# Patient Record
Sex: FEMALE | Race: WHITE | Employment: FULL TIME | URBAN - METROPOLITAN AREA
[De-identification: names, ages, dates, MRNs, and addresses within clinical notes are randomized per-mention and may not be internally consistent; named-entity substitution may affect disease eponyms.]

---

## 2017-12-19 ENCOUNTER — TELEPHONE (OUTPATIENT)
Dept: PHARMACY | Facility: CLINIC | Age: 42
End: 2017-12-19

## 2017-12-19 NOTE — TELEPHONE ENCOUNTER
Called patient to inform missing office note. NO answer left nuha GALLOWAY  [address] [facility]  Direct: [phone]  Department, toll free: [phone], option 7

## 2018-01-15 ENCOUNTER — CLINICAL DOCUMENTATION (OUTPATIENT)
Dept: PHARMACY | Facility: CLINIC | Age: 43
End: 2018-01-15

## 2018-04-04 DIAGNOSIS — R52 PAIN: Primary | ICD-10-CM

## 2018-04-06 ENCOUNTER — OFFICE VISIT (OUTPATIENT)
Dept: ORTHOPEDIC SURGERY | Age: 43
End: 2018-04-06
Payer: COMMERCIAL

## 2018-04-06 ENCOUNTER — HOSPITAL ENCOUNTER (OUTPATIENT)
Dept: GENERAL RADIOLOGY | Age: 43
Discharge: HOME | End: 2018-04-08
Payer: COMMERCIAL

## 2018-04-06 VITALS
TEMPERATURE: 98 F | RESPIRATION RATE: 18 BRPM | DIASTOLIC BLOOD PRESSURE: 65 MMHG | SYSTOLIC BLOOD PRESSURE: 111 MMHG | WEIGHT: 279.2 LBS | BODY MASS INDEX: 47.66 KG/M2 | HEART RATE: 89 BPM | HEIGHT: 64 IN

## 2018-04-06 DIAGNOSIS — M17.12 PRIMARY OSTEOARTHRITIS OF LEFT KNEE: Primary | ICD-10-CM

## 2018-04-06 DIAGNOSIS — R52 PAIN: ICD-10-CM

## 2018-04-06 PROCEDURE — 99203 OFFICE O/P NEW LOW 30 MIN: CPT

## 2018-04-06 PROCEDURE — 99203 OFFICE O/P NEW LOW 30 MIN: CPT | Performed by: ORTHOPAEDIC SURGERY

## 2018-04-10 ENCOUNTER — TELEPHONE (OUTPATIENT)
Dept: ORTHOPEDIC SURGERY | Age: 43
End: 2018-04-10

## 2018-04-11 ENCOUNTER — TELEPHONE (OUTPATIENT)
Dept: PHARMACY | Facility: CLINIC | Age: 43
End: 2018-04-11

## 2018-04-25 ENCOUNTER — PATIENT MESSAGE (OUTPATIENT)
Dept: PHARMACY | Facility: CLINIC | Age: 43
End: 2018-04-25

## 2018-05-18 ENCOUNTER — EMPLOYEE WELLNESS (OUTPATIENT)
Dept: OTHER | Age: 43
End: 2018-05-18

## 2018-05-19 LAB
CHOLESTEROL, TOTAL: 188 MG/DL (ref 0–199)
GLUCOSE BLD-MCNC: 132 MG/DL (ref 74–107)
HBA1C MFR BLD: 9.7 % (ref 4.8–5.9)
HDLC SERPL-MCNC: 59 MG/DL
LDL CHOLESTEROL CALCULATED: 112 MG/DL (ref 0–99)
TRIGL SERPL-MCNC: 83 MG/DL (ref 0–149)

## 2018-05-29 VITALS — WEIGHT: 277 LBS | BODY MASS INDEX: 47.55 KG/M2

## 2018-06-06 ENCOUNTER — OFFICE VISIT (OUTPATIENT)
Dept: ORTHOPEDIC SURGERY | Age: 43
End: 2018-06-06
Payer: COMMERCIAL

## 2018-06-06 VITALS
SYSTOLIC BLOOD PRESSURE: 124 MMHG | HEIGHT: 64 IN | HEART RATE: 82 BPM | DIASTOLIC BLOOD PRESSURE: 70 MMHG | BODY MASS INDEX: 46.1 KG/M2 | RESPIRATION RATE: 18 BRPM | WEIGHT: 270 LBS

## 2018-06-06 DIAGNOSIS — M17.12 PRIMARY OSTEOARTHRITIS OF LEFT KNEE: Primary | ICD-10-CM

## 2018-06-06 PROCEDURE — 20610 DRAIN/INJ JOINT/BURSA W/O US: CPT | Performed by: NURSE PRACTITIONER

## 2018-06-06 PROCEDURE — 99213 OFFICE O/P EST LOW 20 MIN: CPT | Performed by: NURSE PRACTITIONER

## 2018-06-06 PROCEDURE — 6360000002 HC RX W HCPCS

## 2018-06-06 PROCEDURE — 99212 OFFICE O/P EST SF 10 MIN: CPT | Performed by: NURSE PRACTITIONER

## 2018-06-06 PROCEDURE — 2500000003 HC RX 250 WO HCPCS

## 2018-06-08 RX ORDER — LIDOCAINE HYDROCHLORIDE 10 MG/ML
4 INJECTION, SOLUTION INFILTRATION; PERINEURAL ONCE
Status: COMPLETED | OUTPATIENT
Start: 2018-06-08 | End: 2018-06-08

## 2018-06-08 RX ORDER — LIDOCAINE HYDROCHLORIDE 10 MG/ML
2.5 INJECTION, SOLUTION INFILTRATION; PERINEURAL ONCE
Status: COMPLETED | OUTPATIENT
Start: 2018-06-08 | End: 2018-06-08

## 2018-06-08 RX ORDER — BUPIVACAINE HYDROCHLORIDE 2.5 MG/ML
4 INJECTION, SOLUTION EPIDURAL; INFILTRATION; INTRACAUDAL ONCE
Status: COMPLETED | OUTPATIENT
Start: 2018-06-08 | End: 2018-06-08

## 2018-06-08 RX ORDER — TRIAMCINOLONE ACETONIDE 40 MG/ML
40 INJECTION, SUSPENSION INTRA-ARTICULAR; INTRAMUSCULAR ONCE
Status: COMPLETED | OUTPATIENT
Start: 2018-06-08 | End: 2018-06-08

## 2018-06-08 RX ADMIN — BUPIVACAINE HYDROCHLORIDE 10 MG: 2.5 INJECTION, SOLUTION EPIDURAL; INFILTRATION; INTRACAUDAL at 11:41

## 2018-06-08 RX ADMIN — LIDOCAINE HYDROCHLORIDE 4 ML: 10 INJECTION, SOLUTION INFILTRATION; PERINEURAL at 11:42

## 2018-06-08 RX ADMIN — LIDOCAINE HYDROCHLORIDE 2.5 ML: 10 INJECTION, SOLUTION INFILTRATION; PERINEURAL at 11:41

## 2018-06-08 RX ADMIN — TRIAMCINOLONE ACETONIDE 40 MG: 40 INJECTION, SUSPENSION INTRA-ARTICULAR; INTRAMUSCULAR at 11:43

## 2018-06-13 ENCOUNTER — HOSPITAL ENCOUNTER (OUTPATIENT)
Dept: PHYSICAL THERAPY | Age: 43
Setting detail: THERAPIES SERIES
Discharge: HOME | End: 2018-06-13
Payer: COMMERCIAL

## 2018-06-13 PROCEDURE — G8978 MOBILITY CURRENT STATUS: HCPCS

## 2018-06-13 PROCEDURE — G8979 MOBILITY GOAL STATUS: HCPCS

## 2018-06-13 PROCEDURE — 97161 PT EVAL LOW COMPLEX 20 MIN: CPT

## 2018-06-20 ENCOUNTER — HOSPITAL ENCOUNTER (OUTPATIENT)
Dept: PHYSICAL THERAPY | Age: 43
Setting detail: THERAPIES SERIES
Discharge: HOME | End: 2018-06-20
Payer: COMMERCIAL

## 2018-06-20 PROCEDURE — 97113 AQUATIC THERAPY/EXERCISES: CPT

## 2018-06-22 ENCOUNTER — HOSPITAL ENCOUNTER (OUTPATIENT)
Dept: PHYSICAL THERAPY | Age: 43
Setting detail: THERAPIES SERIES
Discharge: HOME | End: 2018-06-22
Payer: COMMERCIAL

## 2018-06-22 PROCEDURE — 97113 AQUATIC THERAPY/EXERCISES: CPT

## 2018-06-25 ENCOUNTER — HOSPITAL ENCOUNTER (OUTPATIENT)
Age: 43
Discharge: HOME | End: 2018-06-25
Payer: COMMERCIAL

## 2018-06-25 LAB
ALBUMIN SERPL-MCNC: 4.6 G/DL (ref 3.5–5.2)
ALP BLD-CCNC: 120 U/L (ref 35–104)
ALT SERPL-CCNC: 21 U/L (ref 0–32)
ANION GAP SERPL CALCULATED.3IONS-SCNC: 13 MMOL/L (ref 7–16)
AST SERPL-CCNC: 21 U/L (ref 0–31)
BILIRUB SERPL-MCNC: 0.6 MG/DL (ref 0–1.2)
BUN BLDV-MCNC: 12 MG/DL (ref 6–20)
CALCIUM SERPL-MCNC: 9.7 MG/DL (ref 8.6–10.2)
CHLORIDE BLD-SCNC: 98 MMOL/L (ref 98–107)
CHOLESTEROL, FASTING: 225 MG/DL (ref 0–199)
CO2: 27 MMOL/L (ref 22–29)
CREAT SERPL-MCNC: 0.6 MG/DL (ref 0.5–1)
GFR AFRICAN AMERICAN: >60
GFR NON-AFRICAN AMERICAN: >60 ML/MIN/1.73
GLUCOSE FASTING: 148 MG/DL (ref 74–109)
HBA1C MFR BLD: 9.7 % (ref 4–5.6)
HCT VFR BLD CALC: 43.7 % (ref 34–48)
HDLC SERPL-MCNC: 71 MG/DL
HEMOGLOBIN: 14.1 G/DL (ref 11.5–15.5)
LDL CHOLESTEROL CALCULATED: 126 MG/DL (ref 0–99)
MCH RBC QN AUTO: 29.1 PG (ref 26–35)
MCHC RBC AUTO-ENTMCNC: 32.3 % (ref 32–34.5)
MCV RBC AUTO: 90.1 FL (ref 80–99.9)
PDW BLD-RTO: 13.1 FL (ref 11.5–15)
PLATELET # BLD: 340 E9/L (ref 130–450)
PMV BLD AUTO: 11.4 FL (ref 7–12)
POTASSIUM SERPL-SCNC: 4.5 MMOL/L (ref 3.5–5)
RBC # BLD: 4.85 E12/L (ref 3.5–5.5)
SODIUM BLD-SCNC: 138 MMOL/L (ref 132–146)
TOTAL PROTEIN: 7.6 G/DL (ref 6.4–8.3)
TRIGLYCERIDE, FASTING: 141 MG/DL (ref 0–149)
TSH SERPL DL<=0.05 MIU/L-ACNC: 8.92 UIU/ML (ref 0.27–4.2)
VLDLC SERPL CALC-MCNC: 28 MG/DL
WBC # BLD: 8.3 E9/L (ref 4.5–11.5)

## 2018-06-25 PROCEDURE — 84443 ASSAY THYROID STIM HORMONE: CPT

## 2018-06-25 PROCEDURE — 83036 HEMOGLOBIN GLYCOSYLATED A1C: CPT

## 2018-06-25 PROCEDURE — 36415 COLL VENOUS BLD VENIPUNCTURE: CPT

## 2018-06-25 PROCEDURE — 85027 COMPLETE CBC AUTOMATED: CPT

## 2018-06-25 PROCEDURE — 80061 LIPID PANEL: CPT

## 2018-06-25 PROCEDURE — 80053 COMPREHEN METABOLIC PANEL: CPT

## 2018-06-27 ENCOUNTER — HOSPITAL ENCOUNTER (OUTPATIENT)
Dept: PHYSICAL THERAPY | Age: 43
Setting detail: THERAPIES SERIES
Discharge: HOME | End: 2018-06-27
Payer: COMMERCIAL

## 2018-06-27 PROCEDURE — 97113 AQUATIC THERAPY/EXERCISES: CPT

## 2018-07-02 ENCOUNTER — OFFICE VISIT (OUTPATIENT)
Dept: FAMILY MEDICINE CLINIC | Age: 43
End: 2018-07-02
Payer: COMMERCIAL

## 2018-07-02 VITALS
RESPIRATION RATE: 18 BRPM | BODY MASS INDEX: 46.95 KG/M2 | WEIGHT: 275 LBS | DIASTOLIC BLOOD PRESSURE: 64 MMHG | HEIGHT: 64 IN | HEART RATE: 85 BPM | OXYGEN SATURATION: 97 % | TEMPERATURE: 98.7 F | SYSTOLIC BLOOD PRESSURE: 127 MMHG

## 2018-07-02 DIAGNOSIS — J01.90 ACUTE SINUSITIS, RECURRENCE NOT SPECIFIED, UNSPECIFIED LOCATION: Primary | ICD-10-CM

## 2018-07-02 PROCEDURE — 99213 OFFICE O/P EST LOW 20 MIN: CPT | Performed by: PHYSICIAN ASSISTANT

## 2018-07-02 RX ORDER — BENZONATATE 100 MG/1
100 CAPSULE ORAL 3 TIMES DAILY PRN
Qty: 15 CAPSULE | Refills: 0 | Status: SHIPPED | OUTPATIENT
Start: 2018-07-02 | End: 2018-07-07

## 2018-07-02 RX ORDER — AMOXICILLIN AND CLAVULANATE POTASSIUM 875; 125 MG/1; MG/1
1 TABLET, FILM COATED ORAL 2 TIMES DAILY
Qty: 20 TABLET | Refills: 0 | Status: SHIPPED | OUTPATIENT
Start: 2018-07-02 | End: 2018-07-12

## 2018-07-11 ENCOUNTER — HOSPITAL ENCOUNTER (OUTPATIENT)
Dept: PHYSICAL THERAPY | Age: 43
Setting detail: THERAPIES SERIES
Discharge: HOME | End: 2018-07-11
Payer: COMMERCIAL

## 2018-07-11 PROCEDURE — 97113 AQUATIC THERAPY/EXERCISES: CPT

## 2018-07-11 NOTE — PROGRESS NOTES
Patient tolerated treatment well [x] Patient limited by fatique  [x] Patient limited by pain [] Patient limited by other medical complications  [x] Other: Treatment session continues to focus on pt's gait mechanics -proper weight shifting with heel to toe pattern repeated with VCs per PTA-Pt able to comply 50%. Pt lacks stance time on LLE due to same 5-6/10 pain in pool.       Prognosis: [x] Good [] Fair  [] Poor    Patient Requires Follow-up: [x] Yes  [] No    Plan:   [x] Continue per plan of care [] Alter current plan (see comments)  [] Plan of care initiated [] Hold pending MD visit [] Discharge          Electronically signed by:  Dany Gunderson PTA [id number]

## 2018-07-13 ENCOUNTER — HOSPITAL ENCOUNTER (OUTPATIENT)
Dept: PHYSICAL THERAPY | Age: 43
Setting detail: THERAPIES SERIES
Discharge: HOME | End: 2018-07-13
Payer: COMMERCIAL

## 2018-07-13 PROCEDURE — 97113 AQUATIC THERAPY/EXERCISES: CPT

## 2018-07-20 ENCOUNTER — APPOINTMENT (OUTPATIENT)
Dept: PHYSICAL THERAPY | Age: 43
End: 2018-07-20
Payer: COMMERCIAL

## 2018-07-25 ENCOUNTER — HOSPITAL ENCOUNTER (OUTPATIENT)
Dept: PHYSICAL THERAPY | Age: 43
Setting detail: THERAPIES SERIES
Discharge: HOME | End: 2018-07-25
Payer: COMMERCIAL

## 2018-07-27 ENCOUNTER — HOSPITAL ENCOUNTER (OUTPATIENT)
Dept: PHYSICAL THERAPY | Age: 43
Setting detail: THERAPIES SERIES
Discharge: HOME | End: 2018-07-27
Payer: COMMERCIAL

## 2018-07-27 PROCEDURE — 97113 AQUATIC THERAPY/EXERCISES: CPT

## 2018-07-27 NOTE — PROGRESS NOTES
[facility]  Phone: [phone] Fax: [phone]        Physical Therapy Aquatic Flow Sheet   Date:  2018    Patient Name:  Jay Gibbons    :  1975  Restrictions/Precautions:    Diagnosis:  Referral Date : 18  Treatment Diagnosis:Left Knee Pain     Insurance/Certification information:  Med [place]   Referring Physician:   Dr Heydi Green of care signed (Y/N):    Visit# / total visits: 6/10  Pain level: 4-5/10 (same)  Electronically signed by:  Aquiles Cook PTA  Time In:1600  Time Out:1640  Key  B= Belt DB= Dumbells T= Theratube   H= Hydrotone N= Noodles W= Weights   P= Paddles S= Speedo equipment K= Kickboard     Exercises/Activities   Warm-up/Amb  minutes  Exercises      Slow forward  6  HR/TR  20x    Slow sideways  6  Marches  20x    Slow backwards  6  Mini-squats  20x    Medium forward    4-way SLR  20x    Medium sideways    Hip circles/fig 8      Small shuffle    Hamstring curls      Jog    Knee extension      Braiding    Pelvic tilts      Bicycling  6  Scap squeezes      Marching  6  Shoulder flex/ext      Functional    Shoulder abd/add      Step    Shoulder H. abd/add      Lifting    Shoulder IR/ER      Hand to opp knee    Rowing      Push down squat    Bilateral pull down      UE PNF    Push/pull      LE PNF    Push downs      Wall push ups    Arm circles      SLS    Elbow flex/ext          Chin tuck      Stretching    UT shrugs/rolls      Gastroc/Soleus  3 x 30 sec  Rocking horse      Hamstring  3 x 30 sec        SKTC    Other      Piriformis          Hip flexor  3 x 30 sec        Ladder pull          Pec stretch          Post deltoid         Timed Code Treatment Minutes:  30    Total Treatment Minutes:  40    Treatment/Activity Tolerance:  [x] Patient tolerated treatment well [] Patient limited by fatique  [] Patient limited by pain [] Patient limited by other medical complications  [x] Other:Increased endurance as pt able to increase 3 walking patterns from 5 min to 6 without complication    Prognosis: [x] Good [] Fair  [] Poor    Patient Requires Follow-up: [x] Yes  [] No    Plan:   [x] Continue per plan of care [] Alter current plan (see comments)  [] Plan of care initiated [] Hold pending MD visit [] Discharge          Electronically signed by:  Keely Hinojosa PTA [id number]

## 2018-08-03 ENCOUNTER — HOSPITAL ENCOUNTER (OUTPATIENT)
Dept: PHYSICAL THERAPY | Age: 43
Setting detail: THERAPIES SERIES
Discharge: HOME | End: 2018-08-03
Payer: COMMERCIAL

## 2018-08-03 PROCEDURE — 97113 AQUATIC THERAPY/EXERCISES: CPT

## 2018-08-08 ENCOUNTER — HOSPITAL ENCOUNTER (OUTPATIENT)
Dept: PHYSICAL THERAPY | Age: 43
Setting detail: THERAPIES SERIES
Discharge: HOME | End: 2018-08-08
Payer: COMMERCIAL

## 2018-08-08 PROCEDURE — 97113 AQUATIC THERAPY/EXERCISES: CPT

## 2018-08-10 ENCOUNTER — HOSPITAL ENCOUNTER (OUTPATIENT)
Age: 43
Discharge: HOME | End: 2018-08-10
Payer: COMMERCIAL

## 2018-08-10 LAB
ALBUMIN SERPL-MCNC: 4.1 G/DL (ref 3.5–5.2)
ALP BLD-CCNC: 98 U/L (ref 35–104)
ALT SERPL-CCNC: 18 U/L (ref 0–32)
ANION GAP SERPL CALCULATED.3IONS-SCNC: 9 MMOL/L (ref 7–16)
AST SERPL-CCNC: 18 U/L (ref 0–31)
BILIRUB SERPL-MCNC: 0.5 MG/DL (ref 0–1.2)
BUN BLDV-MCNC: 13 MG/DL (ref 6–20)
CALCIUM SERPL-MCNC: 9 MG/DL (ref 8.6–10.2)
CHLORIDE BLD-SCNC: 99 MMOL/L (ref 98–107)
CHOLESTEROL, TOTAL: 185 MG/DL (ref 0–199)
CO2: 29 MMOL/L (ref 22–29)
CREAT SERPL-MCNC: 0.6 MG/DL (ref 0.5–1)
CREATININE URINE: 51 MG/DL (ref 29–226)
GFR AFRICAN AMERICAN: >60
GFR NON-AFRICAN AMERICAN: >60 ML/MIN/1.73
GLUCOSE BLD-MCNC: 190 MG/DL (ref 74–109)
HDLC SERPL-MCNC: 68 MG/DL
LDL CHOLESTEROL CALCULATED: 99 MG/DL (ref 0–99)
MICROALBUMIN UR-MCNC: <12 MG/L
MICROALBUMIN/CREAT UR-RTO: ABNORMAL (ref 0–30)
POTASSIUM SERPL-SCNC: 4.7 MMOL/L (ref 3.5–5)
SODIUM BLD-SCNC: 137 MMOL/L (ref 132–146)
T4 FREE: 1.8 NG/DL (ref 0.93–1.7)
TOTAL PROTEIN: 6.9 G/DL (ref 6.4–8.3)
TRIGL SERPL-MCNC: 88 MG/DL (ref 0–149)
TSH SERPL DL<=0.05 MIU/L-ACNC: 3.34 UIU/ML (ref 0.27–4.2)
VLDLC SERPL CALC-MCNC: 18 MG/DL

## 2018-08-10 PROCEDURE — 83036 HEMOGLOBIN GLYCOSYLATED A1C: CPT

## 2018-08-10 PROCEDURE — 84443 ASSAY THYROID STIM HORMONE: CPT

## 2018-08-10 PROCEDURE — 80061 LIPID PANEL: CPT

## 2018-08-10 PROCEDURE — 82570 ASSAY OF URINE CREATININE: CPT

## 2018-08-10 PROCEDURE — 80053 COMPREHEN METABOLIC PANEL: CPT

## 2018-08-10 PROCEDURE — 36415 COLL VENOUS BLD VENIPUNCTURE: CPT

## 2018-08-10 PROCEDURE — 82044 UR ALBUMIN SEMIQUANTITATIVE: CPT

## 2018-08-10 PROCEDURE — 84439 ASSAY OF FREE THYROXINE: CPT

## 2018-08-10 PROCEDURE — 83721 ASSAY OF BLOOD LIPOPROTEIN: CPT

## 2018-08-11 LAB — HBA1C MFR BLD: 8.8 % (ref 4–5.6)

## 2018-08-15 ENCOUNTER — HOSPITAL ENCOUNTER (OUTPATIENT)
Dept: PHYSICAL THERAPY | Age: 43
Setting detail: THERAPIES SERIES
Discharge: HOME | End: 2018-08-15
Payer: COMMERCIAL

## 2018-08-15 PROCEDURE — 97113 AQUATIC THERAPY/EXERCISES: CPT

## 2018-08-15 NOTE — PROGRESS NOTES
[facility]  Phone: [phone] Fax: [phone]        Physical Therapy Aquatic Flow Sheet   Date:  8/15/2018    Patient Name:  Aba Barr    :  1975  Visit# / total visits: 8/10  Restrictions/Precautions:    Diagnosis:  Referral Date : 18  Treatment Diagnosis:Left Knee Pain     Insurance/Certification information:  Med [place]   Referring Physician:   Dr Patel [facility] of care signed (Y/N):      Pain level: 3/10 (better)  Electronically signed by:  Devante Gibbs PTA  Time In:1600  Time Out:1650  Key  B= Belt DB= Dumbells T= Theratube   H= Hydrotone N= Noodles W= Weights   P= Paddles S= Speedo equipment K= Kickboard     Exercises/Activities   Warm-up/Amb  minutes  Exercises      Slow forward  6  HR/TR  20x    Slow sideways  6  Marches  20x    Slow backwards  6  Mini-squats  20x    Medium forward    4-way SLR  20x    Medium sideways    Hip circles/fig 8      Small shuffle    Hamstring curls      Jog    Knee extension      Braiding    Pelvic tilts      Bicycling  6  Scap squeezes      Marching  6  Shoulder flex/ext      Functional    Shoulder abd/add      Step    Shoulder H. abd/add      Lifting    Shoulder IR/ER      Hand to opp knee    Rowing      Push down squat    Bilateral pull down      UE PNF    Push/pull      LE PNF    Push downs      Wall push ups    Arm circles      SLS    Elbow flex/ext        +PF stretch  Chin tuck      Stretching  + calf stretch  UT shrugs/rolls      Gastroc/Soleus  3 x 30 sec  Rocking horse      Hamstring  3 x 30 sec        SKTC    Other      Piriformis          Hip flexor  3 x 30 sec        Ladder pull          Pec stretch          Post deltoid         Timed Code Treatment Minutes:  30    Total Treatment Minutes:  40    Treatment/Activity Tolerance:  [x] Patient tolerated treatment well [] Patient limited by fatique  [] Patient limited by pain [] Patient limited by other medical complications  [x] Other:gait mechanics appear

## 2018-08-16 LAB
Lab: NORMAL
REPORT: NORMAL
THIS TEST SENT TO: NORMAL

## 2018-08-17 ENCOUNTER — APPOINTMENT (OUTPATIENT)
Dept: PHYSICAL THERAPY | Age: 43
End: 2018-08-17
Payer: COMMERCIAL

## 2018-09-10 DIAGNOSIS — M17.12 PRIMARY OSTEOARTHRITIS OF LEFT KNEE: Primary | ICD-10-CM

## 2018-09-12 ENCOUNTER — HOSPITAL ENCOUNTER (OUTPATIENT)
Dept: GENERAL RADIOLOGY | Age: 43
Discharge: HOME | End: 2018-09-14
Payer: COMMERCIAL

## 2018-09-12 ENCOUNTER — OFFICE VISIT (OUTPATIENT)
Dept: ORTHOPEDIC SURGERY | Age: 43
End: 2018-09-12
Payer: COMMERCIAL

## 2018-09-12 VITALS
WEIGHT: 275 LBS | BODY MASS INDEX: 46.95 KG/M2 | HEIGHT: 64 IN | SYSTOLIC BLOOD PRESSURE: 126 MMHG | HEART RATE: 87 BPM | DIASTOLIC BLOOD PRESSURE: 64 MMHG

## 2018-09-12 DIAGNOSIS — M17.12 PRIMARY OSTEOARTHRITIS OF LEFT KNEE: Primary | ICD-10-CM

## 2018-09-12 DIAGNOSIS — M17.12 PRIMARY OSTEOARTHRITIS OF LEFT KNEE: ICD-10-CM

## 2018-09-12 PROCEDURE — 73560 X-RAY EXAM OF KNEE 1 OR 2: CPT

## 2018-09-12 PROCEDURE — 99213 OFFICE O/P EST LOW 20 MIN: CPT | Performed by: NURSE PRACTITIONER

## 2018-09-12 PROCEDURE — 99212 OFFICE O/P EST SF 10 MIN: CPT | Performed by: NURSE PRACTITIONER

## 2018-09-12 NOTE — LETTER
[address]  [facility] [address]  Phone: [phone]  Fax: [phone]           Go Teran CNP      September 12, 2018     Patient: Manpreet Chatterjee   YOB: 1975   Date of Visit: 9/12/2018       To Whom It May Concern: It is my medical opinion that Manpreet Chatterjee requires a disability parking placard for the following reasons:  She has limited walking ability due to an orthopedic condition. Duration of need: 6 months    If you have any questions or concerns, please don't hesitate to call.     Sincerely,        Go Teran CNP

## 2018-09-12 NOTE — PROGRESS NOTES
weight loss program- Dr Shaw Esteban  She will think about  brace-educational pamphlet given  Follow up when knee starts to bother you again     Face-to-face time 15 minutes, greater than 50% in counseling, education, and coordination of care. I have personally performed and/or participated in the history, exam, medical decision making, and procedures and agree with all pertinent clinical information. I have also reviewed and agree with the comprehensive medical history, medications and allergies unless otherwise noted.     Ramon Sheldon, CNP

## 2018-09-21 ENCOUNTER — HOSPITAL ENCOUNTER (OUTPATIENT)
Age: 43
Setting detail: OBSERVATION
Discharge: HOME | End: 2018-09-22
Attending: EMERGENCY MEDICINE | Admitting: INTERNAL MEDICINE
Payer: COMMERCIAL

## 2018-09-21 ENCOUNTER — NURSE TRIAGE (OUTPATIENT)
Dept: OTHER | Facility: CLINIC | Age: 43
End: 2018-09-21

## 2018-09-21 ENCOUNTER — APPOINTMENT (OUTPATIENT)
Dept: GENERAL RADIOLOGY | Age: 43
End: 2018-09-21
Payer: COMMERCIAL

## 2018-09-21 ENCOUNTER — TELEPHONE (OUTPATIENT)
Dept: CARDIOLOGY CLINIC | Age: 43
End: 2018-09-21

## 2018-09-21 DIAGNOSIS — R07.9 CHEST PAIN, UNSPECIFIED TYPE: Primary | ICD-10-CM

## 2018-09-21 LAB
ANION GAP SERPL CALCULATED.3IONS-SCNC: 13 MMOL/L (ref 7–16)
BASOPHILS ABSOLUTE: 0.03 E9/L (ref 0–0.2)
BASOPHILS RELATIVE PERCENT: 0.4 % (ref 0–2)
BUN BLDV-MCNC: 12 MG/DL (ref 6–20)
CALCIUM SERPL-MCNC: 9.4 MG/DL (ref 8.6–10.2)
CHLORIDE BLD-SCNC: 95 MMOL/L (ref 98–107)
CO2: 26 MMOL/L (ref 22–29)
CREAT SERPL-MCNC: 0.5 MG/DL (ref 0.5–1)
EOSINOPHILS ABSOLUTE: 0.09 E9/L (ref 0.05–0.5)
EOSINOPHILS RELATIVE PERCENT: 1.2 % (ref 0–6)
FOLATE: 11.2 NG/ML (ref 4.8–24.2)
GFR AFRICAN AMERICAN: >60
GFR NON-AFRICAN AMERICAN: >60 ML/MIN/1.73
GLUCOSE BLD-MCNC: 308 MG/DL (ref 74–109)
HCT VFR BLD CALC: 40.5 % (ref 34–48)
HEMOGLOBIN: 13.2 G/DL (ref 11.5–15.5)
IMMATURE GRANULOCYTES #: 0.04 E9/L
IMMATURE GRANULOCYTES %: 0.5 % (ref 0–5)
LYMPHOCYTES ABSOLUTE: 1.13 E9/L (ref 1.5–4)
LYMPHOCYTES RELATIVE PERCENT: 14.5 % (ref 20–42)
MAGNESIUM: 2 MG/DL (ref 1.6–2.6)
MCH RBC QN AUTO: 29.5 PG (ref 26–35)
MCHC RBC AUTO-ENTMCNC: 32.6 % (ref 32–34.5)
MCV RBC AUTO: 90.4 FL (ref 80–99.9)
MONOCYTES ABSOLUTE: 0.74 E9/L (ref 0.1–0.95)
MONOCYTES RELATIVE PERCENT: 9.5 % (ref 2–12)
NEUTROPHILS ABSOLUTE: 5.79 E9/L (ref 1.8–7.3)
NEUTROPHILS RELATIVE PERCENT: 73.9 % (ref 43–80)
PDW BLD-RTO: 12.6 FL (ref 11.5–15)
PHOSPHORUS: 4 MG/DL (ref 2.5–4.5)
PLATELET # BLD: 313 E9/L (ref 130–450)
PMV BLD AUTO: 11.5 FL (ref 7–12)
POTASSIUM SERPL-SCNC: 5.1 MMOL/L (ref 3.5–5)
RBC # BLD: 4.48 E12/L (ref 3.5–5.5)
SODIUM BLD-SCNC: 134 MMOL/L (ref 132–146)
TROPONIN: <0.01 NG/ML (ref 0–0.03)
TROPONIN: <0.01 NG/ML (ref 0–0.03)
TSH SERPL DL<=0.05 MIU/L-ACNC: 3.17 UIU/ML (ref 0.27–4.2)
VITAMIN B-12: 1605 PG/ML (ref 211–946)
WBC # BLD: 7.8 E9/L (ref 4.5–11.5)

## 2018-09-21 PROCEDURE — 87486 CHLMYD PNEUM DNA AMP PROBE: CPT

## 2018-09-21 PROCEDURE — 87798 DETECT AGENT NOS DNA AMP: CPT

## 2018-09-21 PROCEDURE — 83735 ASSAY OF MAGNESIUM: CPT

## 2018-09-21 PROCEDURE — 87040 BLOOD CULTURE FOR BACTERIA: CPT

## 2018-09-21 PROCEDURE — 84443 ASSAY THYROID STIM HORMONE: CPT

## 2018-09-21 PROCEDURE — 80048 BASIC METABOLIC PNL TOTAL CA: CPT

## 2018-09-21 PROCEDURE — 87633 RESP VIRUS 12-25 TARGETS: CPT

## 2018-09-21 PROCEDURE — 36415 COLL VENOUS BLD VENIPUNCTURE: CPT

## 2018-09-21 PROCEDURE — G0378 HOSPITAL OBSERVATION PER HR: HCPCS

## 2018-09-21 PROCEDURE — 2580000003 HC RX 258: Performed by: INTERNAL MEDICINE

## 2018-09-21 PROCEDURE — 85025 COMPLETE CBC W/AUTO DIFF WBC: CPT

## 2018-09-21 PROCEDURE — 84100 ASSAY OF PHOSPHORUS: CPT

## 2018-09-21 PROCEDURE — 82746 ASSAY OF FOLIC ACID SERUM: CPT

## 2018-09-21 PROCEDURE — 87581 M.PNEUMON DNA AMP PROBE: CPT

## 2018-09-21 PROCEDURE — 99285 EMERGENCY DEPT VISIT HI MDM: CPT

## 2018-09-21 PROCEDURE — 6370000000 HC RX 637 (ALT 250 FOR IP): Performed by: EMERGENCY MEDICINE

## 2018-09-21 PROCEDURE — 71045 X-RAY EXAM CHEST 1 VIEW: CPT

## 2018-09-21 PROCEDURE — 84484 ASSAY OF TROPONIN QUANT: CPT

## 2018-09-21 PROCEDURE — 82607 VITAMIN B-12: CPT

## 2018-09-21 PROCEDURE — 6370000000 HC RX 637 (ALT 250 FOR IP): Performed by: INTERNAL MEDICINE

## 2018-09-21 RX ORDER — ASPIRIN 81 MG/1
81 TABLET, CHEWABLE ORAL NIGHTLY
Status: DISCONTINUED | OUTPATIENT
Start: 2018-09-21 | End: 2018-09-22 | Stop reason: HOSPADM

## 2018-09-21 RX ORDER — ACETAMINOPHEN 325 MG/1
650 TABLET ORAL EVERY 4 HOURS PRN
Status: DISCONTINUED | OUTPATIENT
Start: 2018-09-21 | End: 2018-09-21 | Stop reason: SDUPTHER

## 2018-09-21 RX ORDER — ALPRAZOLAM 0.25 MG/1
0.5 TABLET ORAL NIGHTLY PRN
Status: DISCONTINUED | OUTPATIENT
Start: 2018-09-21 | End: 2018-09-22 | Stop reason: HOSPADM

## 2018-09-21 RX ORDER — RAMIPRIL 5 MG/1
10 CAPSULE ORAL DAILY
Status: DISCONTINUED | OUTPATIENT
Start: 2018-09-21 | End: 2018-09-22 | Stop reason: HOSPADM

## 2018-09-21 RX ORDER — NITROGLYCERIN 0.4 MG/1
0.4 TABLET SUBLINGUAL EVERY 5 MIN PRN
Status: DISCONTINUED | OUTPATIENT
Start: 2018-09-21 | End: 2018-09-22 | Stop reason: HOSPADM

## 2018-09-21 RX ORDER — SODIUM CHLORIDE 0.9 % (FLUSH) 0.9 %
10 SYRINGE (ML) INJECTION EVERY 12 HOURS SCHEDULED
Status: DISCONTINUED | OUTPATIENT
Start: 2018-09-21 | End: 2018-09-22 | Stop reason: HOSPADM

## 2018-09-21 RX ORDER — SODIUM CHLORIDE 9 MG/ML
INJECTION, SOLUTION INTRAVENOUS CONTINUOUS
Status: DISCONTINUED | OUTPATIENT
Start: 2018-09-21 | End: 2018-09-22 | Stop reason: HOSPADM

## 2018-09-21 RX ORDER — DOCUSATE SODIUM 100 MG/1
100 CAPSULE, LIQUID FILLED ORAL DAILY
Status: DISCONTINUED | OUTPATIENT
Start: 2018-09-21 | End: 2018-09-22 | Stop reason: HOSPADM

## 2018-09-21 RX ORDER — PANTOPRAZOLE SODIUM 40 MG/1
40 TABLET, DELAYED RELEASE ORAL
Status: DISCONTINUED | OUTPATIENT
Start: 2018-09-22 | End: 2018-09-22 | Stop reason: HOSPADM

## 2018-09-21 RX ORDER — ACETAMINOPHEN 325 MG/1
650 TABLET ORAL EVERY 4 HOURS PRN
Status: DISCONTINUED | OUTPATIENT
Start: 2018-09-21 | End: 2018-09-22 | Stop reason: HOSPADM

## 2018-09-21 RX ORDER — CELECOXIB 100 MG/1
200 CAPSULE ORAL DAILY
Status: DISCONTINUED | OUTPATIENT
Start: 2018-09-21 | End: 2018-09-22 | Stop reason: HOSPADM

## 2018-09-21 RX ORDER — DEXTROSE MONOHYDRATE 50 MG/ML
100 INJECTION, SOLUTION INTRAVENOUS PRN
Status: DISCONTINUED | OUTPATIENT
Start: 2018-09-21 | End: 2018-09-22 | Stop reason: HOSPADM

## 2018-09-21 RX ORDER — DEXTROSE MONOHYDRATE 25 G/50ML
12.5 INJECTION, SOLUTION INTRAVENOUS PRN
Status: DISCONTINUED | OUTPATIENT
Start: 2018-09-21 | End: 2018-09-22 | Stop reason: HOSPADM

## 2018-09-21 RX ORDER — SIMVASTATIN 40 MG
40 TABLET ORAL NIGHTLY
Status: DISCONTINUED | OUTPATIENT
Start: 2018-09-21 | End: 2018-09-22 | Stop reason: HOSPADM

## 2018-09-21 RX ORDER — ASPIRIN 81 MG/1
324 TABLET, CHEWABLE ORAL ONCE
Status: COMPLETED | OUTPATIENT
Start: 2018-09-21 | End: 2018-09-21

## 2018-09-21 RX ORDER — ONDANSETRON 2 MG/ML
4 INJECTION INTRAMUSCULAR; INTRAVENOUS EVERY 6 HOURS PRN
Status: DISCONTINUED | OUTPATIENT
Start: 2018-09-21 | End: 2018-09-22 | Stop reason: HOSPADM

## 2018-09-21 RX ORDER — NICOTINE POLACRILEX 4 MG
15 LOZENGE BUCCAL PRN
Status: DISCONTINUED | OUTPATIENT
Start: 2018-09-21 | End: 2018-09-22 | Stop reason: HOSPADM

## 2018-09-21 RX ORDER — BUPROPION HYDROCHLORIDE 300 MG/1
300 TABLET ORAL NIGHTLY
Status: DISCONTINUED | OUTPATIENT
Start: 2018-09-21 | End: 2018-09-22 | Stop reason: HOSPADM

## 2018-09-21 RX ORDER — LEVOTHYROXINE SODIUM 0.07 MG/1
150 TABLET ORAL DAILY
Status: DISCONTINUED | OUTPATIENT
Start: 2018-09-21 | End: 2018-09-22 | Stop reason: HOSPADM

## 2018-09-21 RX ORDER — SODIUM CHLORIDE 0.9 % (FLUSH) 0.9 %
10 SYRINGE (ML) INJECTION PRN
Status: DISCONTINUED | OUTPATIENT
Start: 2018-09-21 | End: 2018-09-22 | Stop reason: HOSPADM

## 2018-09-21 RX ORDER — UBIDECARENONE 75 MG
500 CAPSULE ORAL DAILY
Status: DISCONTINUED | OUTPATIENT
Start: 2018-09-21 | End: 2018-09-22 | Stop reason: HOSPADM

## 2018-09-21 RX ADMIN — ALPRAZOLAM 0.5 MG: 0.25 TABLET ORAL at 23:38

## 2018-09-21 RX ADMIN — SODIUM CHLORIDE: 9 INJECTION, SOLUTION INTRAVENOUS at 17:19

## 2018-09-21 RX ADMIN — SIMVASTATIN 40 MG: 40 TABLET, FILM COATED ORAL at 20:54

## 2018-09-21 RX ADMIN — Medication 10 ML: at 17:20

## 2018-09-21 RX ADMIN — ASPIRIN 81 MG 324 MG: 81 TABLET ORAL at 13:23

## 2018-09-21 RX ADMIN — ACETAMINOPHEN 650 MG: 325 TABLET ORAL at 20:54

## 2018-09-21 RX ADMIN — BUPROPION HYDROCHLORIDE 300 MG: 300 TABLET, EXTENDED RELEASE ORAL at 20:55

## 2018-09-21 RX ADMIN — ASPIRIN 81 MG 81 MG: 81 TABLET ORAL at 20:54

## 2018-09-21 ASSESSMENT — PAIN SCALES - GENERAL
PAINLEVEL_OUTOF10: 5
PAINLEVEL_OUTOF10: 3
PAINLEVEL_OUTOF10: 5
PAINLEVEL_OUTOF10: 4

## 2018-09-21 ASSESSMENT — PAIN DESCRIPTION - ORIENTATION
ORIENTATION: LEFT
ORIENTATION: MID;LEFT

## 2018-09-21 ASSESSMENT — ENCOUNTER SYMPTOMS
VOMITING: 0
CONSTIPATION: 0
NAUSEA: 0
ABDOMINAL PAIN: 0
COUGH: 0
BACK PAIN: 0
RHINORRHEA: 0
HEARTBURN: 0
COLOR CHANGE: 0
BLOOD IN STOOL: 0
SHORTNESS OF BREATH: 0
SORE THROAT: 0
DIARRHEA: 0

## 2018-09-21 ASSESSMENT — PAIN DESCRIPTION - LOCATION
LOCATION: CHEST
LOCATION: CHEST
LOCATION: HEAD
LOCATION: HEAD

## 2018-09-21 ASSESSMENT — PAIN DESCRIPTION - DESCRIPTORS
DESCRIPTORS: DULL;HEADACHE
DESCRIPTORS: ACHING;CONSTANT;DISCOMFORT
DESCRIPTORS: HEADACHE
DESCRIPTORS: DISCOMFORT

## 2018-09-21 ASSESSMENT — PAIN DESCRIPTION - ONSET
ONSET: ON-GOING
ONSET: SUDDEN

## 2018-09-21 ASSESSMENT — PAIN DESCRIPTION - PAIN TYPE
TYPE: ACUTE PAIN

## 2018-09-21 ASSESSMENT — PAIN DESCRIPTION - FREQUENCY
FREQUENCY: CONTINUOUS
FREQUENCY: INTERMITTENT

## 2018-09-21 ASSESSMENT — PAIN DESCRIPTION - PROGRESSION: CLINICAL_PROGRESSION: NOT CHANGED

## 2018-09-21 NOTE — ED PROVIDER NOTES
Present 69-year-old female presenting to the emergency department with chest discomfort and \"ffloppiness\" for the past 2 days. She says that today she felt her heart \"flip flopping\" yesterday, it was an intermittent discomfort. She said that she called her cardiologist office who recommended her come in for evaluation. She said that she last had a stress test and echocardiogram done about one year ago that was normal. She mentions that her mother had a cardiac bypass when she was in her 46s. Chest Pain   Pain location:  L chest  Pain quality comment:  \"discomfort\", floppiness  Pain radiates to:  Does not radiate  Pain severity:  Mild  Onset quality:  Sudden  Duration:  2 days  Timing:  Intermittent (lasts a few minutes at a time)  Progression:  Unchanged  Chronicity:  New  Context: at rest    Context comment:  Not related to exertion or eating  Relieved by:  None tried  Worsened by:  Nothing  Ineffective treatments:  None tried  Associated symptoms: palpitations    Associated symptoms: no abdominal pain, no back pain, no cough, no diaphoresis, no dizziness, no fever, no headache, no heartburn, no lower extremity edema, no nausea, no near-syncope, no shortness of breath, no syncope, no vomiting and no weakness    Risk factors: diabetes mellitus, high cholesterol and hypertension    Risk factors: no coronary artery disease and no smoking (former)        Review of Systems   Constitutional: Negative for chills, diaphoresis and fever. HENT: Negative for congestion, rhinorrhea and sore throat. Respiratory: Negative for cough and shortness of breath. Cardiovascular: Positive for chest pain and palpitations. Negative for syncope and near-syncope. Gastrointestinal: Negative for abdominal pain, blood in stool, constipation, diarrhea, heartburn, nausea and vomiting. Genitourinary: Negative for difficulty urinating, dysuria and hematuria. Musculoskeletal: Negative for back pain and neck pain.    Skin: she was admitted for further observation and treatment. She was given aspirin here. She remained asymptomatic without any chest pain. Vitals were stable. The HEART Risk Score for Acute Coronary Syndrome  Age <46 years, 55-63, 65+  ?  0   > 2 Risk Factors for CAD?*, 1-2, 0  2   History: slight, moderate, highly suspicious  1   EKG: Normal, nonspecific repolarization changes, ST depression   0   Troponin; low, 1-3x normal  Limit, 3x+  0   Total HEART Score: 3     *Risk factors as follows:                  - Family history of CAD    - Hypertension      - Hyperlipidemia     - Diabetes mellitus  - Current smoker  - Obesity    Predicts 6-week risk of major adverse cardiac event. Low Score (0-3 points), risk of MACE of 0.9-1.7%. Moderate Score (4-6 points), risk of MACE of 12-16.6%. High Score (7-10 points), risk of MACE of 50-65%.             ----------------------------------------------- PAST HISTORY --------------------------------------------  Past Medical History:  has a past medical history of Capsulitis; Depression; Diabetes mellitus ([facility] [address] GERD (gastroesophageal reflux disease); HTN (hypertension); Hyperlipidemia; Hypothyroid; Morbid obesity due to excess calories ([facility] [address] Nausea & vomiting; Obesity; Primary osteoarthritis of left knee; Primary osteoarthritis of left knee; Sleep apnea; and Thyroid disease. Past Surgical History:  has a past surgical history that includes shoulder surgery (Left, ?);  section; Endometrial ablation (); Foot surgery (Bilateral, [id number]); and hernia repair (). Social History:  reports that she quit smoking about 4 years ago. She smoked 0.30 packs per day. She has never used smokeless tobacco. She reports that she drinks about 1.8 - 2.4 oz of alcohol per week . She reports that she does not use drugs.     Family History: family history includes Cancer in her father and paternal grandfather; Diabetes in her father, maternal grandfather, maternal

## 2018-09-22 ENCOUNTER — APPOINTMENT (OUTPATIENT)
Dept: NUCLEAR MEDICINE | Age: 43
End: 2018-09-22
Payer: COMMERCIAL

## 2018-09-22 VITALS
TEMPERATURE: 98.4 F | HEART RATE: 86 BPM | RESPIRATION RATE: 16 BRPM | HEIGHT: 64 IN | SYSTOLIC BLOOD PRESSURE: 175 MMHG | WEIGHT: 275 LBS | OXYGEN SATURATION: 98 % | DIASTOLIC BLOOD PRESSURE: 79 MMHG | BODY MASS INDEX: 46.95 KG/M2

## 2018-09-22 PROBLEM — E66.01 OBESITY, CLASS III, BMI 40-49.9 (MORBID OBESITY) (HCC): Status: ACTIVE | Noted: 2018-09-01

## 2018-09-22 PROBLEM — Z96.41 INSULIN PUMP IN PLACE: Status: ACTIVE | Noted: 2018-01-01

## 2018-09-22 PROBLEM — E66.813 OBESITY, CLASS III, BMI 40-49.9 (MORBID OBESITY): Status: ACTIVE | Noted: 2018-09-01

## 2018-09-22 LAB
ALBUMIN SERPL-MCNC: 3.8 G/DL (ref 3.5–5.2)
ALP BLD-CCNC: 119 U/L (ref 35–104)
ALT SERPL-CCNC: 23 U/L (ref 0–32)
ANION GAP SERPL CALCULATED.3IONS-SCNC: 10 MMOL/L (ref 7–16)
AST SERPL-CCNC: 22 U/L (ref 0–31)
BASOPHILS ABSOLUTE: 0.03 E9/L (ref 0–0.2)
BASOPHILS RELATIVE PERCENT: 0.4 % (ref 0–2)
BILIRUB SERPL-MCNC: 0.5 MG/DL (ref 0–1.2)
BUN BLDV-MCNC: 11 MG/DL (ref 6–20)
CALCIUM SERPL-MCNC: 9.1 MG/DL (ref 8.6–10.2)
CHLORIDE BLD-SCNC: 102 MMOL/L (ref 98–107)
CHOLESTEROL, TOTAL: 189 MG/DL (ref 0–199)
CO2: 26 MMOL/L (ref 22–29)
CREAT SERPL-MCNC: 0.6 MG/DL (ref 0.5–1)
EOSINOPHILS ABSOLUTE: 0.15 E9/L (ref 0.05–0.5)
EOSINOPHILS RELATIVE PERCENT: 2.2 % (ref 0–6)
FILM ARRAY ADENOVIRUS: NORMAL
FILM ARRAY BORDETELLA PERTUSSIS: NORMAL
FILM ARRAY CHLAMYDOPHILIA PNEUMONIAE: NORMAL
FILM ARRAY CORONAVIRUS 229E: NORMAL
FILM ARRAY CORONAVIRUS HKU1: NORMAL
FILM ARRAY CORONAVIRUS NL63: NORMAL
FILM ARRAY CORONAVIRUS OC43: NORMAL
FILM ARRAY INFLUENZA A VIRUS 09H1: NORMAL
FILM ARRAY INFLUENZA A VIRUS H1: NORMAL
FILM ARRAY INFLUENZA A VIRUS H3: NORMAL
FILM ARRAY INFLUENZA A VIRUS: NORMAL
FILM ARRAY INFLUENZA B: NORMAL
FILM ARRAY METAPNEUMOVIRUS: NORMAL
FILM ARRAY MYCOPLASMA PNEUMONIAE: NORMAL
FILM ARRAY PARAINFLUENZA VIRUS 1: NORMAL
FILM ARRAY PARAINFLUENZA VIRUS 2: NORMAL
FILM ARRAY PARAINFLUENZA VIRUS 3: NORMAL
FILM ARRAY PARAINFLUENZA VIRUS 4: NORMAL
FILM ARRAY RESPIRATORY SYNCITIAL VIRUS: NORMAL
FILM ARRAY RHINOVIRUS/ENTEROVIRUS: NORMAL
GFR AFRICAN AMERICAN: >60
GFR NON-AFRICAN AMERICAN: >60 ML/MIN/1.73
GLUCOSE BLD-MCNC: 167 MG/DL (ref 74–109)
HCT VFR BLD CALC: 38.7 % (ref 34–48)
HDLC SERPL-MCNC: 63 MG/DL
HEMOGLOBIN: 12.6 G/DL (ref 11.5–15.5)
IMMATURE GRANULOCYTES #: 0.04 E9/L
IMMATURE GRANULOCYTES %: 0.6 % (ref 0–5)
LDL CHOLESTEROL CALCULATED: 89 MG/DL (ref 0–99)
LV EF: 60 %
LV EF: 70 %
LVEF MODALITY: NORMAL
LVEF MODALITY: NORMAL
LYMPHOCYTES ABSOLUTE: 1.39 E9/L (ref 1.5–4)
LYMPHOCYTES RELATIVE PERCENT: 20.7 % (ref 20–42)
MAGNESIUM: 2 MG/DL (ref 1.6–2.6)
MCH RBC QN AUTO: 29.6 PG (ref 26–35)
MCHC RBC AUTO-ENTMCNC: 32.6 % (ref 32–34.5)
MCV RBC AUTO: 90.8 FL (ref 80–99.9)
MONOCYTES ABSOLUTE: 0.69 E9/L (ref 0.1–0.95)
MONOCYTES RELATIVE PERCENT: 10.3 % (ref 2–12)
NEUTROPHILS ABSOLUTE: 4.43 E9/L (ref 1.8–7.3)
NEUTROPHILS RELATIVE PERCENT: 65.8 % (ref 43–80)
PDW BLD-RTO: 12.7 FL (ref 11.5–15)
PLATELET # BLD: 312 E9/L (ref 130–450)
PMV BLD AUTO: 11.7 FL (ref 7–12)
POTASSIUM SERPL-SCNC: 4.6 MMOL/L (ref 3.5–5)
RBC # BLD: 4.26 E12/L (ref 3.5–5.5)
SODIUM BLD-SCNC: 138 MMOL/L (ref 132–146)
TOTAL PROTEIN: 6.9 G/DL (ref 6.4–8.3)
TRIGL SERPL-MCNC: 183 MG/DL (ref 0–149)
VLDLC SERPL CALC-MCNC: 37 MG/DL
WBC # BLD: 6.7 E9/L (ref 4.5–11.5)

## 2018-09-22 PROCEDURE — 93018 CV STRESS TEST I&R ONLY: CPT | Performed by: INTERNAL MEDICINE

## 2018-09-22 PROCEDURE — 6370000000 HC RX 637 (ALT 250 FOR IP): Performed by: INTERNAL MEDICINE

## 2018-09-22 PROCEDURE — 80053 COMPREHEN METABOLIC PANEL: CPT

## 2018-09-22 PROCEDURE — 93016 CV STRESS TEST SUPVJ ONLY: CPT | Performed by: INTERNAL MEDICINE

## 2018-09-22 PROCEDURE — 6360000002 HC RX W HCPCS: Performed by: NURSE PRACTITIONER

## 2018-09-22 PROCEDURE — 85025 COMPLETE CBC W/AUTO DIFF WBC: CPT

## 2018-09-22 PROCEDURE — 2580000003 HC RX 258: Performed by: INTERNAL MEDICINE

## 2018-09-22 PROCEDURE — G0378 HOSPITAL OBSERVATION PER HR: HCPCS

## 2018-09-22 PROCEDURE — 99244 OFF/OP CNSLTJ NEW/EST MOD 40: CPT | Performed by: INTERNAL MEDICINE

## 2018-09-22 PROCEDURE — 93017 CV STRESS TEST TRACING ONLY: CPT

## 2018-09-22 PROCEDURE — 93306 TTE W/DOPPLER COMPLETE: CPT

## 2018-09-22 PROCEDURE — 78452 HT MUSCLE IMAGE SPECT MULT: CPT

## 2018-09-22 PROCEDURE — 94660 CPAP INITIATION&MGMT: CPT

## 2018-09-22 PROCEDURE — 83735 ASSAY OF MAGNESIUM: CPT

## 2018-09-22 PROCEDURE — 80061 LIPID PANEL: CPT

## 2018-09-22 PROCEDURE — 3430000000 HC RX DIAGNOSTIC RADIOPHARMACEUTICAL: Performed by: RADIOLOGY

## 2018-09-22 PROCEDURE — A9500 TC99M SESTAMIBI: HCPCS | Performed by: RADIOLOGY

## 2018-09-22 PROCEDURE — 36415 COLL VENOUS BLD VENIPUNCTURE: CPT

## 2018-09-22 RX ADMIN — RAMIPRIL 10 MG: 5 CAPSULE ORAL at 10:26

## 2018-09-22 RX ADMIN — DOCUSATE SODIUM 100 MG: 100 CAPSULE, LIQUID FILLED ORAL at 10:26

## 2018-09-22 RX ADMIN — ACETAMINOPHEN 650 MG: 325 TABLET ORAL at 10:30

## 2018-09-22 RX ADMIN — CELECOXIB 200 MG: 100 CAPSULE ORAL at 10:26

## 2018-09-22 RX ADMIN — SODIUM CHLORIDE: 9 INJECTION, SOLUTION INTRAVENOUS at 03:16

## 2018-09-22 RX ADMIN — Medication 10 MILLICURIE: at 11:00

## 2018-09-22 RX ADMIN — VITAMIN B12 0.1 MG ORAL TABLET 500 MCG: 0.1 TABLET ORAL at 10:25

## 2018-09-22 RX ADMIN — Medication 30 MILLICURIE: at 12:30

## 2018-09-22 RX ADMIN — REGADENOSON 0.4 MG: 0.08 INJECTION, SOLUTION INTRAVENOUS at 12:55

## 2018-09-22 ASSESSMENT — PAIN SCALES - GENERAL
PAINLEVEL_OUTOF10: 9
PAINLEVEL_OUTOF10: 6
PAINLEVEL_OUTOF10: 2
PAINLEVEL_OUTOF10: 0

## 2018-09-22 ASSESSMENT — PAIN DESCRIPTION - FREQUENCY: FREQUENCY: INTERMITTENT

## 2018-09-22 ASSESSMENT — PAIN DESCRIPTION - ORIENTATION: ORIENTATION: RIGHT;LEFT;ANTERIOR;MID

## 2018-09-22 ASSESSMENT — PAIN DESCRIPTION - PAIN TYPE: TYPE: ACUTE PAIN

## 2018-09-22 ASSESSMENT — PAIN DESCRIPTION - ONSET: ONSET: ON-GOING

## 2018-09-22 ASSESSMENT — PAIN DESCRIPTION - LOCATION: LOCATION: HEAD

## 2018-09-22 ASSESSMENT — PAIN DESCRIPTION - PROGRESSION: CLINICAL_PROGRESSION: GRADUALLY WORSENING

## 2018-09-22 ASSESSMENT — PAIN DESCRIPTION - DESCRIPTORS: DESCRIPTORS: ACHING;DISCOMFORT;HEADACHE

## 2018-09-22 NOTE — CONSULTS
\"fluttering and flopping\" without accompanying shortness of breath or  dizziness. However, as this was a new symptom, she was concerned and  presented to the emergency department for further evaluation. Upon arrival  to the emergency department, her vital signs were oral temperature 98.5,  heart rate 86 beats per minute, respiratory rate 14, blood pressure 137/68,  and oxygen saturation 97% on room air. A 12-lead EKG was obtained that  showed sinus rhythm. WBC 7.8, H and H 13.2 and 40.5. BUN/sCr 12/0.5. Troponin less than 0.01. Chest x-ray with suspected mild pulmonary  vascular congestion. She did receive aspirin 324 mg and was admitted to a  telemetry monitoring unit for further evaluation and management. Cardiology was consulted by Dr. Hussein Castro for further evaluation of chest  pain. Since that time, she has had one additional troponin less than 0.01. Currently, the patient is sitting up in bed. She denies chest pain,  palpitations, feelings of her heart racing, or dyspnea at present. Her  vital signs are stable. Telemetry monitoring shows sinus rhythm with no  arrhythmia and she is in no acute distress. PAST MEDICAL AND SURGICAL HISTORY:  1.  Echocardiogram on 05/04/2016:  EF 60%. Moderate concentric LVH. 2.  Exercise nuclear stress study on 03/28/2017:  The patient exercised  9:55 minutes, 7 METs, 81% maximum predicted heart rate, nonischemic, no  wall motion abnormality, EF 69%. 3.  Hypertension. 4.  Hyperlipidemia. 5.  Type 1 diabetes mellitus with diabetic retinopathy. 6.  Morbid obesity. 7.  Hypothyroidism, on replacement therapy. 8.  Obstructive sleep apnea with compliance with CPAP. 9.  Depression. 10.  Osteoarthritis to bilateral knees. 11.  History of right shoulder capsulitis and right frozen shoulder. 12.  Status post ventral hernia repair. 13.  Status post bilateral feet surgery (further details unknown).   14.  Status post left shoulder surgery to release impingement. 15.  Status post  section. 16.  Status post endometrial ablation. 16.  Family history of premature coronary artery disease. Mother with  history of CABG at the age of 64.  25.  Remote tobacco abuse. 19.  Echocardiogram on 2018 (Dr. Glenna England):  Left ventricular size  is grossly normal.  Moderate concentric LVH. No regional wall motion  abnormalities. EF 60%. Stage II diastolic dysfunction. FAMILY HISTORY:  Mother with history of CABG at the age of 64. Father with  history of AFib. SOCIAL HISTORY:  Quit smoking cigarettes in . Prior to that, smoked  one pack of cigarettes per day for approximately 15 years. States that she  drinks alcohol rarely on social occasion. Denies illicit drug use. Caffeine intake includes two cups of coffee daily. ALLERGIES:  No known allergies. HOME MEDICATIONS:  Xanax, aspirin 81 mg daily, Wellbutrin, Celebrex,  Colace, insulin pump, Synthroid 175 mcg daily, Prilosec, Altace 10 mg  daily, Zocor 40 mg daily, and vitamin B12. HOSPITAL MEDICATIONS:  Aspirin 81 mg daily, Wellbutrin, Celebrex, Colace,  Lovenox 40 mg daily, Humalog, Synthroid 150 mcg daily, Protonix, Altace 10  mg daily, Zocor 40 mg daily, vitamin B12, and normal saline at 100 mL per  hour. REVIEW OF SYSTEMS:  CONSTITUTIONAL:  Complains of hot flashes. Denies fevers, chills, night  sweats, or fatigue. HEENT:  Wears glasses. Complains of intermittent headaches. Denies  nosebleeds, blurred vision, or oral pain, abscess, or lesion. MUSCULOSKELETAL:  Denies falls. Complains of pain in bilateral lower  extremities with ambulation that she related to osteoarthritis to her  bilateral knees. Edema in her bilateral lower extremities that is  unchanged. NEUROLOGIC:  Denies dizziness, lightheadedness, numbness, or tingling. CARDIAC:  Chest pain, palpitations, feelings of her heart racing. See HPI. LUNGS:  Denies orthopnea or PND.   GI:  Denies heartburn, abdominal pain, nausea, vomiting, diarrhea,  constipation, or black, bloody, or tarry stools. :  Denies dysuria or hematuria. HEME:  Denies excessive bleeding or bruising. LYMPH:  Denies lumps or bumps in neck, axilla, breast, or groin. ENDOCRINE:  Denies excessive thirst.  Denies intolerance to hot or cold. PSYCH:  Complains of anxiety without depression. GYN:  Last menstrual period 2013. States that she has had no vaginal  bleeding in two years. PHYSICAL EXAMINATION:  VITAL SIGNS:  Oral temperature 98.5, heart rate 86 beats per minute,  respiratory rate 16, blood pressure 142/64, oxygen saturation 98% on room  air. Weight 275 pounds. BMI 47.3. Rest of the physical exam to follow as per Dr. Juan Jose Seymour. LABS/TESTS:  Troponin less than 0.01 x2. Sodium 138, potassium 4.6,  chloride 102, CO2 26, BUN 11, creatinine 0.6, blood glucose 167. WBC 6.7,  hemoglobin 12.6, hematocrit 38.7, platelets 932,116. Alkaline phosphatase  119. Blood cultures pending. Respiratory panel negative. Magnesium 2. Calcium 9.1. GFR greater than 60. TSH 3.170. ProBNP pending. Total  cholesterol 189, HDL 63, LDL 85, triglycerides 183. Chest x-ray:  Stable enlarged cardiomediastinal silhouette, suspected  underlying mild central pulmonary vascular congestion. IMPRESSION AND PLAN:  To follow as per Dr. Juan Jose Seymour. ABELINO Owen    D: 09/22/2018 11:07:59       T: 09/22/2018 12:29:49     [email or web address]  Job#: [id number]     Doc#: [id number]    CC:    [id number]      Expand All Collapse All    []Manual[]Template  []Copied  Full Consult      HPI:  Patient seen in consultation for chest pain. Patient notes that she has been having left chest discomfort on and off for last 24-48 hours. She notes that this lasts 5-10 minutes in duration and then resolves with rest.  She however does not have any exacerbation of this pain with exertion. She has no associated shortness of breath, diaphoresis, nausea or vomiting. She has no palpitations.           lung            ROS:   General: No unusual weight gain, no change in exercise tolerance  Skin: No rash or itching  EENT: No vision changes or nosebleeds  Cardiovascular: No orthopnea or paroxysmal nocturnal dyspnea  Respiratory: No cough or hemoptysis  Gastrointestinal: No hematemesis or recent changes in bowel habits  Genitourinary: No hematuria, urgency or frequency  Musculoskeletal: No muscular weakness or joint swelling   Neurologic / Psychiatric: No incoordination or convulsions  Allergic / Immunologic/ Lymphatic / Endocrine: No anemia or bleeding tendency     Physical Exam:   Vitals          Vitals:     09/21/18 1608 09/21/18 1645 09/21/18 2106 09/22/18 0824   BP: (!) 152/71 (!) 146/69 (!) 160/74 (!) 142/64   Pulse: 86 91 91 86   Resp: 18 16 14 16   Temp: 98.9 °F (37.2 °C) 98.4 °F (36.9 °C) 98.3 °F (36.8 °C) 98.5 °F (36.9 °C)   TempSrc:   Oral Oral Oral   SpO2: 97% 97%   98%   Weight:   275 lb (124.7 kg)       Height:   5' 4\" (1.626 m)             HEAD & FACE: Normocephalic. Symmetric. EYES: No xanthelasma. Conjunctivae not injected. EARS, NOSE, MOUTH & THROAT: Good dentition. No oral pallor or cyanosis. NECK: No JVD at 30 degrees. No thyromegaly. RESPIRATORY: Clear to auscultation and percussion in all fields. No use of accessory muscle or intercostal retractions. CARDIOVASCULAR: Regular rate and rhythm. No lifts or thrills on palpitation. Auscultation with normal S1-S2 in intensity and splitting. No carotid bruits. Abdominal aorta not enlarged. Femoral arteries without bruits. Pedal pulses 1+. No edema. ABDOMEN: Soft without hepatic or splenic enlargement. No tenderness. MUSCULOSKELETAL: No kyphosis or scoliosis of the back. Good muscle strength and tone. No muscle atrophy. EXTREMITIES: No clubbing or cyanosis. SKIN: No Xanthomas or ulcerations. NEUROLOGIC: Oriented to time, place and person. Normal mood and affect.     LYMPHATIC:  No palpable neck or supraclavicular nodes. No spleno           EKG: No acute changes     Impression:  1. Atypical chest pain   2. IDDM   3. Palpitations            Plan:  1. Cycle cardiac enzymes   2. Monitor   3. Stress testing            Isabela Quispe        The above documentation has been prepared under my direction and personally reviewed by me in its entirety. I discussed patient case in detail with JOEY and reviewed JOEY's documentation including HPI, ROS, medical/surgical history, family history, social history, allergies, and medications. I confirm that HPI, Physical Exam, Assessment and Plan are performed in their entirety by me.    Isabela Quispe MD

## 2018-09-22 NOTE — H&P
insulin lispro, Inject into the skin continuous 8661-5675 2.35 units/hr [id number] 2.40 units/hr  8080-7894 1.8 units/hr  docusate sodium (COLACE) 100 MG capsule, Take 100 mg by mouth daily  celecoxib (CELEBREX) 200 MG capsule, Take 200 mg by mouth daily  vitamin B-12 (CYANOCOBALAMIN) 500 MCG tablet, Take 500 mcg by mouth daily  levothyroxine (SYNTHROID) 175 MCG tablet, Take 175 mcg by mouth Daily   aspirin 81 MG tablet, Take 81 mg by mouth nightly. [DISCONTINUED] insulin lispro (HUMALOG) 100 UNIT/ML injection, Inject into the skin 3 times daily (before meals) Indications: insulin pump Sliding scale  ramipril (ALTACE) 5 MG tablet, Take 10 mg by mouth daily Taking 10 mg daily  omeprazole (PRILOSEC) 40 MG capsule, Take 40 mg by mouth daily. simvastatin (ZOCOR) 10 MG tablet, Take 40 mg by mouth nightly   buPROPion (WELLBUTRIN XL) 300 MG XL tablet, Take 300 mg by mouth nightly. alprazolam (XANAX) 0.5 MG tablet, Take 0.5 mg by mouth nightly as needed. Allergies:    Patient has no known allergies. Social History:    reports that she quit smoking about 4 years ago. She started smoking about 23 years ago. She has a 9.50 pack-year smoking history. She has never used smokeless tobacco. She reports that she drinks about 1.8 - 2.4 oz of alcohol per week . She reports that she does not use drugs. Family History:   family history includes Cancer in her father and paternal grandfather; Diabetes in her father, maternal grandfather, maternal grandmother, mother, and paternal grandfather; Heart Disease (age of onset: 47) in her mother; No Known Problems in her sister; Other in her father.     REVIEW OF SYSTEMS:  As above in the HPI, otherwise negative    PHYSICAL EXAM:    VS: BP (!) 142/64   Pulse 86   Temp 98.5 °F (36.9 °C) (Oral)   Resp 16   Ht 5' 4\" (1.626 m)   Wt 275 lb (124.7 kg)   SpO2 98%   BMI 47.20 kg/m²     General appearance: Alert, Awake, Oriented times 3, no distress  Skin: Warm and dry ; no rashes  Head: Normocephalic. No masses, lesions or tenderness noted  Eyes: Conjunctivae pink, sclera white. PERRL,EOM-I  Ears: External ears normal  Nose/Sinuses: Nares normal. Septum midline. Mucosa normal. No drainage  Oropharynx: Oropharynx clear with no exudate seen  Neck: Supple. No jugular venous distension, lymphadenopathy or thyromegaly Trachea midline  Lungs: Clear to auscultation bilaterally. No rhonchi, crackles or wheezes  Heart: S1 S2  Regular rate and rhythm. No rub, murmur or gallop  Abdomen: Soft, non-tender. BS normal. No masses, organomegaly; no rebound or guarding  Extremities: +1 nonpitting edema, Peripheral pulses palpable  Musculoskeletal: Muscular strength appears intact. Neuro:  No focal motor defects ; II-XII grossly intact .  HESS equally  Breast: deferred  Rectal: deferred  Genitalia:  deferred    LABS:  CBC:   Lab Results   Component Value Date    WBC 6.7 09/22/2018    RBC 4.26 09/22/2018    HGB 12.6 09/22/2018    HCT 38.7 09/22/2018    MCV 90.8 09/22/2018    MCH 29.6 09/22/2018    MCHC 32.6 09/22/2018    RDW 12.7 09/22/2018     09/22/2018    MPV 11.7 09/22/2018     CBC with Differential:    Lab Results   Component Value Date    WBC 6.7 09/22/2018    RBC 4.26 09/22/2018    HGB 12.6 09/22/2018    HCT 38.7 09/22/2018     09/22/2018    MCV 90.8 09/22/2018    MCH 29.6 09/22/2018    MCHC 32.6 09/22/2018    RDW 12.7 09/22/2018    LYMPHOPCT 20.7 09/22/2018    MONOPCT 10.3 09/22/2018    BASOPCT 0.4 09/22/2018    MONOSABS 0.69 09/22/2018    LYMPHSABS 1.39 09/22/2018    EOSABS 0.15 09/22/2018    BASOSABS 0.03 09/22/2018     Hemoglobin/Hematocrit:    Lab Results   Component Value Date    HGB 12.6 09/22/2018    HCT 38.7 09/22/2018     CMP:    Lab Results   Component Value Date     09/22/2018    K 4.6 09/22/2018     09/22/2018    CO2 26 09/22/2018    BUN 11 09/22/2018    CREATININE 0.6 09/22/2018    GFRAA >60 09/22/2018    LABGLOM >60 09/22/2018    GLUCOSE 167 09/22/2018 TRACE-INTACT 04/24/2016    GLUCOSEU Negative 04/24/2016    GLUCOSEU NEGATIVE 02/02/2012     HgBA1c:    Lab Results   Component Value Date    LABA1C 8.8 08/10/2018     FLP:    Lab Results   Component Value Date    TRIG 183 09/22/2018    HDL 63 09/22/2018    LDLCALC 89 09/22/2018    LABVLDL 37 09/22/2018     TSH:    Lab Results   Component Value Date    TSH 3.170 09/21/2018     VITAMIN B12: No components found for: B12  FOLATE:    Lab Results   Component Value Date    FOLATE 11.2 09/21/2018       RADIOLOGY:  XR CHEST PORTABLE   Final Result   1. Stable, enlarged cardiomediastinal silhouette. .   2. Suspected underlying mild central pulmonary vascular congestion      Stress/Rest NM Myocardial SPECT RX    (Results Pending)       ASSESSMENT:      Active Hospital Problems    Diagnosis    GERD (gastroesophageal reflux disease) [K21.9]    Depression [F32.9]    MILENA (obstructive sleep apnea) [G47.33]    Retinopathy due to secondary DM ([address] [E13.319]    Diabetes mellitus ([address] [E11.9]    Chest pain [R07.9]    Obesity, Class III, BMI 40-49.9 (morbid obesity) ([address] [E66.01]    Insulin pump in place [Z96.41]    Diabetic frozen shoulder associated with type 1 diabetes mellitus ([address] [E10.618, M75.00]    DM type 1 (diabetes mellitus, type 1) ([address] [E10.9]    Diabetic retinopathy ([facility] [E11.319]    HTN (hypertension) [I10]       PLAN:  Medications discussed with patient  GI prophylaxis  DVT prophylaxis  Consultants notes reviewed  Cycle cardiac enzymes  2-D echo completed this morning, pending  I spoke with cardiologist, stress test to be done this afternoon  Add low dose beta blocker to help control palpitations if stress test negative            Please note that over 50 minutes was spent in evaluating the patient, review of records and results, discussion with staff/family, etc.    Taylor Felder DO  12:34 PM  9/22/2018

## 2018-09-22 NOTE — PROGRESS NOTES
Date: 9/22/2018    Time 0100    Patient Placed On BIPAP/CPAP/ Non-Invasive Ventilation? Yes    If no must comment. Facial area red/color change? No           If YES are Blister/Lesion present? No   If yes must notify nursing staff  BIPAP/CPAP skin barrier?   Yes    Skin barrier type:Liquicel       Comments:Patient titrated to 11 cm H2O for comfort        Meredith Burns, RRT

## 2018-09-23 ENCOUNTER — CARE COORDINATION (OUTPATIENT)
Dept: CASE MANAGEMENT | Age: 43
End: 2018-09-23

## 2018-09-23 DIAGNOSIS — R07.9 CHEST PAIN, UNSPECIFIED TYPE: Primary | ICD-10-CM

## 2018-09-26 LAB — BLOOD CULTURE, ROUTINE: NORMAL

## 2018-10-01 LAB
EKG ATRIAL RATE: 90 BPM
EKG P AXIS: 49 DEGREES
EKG P-R INTERVAL: 146 MS
EKG Q-T INTERVAL: 356 MS
EKG QRS DURATION: 76 MS
EKG QTC CALCULATION (BAZETT): 435 MS
EKG R AXIS: 14 DEGREES
EKG T AXIS: 53 DEGREES
EKG VENTRICULAR RATE: 90 BPM

## 2018-10-10 ENCOUNTER — OFFICE VISIT (OUTPATIENT)
Dept: BARIATRICS/WEIGHT MGMT | Age: 43
End: 2018-10-10
Payer: COMMERCIAL

## 2018-10-10 VITALS
WEIGHT: 284.4 LBS | HEIGHT: 64 IN | TEMPERATURE: 97.8 F | DIASTOLIC BLOOD PRESSURE: 73 MMHG | BODY MASS INDEX: 48.55 KG/M2 | HEART RATE: 75 BPM | SYSTOLIC BLOOD PRESSURE: 147 MMHG

## 2018-10-10 DIAGNOSIS — M17.0 PRIMARY OSTEOARTHRITIS OF BOTH KNEES: ICD-10-CM

## 2018-10-10 PROCEDURE — 99202 OFFICE O/P NEW SF 15 MIN: CPT

## 2018-10-10 PROCEDURE — 99204 OFFICE O/P NEW MOD 45 MIN: CPT | Performed by: INTERNAL MEDICINE

## 2018-10-10 RX ORDER — TRIAMCINOLONE ACETONIDE 1 MG/G
CREAM TOPICAL PRN
COMMUNITY
Start: 2018-10-01 | End: 2019-06-26

## 2018-10-10 RX ORDER — SIMVASTATIN 40 MG
40 TABLET ORAL DAILY
COMMUNITY
Start: 2018-08-29 | End: 2018-11-28 | Stop reason: SDUPTHER

## 2018-10-10 RX ORDER — FUROSEMIDE 20 MG/1
20 TABLET ORAL EVERY OTHER DAY
COMMUNITY
End: 2020-02-05 | Stop reason: SDUPTHER

## 2018-10-10 RX ORDER — RAMIPRIL 10 MG/1
10 CAPSULE ORAL DAILY
COMMUNITY
Start: 2018-08-29 | End: 2018-11-28 | Stop reason: SDUPTHER

## 2018-10-10 NOTE — PROGRESS NOTES
Date of Encounter: 10/10/18    Chief Complaint   Patient presents with    Hypertension    Diabetes    Weight Management       HPI:     Problem -  Bilateral knee pain chronic   Duration -  Began in 2015   Severity -  high   Context -  Sees Dr. Saida Mayorga; no surgery planned; conservative/life-style management recommended;  Began due to injury   Aggravating/Alleviating Factors -  Excess weight is worsening her knee pain     Problem -  obesity   Duration -  Began 10 years ago    Severity -  high   Context -  BMI 48.8         Wt 284 lbs       HS grad wt             140 lbs  Highest wt              284 lbs  Lowest wt               140 lbs'  Pattern of wt gain    gradual  Wt change past yr   20 lbs   Aggravating/Alleviating Factors -  Ad jayla eating is worsening her weight gain      Diet:    Number of meals per day - 3    Number of snacks per day - 1    Meal volume - 9\" plate, usually seconds    Fast food/convenience store - 3x/week    Restaurants (not fast food) - 0x/week   Sweets - 2d/week   Chips or popcorn - 7d/week   Crackers/pretzels - 0d/week   Nuts - 0d/week   Dried fruit - 0d/week   Whole fruit - 5d/week   Breakfast cereal - 2d/week (plain Cherrios or Rice Krispies)   Granola/Protein/Energy bar - 0d/week   Sugar sweetened beverages - 2 cups coffee/day, 2 tablespoons of half-and-half with the first cup (20 elizabeth/Tab), 3 tablespoons of Int Delight sugar free in the second (18 elizabeth/Tbsp)   Protein - No supplements   Fiber - No supplements     Exercise:    Gym membership - [employer]    Walking - none    Running - none    Resistance - none    Aerobic class - none    PMH/PSH/FamHx/SocHx:   Each of these were reviewed in detail with the patient this visit by me. I recorded the obtained information in the chart under the respective heading. Findings pertinent to this visit are listed below.  - HTN, HLD, MILENA    Medications: The patient's current medication list was reviewed by me in detail.  I entered or updated all or container. Return to see me in six weeks    I spent 45 minutes in a face to face encounter with Rishi Mclean today. >35 minutes of this was in education and counseling regarding dietary interventions for weight reduction, protein and fiber requirements and rules of elimination.     Rupert Michael MD  Endocrinology/Obesity  10/10/18

## 2018-11-06 ENCOUNTER — OFFICE VISIT (OUTPATIENT)
Dept: CARDIOLOGY CLINIC | Age: 43
End: 2018-11-06
Payer: COMMERCIAL

## 2018-11-06 VITALS
HEIGHT: 64 IN | HEART RATE: 77 BPM | RESPIRATION RATE: 16 BRPM | BODY MASS INDEX: 46.47 KG/M2 | SYSTOLIC BLOOD PRESSURE: 128 MMHG | DIASTOLIC BLOOD PRESSURE: 78 MMHG | WEIGHT: 272.2 LBS

## 2018-11-06 DIAGNOSIS — I10 HYPERTENSION, UNSPECIFIED TYPE: Primary | ICD-10-CM

## 2018-11-06 PROCEDURE — 93000 ELECTROCARDIOGRAM COMPLETE: CPT | Performed by: INTERNAL MEDICINE

## 2018-11-06 PROCEDURE — 99213 OFFICE O/P EST LOW 20 MIN: CPT | Performed by: INTERNAL MEDICINE

## 2018-11-06 NOTE — PROGRESS NOTES
TEST VI) strip Contour Next Test Strips   Take six times daily      furosemide (LASIX) 20 MG tablet Take 20 mg by mouth every other day      ramipril (ALTACE) 10 MG capsule Take 10 mg by mouth daily      simvastatin (ZOCOR) 40 MG tablet Take 40 mg by mouth daily      triamcinolone (KENALOG) 0.1 % cream Apply topically as needed      metoprolol tartrate (LOPRESSOR) 25 MG tablet Take 0.5 tablets by mouth 2 times daily (Patient taking differently: Take 25 mg by mouth 2 times daily ) 60 tablet 3    Insulin Pump - insulin lispro Inject into the skin continuous 7445-8527 2.35 units/hr  0513-7200 2.40 units/hr   2855-5852 1.8 units/hr      docusate sodium (COLACE) 100 MG capsule Take 100 mg by mouth daily      celecoxib (CELEBREX) 200 MG capsule Take 200 mg by mouth daily      vitamin B-12 (CYANOCOBALAMIN) 500 MCG tablet Take 500 mcg by mouth daily      levothyroxine (SYNTHROID) 175 MCG tablet Take 175 mcg by mouth Daily       aspirin 81 MG tablet Take 81 mg by mouth nightly.  omeprazole (PRILOSEC) 40 MG capsule Take 40 mg by mouth daily.  buPROPion (WELLBUTRIN XL) 300 MG XL tablet Take 300 mg by mouth nightly.  alprazolam (XANAX) 0.5 MG tablet Take 0.5 mg by mouth nightly as needed. No current facility-administered medications for this visit. Physical Exam:  /78   Pulse 77   Resp 16   Ht 5' 4\" (1.626 m)   Wt 272 lb 3.2 oz (123.5 kg)   BMI 46.72 kg/m²   Wt Readings from Last 3 Encounters:   11/06/18 272 lb 3.2 oz (123.5 kg)   10/10/18 284 lb 6.4 oz (129 kg)   09/21/18 275 lb (124.7 kg)     Appearance: Awake, alert and oriented x 3, no acute respiratory distress  Skin: Intact, no rash  Head: Normocephalic, atraumatic  Eyes: EOMI, no conjunctival erythema  ENMT: No pharyngeal erythema, MMM, no rhinorrhea  Neck: Supple, no elevated JVP, no carotid bruits  Lungs: Clear to auscultation bilaterally. No wheezes, rales, or rhonchi.   Cardiac: Regular rate and rhythm, +S1S2, no test:  9/22/2018    Impression   1. No reversible perfusion defect   2. Ejection fraction is greater than 70  %.   3. No significant wall motion abnormality               The 10-year ASCVD risk score (Kate Villela, et al., 2013) is: 1.4%    Values used to calculate the score:      Age: 39 years      Sex: Female      Is Non- : No      Diabetic: Yes      Tobacco smoker: No      Systolic Blood Pressure: 478 mmHg      Is BP treated: Yes      HDL Cholesterol: 63 mg/dL      Total Cholesterol: 189 mg/dL        ASSESSMENT:  1. Atypical chest pain now resolved with a normal stress test  2. Hypertension well controlled  3. Hyperlipidemia on simvastatin  4. Obstructive sleep apnea on CPAP compliant with the CPAP  5. Type I diabetes with peripheral neuropathy  6. Hypothyroidism on HRT    Plan:   1. Continue metoprolol for rate control. 2. Continue rest of the current medications for BP and hyperlipidemia. 3. Follow up with me in one year. The patient's current medication list, allergies, problem list and results of all previously ordered testing were reviewed at today's visit.   Justina Gomez MD  [facility]) Cardiology

## 2018-11-28 ENCOUNTER — OFFICE VISIT (OUTPATIENT)
Dept: FAMILY MEDICINE CLINIC | Age: 43
End: 2018-11-28
Payer: COMMERCIAL

## 2018-11-28 VITALS
TEMPERATURE: 98.8 F | BODY MASS INDEX: 47.97 KG/M2 | RESPIRATION RATE: 16 BRPM | HEIGHT: 64 IN | WEIGHT: 281 LBS | HEART RATE: 72 BPM | DIASTOLIC BLOOD PRESSURE: 62 MMHG | OXYGEN SATURATION: 96 % | SYSTOLIC BLOOD PRESSURE: 114 MMHG

## 2018-11-28 DIAGNOSIS — E10.9 TYPE 1 DIABETES MELLITUS WITHOUT COMPLICATION (HCC): Primary | ICD-10-CM

## 2018-11-28 DIAGNOSIS — E78.2 MIXED HYPERLIPIDEMIA: ICD-10-CM

## 2018-11-28 DIAGNOSIS — G47.33 OSA (OBSTRUCTIVE SLEEP APNEA): ICD-10-CM

## 2018-11-28 DIAGNOSIS — E03.9 ACQUIRED HYPOTHYROIDISM: ICD-10-CM

## 2018-11-28 DIAGNOSIS — E66.01 OBESITY, CLASS III, BMI 40-49.9 (MORBID OBESITY) (HCC): ICD-10-CM

## 2018-11-28 DIAGNOSIS — F32.A DEPRESSION, UNSPECIFIED DEPRESSION TYPE: ICD-10-CM

## 2018-11-28 DIAGNOSIS — I10 ESSENTIAL HYPERTENSION: ICD-10-CM

## 2018-11-28 PROCEDURE — 99214 OFFICE O/P EST MOD 30 MIN: CPT | Performed by: FAMILY MEDICINE

## 2018-11-28 RX ORDER — SIMVASTATIN 40 MG
40 TABLET ORAL DAILY
Qty: 90 TABLET | Refills: 1 | Status: SHIPPED | OUTPATIENT
Start: 2018-11-28 | End: 2019-03-18 | Stop reason: SDUPTHER

## 2018-11-28 RX ORDER — RAMIPRIL 10 MG/1
10 CAPSULE ORAL DAILY
Qty: 90 CAPSULE | Refills: 1 | Status: SHIPPED | OUTPATIENT
Start: 2018-11-28 | End: 2019-03-18 | Stop reason: SDUPTHER

## 2018-11-28 NOTE — PROGRESS NOTES
effects. Hyperlipidemia: Patient presents with hyperlipidemia. Asymptomatic. Patient is  well controlled. Is currently on Simvastatin 40 mg. Compliance with treatment thus far has been adequate. Taking as prescribed. No adverse effects. The patient does use medications that may worsen dyslipidemias (corticosteroids, progestins, anabolic steroids, diuretics, beta-blockers, amiodarone, cyclosporine, olanzapine). The patient exercises rarely. The patient is not known to have coexisting coronary artery disease. Sleep Apnea: Patient presents with possible obstructive sleep apnea. Patient complains of snoring, difficulty falling asleep once awakened. Patient denies take naps during the day. Patient generally gets 5 or 6 hours of sleep per night, and states they generally have difficulty falling asleep, nightime awakenings and difficulty falling back asleep if awakened. Snoring of mild severity is present. Apneic episodes are not present. Nasal obstruction is not present. Patient does not have had tonsillectomy. Depression/Anxiety: Patient complains of evaluation of depression and  anxiety disorder. She has the following anxiety symptoms: difficulty concentrating, dizziness, feelings of losing control. Onset of symptoms was approximately several years ago, stable since that time. She denies current suicidal and homicidal ideation. Family history significant for no psychiatric illness. Possible organic causes contributing are: medications. Risk factors: none Previous treatment includes Wellbutrin and Xanax and none. She complains of the following side effects from the treatment: none. Patient's past medical, surgical, social and/or family history reviewed, updated in chart, and are non-contributory (unless otherwise stated). Medications and allergies also reviewed and updated in chart.     ROS  Review of Systems - General ROS: negative for - chills, fatigue, fever, night sweats, sleep disturbance, normal rate, regular rhythm, normal S1, S2, no murmurs, rubs, clicks or gallops. Radial pulses 2+ bilateral.  PT/DP pulse 2+ bilat. No C/C/E    Chest: clear to auscultation, no wheezes, rales or rhonchi, symmetric air entry. Abdomen: Soft, non-tender, non-distended, positive BS in all 4 quadrants    Extremities:Dorsalis pedis pulses palpated bilaterally, no clubbing, cyanosis, edema or erythema, Sensory exam of the foot is normal, tested with the monofilament. Good pulses, no lesions or ulcers, good peripheral pulses. SKIN: warm, dry, no lesions, jaundice, petechiae, pallor, cyanosis, ecchymosis    NEURO: gross motor exam normal by observation, gait normal    Mental status - alert, oriented to person, place, and time, normal mood, behavior, speech, dress, motor activity, and thought processes      Assessment/Plan  Juan Manuel Palacios was seen today for establish care, diabetes and medication refill. Diagnoses and all orders for this visit:    Type 1 diabetes mellitus without complication (HCC)  -     POCT glycosylated hemoglobin (Hb A1C)    Mixed hyperlipidemia  -     simvastatin (ZOCOR) 40 MG tablet; Take 1 tablet by mouth daily    Essential hypertension  -     ramipril (ALTACE) 10 MG capsule; Take 1 capsule by mouth daily    Acquired hypothyroidism  -  Stable, continue medications as prescribed. Anxiety  -  Stable, continue medications as prescribed. Sleep Apnea  -  Stable, continue medications as prescribed. Obesity, Class III, BMI 40-49.9 (morbid obesity) ([facility])  - Body mass index is 48.23 kg/m². BMI was elevated today, and weight loss plan recommended is : conventional weight loss and daily exercise regimen. Return in about 3 months (around 2/28/2019).       Call or go to ED immediately if symptoms worsen or persist.    Educational materials and/or home exercises printed for patient's review and were included in patient instructions on his/her After Visit Summary and given to patient at the end of visit. Counseled regarding above diagnosis, including possible risks and complications,  especially if left uncontrolled. Counseled regarding the possible side effects, risks, benefits and alternatives to treatment; patient and/or guardian verbalizes understanding, agrees, feels comfortable with and wishes to proceed with above treatment plan. Advised patient to call with any new medication issues, and read all Rx info from pharmacy to assure aware of all possible risks and side effects of medication before taking. Reviewed age and gender appropriate health screening exams and vaccinations. Advised patient regarding importance of keeping up with recommended health maintenance and to schedule as soon as possible if overdue, as this is important in assessing for undiagnosed pathology, especially cancer, as well as protecting against potentially harmful/life threatening disease. Patient and/or guardian verbalizes understanding and agrees with above counseling, assessment and plan. All questions answered.     Digna Oneill, DO

## 2019-01-07 DIAGNOSIS — R00.2 HEART PALPITATIONS: Primary | ICD-10-CM

## 2019-01-07 DIAGNOSIS — F41.9 ANXIETY: ICD-10-CM

## 2019-01-09 RX ORDER — BUPROPION HYDROCHLORIDE 300 MG/1
300 TABLET ORAL NIGHTLY
Qty: 90 TABLET | Refills: 1 | Status: SHIPPED | OUTPATIENT
Start: 2019-01-09 | End: 2019-03-18 | Stop reason: SDUPTHER

## 2019-01-10 RX ORDER — ALPRAZOLAM 0.5 MG/1
1 TABLET ORAL NIGHTLY PRN
Qty: 60 TABLET | Refills: 2 | Status: SHIPPED | OUTPATIENT
Start: 2019-01-10 | End: 2019-03-11

## 2019-01-31 ENCOUNTER — CARE COORDINATION (OUTPATIENT)
Dept: CARE COORDINATION | Age: 44
End: 2019-01-31

## 2019-03-03 ENCOUNTER — HOSPITAL ENCOUNTER (OUTPATIENT)
Age: 44
Discharge: HOME | End: 2019-03-03
Payer: COMMERCIAL

## 2019-03-03 LAB
ALBUMIN SERPL-MCNC: 4.1 G/DL (ref 3.5–5.2)
ALP BLD-CCNC: 155 U/L (ref 35–104)
ALT SERPL-CCNC: 39 U/L (ref 0–32)
ANION GAP SERPL CALCULATED.3IONS-SCNC: 11 MMOL/L (ref 7–16)
AST SERPL-CCNC: 22 U/L (ref 0–31)
BILIRUB SERPL-MCNC: 0.7 MG/DL (ref 0–1.2)
BUN BLDV-MCNC: 15 MG/DL (ref 6–20)
CALCIUM SERPL-MCNC: 9.7 MG/DL (ref 8.6–10.2)
CHLORIDE BLD-SCNC: 100 MMOL/L (ref 98–107)
CHOLESTEROL, TOTAL: 185 MG/DL (ref 0–199)
CO2: 30 MMOL/L (ref 22–29)
CREAT SERPL-MCNC: 0.7 MG/DL (ref 0.5–1)
CREATININE URINE: 77 MG/DL (ref 29–226)
GFR AFRICAN AMERICAN: >60
GFR NON-AFRICAN AMERICAN: >60 ML/MIN/1.73
GLUCOSE BLD-MCNC: 178 MG/DL (ref 74–99)
HBA1C MFR BLD: 10 % (ref 4–5.6)
HDLC SERPL-MCNC: 68 MG/DL
LDL CHOLESTEROL CALCULATED: 99 MG/DL (ref 0–99)
MICROALBUMIN UR-MCNC: <12 MG/L
MICROALBUMIN/CREAT UR-RTO: ABNORMAL (ref 0–30)
POTASSIUM SERPL-SCNC: 4.4 MMOL/L (ref 3.5–5)
SODIUM BLD-SCNC: 141 MMOL/L (ref 132–146)
T4 FREE: 1.51 NG/DL (ref 0.93–1.7)
TOTAL PROTEIN: 7.2 G/DL (ref 6.4–8.3)
TRIGL SERPL-MCNC: 92 MG/DL (ref 0–149)
TSH SERPL DL<=0.05 MIU/L-ACNC: 5.14 UIU/ML (ref 0.27–4.2)
VLDLC SERPL CALC-MCNC: 18 MG/DL

## 2019-03-03 PROCEDURE — 84443 ASSAY THYROID STIM HORMONE: CPT

## 2019-03-03 PROCEDURE — 80061 LIPID PANEL: CPT

## 2019-03-03 PROCEDURE — 82570 ASSAY OF URINE CREATININE: CPT

## 2019-03-03 PROCEDURE — 80053 COMPREHEN METABOLIC PANEL: CPT

## 2019-03-03 PROCEDURE — 83036 HEMOGLOBIN GLYCOSYLATED A1C: CPT

## 2019-03-03 PROCEDURE — 83721 ASSAY OF BLOOD LIPOPROTEIN: CPT

## 2019-03-03 PROCEDURE — 36415 COLL VENOUS BLD VENIPUNCTURE: CPT

## 2019-03-03 PROCEDURE — 84439 ASSAY OF FREE THYROXINE: CPT

## 2019-03-03 PROCEDURE — 82044 UR ALBUMIN SEMIQUANTITATIVE: CPT

## 2019-03-06 LAB
Lab: NORMAL
REPORT: NORMAL
THIS TEST SENT TO: NORMAL

## 2019-03-07 ENCOUNTER — CARE COORDINATION (OUTPATIENT)
Dept: CARE COORDINATION | Age: 44
End: 2019-03-07

## 2019-03-18 ENCOUNTER — OFFICE VISIT (OUTPATIENT)
Dept: FAMILY MEDICINE CLINIC | Age: 44
End: 2019-03-18
Payer: COMMERCIAL

## 2019-03-18 VITALS
TEMPERATURE: 98.6 F | HEART RATE: 79 BPM | HEIGHT: 63 IN | DIASTOLIC BLOOD PRESSURE: 62 MMHG | BODY MASS INDEX: 50.14 KG/M2 | RESPIRATION RATE: 16 BRPM | OXYGEN SATURATION: 98 % | SYSTOLIC BLOOD PRESSURE: 120 MMHG | WEIGHT: 283 LBS

## 2019-03-18 DIAGNOSIS — E66.01 MORBID OBESITY WITH BMI OF 50.0-59.9, ADULT (HCC): ICD-10-CM

## 2019-03-18 DIAGNOSIS — I10 ESSENTIAL HYPERTENSION: Primary | ICD-10-CM

## 2019-03-18 DIAGNOSIS — L30.9 DERMATITIS: ICD-10-CM

## 2019-03-18 DIAGNOSIS — M25.562 CHRONIC PAIN OF BOTH KNEES: ICD-10-CM

## 2019-03-18 DIAGNOSIS — E10.9 TYPE 1 DIABETES MELLITUS WITHOUT COMPLICATION (HCC): ICD-10-CM

## 2019-03-18 DIAGNOSIS — M25.561 CHRONIC PAIN OF BOTH KNEES: ICD-10-CM

## 2019-03-18 DIAGNOSIS — E78.2 MIXED HYPERLIPIDEMIA: ICD-10-CM

## 2019-03-18 DIAGNOSIS — F32.A DEPRESSION, UNSPECIFIED DEPRESSION TYPE: ICD-10-CM

## 2019-03-18 DIAGNOSIS — G89.29 CHRONIC PAIN OF BOTH KNEES: ICD-10-CM

## 2019-03-18 PROCEDURE — 99214 OFFICE O/P EST MOD 30 MIN: CPT | Performed by: FAMILY MEDICINE

## 2019-03-18 RX ORDER — BUPROPION HYDROCHLORIDE 300 MG/1
300 TABLET ORAL NIGHTLY
Qty: 90 TABLET | Refills: 3 | Status: SHIPPED | OUTPATIENT
Start: 2019-03-18 | End: 2019-07-02 | Stop reason: SDUPTHER

## 2019-03-18 RX ORDER — ICOSAPENT ETHYL 1000 MG/1
1 CAPSULE ORAL 2 TIMES DAILY
COMMUNITY
End: 2019-06-26 | Stop reason: ALTCHOICE

## 2019-03-18 RX ORDER — SIMVASTATIN 40 MG
40 TABLET ORAL DAILY
Qty: 90 TABLET | Refills: 3 | Status: SHIPPED | OUTPATIENT
Start: 2019-03-18 | End: 2019-08-21 | Stop reason: SDUPTHER

## 2019-03-18 RX ORDER — ALPRAZOLAM 0.25 MG/1
0.5 TABLET ORAL NIGHTLY
COMMUNITY
End: 2019-04-03 | Stop reason: DRUGHIGH

## 2019-03-18 RX ORDER — RAMIPRIL 10 MG/1
10 CAPSULE ORAL DAILY
Qty: 90 CAPSULE | Refills: 3 | Status: SHIPPED | OUTPATIENT
Start: 2019-03-18 | End: 2019-09-13 | Stop reason: SDUPTHER

## 2019-03-20 ENCOUNTER — CLINICAL DOCUMENTATION (OUTPATIENT)
Dept: PHARMACY | Facility: CLINIC | Age: 44
End: 2019-03-20

## 2019-04-01 ENCOUNTER — CLINICAL DOCUMENTATION (OUTPATIENT)
Dept: PHARMACY | Facility: CLINIC | Age: 44
End: 2019-04-01

## 2019-04-01 NOTE — PROGRESS NOTES
Pharmacy Pop Care Documentation:      The application for Donald Lugo for enrollment into the diabetes management program has been reviewed and accepted on 4/1/19.     Shahid Davidson

## 2019-04-03 ENCOUNTER — OFFICE VISIT (OUTPATIENT)
Dept: FAMILY MEDICINE CLINIC | Age: 44
End: 2019-04-03
Payer: COMMERCIAL

## 2019-04-03 ENCOUNTER — CARE COORDINATION (OUTPATIENT)
Dept: CARE COORDINATION | Age: 44
End: 2019-04-03

## 2019-04-03 VITALS
OXYGEN SATURATION: 96 % | HEART RATE: 74 BPM | HEIGHT: 63 IN | RESPIRATION RATE: 16 BRPM | SYSTOLIC BLOOD PRESSURE: 120 MMHG | WEIGHT: 291.4 LBS | TEMPERATURE: 98.1 F | BODY MASS INDEX: 51.63 KG/M2 | DIASTOLIC BLOOD PRESSURE: 72 MMHG

## 2019-04-03 DIAGNOSIS — L30.9 DERMATITIS: Primary | ICD-10-CM

## 2019-04-03 PROCEDURE — 99213 OFFICE O/P EST LOW 20 MIN: CPT | Performed by: PHYSICIAN ASSISTANT

## 2019-04-03 RX ORDER — CETIRIZINE HYDROCHLORIDE 10 MG/1
10 TABLET ORAL DAILY
Qty: 30 TABLET | Refills: 1 | Status: SHIPPED | OUTPATIENT
Start: 2019-04-03 | End: 2019-05-03

## 2019-04-03 RX ORDER — CEPHALEXIN 500 MG/1
500 CAPSULE ORAL 2 TIMES DAILY
Qty: 20 CAPSULE | Refills: 0 | Status: SHIPPED | OUTPATIENT
Start: 2019-04-03 | End: 2019-06-26 | Stop reason: ALTCHOICE

## 2019-04-03 RX ORDER — TRIAMCINOLONE ACETONIDE 1 MG/G
CREAM TOPICAL
Qty: 80 G | Refills: 0 | Status: SHIPPED | OUTPATIENT
Start: 2019-04-03 | End: 2020-01-20 | Stop reason: SDUPTHER

## 2019-04-03 RX ORDER — ALPRAZOLAM 0.5 MG/1
0.5 TABLET ORAL 2 TIMES DAILY
COMMUNITY
End: 2019-04-15 | Stop reason: SDUPTHER

## 2019-04-03 RX ORDER — LEVOTHYROXINE SODIUM 0.2 MG/1
200 TABLET ORAL DAILY
COMMUNITY

## 2019-04-05 NOTE — PROGRESS NOTES
Chief Complaint:   Rash (Pt has red, itchy rash w/raised bumps on both ankles x 1-2 days)    History of Present Illness   Source of history provided by:  patient. Suzy Liu is a 39 y.o. old female who has a past medical history of:   Past Medical History:   Diagnosis Date    Capsulitis     right shoulder    Depression     Diabetes mellitus ([facility]     AGE 2, TYPE 1    Diabetic frozen shoulder associated with type 1 diabetes mellitus ([facility] 2013    RIGHT    GERD (gastroesophageal reflux disease)     HTN (hypertension)     Hyperlipidemia     Hypothyroid 2/7/2012    Insulin pump in place 2018    Nausea & vomiting     resolved    Obesity 2/7/2012    Obesity, Class III, BMI 40-49.9 (morbid obesity) ([facility] 09/2018    BMI 47.3    MILENA (obstructive sleep apnea) 03/02/2012    USES CPAP    Primary osteoarthritis of knee     B/L    Retinopathy due to secondary DM ([facility] 2011    LASER [place]    Sleep apnea 3/2/2012    uses cpap, instructed to bring    Thyroid disease    presents for complaint of a rash to the bilateral ankles, which began 2 days ago. Patient states rash began suddenly. Never had anything like this before. Denies recent changes in medications, foods, lotions, detergents, shampoos, etc. She has not been outside doing yard work recently. She has dogs that have been outside. Pt states the area is itchy, red, and has not noted streaking. She reports some clear drainage from the right leg. She has chronic lower leg swelling. She occasionally takes her diuretic. She is diabetic. Denies any fever, chills, HA, recent illness, myalgias, vomiting, or lethargy.        ROS    Unless otherwise stated in this report or unable to obtain because of the patient's clinical or mental status as evidenced by the medical record, this patients's positive and negative responses for Review of Systems, constitutional, psych, eyes, ENT, cardiovascular, respiratory, gastrointestinal, neurological, genitourinary, with slight clear drainage and vesciles. Left leg without vesicles. No TTP over same area. No excoriations, papules, pustules, or bullae noted. No lymphangitic streaking. Neurological:  Orientation age-appropriate unless noted elseware. Motor functions intact. Lab / Imaging Results   (All laboratory and radiology results have been personally reviewed by myself)  Labs:    Imaging: All Radiology results interpreted by Radiologist unless otherwise noted. Assessment / Plan     Impression(s):  1. Dermatitis      Disposition:  Disposition: Discharge to home    New Prescriptions    CEPHALEXIN (KEFLEX) 500 MG CAPSULE    Take 1 capsule by mouth 2 times daily    CETIRIZINE (ZYRTEC) 10 MG TABLET    Take 1 tablet by mouth daily    TRIAMCINOLONE (KENALOG) 0.1 % CREAM    Apply topically to affected areas 2 times daily. Rash appears more like a dermatitis/poison ivy than a cellulitis; however, given open area with some drainage, will add on keflex. Recommended elevation and compression stockings. Recheck in 1 week. Discussed red flag symptoms. To call or to ER if changes or worse. Pt advised to take all medications as directed.

## 2019-04-09 DIAGNOSIS — I10 ESSENTIAL HYPERTENSION: ICD-10-CM

## 2019-04-11 ENCOUNTER — TELEPHONE (OUTPATIENT)
Dept: PHARMACY | Facility: CLINIC | Age: 44
End: 2019-04-11

## 2019-04-11 NOTE — TELEPHONE ENCOUNTER
Called patient to schedule yearly pharmacist appointment to discuss medications for Diabetes Management Program.    No answer. Left VM on home/cell TAD: Please call back at [phone] Option #7. Karuna Mandujano, [id number] [address]   Department, toll free: [phone], option 7       Letter and MyChart message also sent.

## 2019-04-11 NOTE — LETTER
Elissa Hermosillo   [address]  [place] [address]           04/11/19     Dear Paul Nunez! You are currently enrolled into the [facility]) Diabetes Management Program beginning on April 1st, 2019. The [address] [place] [address] Team has attempted to contact you to schedule your 2019 Diabetes Management telephone appointment but was unable to reach you. One of the requirements to participate in the [facility] Diabetes Management Program is to complete this appointment yearly. We would like to work with you and your doctor to:  - Review your medications, including over-the-counter and herbal medications  - Answer questions about your medications and how to get the most benefit from them  - Identify potential drug interactions or side effects and help fix them  - Identify preferred medications that are equally effective, but available at a lower cost to you  - Help you reach the necessary requirements to remain enrolled in the Diabetes Management Program offered by [facility]     Please call [phone] and select option #7 to schedule this appointment to take advantage of this service.  Telephone appointments are available Monday thru Friday from 7:30 AM till 5:30 PM.        Sincerely,      [address]  Phone: [phone], option 7

## 2019-04-15 DIAGNOSIS — F32.A DEPRESSION, UNSPECIFIED DEPRESSION TYPE: Primary | ICD-10-CM

## 2019-04-17 RX ORDER — ALPRAZOLAM 0.5 MG/1
0.5 TABLET ORAL 2 TIMES DAILY
Qty: 60 TABLET | Refills: 0 | Status: SHIPPED | OUTPATIENT
Start: 2019-04-17 | End: 2019-05-17

## 2019-04-26 NOTE — CARE COORDINATION
Spoke with patient in person , she is enrolled into care coordination for diabetes her A1C as of March 2019 is 10.0    She has had symptoms of a low Blood glucose a times, in which she has treated but could use some education on how to treat in order to avoid rebound hyperglycemia and tired symptoms after episodes     She expresses interest in weight loss and is considering dietician referral to assist   She does not exercise    Enc to drink more water       She is at risk for falls due to feet numb and having dizzy spells at times       PLAN     Education on diabetes , self management   Dietary instruction  Enc more water consumption  Increase activity   Falls prevention education(mail)  maill educational materials on DM zone tools   Increase activity   Educate on diiferent access to healthcare   Set goals   Continue to follow

## 2019-05-20 ENCOUNTER — CARE COORDINATION (OUTPATIENT)
Dept: CARE COORDINATION | Age: 44
End: 2019-05-20

## 2019-05-20 DIAGNOSIS — F41.9 ANXIETY: Primary | ICD-10-CM

## 2019-05-20 DIAGNOSIS — R00.2 HEART PALPITATIONS: ICD-10-CM

## 2019-05-20 NOTE — TELEPHONE ENCOUNTER
Needs refill of omeprazole 40 mg to [facility] and written Rx for xanax 0.5 mg BID.  Call patient when nready

## 2019-05-20 NOTE — CARE COORDINATION
Unable to reach patient by phone for DM CC outreach call, left message for patient to reach me M-F [phone] @ [phone] or cell [phone], if I do not hear from patient today,   next outreach call will be in . ..  Week(s) (refer to encounter tracker for next outreach date)

## 2019-05-21 RX ORDER — OMEPRAZOLE 40 MG/1
40 CAPSULE, DELAYED RELEASE ORAL DAILY
Qty: 90 CAPSULE | Refills: 3 | Status: SHIPPED
Start: 2019-05-21 | End: 2020-04-13 | Stop reason: SDUPTHER

## 2019-05-21 RX ORDER — ALPRAZOLAM 0.5 MG/1
0.5 TABLET ORAL 2 TIMES DAILY PRN
Qty: 60 TABLET | Refills: 2 | Status: SHIPPED | OUTPATIENT
Start: 2019-05-21 | End: 2019-09-18 | Stop reason: SDUPTHER

## 2019-05-28 ENCOUNTER — TELEPHONE (OUTPATIENT)
Dept: PHARMACY | Facility: CLINIC | Age: 44
End: 2019-05-28

## 2019-05-28 NOTE — LETTER
Maxime Castellon   [address] [id number]           05/28/19     Dear Theodore,    Thanks so much for taking the first step towards better health. According to our records, you are missing the following requirement that must be completed by July 1st, 2019:     Meet with a [facility]) clinical pharmacist by phone   Please call [phone] and select option #7 to schedule this appointment. Telephone appointments are available Monday thru Friday from 7:30 AM till 5:30 PM.      You will have to submit documentation of completion of requirements if your Physician does not use the [employer] electronic charting system or if you have your lab/urine tests done outside of [address]. Return the documentation to [email or web address] or by fax at [phone]     This is a courtesy reminder. If you have your appointment(s) or lab work scheduled prior to the July 1st dead-line please disregard the above information. Just a reminder of the requirements to be completed between July 1 2019 and Dec. 31 2019   Diabetes Visit with your physician in 2019 (Second yearly visit)   Second A1C in 2019   Flu vaccination (once yearly)   Take diabetes medication as prescribed as well as cholesterol (Statin) or high blood pressure (ACE/ARB) medications, if needed. 70% adherence is required of diabetes medications   Provide documentation if cholesterol and/or high blood pressure medications are not needed. Lipid panel (once yearly)   Urine albumin (once yearly)   Pneumonia Vaccination (once or as indicated by physician)     Requirements if A1C is greater than 8 percent:   Engage with a [facility] ([facility]) diabetes educator at one of our hospital locations or an ambulatory care coordinator by phone. If requirements(s) are not met by the date listed above you will be disqualified from the program and the credit valued at $600 towards your diabetic medications and supplies will be revoked.  You will be able to reapply the following calendar year. [address] [place] [address],[address] Team   [phone] Option #7   Email: [email or web address]   Fax Number: [phone]

## 2019-05-28 NOTE — TELEPHONE ENCOUNTER
Patient is still missing the following requirement for the DM Program that is due by July 1st, 2019:  Meet with a [facility] ([facility]) clinical pharmacist by phone      Called patient to schedule yearly pharmacist appointment to discuss medications for Diabetes Management Program.     No answer. Left VM on home TAD: Please call back at [phone] Option #7. Letter sent. Jessica Sanabria CPhT.   Pharmacy Technician/Administrative [address] [facility]  Department, toll free: [phone], option 7

## 2019-06-10 ENCOUNTER — HOSPITAL ENCOUNTER (OUTPATIENT)
Age: 44
Discharge: HOME | End: 2019-06-10
Payer: COMMERCIAL

## 2019-06-10 LAB
ALBUMIN SERPL-MCNC: 4.2 G/DL (ref 3.5–5.2)
ALP BLD-CCNC: 154 U/L (ref 35–104)
ALT SERPL-CCNC: 32 U/L (ref 0–32)
ANION GAP SERPL CALCULATED.3IONS-SCNC: 11 MMOL/L (ref 7–16)
AST SERPL-CCNC: 50 U/L (ref 0–31)
BILIRUB SERPL-MCNC: 0.4 MG/DL (ref 0–1.2)
BUN BLDV-MCNC: 21 MG/DL (ref 6–20)
CALCIUM SERPL-MCNC: 9.2 MG/DL (ref 8.6–10.2)
CHLORIDE BLD-SCNC: 103 MMOL/L (ref 98–107)
CHOLESTEROL, TOTAL: 165 MG/DL (ref 0–199)
CO2: 26 MMOL/L (ref 22–29)
CREAT SERPL-MCNC: 0.8 MG/DL (ref 0.5–1)
GFR AFRICAN AMERICAN: >60
GFR NON-AFRICAN AMERICAN: >60 ML/MIN/1.73
GLUCOSE BLD-MCNC: 77 MG/DL (ref 74–99)
HBA1C MFR BLD: 8.3 % (ref 4–5.6)
HDLC SERPL-MCNC: 49 MG/DL
LDL CHOLESTEROL CALCULATED: 84 MG/DL (ref 0–99)
POTASSIUM SERPL-SCNC: 4.9 MMOL/L (ref 3.5–5)
SODIUM BLD-SCNC: 140 MMOL/L (ref 132–146)
T4 FREE: 1.58 NG/DL (ref 0.93–1.7)
TOTAL PROTEIN: 7.1 G/DL (ref 6.4–8.3)
TRIGL SERPL-MCNC: 158 MG/DL (ref 0–149)
TSH SERPL DL<=0.05 MIU/L-ACNC: 3 UIU/ML (ref 0.27–4.2)
VLDLC SERPL CALC-MCNC: 32 MG/DL

## 2019-06-10 PROCEDURE — 83036 HEMOGLOBIN GLYCOSYLATED A1C: CPT

## 2019-06-10 PROCEDURE — 80061 LIPID PANEL: CPT

## 2019-06-10 PROCEDURE — 84443 ASSAY THYROID STIM HORMONE: CPT

## 2019-06-10 PROCEDURE — 36415 COLL VENOUS BLD VENIPUNCTURE: CPT

## 2019-06-10 PROCEDURE — 84439 ASSAY OF FREE THYROXINE: CPT

## 2019-06-10 PROCEDURE — 83721 ASSAY OF BLOOD LIPOPROTEIN: CPT

## 2019-06-10 PROCEDURE — 80053 COMPREHEN METABOLIC PANEL: CPT

## 2019-06-14 LAB
Lab: NORMAL
REPORT: NORMAL
THIS TEST SENT TO: NORMAL

## 2019-06-18 ENCOUNTER — TELEPHONE (OUTPATIENT)
Dept: PHARMACY | Facility: CLINIC | Age: 44
End: 2019-06-18

## 2019-06-18 NOTE — TELEPHONE ENCOUNTER
[facility]) Employee Diabetes Program    According to our records, you are missing one or more of the following requirements that must be completed by July 1st, 2019:     Patient is still missing the following requirement for the DM Program that is due by July 1st, 2019:    Meet with a [facility]) clinical pharmacist in person at a hospital location or by phone     Called patient regarding missing requirements for the Diabetes Management Program.   No answer. Left VM on  TAD: Please call back at [phone] Option #7 to retrieve the above message. Letter mailed     [facility] Team   [phone] Option #7   Email: [email or web address]   Fax Number: [phone]

## 2019-06-18 NOTE — LETTER
Darius Melendez   [address] [id number]           06/18/19     Dear Jimbo Baer,    Thanks so much for taking the first step towards better health. This letter is to inform you that we have received your enrollment form for the [facility] DM Program but you are missing the following requirements or documentation:     - Pharmacist appointment (M-F from 7:30am to 5:30pm) by phone or in person     To continue to qualify for this program the above requirements must be met by 07/01/2019. Results or visits obtained outside of [facility] will need to be provided by fax or email to the numbers listed below before the deadline in order to qualify for the program.    If requirements are not met by the date listed above you will be disqualified from the program and the credit valued at $600 towards your diabetic medications and supplies will be revoked. You will be able to reapply the following calendar year. [facility] Team        [phone] Option #7    Email: [email or web address]    Fax Number: [phone]

## 2019-06-21 ENCOUNTER — CARE COORDINATION (OUTPATIENT)
Dept: CARE COORDINATION | Age: 44
End: 2019-06-21

## 2019-06-21 NOTE — CARE COORDINATION
Unable to reach by phone for [facility] outreach call , left message for patient to reach me M-F [phone] @ [phone] or cell [phone], if I do not hear from patient today, next attempt to call will be wihtin 1-2 weeks (refer to next outreach. ..

## 2019-06-26 ENCOUNTER — TELEPHONE (OUTPATIENT)
Dept: PHARMACY | Facility: CLINIC | Age: 44
End: 2019-06-26

## 2019-06-26 RX ORDER — ALPRAZOLAM 0.5 MG/1
0.5 TABLET ORAL NIGHTLY PRN
COMMUNITY
End: 2019-08-16 | Stop reason: SDUPTHER

## 2019-06-26 NOTE — TELEPHONE ENCOUNTER
Patient is still missing the following requirement for the DM Program that is due by July 1st, 2019:  Meet with a [facility] Pharmacist by phone     Called patient regarding missing requirements for the Diabetes Management Program.   No answer. Left VM on home/cell TAD: Please call back at [phone] Option #7 to retrieve the above message.     Nisa Garcia, [id number]   Department, toll free: [phone], option 7

## 2019-06-26 NOTE — TELEPHONE ENCOUNTER
[facility]) Employee Diabetes Program  =================================================================  Savana Shah is a 38 y.o. female enrolled in the [address] [place] [address] Diabetes Program.    Patient return call regarding missing requirement - wanted to complete yearly LTAC, located within [facility] visit at time of call. Medications:  Medication Sig    omeprazole (PRILOSEC) 40 MG delayed release capsule Take 1 capsule by mouth daily    metoprolol tartrate (LOPRESSOR) 25 MG tablet Take 1 tablet by mouth 2 times daily    levothyroxine (SYNTHROID) 200 MCG tablet Take 200 mcg by mouth Daily    cephALEXin (KEFLEX) 500 MG capsule Take 1 capsule by mouth 2 times daily    triamcinolone (KENALOG) 0.1 % cream Apply topically to affected areas 2 times daily.  Icosapent Ethyl (VASCEPA) 1 g CAPS capsule Take 1 g by mouth 2 times daily    buPROPion (WELLBUTRIN XL) 300 MG extended release tablet Take 1 tablet by mouth nightly    ramipril (ALTACE) 10 MG capsule Take 1 capsule by mouth daily    simvastatin (ZOCOR) 40 MG tablet Take 1 tablet by mouth daily    hydrocortisone 2.5 % cream Apply topically 2 times daily.  blood glucose test strips (ASCENSIA AUTODISC VI;ONE TOUCH ULTRA TEST VI) strip Contour Next Test Strips   Take six times daily    furosemide (LASIX) 20 MG tablet Take 20 mg by mouth every other day    triamcinolone (KENALOG) 0.1 % cream Apply topically as needed    Insulin Pump - insulin lispro Inject into the skin continuous 6255-8767 2.35 units/hr  5245-9471 2.40 units/hr   5229-4757 1.8 units/hr    docusate sodium (COLACE) 100 MG capsule Take 100 mg by mouth daily    celecoxib (CELEBREX) 200 MG capsule Take 200 mg by mouth daily    vitamin B-12 (CYANOCOBALAMIN) 500 MCG tablet Take 1,000 mcg by mouth daily     aspirin 81 MG tablet Take 81 mg by mouth nightly.       Alprazolam nightly to help with sleep    Current Pharmacy: not using  Current testing supplies/frequency: Contour that syncs with pump  Pen needles/syringes: n/a    Allergies:  No Known Allergies   Vitals/Labs:  BP Readings from Last 3 Encounters:   19 120/72   19 120/62   18 114/62     Component      Latest Ref Rng & Units 3/3/2019           9:24 AM   Microalbumin, Random Urine      Not Established mg/L <12.0   Creatinine, Ur      29 - 226 mg/dL 77   Microalbumin Creatinine Ratio      0.0 - 30.0 - (AA)     Lab Results   Component Value Date    LABA1C 8.3 (H) 06/10/2019    LABA1C 10.0 (H) 2019    LABA1C 8.8 (H) 08/10/2018     Lab Results   Component Value Date    CHOL 165 06/10/2019    TRIG 158 (H) 06/10/2019    HDL 49 06/10/2019    LDLCHOLESTEROL 90 2014    LDLCALC 84 06/10/2019     ALT   Date Value Ref Range Status   06/10/2019 32 0 - 32 U/L Final     AST   Date Value Ref Range Status   06/10/2019 50 (H) 0 - 31 U/L Final     The 10-year ASCVD risk score (Maged Luis et al., 2013) is: 1.6%    Values used to calculate the score:      Age: 38 years      Sex: Female      Is Non- : No      Diabetic: Yes      Tobacco smoker: No      Systolic Blood Pressure: 322 mmHg      Is BP treated: Yes      HDL Cholesterol: 49 mg/dL      Total Cholesterol: 165 mg/dL     Lab Results   Component Value Date    CREATININE 0.8 06/10/2019     Estimated Creatinine Clearance: 118 mL/min (based on SCr of 0.8 mg/dL). Component      Latest Ref Rng & Units 6/10/2019           9:36 AM   GFR Non-African American      >=60 mL/min/1.73 >60       Immunizations:  Immunization History   Administered Date(s) Administered    Influenza Virus Vaccine 10/03/2017, 10/08/2018      Social History:  Social History     Tobacco Use    Smoking status: Former Smoker     Packs/day: 0.50     Years: 19.00     Pack years: 9.50     Start date: 2013     Last attempt to quit: 2014     Years since quittin.4    Smokeless tobacco: Never Used   Substance Use Topics    Alcohol use:  Yes     Alcohol/week: 1.2 oz     Types: 2 Glasses of wine per week     ASSESSMENT:  Initial Program Requirements (Y indicates has completed for the year, N indicates needs to be completed by 7/1/2019):  Yes - OV with provider for DM (1st)  Yes - A1c (1st)     Ongoing Program Requirements (Y indicates has completed for the year, N indicates needs to be completed by 12/31/2019): No - OV with provider for DM (2nd)  No - ACC/diabetes educator visit (if A1c over 8%) - please call [phone] to schedule   No - A1c (2nd)  Yes - Lipid panel  Yes - Urine microalbumin  No - Pneumococcal vaccination: appears due for PPSV23 - she is unsure if she has received in the past but states she will discuss with her provider and either obtain (seemed willing) or have provider send vaccination record   No - Influenza vaccination for Fall 2019  No - Medication adherence over 70% - although on insulin only regimen  Yes - On statin or contraindication(s) simvastatin  Yes - On ACEi/ARB or contraindication(s) ramipril     Formulary Medication Review:  Non-formulary or medications with cost-effective alternatives: none at this time. Current medications eligible for copay waiver, up to $600, through [facility] ([facility]) (mail order) Pharmacy:  - aspirin (with prescription), Humalog, simvastatin, ramipril  - Generic ([place] pharmacy-stocked) insulin syringes and pen needles  - Agamatrix or Prodigy meter and supplies     Diabetes Care:   - Glycemic Goal: <7.0%. Is not at blood glucose goal but is on insulin pump f/b endocrinology. Current regimen Humalog via pump. Other Considerations:  - Blood Pressure Goal: BP less than 140/90 mmHg due to history of DM: Is at blood pressure goal. Current regimen ramipril 10 mg daily, metoprolol tartrate 25 mg BID. No albuminuria, eGFR WNL. - Lipids: Patient has a 10-yr ASCVD risk of less than 20% with DM and is therefore a candidate for moderate-intensity statin therapy based on updated guidelines.  Is on moderate intensity statin therapy (simvastatin 40 mg nightly)  - Smoking status: fomer smoker    PLAN:  - Consideration(s) for provider:   · Patient to follow up with RD - will send encounter to dietician to initiate outreach per pt request   · PPSV23 as appropriate  - DM program gaps identified:   · Initial requirements: Requirements met   · Ongoing requirements: OV with provider for DM (2nd), ACC/diabetes educator visit (if A1c over 8%), A1c (2nd), Pneumococcal vaccination: appears may be due for Pneumovax 23, Influenza vaccination for 3094-5667 and Medication adherence over 70%   - Education to patient: as above   - Follow up: PCP/specialist for identified gaps or as scheduled below and Diabetes Educator or Ambulatory Care Coordinator for identified gaps or as scheduled below  - Upcoming appointments:   Future Appointments   Date Time Provider [place]   9/18/2019  7:45 AM [address] [place] [address] Misael Macdonald, PharmD, [facility] Pharmacist  Direct: [phone]  Dept: [phone] (toll free [phone], option 7)     CLINICAL PHARMACY CONSULT: MED RECONCILIATION/REVIEW ADDENDUM    For Pharmacy Admin Tracking Only    PHSO: Yes  Total # of Interventions Recommended: 2  - Updated Order #: 1 Updated Order Reason(s):  Other  Recommended intervention potential cost savings: 1  Total Interventions Accepted: 1  Time Spent (min): 30

## 2019-06-26 NOTE — LETTER
[address]  [address], [place] [address]  Phone: [phone]  Fax: [address]   [address]  [place] [address]           6/28/2019    Dear Marita Ahn,    Thank you for taking the time to speak with us for the [facility]! Here is a summary of some things we talked about:    Current medications eligible for copay waiver, up to $600, through [facility]) (mail order) Pharmacy:  - aspirin (with prescription), Humalog, simvastatin, ramipril  - Generic ([address] [facility] pharmacy-stocked) insulin syringes and pen needles available if needed; Prodigy (or Agamatrix) meter and supplies if desired  Mail order pharmacy: [phone], option 0 - please allow 2 weeks for processing and shipping prescriptions. Requirements to be completed:  - Initial requirements (due by 7/1/2019): Requirements met   - Ongoing requirements (due by 12/31/2019): Office visit with provider for diabetes (2nd), care coordinator/diabetes educator visit (if A1c over 8% - please call [phone] to schedule), A1c (2nd), Pneumococcal vaccination: appears may be due for Pneumovax 23, Influenza vaccination for 1570-7518 and Medication adherence over 70%     After your recent pharmacist visit, the below was identified as a potential opportunity to assist you with your diabetes management:  - Pneumonia vaccine: according to our records, you may be due for a pneumonia vaccine called Pneumovax 23. This is a one-time pneumonia vaccine until you turn 72 that is recommended for people with diabets. Talk to your doctor about the pneumonia vaccine. If you are due, you are able to obtain it at a provider office visit or it is available for $0 at a local in-network vaccinating pharmacy (NOT a [facility] or [facility] pharmacy). (continued on next page)    Please work with your provider to ensure that the 2019 requirements are completed by the appropriate dates. Please feel free to contact us with questions. Thank you,  [address] Team  Telephone: [phone] Option #7   Fax: [phone]  Email: [email or web address]

## 2019-06-28 ENCOUNTER — CARE COORDINATION (OUTPATIENT)
Dept: CARE COORDINATION | Age: 44
End: 2019-06-28

## 2019-06-28 NOTE — CARE COORDINATION
RD initiated outreach regarding employee beneficiary diabetes program. LM and introduced self/explained reason for call. Requested patient call back to discuss DM management. Provided call back information. If no return call within one month, RD will f/u again as appropriate.  RD signed onto care team.      Patricia Tirado [address], [address],    [phone]

## 2019-07-02 DIAGNOSIS — F32.A DEPRESSION, UNSPECIFIED DEPRESSION TYPE: ICD-10-CM

## 2019-07-02 RX ORDER — BUPROPION HYDROCHLORIDE 300 MG/1
300 TABLET ORAL NIGHTLY
Qty: 90 TABLET | Refills: 3 | Status: SHIPPED
Start: 2019-07-02 | End: 2020-06-30 | Stop reason: SDUPTHER

## 2019-07-02 NOTE — CARE COORDINATION
Lizz Goldberg  July 2, 2019    Initial Employee Beneficiary Diabetes Program Assessment     Nutrition Assessment    Biochemical Data, Medical Tests and Procedures:    Lab Results   Component Value Date    LABA1C 8.3 (H) 06/10/2019     No results found for: EAG    Lab Results   Component Value Date    CHOL 165 06/10/2019    CHOL 185 03/03/2019    CHOL 189 09/22/2018     Lab Results   Component Value Date    TRIG 158 (H) 06/10/2019    TRIG 92 03/03/2019    TRIG 183 (H) 09/22/2018     Lab Results   Component Value Date    HDL 49 06/10/2019    HDL 68 03/03/2019    HDL 63 09/22/2018     Lab Results   Component Value Date    LDLCALC 84 06/10/2019    LDLCALC 99 03/03/2019    LDLCALC 89 09/22/2018     Lab Results   Component Value Date    LABVLDL 32 06/10/2019    LABVLDL 18 03/03/2019    LABVLDL 37 09/22/2018     No results found for: [facility]    Lab Results   Component Value Date    WBC 6.7 09/22/2018    HGB 12.6 09/22/2018    HCT 38.7 09/22/2018    MCV 90.8 09/22/2018     09/22/2018       Lab Results   Component Value Date    CREATININE 0.8 06/10/2019    BUN 21 (H) 06/10/2019     06/10/2019    K 4.9 06/10/2019     06/10/2019    CO2 26 06/10/2019       Patient Care Team:  Zoe Terry DO as PCP - General (Family Medicine)  Zoe Terry DO as PCP - [facility] Provider  Kings Ferro MD as Consulting Physician (Internal Medicine)  Rosa Castro DO as Consulting Physician (Endocrinology)  Sandra Waller MD as Consulting Physician (Cardiology)  Catrachito Foster, RN as Care Coordinator  Richard Hawkins, MS, RD, LD as Care Coordinator (Dietitian)    Patient Active Problem List   Diagnosis    Obesity    Hypothyroid    HTN (hypertension)    Diabetic retinopathy ([address]    DM type 1 (diabetes mellitus, type 1) ([address]    Sleep apnea    Heart palpitations    Primary osteoarthritis of left knee    Chest pain    GERD (gastroesophageal reflux disease)    Depression    MILENA Signs/Symptoms as evidenced by food recall     #2 Problem Overweight/Obesity       Etiology related to increased energy intake and decreased energy expenditure        Signs/Symptoms as evidenced by BMI 51.61 kg/m2    Nutrition Intervention    Nutrition Prescription:    diabetic diet providing 4515-8017 kcals to promote wt loss ([place]-St. Jeor Equation - 250/500 kcals). Estimated daily CHO Needs: 190--210 g (based on 45-50% total calorie intake)  Estimated daily Protein Needs: 72 g (based on 1 g/kg adj. BW)  Estimated daily Fluid Needs: 64 oz. Intervention #1    Nutrition Counseling: Used open-ended questions to assess patients perceived susceptibility and severity of disease state. Discussed potential impact of health threat on patient's lifestyle. Used open-ended questions to assess patient's perception regarding benefits of and barriers to implementation of nutrition therapy. Goal(s): Patient will apply constructs discussed during nutrition counseling to initiate and encourage behavior change to positively affect disease state/nutrition status. Intervention #2    Nutrition Education: Clearly defined the benefits of nutrition therapy. Summarized and affirmed positive aspects of current nutrition patterns. Provided education regarding value of adherence to controlled CHO intake. Discussed ways to establish application of Plate Method meal planning efforts and discussed concepts of alternatives and choices regarding implementation of consistent CHO diet. Explored ideas for small, incremental goals to initiate behavior change. Goal(s) Patient will apply education to define small goals that will help to implement consistent CHO nutrition therapy.      Nutrition Monitoring and Evaluation    Indicator/Goal Criteria   #1 Patient will start to consume more balanced meals consistently  #1 Patient will follow MyPlate diet guidelines for reference of a balance of macronutrients   #2 Patient will increase

## 2019-07-30 ENCOUNTER — CARE COORDINATION (OUTPATIENT)
Dept: CARE COORDINATION | Age: 44
End: 2019-07-30

## 2019-08-08 ENCOUNTER — TELEPHONE (OUTPATIENT)
Dept: PHARMACY | Facility: CLINIC | Age: 44
End: 2019-08-08

## 2019-08-16 DIAGNOSIS — F41.9 ANXIETY: ICD-10-CM

## 2019-08-16 DIAGNOSIS — F41.9 ANXIETY: Primary | ICD-10-CM

## 2019-08-16 RX ORDER — ALPRAZOLAM 0.5 MG/1
0.5 TABLET ORAL NIGHTLY PRN
Qty: 30 TABLET | Refills: 0 | Status: SHIPPED | OUTPATIENT
Start: 2019-08-16 | End: 2019-08-16 | Stop reason: SDUPTHER

## 2019-08-16 RX ORDER — ALPRAZOLAM 0.5 MG/1
0.5 TABLET ORAL NIGHTLY PRN
Qty: 30 TABLET | Refills: 0 | Status: SHIPPED | OUTPATIENT
Start: 2019-08-16 | End: 2019-09-15

## 2019-08-21 DIAGNOSIS — E78.2 MIXED HYPERLIPIDEMIA: ICD-10-CM

## 2019-08-21 RX ORDER — SIMVASTATIN 40 MG
40 TABLET ORAL DAILY
Qty: 90 TABLET | Refills: 1 | Status: SHIPPED
Start: 2019-08-21 | End: 2020-02-11 | Stop reason: SDUPTHER

## 2019-08-27 ENCOUNTER — CARE COORDINATION (OUTPATIENT)
Dept: CARE COORDINATION | Age: 44
End: 2019-08-27

## 2019-09-04 ENCOUNTER — CARE COORDINATION (OUTPATIENT)
Dept: CARE COORDINATION | Age: 44
End: 2019-09-04

## 2019-09-04 NOTE — CARE COORDINATION
ACM attempted to reach patient for introduction to Associate Care Management. HIPAA compliant message left requesting a return phone call at patients convenience. [employer] Message also sent. Will continue to follow.

## 2019-09-05 ENCOUNTER — TELEPHONE (OUTPATIENT)
Dept: FAMILY MEDICINE CLINIC | Age: 44
End: 2019-09-05

## 2019-09-13 ENCOUNTER — CARE COORDINATION (OUTPATIENT)
Dept: CARE COORDINATION | Age: 44
End: 2019-09-13

## 2019-09-13 DIAGNOSIS — I10 ESSENTIAL HYPERTENSION: ICD-10-CM

## 2019-09-13 RX ORDER — RAMIPRIL 10 MG/1
10 CAPSULE ORAL DAILY
Qty: 90 CAPSULE | Refills: 0 | Status: SHIPPED | OUTPATIENT
Start: 2019-09-13 | End: 2019-12-19 | Stop reason: SDUPTHER

## 2019-09-13 NOTE — CARE COORDINATION
ACM attempted to reach patient for introduction to Associate Care Management. Patient's voicemail box has not been set up yet. ACM notes letter previously sent via [employer] has been read; patient has not outreach ACM. ACM will attempt final call next week. If no answer/reply ACM will send final letter and sign off.

## 2019-09-18 ENCOUNTER — OFFICE VISIT (OUTPATIENT)
Dept: FAMILY MEDICINE CLINIC | Age: 44
End: 2019-09-18
Payer: COMMERCIAL

## 2019-09-18 VITALS
SYSTOLIC BLOOD PRESSURE: 112 MMHG | HEART RATE: 72 BPM | BODY MASS INDEX: 49.61 KG/M2 | DIASTOLIC BLOOD PRESSURE: 80 MMHG | HEIGHT: 63 IN | TEMPERATURE: 98.1 F | OXYGEN SATURATION: 98 % | WEIGHT: 280 LBS | RESPIRATION RATE: 18 BRPM

## 2019-09-18 DIAGNOSIS — R00.2 HEART PALPITATIONS: ICD-10-CM

## 2019-09-18 DIAGNOSIS — F41.9 ANXIETY: ICD-10-CM

## 2019-09-18 DIAGNOSIS — I10 ESSENTIAL HYPERTENSION: Primary | ICD-10-CM

## 2019-09-18 DIAGNOSIS — Z12.31 ENCOUNTER FOR SCREENING MAMMOGRAM FOR MALIGNANT NEOPLASM OF BREAST: ICD-10-CM

## 2019-09-18 DIAGNOSIS — E78.2 MIXED HYPERLIPIDEMIA: ICD-10-CM

## 2019-09-18 DIAGNOSIS — Z23 NEED FOR VACCINATION: ICD-10-CM

## 2019-09-18 PROCEDURE — 99214 OFFICE O/P EST MOD 30 MIN: CPT | Performed by: FAMILY MEDICINE

## 2019-09-18 PROCEDURE — 90471 IMMUNIZATION ADMIN: CPT | Performed by: FAMILY MEDICINE

## 2019-09-18 PROCEDURE — 90732 PPSV23 VACC 2 YRS+ SUBQ/IM: CPT | Performed by: FAMILY MEDICINE

## 2019-09-18 RX ORDER — ALPRAZOLAM 0.5 MG/1
0.5 TABLET ORAL 2 TIMES DAILY PRN
Qty: 60 TABLET | Refills: 2 | Status: SHIPPED | OUTPATIENT
Start: 2019-09-18 | End: 2019-12-20 | Stop reason: SDUPTHER

## 2019-09-18 RX ORDER — CELECOXIB 200 MG/1
200 CAPSULE ORAL DAILY
Qty: 90 CAPSULE | Refills: 3 | Status: SHIPPED
Start: 2019-09-18 | End: 2020-04-13 | Stop reason: SDUPTHER

## 2019-09-18 NOTE — PROGRESS NOTES
well, no complaints. Above content reviewed today, no changes. Patient's past medical, surgical, social and/or family history reviewed, updated in chart, and are non-contributory (unless otherwise stated). Medications and allergies also reviewed and updated in chart.     ROS Unless otherwise specified  Review of Systems - General ROS: negative for - chills, fatigue, fever, night sweats, sleep disturbance, weight gain or weight loss  Psychological ROS: negative for - anxiety, behavioral disorder, depression, hallucinations, irritability, memory difficulties, mood swings, sleep disturbances or suicidal ideation  ENT ROS: negative for - epistaxis, headaches, hearing change, nasal congestion, nasal discharge, nasal polyps, sinus pain, tinnitus, vertigo or visual changes  Hematological and Lymphatic ROS: negative for - bleeding problems, blood clots, fatigue or swollen lymph nodes  Respiratory ROS: negative for - cough, orthopnea, shortness of breath, sputum changes, tachypnea or wheezing  Cardiovascular ROS: negative for - chest pain, dyspnea on exertion, irregular heartbeat, loss of consciousness, palpitations, paroxysmal nocturnal dyspnea or rapid heart rate  Gastrointestinal ROS: negative for - abdominal pain, blood in stools, change in bowel habits, constipation, diarrhea, gas/bloating, heartburn or nausea/vomiting  Musculoskeletal ROS: negative for - joint pain, joint stiffness, joint swelling or muscle, back pain, bowel or bladder incontinence  Neurological ROS: negative for - behavioral changes, confusion, dizziness, headaches, memory loss, numbness/tingling, seizures or speech problems, weakness  Dermatological ROS: negative for - dry skin, mole changes, nail changes, pruritus, rash or skin lesion changes    Physical Exam  Wt Readings from Last 3 Encounters:   09/18/19 280 lb (127 kg)   04/03/19 291 lb 6.4 oz (132.2 kg)   03/18/19 283 lb (128.4 kg)     Temp Readings from Last 3 Encounters:   09/18/19 98.1 °F (36.7 °C)   04/03/19 98.1 °F (36.7 °C)   03/18/19 98.6 °F (37 °C) (Oral)     BP Readings from Last 3 Encounters:   09/18/19 112/80   04/03/19 120/72   03/18/19 120/62     Pulse Readings from Last 3 Encounters:   09/18/19 72   04/03/19 74   03/18/19 79       General appearance: alert, well appearing, and in no distress, oriented to person, place, and time and normal appearing weight. CVS exam: normal rate, regular rhythm, normal S1, S2, no murmurs, rubs, clicks or gallops. Radial pulses 2+ bilateral.  PT/DP pulse 2+ bilat. No C/C/E    Chest: clear to auscultation, no wheezes, rales or rhonchi, symmetric air entry. Abdomen: Soft, non-tender, non-distended, positive BS in all 4 quadrants    Extremities:Dorsalis pedis pulses palpated bilaterally, no clubbing, cyanosis, edema or erythema     SKIN: warm, dry, no lesions, jaundice, petechiae, pallor, cyanosis, ecchymosis    NEURO: gross motor exam normal by observation, gait normal    Mental status - alert, oriented to person, place, and time, normal mood, behavior, speech, dress, motor activity, and thought processes      Assessment/Plan  Micheal Brennan was seen today for diabetes, hypertension and edema. Diagnoses and all orders for this visit:    Essential hypertension  Mixed hyperlipidemia  -  Stable, continue medications as prescribed. Anxiety  -     ALPRAZolam (XANAX) 0.5 MG tablet; Take 1 tablet by mouth 2 times daily as needed for Sleep (depression) for up to 30 days. Heart palpitations  -     ALPRAZolam (XANAX) 0.5 MG tablet; Take 1 tablet by mouth 2 times daily as needed for Sleep (depression) for up to 30 days. Encounter for screening mammogram for malignant neoplasm of breast  -     TOM DIGITAL SCREEN W CAD BILATERAL; Future    Other orders  -     celecoxib (CELEBREX) 200 MG capsule; Take 1 capsule by mouth daily        Return in about 6 months (around 3/18/2020).     Call or go to ED immediately if symptoms worsen or persist.    Educational

## 2019-09-26 ENCOUNTER — CARE COORDINATION (OUTPATIENT)
Dept: OTHER | Facility: CLINIC | Age: 44
End: 2019-09-26

## 2019-10-06 ENCOUNTER — HOSPITAL ENCOUNTER (OUTPATIENT)
Age: 44
Discharge: HOME | End: 2019-10-06
Payer: COMMERCIAL

## 2019-10-06 LAB
ALBUMIN SERPL-MCNC: 4.2 G/DL (ref 3.5–5.2)
ALP BLD-CCNC: 128 U/L (ref 35–104)
ALT SERPL-CCNC: 22 U/L (ref 0–32)
ANION GAP SERPL CALCULATED.3IONS-SCNC: 10 MMOL/L (ref 7–16)
AST SERPL-CCNC: 24 U/L (ref 0–31)
BILIRUB SERPL-MCNC: 0.6 MG/DL (ref 0–1.2)
BUN BLDV-MCNC: 15 MG/DL (ref 6–20)
CALCIUM SERPL-MCNC: 9.6 MG/DL (ref 8.6–10.2)
CHLORIDE BLD-SCNC: 101 MMOL/L (ref 98–107)
CHOLESTEROL, TOTAL: 178 MG/DL (ref 0–199)
CO2: 30 MMOL/L (ref 22–29)
CREAT SERPL-MCNC: 0.8 MG/DL (ref 0.5–1)
CREATININE URINE: 36 MG/DL (ref 29–226)
GFR AFRICAN AMERICAN: >60
GFR NON-AFRICAN AMERICAN: >60 ML/MIN/1.73
GLUCOSE BLD-MCNC: 73 MG/DL (ref 74–99)
HBA1C MFR BLD: 7.9 % (ref 4–5.6)
HDLC SERPL-MCNC: 56 MG/DL
LDL CHOLESTEROL CALCULATED: 104 MG/DL (ref 0–99)
MICROALBUMIN UR-MCNC: <12 MG/L
MICROALBUMIN/CREAT UR-RTO: ABNORMAL (ref 0–30)
POTASSIUM SERPL-SCNC: 4.6 MMOL/L (ref 3.5–5)
SODIUM BLD-SCNC: 141 MMOL/L (ref 132–146)
T4 FREE: 1.7 NG/DL (ref 0.93–1.7)
TOTAL PROTEIN: 7.2 G/DL (ref 6.4–8.3)
TRIGL SERPL-MCNC: 91 MG/DL (ref 0–149)
TSH SERPL DL<=0.05 MIU/L-ACNC: 3.8 UIU/ML (ref 0.27–4.2)
VLDLC SERPL CALC-MCNC: 18 MG/DL

## 2019-10-06 PROCEDURE — 84439 ASSAY OF FREE THYROXINE: CPT

## 2019-10-06 PROCEDURE — 80053 COMPREHEN METABOLIC PANEL: CPT

## 2019-10-06 PROCEDURE — 83036 HEMOGLOBIN GLYCOSYLATED A1C: CPT

## 2019-10-06 PROCEDURE — 82570 ASSAY OF URINE CREATININE: CPT

## 2019-10-06 PROCEDURE — 82044 UR ALBUMIN SEMIQUANTITATIVE: CPT

## 2019-10-06 PROCEDURE — 36415 COLL VENOUS BLD VENIPUNCTURE: CPT

## 2019-10-06 PROCEDURE — 84443 ASSAY THYROID STIM HORMONE: CPT

## 2019-10-06 PROCEDURE — 83721 ASSAY OF BLOOD LIPOPROTEIN: CPT

## 2019-10-06 PROCEDURE — 80061 LIPID PANEL: CPT

## 2019-10-09 ENCOUNTER — PATIENT MESSAGE (OUTPATIENT)
Dept: PHARMACY | Facility: CLINIC | Age: 44
End: 2019-10-09

## 2019-10-09 LAB
Lab: NORMAL
REPORT: NORMAL
THIS TEST SENT TO: NORMAL

## 2019-11-06 ENCOUNTER — HOSPITAL ENCOUNTER (OUTPATIENT)
Dept: MAMMOGRAPHY | Age: 44
Discharge: HOME | End: 2019-11-08
Payer: COMMERCIAL

## 2019-11-06 DIAGNOSIS — Z12.31 ENCOUNTER FOR SCREENING MAMMOGRAM FOR MALIGNANT NEOPLASM OF BREAST: ICD-10-CM

## 2019-11-27 ENCOUNTER — TELEPHONE (OUTPATIENT)
Dept: PHARMACY | Facility: CLINIC | Age: 44
End: 2019-11-27

## 2019-12-16 ENCOUNTER — CLINICAL DOCUMENTATION (OUTPATIENT)
Dept: PHARMACY | Facility: CLINIC | Age: 44
End: 2019-12-16

## 2019-12-19 DIAGNOSIS — I10 ESSENTIAL HYPERTENSION: ICD-10-CM

## 2019-12-19 RX ORDER — RAMIPRIL 10 MG/1
10 CAPSULE ORAL DAILY
Qty: 90 CAPSULE | Refills: 0 | Status: SHIPPED
Start: 2019-12-19 | End: 2020-03-31 | Stop reason: SDUPTHER

## 2020-01-14 ENCOUNTER — TELEPHONE (OUTPATIENT)
Dept: PHARMACY | Facility: CLINIC | Age: 45
End: 2020-01-14

## 2020-01-14 NOTE — LETTER
[facility]  [address], [address]  Phone: [phone]  Fax: [phone] [facility]   [address]  [facility] [address]         01/14/20     Dear Jaclyn Rodriguez,    Congratulations! You have completed the 2019 requirements for the [address] will automatically be re-enrolled into the [facility]) Diabetes Management Program for 2020. One of the requirements to participate in the [facility] Diabetes Management Program is to complete a Clinical Pharmacist Telephone appointment yearly. We would like to work with you and your doctor to:  - Review your medications, including over-the-counter and herbal medications  - Answer questions about your medications and how to get the most benefit from them  - Identify potential drug interactions or side effects and help fix them  - Identify preferred medications that are equally effective, but available at a lower cost to you  - Help you reach the necessary requirements to remain enrolled in the Diabetes Management Program offered by [facility]     Please call [phone] and select option #7 to schedule this appointment to take advantage of this service. Telephone appointments are available Monday thru Friday from 7:30 AM till 5:30 PM.     This is a courtesy reminder. If you have this appointment already scheduled for your 2020 enrollment in the program, please disregard this message. If you have not scheduled this appointment yet, please contact us at the above number to schedule.      Sincerely,      [address]  Phone: [phone], option 7

## 2020-01-16 RX ORDER — ALPRAZOLAM 0.5 MG/1
0.5 TABLET ORAL 2 TIMES DAILY PRN
Qty: 60 TABLET | Refills: 0 | Status: SHIPPED | OUTPATIENT
Start: 2020-01-16 | End: 2020-02-18 | Stop reason: SDUPTHER

## 2020-01-20 RX ORDER — TRIAMCINOLONE ACETONIDE 1 MG/G
CREAM TOPICAL
Qty: 80 G | Refills: 0 | Status: SHIPPED
Start: 2020-01-20 | End: 2021-10-23

## 2020-02-04 NOTE — TELEPHONE ENCOUNTER
Pharmacy called and needs clarification on Triamcinolone cream-The 80 g tube is that a 30 day or 90 day supply? Not sure of size of area being treated.   [phone] Gayle Ayala

## 2020-02-05 PROBLEM — R07.9 CHEST PAIN: Status: RESOLVED | Noted: 2018-09-21 | Resolved: 2020-02-05

## 2020-02-06 RX ORDER — FUROSEMIDE 20 MG/1
20 TABLET ORAL EVERY OTHER DAY
Qty: 45 TABLET | Refills: 1 | Status: SHIPPED
Start: 2020-02-06 | End: 2020-05-26 | Stop reason: SDUPTHER

## 2020-02-12 RX ORDER — SIMVASTATIN 40 MG
40 TABLET ORAL DAILY
Qty: 90 TABLET | Refills: 1 | Status: SHIPPED
Start: 2020-02-12 | End: 2020-05-26 | Stop reason: SDUPTHER

## 2020-02-14 ENCOUNTER — HOSPITAL ENCOUNTER (OUTPATIENT)
Age: 45
Discharge: HOME | End: 2020-02-14
Payer: COMMERCIAL

## 2020-02-14 LAB
ALBUMIN SERPL-MCNC: 4.2 G/DL (ref 3.5–5.2)
ALP BLD-CCNC: 139 U/L (ref 35–104)
ALT SERPL-CCNC: 28 U/L (ref 0–32)
ANION GAP SERPL CALCULATED.3IONS-SCNC: 11 MMOL/L (ref 7–16)
AST SERPL-CCNC: 34 U/L (ref 0–31)
BILIRUB SERPL-MCNC: 0.4 MG/DL (ref 0–1.2)
BUN BLDV-MCNC: 26 MG/DL (ref 6–20)
CALCIUM SERPL-MCNC: 9.3 MG/DL (ref 8.6–10.2)
CHLORIDE BLD-SCNC: 101 MMOL/L (ref 98–107)
CHOLESTEROL, TOTAL: 155 MG/DL (ref 0–199)
CO2: 24 MMOL/L (ref 22–29)
CREAT SERPL-MCNC: 0.8 MG/DL (ref 0.5–1)
GFR AFRICAN AMERICAN: >60
GFR NON-AFRICAN AMERICAN: >60 ML/MIN/1.73
GLUCOSE BLD-MCNC: 129 MG/DL (ref 74–99)
HBA1C MFR BLD: 8.4 % (ref 4–5.6)
HDLC SERPL-MCNC: 52 MG/DL
LDL CHOLESTEROL CALCULATED: 82 MG/DL (ref 0–99)
POTASSIUM SERPL-SCNC: 4.7 MMOL/L (ref 3.5–5)
SODIUM BLD-SCNC: 136 MMOL/L (ref 132–146)
T4 FREE: 1.7 NG/DL (ref 0.93–1.7)
TOTAL PROTEIN: 6.6 G/DL (ref 6.4–8.3)
TRIGL SERPL-MCNC: 104 MG/DL (ref 0–149)
TSH SERPL DL<=0.05 MIU/L-ACNC: 2.37 UIU/ML (ref 0.27–4.2)
VLDLC SERPL CALC-MCNC: 21 MG/DL

## 2020-02-14 PROCEDURE — 83721 ASSAY OF BLOOD LIPOPROTEIN: CPT

## 2020-02-14 PROCEDURE — 83036 HEMOGLOBIN GLYCOSYLATED A1C: CPT

## 2020-02-14 PROCEDURE — 80061 LIPID PANEL: CPT

## 2020-02-14 PROCEDURE — 36415 COLL VENOUS BLD VENIPUNCTURE: CPT

## 2020-02-14 PROCEDURE — 80053 COMPREHEN METABOLIC PANEL: CPT

## 2020-02-14 PROCEDURE — 84439 ASSAY OF FREE THYROXINE: CPT

## 2020-02-14 PROCEDURE — 84443 ASSAY THYROID STIM HORMONE: CPT

## 2020-02-17 LAB
Lab: NORMAL
REPORT: NORMAL
THIS TEST SENT TO: NORMAL

## 2020-02-18 RX ORDER — ALPRAZOLAM 0.5 MG/1
0.5 TABLET ORAL 2 TIMES DAILY PRN
Qty: 60 TABLET | Refills: 2 | Status: SHIPPED | OUTPATIENT
Start: 2020-02-18 | End: 2020-03-19

## 2020-03-17 ENCOUNTER — TELEPHONE (OUTPATIENT)
Dept: ADMINISTRATIVE | Age: 45
End: 2020-03-17

## 2020-03-17 NOTE — TELEPHONE ENCOUNTER
I will call her back, but please note that we have a refill line that patients can call. It is option 3 when you call our office.  Please refer them to this

## 2020-03-17 NOTE — TELEPHONE ENCOUNTER
Marshal Roxann called in questioning if she should come in tomorrow or not. Appt RS to May 26th, but she says that she will need med refills before then. She is not sure what she might be needing since she is not at home, but she does refills through mail order. Please call her back at [phone], ok to LM if no answer.

## 2020-03-31 RX ORDER — RAMIPRIL 10 MG/1
10 CAPSULE ORAL DAILY
Qty: 90 CAPSULE | Refills: 1 | Status: SHIPPED
Start: 2020-03-31 | End: 2020-11-02

## 2020-04-13 RX ORDER — OMEPRAZOLE 40 MG/1
40 CAPSULE, DELAYED RELEASE ORAL DAILY
Qty: 90 CAPSULE | Refills: 1 | Status: SHIPPED
Start: 2020-04-13 | End: 2020-09-28 | Stop reason: SDUPTHER

## 2020-04-13 RX ORDER — CELECOXIB 200 MG/1
200 CAPSULE ORAL DAILY
Qty: 90 CAPSULE | Refills: 1 | Status: SHIPPED
Start: 2020-04-13 | End: 2020-12-02 | Stop reason: SDUPTHER

## 2020-05-13 ENCOUNTER — TELEPHONE (OUTPATIENT)
Dept: PHARMACY | Facility: CLINIC | Age: 45
End: 2020-05-13

## 2020-05-13 RX ORDER — ALPRAZOLAM 0.5 MG/1
0.5 TABLET ORAL 2 TIMES DAILY PRN
Qty: 60 TABLET | Refills: 2 | Status: SHIPPED
Start: 2020-05-13 | End: 2020-08-13 | Stop reason: SDUPTHER

## 2020-05-13 NOTE — TELEPHONE ENCOUNTER
[address] [facility]  =============================================  Bucky Glover is a 38 y.o. female enrolled in the [address] [place] [address] Diabetes Program.      Identified care gap: yearly [facility] visit, A1c >8%    Attempting to reach patient. No answer, LM. 5/13/20, 5/18/20    [employer] message also sent.  5/13/20      Stefano Renee Pharmacist  Dept: [phone] (toll free [phone], option 7)      For Pharmacy Admin Tracking Only    PHSO: Yes  Time Spent (min): 5

## 2020-05-26 ENCOUNTER — VIRTUAL VISIT (OUTPATIENT)
Dept: FAMILY MEDICINE CLINIC | Age: 45
End: 2020-05-26
Payer: COMMERCIAL

## 2020-05-26 VITALS — HEIGHT: 64 IN | BODY MASS INDEX: 47.97 KG/M2 | WEIGHT: 281 LBS

## 2020-05-26 PROCEDURE — 3052F HG A1C>EQUAL 8.0%<EQUAL 9.0%: CPT | Performed by: FAMILY MEDICINE

## 2020-05-26 PROCEDURE — 99214 OFFICE O/P EST MOD 30 MIN: CPT | Performed by: FAMILY MEDICINE

## 2020-05-26 RX ORDER — POTASSIUM CHLORIDE 20 MEQ/1
20 TABLET, EXTENDED RELEASE ORAL 2 TIMES DAILY
Qty: 180 TABLET | Refills: 1 | Status: SHIPPED
Start: 2020-05-26 | End: 2021-10-23

## 2020-05-26 RX ORDER — FUROSEMIDE 40 MG/1
40 TABLET ORAL 2 TIMES DAILY
Qty: 180 TABLET | Refills: 1 | Status: SHIPPED
Start: 2020-05-26 | End: 2021-10-23

## 2020-05-26 RX ORDER — POTASSIUM CITRATE 10 MEQ/1
TABLET, EXTENDED RELEASE ORAL
COMMUNITY
End: 2020-05-26 | Stop reason: DRUGHIGH

## 2020-05-26 RX ORDER — POTASSIUM CHLORIDE 750 MG/1
10 CAPSULE, EXTENDED RELEASE ORAL 2 TIMES DAILY
COMMUNITY
End: 2020-06-09 | Stop reason: ALTCHOICE

## 2020-05-26 RX ORDER — SIMVASTATIN 40 MG
40 TABLET ORAL DAILY
Qty: 90 TABLET | Refills: 1 | Status: SHIPPED
Start: 2020-05-26 | End: 2020-08-07 | Stop reason: SDUPTHER

## 2020-05-26 NOTE — PROGRESS NOTES
discharge, nasal polyps, sinus pain, tinnitus, vertigo or visual changes  Hematological and Lymphatic ROS: negative for - bleeding problems, blood clots, fatigue or swollen lymph nodes  Respiratory ROS: negative for - cough, orthopnea, shortness of breath, sputum changes, tachypnea or wheezing  Cardiovascular ROS: negative for - chest pain, dyspnea on exertion, irregular heartbeat, loss of consciousness, palpitations, paroxysmal nocturnal dyspnea or rapid heart rate  Gastrointestinal ROS: negative for - abdominal pain, blood in stools, change in bowel habits, constipation, diarrhea, gas/bloating, heartburn or nausea/vomiting  Musculoskeletal ROS: negative for - joint pain, joint stiffness, joint swelling or muscle, back pain, bowel or bladder incontinence  Neurological ROS: negative for - behavioral changes, confusion, dizziness, headaches, memory loss, numbness/tingling, seizures or speech problems, weakness  Dermatological ROS: negative for - dry skin, mole changes, nail changes, pruritus, rash or skin lesion changes    Prior to Visit Medications    Medication Sig Taking? Authorizing Provider   potassium chloride (MICRO-K) 10 MEQ extended release capsule Take 10 mEq by mouth 2 times daily Yes Historical Provider, MD   potassium chloride (KLOR-CON M) 20 MEQ extended release tablet Take 1 tablet by mouth 2 times daily Yes Digna Terry, DO   simvastatin (ZOCOR) 40 MG tablet Take 1 tablet by mouth daily Yes Digna Terry DO   furosemide (LASIX) 40 MG tablet Take 1 tablet by mouth 2 times daily Yes Digna Terry DO   ALPRAZolam (XANAX) 0.5 MG tablet Take 1 tablet by mouth 2 times daily as needed for Sleep (depression) for up to 90 days.  Yes Digna Terry DO   omeprazole (PRILOSEC) 40 MG delayed release capsule Take 1 capsule by mouth daily Yes Digna Terry DO   celecoxib (CELEBREX) 200 MG capsule Take 1 capsule by mouth daily Yes Digna Terry DO   metoprolol tartrate (LOPRESSOR) 25 DTaP/Tdap/Td vaccine (1 - Tdap) 05/15/1993    Cervical cancer screen  04/12/2020    Flu vaccine (Season Ended) 09/01/2020    Diabetic microalbuminuria test  10/06/2020    A1C test (Diabetic or Prediabetic)  02/14/2021    Lipid screen  02/14/2021    TSH testing  02/14/2021    Potassium monitoring  02/14/2021    Creatinine monitoring  02/14/2021    Pneumococcal 0-64 years Vaccine  Completed    Hepatitis A vaccine  Aged Out    Hib vaccine  Aged Out    Meningococcal (ACWY) vaccine  Aged Out       PHYSICAL EXAMINATION:  [ INSTRUCTIONS:  \"[x]\" Indicates a positive item  \"[]\" Indicates a negative item  -- DELETE ALL ITEMS NOT EXAMINED]  Vital Signs: (As obtained by patient/caregiver or practitioner observation)    Blood pressure-  Heart rate-    Respiratory rate-    Temperature-  Pulse oximetry-     Constitutional: [x] Appears well-developed and well-nourished [] No apparent distress      [x] Abnormal-   Mental status  [x] Alert and awake  [x] Oriented to person/place/time [x]Able to follow commands      Eyes:  EOM    [x]  Normal  [] Abnormal-  Sclera  [x]  Normal  [] Abnormal -         Discharge [x]  None visible  [] Abnormal -    HENT:   [x] Normocephalic, atraumatic.   [] Abnormal   [x] Mouth/Throat: Mucous membranes are moist.     External Ears [x] Normal  [] Abnormal-     Neck: [x] No visualized mass     Pulmonary/Chest: [x] Respiratory effort normal.  [x] No visualized signs of difficulty breathing or respiratory distress        [] Abnormal-      Musculoskeletal:   [] Normal gait with no signs of ataxia         [x] Normal range of motion of neck        [] Abnormal-       Neurological:        [x] No Facial Asymmetry (Cranial nerve 7 motor function) (limited exam to video visit)          [] No gaze palsy        [] Abnormal-         Skin:        [x] No significant exanthematous lesions or discoloration noted on facial skin         [] Abnormal-            Psychiatric:       [x] Normal Affect [x] No

## 2020-06-09 ENCOUNTER — SCHEDULED TELEPHONE ENCOUNTER (OUTPATIENT)
Dept: PHARMACY | Facility: CLINIC | Age: 45
End: 2020-06-09

## 2020-06-09 NOTE — TELEPHONE ENCOUNTER
mg by mouth daily      vitamin B-12 (CYANOCOBALAMIN) 500 MCG tablet Take 1,000 mcg by mouth daily       aspirin 81 MG tablet Take 81 mg by mouth nightly. No current facility-administered medications for this visit. Current Pharmacy: [address] [place] [address] and [id number] Sandra Valentin (DM meds through [facility])  Current testing supplies/frequency: Freestyle Syed Patient wanting to continue with Lucyann Kennel  Pen needles/syringes: n/a    Allergies:  No Known Allergies   Vitals/Labs:  BP Readings from Last 3 Encounters:   09/18/19 112/80   04/03/19 120/72   03/18/19 120/62     Lab Results   Component Value Date    LABMICR <12.0 10/06/2019     Lab Results   Component Value Date    LABA1C 8.4 (H) 02/14/2020    LABA1C 7.9 (H) 10/06/2019    LABA1C 8.3 (H) 06/10/2019     Lab Results   Component Value Date    CHOL 155 02/14/2020    TRIG 104 02/14/2020    HDL 52 02/14/2020    LDLCHOLESTEROL 90 12/03/2014    LDLCALC 82 02/14/2020     ALT   Date Value Ref Range Status   02/14/2020 28 0 - 32 U/L Final     AST   Date Value Ref Range Status   02/14/2020 34 (H) 0 - 31 U/L Final     The 10-year ASCVD risk score (Arminda Styles, et al., 2013) is: 1.3%    Values used to calculate the score:      Age: 54 years      Sex: Female      Is Non- : No      Diabetic: Yes      Tobacco smoker: No      Systolic Blood Pressure: 988 mmHg      Is BP treated: Yes      HDL Cholesterol: 52 mg/dL      Total Cholesterol: 155 mg/dL     Lab Results   Component Value Date    CREATININE 0.8 02/14/2020     CrCl cannot be calculated (Unknown ideal weight. ).   eGFR: >60 mL/min/1.73 m^2    Immunizations:  Immunization History   Administered Date(s) Administered    Influenza Virus Vaccine 10/03/2017, 10/08/2018    Pneumococcal Polysaccharide ([id number]) 09/18/2019      Social History:  Social History     Tobacco Use    Smoking status: Former Smoker     Packs/day: 0.50     Years: 19.00     Pack years: 9.50     Start date: 1/1/2013     Last attempt to quit: 2014     Years since quittin.4    Smokeless tobacco: Never Used   Substance Use Topics    Alcohol use: Yes     Alcohol/week: 2.0 standard drinks     Types: 2 Glasses of wine per week     ASSESSMENT:  Initial Program Requirements (Y indicates has completed for the year, N indicates needs to be completed by 2020):  Yes - OV with provider for DM (1st)  Yes - A1c (1st)     Ongoing Program Requirements (Y indicates has completed for the year, N indicates needs to be completed by 2020): No - OV with provider for DM (2nd)  No - ACC/diabetes educator visit (if A1c over 8%)  No - A1c (2nd)  Yes - Lipid panel  No - Urine microalbumin  Yes - Pneumococcal vaccination: [id number]   No - Influenza vaccination for   No - Medication adherence over 70%  Yes - On statin or contraindication(s)   Yes - On ACEi/ARB or contraindication(s)       Current medications eligible for copay waiver, up to $600, through [facility]:  - Humalog, ramipril, simvastatin, aspirin (with rx)  - Generic ([facility] pharmacy-stocked) insulin syringes and pen needles  - Agamatrix or Prodigy meter and supplies  - Dexcom G6 supplies     Diabetes Care:   - Glycemic Goal: <7.0%. Is not at blood glucose goal but is on pump therapy. - Duplicate MOA: n/a  - Reduce Pill [place]: n/a  - Appropriateness of Insulin Therapy: pump therapy  - Therapy Optimization: n/a  - De-escalation of Therapy: n/a  - Adherent to ACEi/ARB and/or statin: yes  Type 1 DM under inadequate control as evidenced by A1c 8.4%. - Current symptoms/problems include none  - Home blood sugar records:  Average from [place] is 178  - Any episodes of hypoglycemia? no  - Known diabetic complications: none  - Eye exam current (within one year): yes  - Foot exam current (within one year): yes  - Daily Aspirin? Yes  - Medication compliance: compliant all of the time  - Diet compliance: patient is working to get diet controlled.  discussed diet  - Current

## 2020-06-30 RX ORDER — BUPROPION HYDROCHLORIDE 300 MG/1
300 TABLET ORAL NIGHTLY
Qty: 90 TABLET | Refills: 0 | Status: SHIPPED
Start: 2020-06-30 | End: 2020-09-28 | Stop reason: SDUPTHER

## 2020-08-10 RX ORDER — SIMVASTATIN 40 MG
40 TABLET ORAL DAILY
Qty: 90 TABLET | Refills: 1 | Status: SHIPPED
Start: 2020-08-10 | End: 2021-02-09 | Stop reason: SDUPTHER

## 2020-08-12 RX ORDER — MONTELUKAST SODIUM 10 MG/1
10 TABLET ORAL NIGHTLY
Qty: 30 TABLET | Refills: 1 | Status: SHIPPED
Start: 2020-08-12 | End: 2021-01-20 | Stop reason: ALTCHOICE

## 2020-08-13 RX ORDER — ALPRAZOLAM 0.5 MG/1
0.5 TABLET ORAL 2 TIMES DAILY PRN
Qty: 60 TABLET | Refills: 2 | Status: SHIPPED | OUTPATIENT
Start: 2020-08-13 | End: 2020-11-12 | Stop reason: SDUPTHER

## 2020-09-13 ENCOUNTER — NURSE TRIAGE (OUTPATIENT)
Dept: OTHER | Facility: CLINIC | Age: 45
End: 2020-09-13

## 2020-09-14 ENCOUNTER — OFFICE VISIT (OUTPATIENT)
Dept: PRIMARY CARE CLINIC | Age: 45
End: 2020-09-14
Payer: COMMERCIAL

## 2020-09-14 ENCOUNTER — HOSPITAL ENCOUNTER (OUTPATIENT)
Age: 45
Discharge: HOME | End: 2020-09-16
Payer: COMMERCIAL

## 2020-09-14 VITALS
WEIGHT: 275 LBS | SYSTOLIC BLOOD PRESSURE: 126 MMHG | TEMPERATURE: 98.4 F | HEIGHT: 64 IN | DIASTOLIC BLOOD PRESSURE: 78 MMHG | BODY MASS INDEX: 46.95 KG/M2 | OXYGEN SATURATION: 98 % | HEART RATE: 71 BPM

## 2020-09-14 LAB — S PYO AG THROAT QL: NORMAL

## 2020-09-14 PROCEDURE — 99213 OFFICE O/P EST LOW 20 MIN: CPT | Performed by: NURSE PRACTITIONER

## 2020-09-14 PROCEDURE — U0003 INFECTIOUS AGENT DETECTION BY NUCLEIC ACID (DNA OR RNA); SEVERE ACUTE RESPIRATORY SYNDROME CORONAVIRUS 2 (SARS-COV-2) (CORONAVIRUS DISEASE [COVID-19]), AMPLIFIED PROBE TECHNIQUE, MAKING USE OF HIGH THROUGHPUT TECHNOLOGIES AS DESCRIBED BY CMS-2020-01-R: HCPCS

## 2020-09-14 PROCEDURE — 87880 STREP A ASSAY W/OPTIC: CPT | Performed by: NURSE PRACTITIONER

## 2020-09-14 RX ORDER — ALBUTEROL SULFATE 90 UG/1
2 AEROSOL, METERED RESPIRATORY (INHALATION) 4 TIMES DAILY PRN
Qty: 3 INHALER | Refills: 1 | Status: SHIPPED
Start: 2020-09-14 | End: 2020-12-22 | Stop reason: ALTCHOICE

## 2020-09-14 RX ORDER — BENZONATATE 100 MG/1
100 CAPSULE ORAL 3 TIMES DAILY PRN
Qty: 21 CAPSULE | Refills: 0 | Status: SHIPPED | OUTPATIENT
Start: 2020-09-14 | End: 2020-09-21

## 2020-09-14 RX ORDER — AZITHROMYCIN 250 MG/1
250 TABLET, FILM COATED ORAL DAILY
Qty: 6 TABLET | Refills: 0 | Status: SHIPPED
Start: 2020-09-14 | End: 2020-12-01 | Stop reason: CLARIF

## 2020-09-14 NOTE — PROGRESS NOTES
Chief Complaint   Fatigue; Headache; and Pharyngitis      History of Present Illness   Source of history provided by:  patient. Jonathan Yost is a 54 y.o. old female who presents to the flu clinic with complaints of Headache, Pharyngitis, dry Cough, Wheezing and Fatigue x 3 days. States symptoms have stayed the same since onset. Has been taking Acetaminophen, NSAID without symptomatic relief. Denies any Fever, Shortness of breath, Nausea, Vomiting, Chest Pain, Abdominal Pain, Rash or Lethargy. Denies any hx of asthma, COPD or emphysema. Pertinent hx of T1DM, HTN and Hyperlipidemia. ROS   Pertinent positives and negatives are stated within HPI, all other systems reviewed and are negative. Past Medical History:  has a past medical history of Capsulitis, Depression, Diabetes mellitus ([facility] Diabetic frozen shoulder associated with type 1 diabetes mellitus ([facility] GERD (gastroesophageal reflux disease), HTN (hypertension), Hyperlipidemia, Hypothyroid, Insulin pump in place, Nausea & vomiting, Obesity, Obesity, Class III, BMI 40-49.9 (morbid obesity) ([facility] MILENA (obstructive sleep apnea), Primary osteoarthritis of knee, Retinopathy due to secondary DM [facility]), Sleep apnea, and Thyroid disease. Past Surgical History:  has a past surgical history that includes shoulder surgery (Left, );  section; Endometrial ablation (); Foot surgery (Bilateral, [id number]); hernia repair (); and cardiovascular stress test (2017). Social History:  reports that she quit smoking about 6 years ago. She started smoking about 7 years ago. She has a 9.50 pack-year smoking history. She has never used smokeless tobacco. She reports current alcohol use of about 2.0 standard drinks of alcohol per week. She reports that she does not use drugs.   Family History: family history includes Cancer in her father and paternal grandfather; Diabetes in her father, maternal grandfather, maternal grandmother, mother, and paternal grandfather; Heart Disease (age of onset: 47) in her mother; No Known Problems in her sister; Other in her father. Allergies: Patient has no known allergies. Physical Exam   Vital Signs:  /78   Pulse 71   Temp 98.4 °F (36.9 °C)   Ht 5' 4\" (1.626 m)   Wt 275 lb (124.7 kg)   SpO2 98%   BMI 47.20 kg/m²    Oxygen Saturation Interpretation: Normal.    Constitutional:  Alert, development consistent with age. NAD. Head:  NC/NT. Airway patent. Ears: TMs clear bilaterally. Canals without exudate or swelling bilaterally. Mouth: Posterior pharynx with mild erythema and clear postnasal drip. There is no tonsillar hypertrophy or exudate. Neck:  Normal ROM. Supple. No anterior cervical adenopathy noted. Lungs: CTAB without wheezes, rales, or rhonchi. CV:  Regular rate and rhythm, normal heart sounds, without pathological murmurs, ectopy, gallops, or rubs. Skin:  Normal turgor. Warm, dry, without visible rash. Lymphatic: No lymphangitis or adenopathy noted. Neurological:  Oriented. Motor functions intact. Lab / Imaging Results   (All laboratory and radiology results have been personally reviewed by myself)  Labs:  Results for orders placed or performed in visit on 09/14/20   POCT rapid strep A   Result Value Ref Range    Strep A Ag None Detected None Detected       Imaging: All Radiology results interpreted by Radiologist unless otherwise noted. No results found. Medical Decision Making   Pt non-toxic, in no apparent distress and stable at time of discharge. Assessment/Plan   Ondina Rutledge was seen today for fatigue, headache and pharyngitis. Diagnoses and all orders for this visit:    Upper respiratory tract infection, unspecified type  -     albuterol sulfate HFA (VENTOLIN HFA) 108 (90 Base) MCG/ACT inhaler; Inhale 2 puffs into the lungs 4 times daily as needed for Wheezing  -     azithromycin (ZITHROMAX Z-IRINEO) 250 MG tablet;  Take 1 tablet by mouth daily Take 2 tabs on day one, then

## 2020-09-16 LAB
SARS-COV-2: NOT DETECTED
SOURCE: NORMAL

## 2020-09-28 RX ORDER — BUPROPION HYDROCHLORIDE 300 MG/1
300 TABLET ORAL NIGHTLY
Qty: 90 TABLET | Refills: 0 | Status: SHIPPED
Start: 2020-09-28 | End: 2020-12-28 | Stop reason: SDUPTHER

## 2020-09-28 RX ORDER — OMEPRAZOLE 40 MG/1
40 CAPSULE, DELAYED RELEASE ORAL DAILY
Qty: 90 CAPSULE | Refills: 1 | Status: SHIPPED
Start: 2020-09-28 | End: 2021-03-09 | Stop reason: SDUPTHER

## 2020-10-02 ENCOUNTER — HOSPITAL ENCOUNTER (OUTPATIENT)
Age: 45
Discharge: HOME | End: 2020-10-02
Payer: COMMERCIAL

## 2020-10-02 LAB
ALBUMIN SERPL-MCNC: 4.1 G/DL (ref 3.5–5.2)
ALP BLD-CCNC: 201 U/L (ref 35–104)
ALT SERPL-CCNC: 47 U/L (ref 0–32)
ANION GAP SERPL CALCULATED.3IONS-SCNC: 8 MMOL/L (ref 7–16)
AST SERPL-CCNC: 45 U/L (ref 0–31)
BILIRUB SERPL-MCNC: 0.5 MG/DL (ref 0–1.2)
BUN BLDV-MCNC: 15 MG/DL (ref 6–20)
CALCIUM SERPL-MCNC: 9.3 MG/DL (ref 8.6–10.2)
CHLORIDE BLD-SCNC: 99 MMOL/L (ref 98–107)
CO2: 29 MMOL/L (ref 22–29)
CREAT SERPL-MCNC: 0.7 MG/DL (ref 0.5–1)
CREATININE URINE: 93 MG/DL (ref 29–226)
GFR AFRICAN AMERICAN: >60
GFR NON-AFRICAN AMERICAN: >60 ML/MIN/1.73
GLUCOSE BLD-MCNC: 121 MG/DL (ref 74–99)
HBA1C MFR BLD: 9.4 % (ref 4–5.6)
MICROALBUMIN UR-MCNC: 172 MG/L
MICROALBUMIN/CREAT UR-RTO: 184.9 (ref 0–30)
POTASSIUM SERPL-SCNC: 4.7 MMOL/L (ref 3.5–5)
SODIUM BLD-SCNC: 136 MMOL/L (ref 132–146)
T4 FREE: 1.65 NG/DL (ref 0.93–1.7)
TOTAL PROTEIN: 7.2 G/DL (ref 6.4–8.3)
TSH SERPL DL<=0.05 MIU/L-ACNC: 6.42 UIU/ML (ref 0.27–4.2)

## 2020-10-02 PROCEDURE — 83721 ASSAY OF BLOOD LIPOPROTEIN: CPT

## 2020-10-02 PROCEDURE — 80053 COMPREHEN METABOLIC PANEL: CPT

## 2020-10-02 PROCEDURE — 82044 UR ALBUMIN SEMIQUANTITATIVE: CPT

## 2020-10-02 PROCEDURE — 84439 ASSAY OF FREE THYROXINE: CPT

## 2020-10-02 PROCEDURE — 82570 ASSAY OF URINE CREATININE: CPT

## 2020-10-02 PROCEDURE — 84443 ASSAY THYROID STIM HORMONE: CPT

## 2020-10-02 PROCEDURE — 36415 COLL VENOUS BLD VENIPUNCTURE: CPT

## 2020-10-02 PROCEDURE — 83036 HEMOGLOBIN GLYCOSYLATED A1C: CPT

## 2020-10-08 LAB
Lab: NORMAL
REPORT: NORMAL
THIS TEST SENT TO: NORMAL

## 2020-10-12 ENCOUNTER — HOSPITAL ENCOUNTER (OUTPATIENT)
Age: 45
Discharge: HOME | End: 2020-10-12
Payer: COMMERCIAL

## 2020-10-12 LAB — ALP BLD-CCNC: 135 U/L (ref 35–104)

## 2020-10-12 PROCEDURE — 84075 ASSAY ALKALINE PHOSPHATASE: CPT

## 2020-10-12 PROCEDURE — 36415 COLL VENOUS BLD VENIPUNCTURE: CPT

## 2020-10-31 ENCOUNTER — HOSPITAL ENCOUNTER (EMERGENCY)
Age: 45
Discharge: HOME | End: 2020-10-31
Attending: EMERGENCY MEDICINE
Payer: COMMERCIAL

## 2020-10-31 VITALS
HEIGHT: 64 IN | SYSTOLIC BLOOD PRESSURE: 116 MMHG | RESPIRATION RATE: 14 BRPM | OXYGEN SATURATION: 100 % | DIASTOLIC BLOOD PRESSURE: 57 MMHG | BODY MASS INDEX: 47.46 KG/M2 | HEART RATE: 77 BPM | WEIGHT: 278 LBS | TEMPERATURE: 97.5 F

## 2020-10-31 LAB
ANION GAP SERPL CALCULATED.3IONS-SCNC: 15 MMOL/L (ref 7–16)
BASOPHILS ABSOLUTE: 0.04 E9/L (ref 0–0.2)
BASOPHILS RELATIVE PERCENT: 0.3 % (ref 0–2)
BUN BLDV-MCNC: 16 MG/DL (ref 6–20)
CALCIUM SERPL-MCNC: 9.7 MG/DL (ref 8.6–10.2)
CHLORIDE BLD-SCNC: 101 MMOL/L (ref 98–107)
CHP ED QC CHECK: YES
CO2: 24 MMOL/L (ref 22–29)
CREAT SERPL-MCNC: 0.7 MG/DL (ref 0.5–1)
EOSINOPHILS ABSOLUTE: 0.02 E9/L (ref 0.05–0.5)
EOSINOPHILS RELATIVE PERCENT: 0.2 % (ref 0–6)
GFR AFRICAN AMERICAN: >60
GFR NON-AFRICAN AMERICAN: >60 ML/MIN/1.73
GLUCOSE BLD-MCNC: 154 MG/DL
GLUCOSE BLD-MCNC: 164 MG/DL
GLUCOSE BLD-MCNC: 169 MG/DL (ref 74–99)
HCT VFR BLD CALC: 42.5 % (ref 34–48)
HEMOGLOBIN: 13.8 G/DL (ref 11.5–15.5)
IMMATURE GRANULOCYTES #: 0.06 E9/L
IMMATURE GRANULOCYTES %: 0.5 % (ref 0–5)
LYMPHOCYTES ABSOLUTE: 0.71 E9/L (ref 1.5–4)
LYMPHOCYTES RELATIVE PERCENT: 5.4 % (ref 20–42)
MCH RBC QN AUTO: 29.4 PG (ref 26–35)
MCHC RBC AUTO-ENTMCNC: 32.5 % (ref 32–34.5)
MCV RBC AUTO: 90.6 FL (ref 80–99.9)
METER GLUCOSE: 154 MG/DL (ref 74–99)
METER GLUCOSE: 163 MG/DL (ref 74–99)
MONOCYTES ABSOLUTE: 0.55 E9/L (ref 0.1–0.95)
MONOCYTES RELATIVE PERCENT: 4.2 % (ref 2–12)
NEUTROPHILS ABSOLUTE: 11.78 E9/L (ref 1.8–7.3)
NEUTROPHILS RELATIVE PERCENT: 89.4 % (ref 43–80)
PDW BLD-RTO: 12.8 FL (ref 11.5–15)
PLATELET # BLD: 320 E9/L (ref 130–450)
PMV BLD AUTO: 11 FL (ref 7–12)
POTASSIUM REFLEX MAGNESIUM: 4 MMOL/L (ref 3.5–5)
RBC # BLD: 4.69 E12/L (ref 3.5–5.5)
REASON FOR REJECTION: NORMAL
REJECTED TEST: NORMAL
SODIUM BLD-SCNC: 140 MMOL/L (ref 132–146)
WBC # BLD: 13.2 E9/L (ref 4.5–11.5)

## 2020-10-31 PROCEDURE — 99283 EMERGENCY DEPT VISIT LOW MDM: CPT

## 2020-10-31 NOTE — ED PROVIDER NOTES
Skinny Post is a 54 y.o. female presenting to the ED for hypoglycemia, beginning pta ago. The complaint has been intermittent, moderate in severity, and worsened by nothing. 55 yo f who is a type 1 diabetic on medtronic pump w basal between 1.75-2u/hr bsaed on time of day, presents with unresponsive episode in bed with BS found to be 31. EMS was called and placed IV and gave 1 amp IV d50, pt was given a sandwich and a bananam last . Pt is AAOx3 has no complaints now, she states she came due to her son wanting her to get checked out. Her endocrinologist is dr Fernando Flores. Pt denies any trauma, falls, headache, cp, sob, back pain, abd pain, n,v,d,uti sx, cough or cold sx. Review of Systems:   Pertinent positives and negatives are stated within HPI, all other systems reviewed and are negative.          --------------------------------------------- PAST HISTORY ---------------------------------------------  Past Medical History:  has a past medical history of Capsulitis, Depression, Diabetes mellitus ([address] Diabetic frozen shoulder associated with type 1 diabetes mellitus ([address] GERD (gastroesophageal reflux disease), HTN (hypertension), Hyperlipidemia, Hypothyroid, Insulin pump in place, Nausea & vomiting, Obesity, Obesity, Class III, BMI 40-49.9 (morbid obesity) ([address] MILENA (obstructive sleep apnea), Primary osteoarthritis of knee, Retinopathy due to secondary DM ([address] Sleep apnea, and Thyroid disease. Past Surgical History:  has a past surgical history that includes shoulder surgery (Left, );  section; Endometrial ablation (); Foot surgery (Bilateral, [id number]); hernia repair (); and cardiovascular stress test (2017). Social History:  reports that she quit smoking about 6 years ago. She started smoking about 7 years ago. She has a 9.50 pack-year smoking history.  She has never used smokeless tobacco. She reports current alcohol use of about 2.0 standard drinks of alcohol per week. She reports that she does not use drugs. Family History: family history includes Cancer in her father and paternal grandfather; Diabetes in her father, maternal grandfather, maternal grandmother, mother, and paternal grandfather; Heart Disease (age of onset: 47) in her mother; No Known Problems in her sister; Other in her father. The patients home medications have been reviewed. Allergies: Patient has no known allergies.     -------------------------------------------------- RESULTS -------------------------------------------------  All laboratory and radiology results have been personally reviewed by myself   LABS:  Results for orders placed or performed during the hospital encounter of 10/31/20   CBC Auto Differential   Result Value Ref Range    WBC 13.2 (H) 4.5 - 11.5 E9/L    RBC 4.69 3.50 - 5.50 E12/L    Hemoglobin 13.8 11.5 - 15.5 g/dL    Hematocrit 42.5 34.0 - 48.0 %    MCV 90.6 80.0 - 99.9 fL    MCH 29.4 26.0 - 35.0 pg    MCHC 32.5 32.0 - 34.5 %    RDW 12.8 11.5 - 15.0 fL    Platelets 176 453 - 186 E9/L    MPV 11.0 7.0 - 12.0 fL    Neutrophils % 89.4 (H) 43.0 - 80.0 %    Immature Granulocytes % 0.5 0.0 - 5.0 %    Lymphocytes % 5.4 (L) 20.0 - 42.0 %    Monocytes % 4.2 2.0 - 12.0 %    Eosinophils % 0.2 0.0 - 6.0 %    Basophils % 0.3 0.0 - 2.0 %    Neutrophils Absolute 11.78 (H) 1.80 - 7.30 E9/L    Immature Granulocytes # 0.06 E9/L    Lymphocytes Absolute 0.71 (L) 1.50 - 4.00 E9/L    Monocytes Absolute 0.55 0.10 - 0.95 E9/L    Eosinophils Absolute 0.02 (L) 0.05 - 0.50 E9/L    Basophils Absolute 0.04 0.00 - 0.20 S9/M   Basic Metabolic Panel w/ Reflex to MG   Result Value Ref Range    Sodium 140 132 - 146 mmol/L    Potassium reflex Magnesium 4.0 3.5 - 5.0 mmol/L    Chloride 101 98 - 107 mmol/L    CO2 24 22 - 29 mmol/L    Anion Gap 15 7 - 16 mmol/L    Glucose 169 (H) 74 - 99 mg/dL    BUN 16 6 - 20 mg/dL    CREATININE 0.7 0.5 - 1.0 mg/dL    GFR Non-African American >60 >=60 Chest wall: No tenderness. Abdominal:      General: Bowel sounds are normal.      Tenderness: There is no abdominal tenderness. There is no right CVA tenderness, left CVA tenderness or guarding. Comments: medtronic Pump w basal 1.75-2u/hr   Musculoskeletal:         General: No swelling or deformity. Skin:     General: Skin is warm and dry. Capillary Refill: Capillary refill takes less than 2 seconds. Neurological:      General: No focal deficit present. Mental Status: She is alert and oriented to person, place, and time. Psychiatric:         Mood and Affect: Mood normal.               ------------------------------ ED COURSE/MEDICAL DECISION MAKING----------------------  Medications - No data to display      ED COURSE:       Medical Decision Making:    Blood sugars monitored and stable, pt to call her endocrinologist Monday and review BS log and insulin doseing, pt also to f/w pcp. Pt given instructions for low BS at home and given RX for glucagon pen  Risks and benefits were discussed with patient for All medications dispensed and given in department as well prescriptions prescribed for home, . The patient elected to take the medicine. Pt instructed on warning signs and precautions for medication side effects. The patient was given warning signs for when to seek medical attention. Counseled regarding todays diagnosis, including possible risks and complications,  especially if left uncontrolled. Counseled regarding the possible side effects, risks, benefits and alternatives to treatment; patient and/or guardian verbalizes understanding, agrees, feels comfortable with and wishes to proceed with treatment plan. Advised patient to call her primary care physician with any new medication issues, and read all Rx info from pharmacy to assure aware of all possible risks and side effects of medication before taking.     I did discuss warning signs for when to return to the Emergency Room, and the patient verbalized understanding      Counseling: The emergency provider has spoken with the patient and discussed todays results, in addition to providing specific details for the plan of care and counseling regarding the diagnosis and prognosis. Questions are answered at this time and they are agreeable with the plan.      --------------------------------- IMPRESSION AND DISPOSITION ---------------------------------    IMPRESSION  1. Hypoglycemia        DISPOSITION  Disposition: Discharge to home  Patient condition is good      NOTE: This report was transcribed using voice recognition software.  Every effort was made to ensure accuracy; however, inadvertent computerized transcription errors may be present       Ariana Antony, DO  10/31/20 [facility],   10/31/20 3891

## 2020-10-31 NOTE — ED NOTES
Bed side blood sugar 154     Lavelle Pascual, VINNY  10/31/20 [facility], RN  10/31/20 4061

## 2020-11-02 ENCOUNTER — CARE COORDINATION (OUTPATIENT)
Dept: OTHER | Facility: CLINIC | Age: 45
End: 2020-11-02

## 2020-11-02 RX ORDER — RAMIPRIL 10 MG/1
CAPSULE ORAL
Qty: 90 CAPSULE | Refills: 1 | Status: SHIPPED
Start: 2020-11-02 | End: 2021-06-08 | Stop reason: SDUPTHER

## 2020-11-03 ENCOUNTER — CARE COORDINATION (OUTPATIENT)
Dept: OTHER | Facility: CLINIC | Age: 45
End: 2020-11-03

## 2020-11-03 NOTE — CARE COORDINATION
[facility] ED Follow Up Call    11/3/2020    Patient: Naomi Rodriguez Patient : 1975   MRN: [id number]  Reason for Admission: Hypoglycemia  Discharge Date: 10/31/2020    ACM attempted 2nd outreach to reach patient for introduction to Associate Care Management. HIPAA compliant message left requesting a return phone call at patients convenience. Unable to reach letter sent to patient via [address] Will continue to outreach patient. Amanuel Hanks RN BSN  Associate Care Manager  Phone: [phone]  Email: [email or web address]

## 2020-11-04 ENCOUNTER — OFFICE VISIT (OUTPATIENT)
Dept: PODIATRY | Age: 45
End: 2020-11-04
Payer: COMMERCIAL

## 2020-11-04 VITALS — HEIGHT: 64 IN | BODY MASS INDEX: 46.95 KG/M2 | TEMPERATURE: 97.3 F | WEIGHT: 275 LBS

## 2020-11-04 PROBLEM — I87.2 VENOUS INSUFFICIENCY (CHRONIC) (PERIPHERAL): Status: ACTIVE | Noted: 2020-11-04

## 2020-11-04 PROBLEM — M76.62 ACHILLES BURSITIS OF LEFT LOWER EXTREMITY: Status: ACTIVE | Noted: 2020-11-04

## 2020-11-04 PROBLEM — M79.672 LEFT FOOT PAIN: Status: ACTIVE | Noted: 2020-11-04

## 2020-11-04 PROBLEM — M79.661 PAIN IN BOTH LOWER LEGS: Status: ACTIVE | Noted: 2020-11-04

## 2020-11-04 PROBLEM — R60.0 LOCALIZED EDEMA: Status: ACTIVE | Noted: 2020-11-04

## 2020-11-04 PROBLEM — M79.662 PAIN IN BOTH LOWER LEGS: Status: ACTIVE | Noted: 2020-11-04

## 2020-11-04 PROCEDURE — 29580 STRAPPING UNNA BOOT: CPT | Performed by: PODIATRIST

## 2020-11-04 PROCEDURE — 99203 OFFICE O/P NEW LOW 30 MIN: CPT | Performed by: PODIATRIST

## 2020-11-04 NOTE — PROGRESS NOTES
Patient is here today for evaluation of left heel pain. She states the pain has been present for approximately 2 weeks. No injury.

## 2020-11-04 NOTE — PROGRESS NOTES
20     Liu Hayes    : 1975 Sex: female   Age: 54 y.o. Patient was referred by: None  Patient's PCP/Provider is:  Digna Oneill DO    Subjective:    Patient seen today for evaluation regarding Achilles insertional tendinitis issues left foot. Chief Complaint   Patient presents with    Foot Pain       HPI: Patient stated the issues have been going on for several weeks now and progressively getting worse. She denies any recent injuries or change in activities. She stated the issue does cause discomfort upon awakening in the morning and after certain ambulatory activities. She has not tried any OTC treatments to this point in time. No other additional abnormalities noted at this time. ROS:  Const: Positives and pertinent negatives as per HPI. Musculo: Denies symptoms other than stated above. Neuro: Denies symptoms other than stated above. Skin: Denies symptoms other than stated above. Current Medications:    Current Outpatient Medications:     ramipril (ALTACE) 10 MG capsule, TAKE 1 CAPSULE BY MOUTH ONE TIME A DAY, Disp: 90 capsule, Rfl: 1    omeprazole (PRILOSEC) 40 MG delayed release capsule, Take 1 capsule by mouth daily, Disp: 90 capsule, Rfl: 1    buPROPion (WELLBUTRIN XL) 300 MG extended release tablet, Take 1 tablet by mouth nightly, Disp: 90 tablet, Rfl: 0    albuterol sulfate HFA (VENTOLIN HFA) 108 (90 Base) MCG/ACT inhaler, Inhale 2 puffs into the lungs 4 times daily as needed for Wheezing, Disp: 3 Inhaler, Rfl: 1    azithromycin (ZITHROMAX Z-IRINEO) 250 MG tablet, Take 1 tablet by mouth daily Take 2 tabs on day one, then 1 tab daily for the next 4 days, Disp: 6 tablet, Rfl: 0    ALPRAZolam (XANAX) 0.5 MG tablet, Take 1 tablet by mouth 2 times daily as needed for Sleep (depression) for up to 90 days. , Disp: 60 tablet, Rfl: 2    montelukast (SINGULAIR) 10 MG tablet, Take 1 tablet by mouth nightly, Disp: 30 tablet, Rfl: 1    simvastatin (ZOCOR) 40 MG tablet, Take 1 tablet by mouth daily, Disp: 90 tablet, Rfl: 1    potassium chloride (KLOR-CON M) 20 MEQ extended release tablet, Take 1 tablet by mouth 2 times daily, Disp: 180 tablet, Rfl: 1    furosemide (LASIX) 40 MG tablet, Take 1 tablet by mouth 2 times daily, Disp: 180 tablet, Rfl: 1    celecoxib (CELEBREX) 200 MG capsule, Take 1 capsule by mouth daily, Disp: 90 capsule, Rfl: 1    metoprolol tartrate (LOPRESSOR) 25 MG tablet, Take 1 tablet by mouth 2 times daily, Disp: 180 tablet, Rfl: 3    triamcinolone (KENALOG) 0.1 % cream, Apply topically to affected areas 2 times daily. , Disp: 80 g, Rfl: 0    levothyroxine (SYNTHROID) 200 MCG tablet, Take 200 mcg by mouth Daily, Disp: , Rfl:     hydrocortisone 2.5 % cream, Apply topically 2 times daily. , Disp: 30 g, Rfl: 2    Insulin Pump - insulin lispro, Inject into the skin continuous 0636-5858 2.35 units/hr 7203-1495 2.40 units/hr  6567-4644 1.8 units/hr, Disp: , Rfl:     docusate sodium (COLACE) 100 MG capsule, Take 100 mg by mouth daily, Disp: , Rfl:     vitamin B-12 (CYANOCOBALAMIN) 500 MCG tablet, Take 1,000 mcg by mouth daily , Disp: , Rfl:     aspirin 81 MG tablet, Take 81 mg by mouth nightly., Disp: , Rfl:     Allergies:  No Known Allergies    Vitals:    11/04/20 0959   Temp: 97.3 °F (36.3 °C)   Weight: 275 lb (124.7 kg)   Height: 5' 4\" (1.626 m)        Past Medical History:   Diagnosis Date    Capsulitis     right shoulder    Depression     Diabetes mellitus ([facility]     AGE 2, TYPE 1    Diabetic frozen shoulder associated with type 1 diabetes mellitus ([facility] 2013    RIGHT    GERD (gastroesophageal reflux disease)     HTN (hypertension)     Hyperlipidemia     Hypothyroid 2/7/2012    Insulin pump in place 2018    Nausea & vomiting     resolved    Obesity 2/7/2012    Obesity, Class III, BMI 40-49.9 (morbid obesity) ([facility] 09/2018    BMI 47.3    MILENA (obstructive sleep apnea) 03/02/2012    USES CPAP    Primary osteoarthritis of knee     B/L    Retinopathy due to secondary DM ([facility]     LASER [place]    Sleep apnea 3/2/2012    uses cpap, instructed to bring    Thyroid disease      Family History   Problem Relation Age of Onset    Diabetes Mother         Age 59, 2018    Heart Disease Mother 47        ACB    Diabetes Father         Age 70, 2018    Cancer Father         Skin cancer    Other Father         AFIB    No Known Problems Sister     Diabetes Maternal Grandmother     Diabetes Maternal Grandfather     Diabetes Paternal Grandfather     Cancer Paternal Grandfather         lung     Past Surgical History:   Procedure Laterality Date    CARDIOVASCULAR STRESS TEST  2017    NEG     SECTION      ENDOMETRIAL ABLATION  2013    FOOT SURGERY Bilateral [id number],[id number]    to treat fascitis bilateral, to remove a nerve left    HERNIA REPAIR  2015    VENTRAL    SHOULDER SURGERY Left 2013    to release impingement     Social History     Tobacco Use    Smoking status: Former Smoker     Packs/day: 0.50     Years: 19.00     Pack years: 9.50     Start date: 2013     Last attempt to quit: 2014     Years since quittin.8    Smokeless tobacco: Never Used   Substance Use Topics    Alcohol use: Yes     Alcohol/week: 2.0 standard drinks     Types: 2 Glasses of wine per week    Drug use: No           Diagnostic studies:    Xr Foot Left (min 3 Views)    Result Date: 2020  EXAMINATION: THREE XRAY VIEWS OF THE LEFT FOOT 2020 9:56 am COMPARISON: 2011 HISTORY: ORDERING SYSTEM PROVIDED HISTORY: Left foot pain TECHNOLOGIST PROVIDED HISTORY: Standing unless patient unable to stand FINDINGS: Multifocal slight degenerative articular surface irregularity primarily in the midfoot and hindfoot region. It is most prominent at the talonavicular joint and little toe tarsometatarsal joint. There is no radiographic evidence of definite acute fracture or dislocation. No evidence of focal intraosseous lesion.      No definite radiographically visible acute skeletal pathology Degenerative change         Procedures: An unna boot  compressive wrap was applied to the left lower extremity. It's purpose is to  decrease  the amount of edema present, and to allow proper healing of the soft tissues. The patient was instructed to keep the unna boot clean, dry and intact until the next follow up visit. The patient was instructed to look for signs of redness, irritation, blistering and pain. If these or any other symptoms were to develop, they were advised to contact the office immediately for reevaluation. Exam:  VASCULAR: Pulses palpable left foot. Capillary fill time brisk digits 1 through 5 left foot. NEUROLOGICAL: Epicritic sensations intact left lower extremity  DERMATOLOGICAL: Localized edematous issues noted to the posterior left heel and ankle region. No ecchymotic skin changes present left foot. Venous insufficiency issues noted with both varicosities and stasis skin changes present. No skin abrasions or any signs of infection noted left lower extremity. MUSCULOSKELETAL: Tenderness noted to palpation to the distal Achilles insertional area with range of motion and muscle testing performed. No palpable defect noted along the course of the distal Achilles tendon region left. Plan Per Assessment  Sharon Zavala was seen today for foot pain. Diagnoses and all orders for this visit:    Achilles bursitis of left lower extremity    Localized edema    Left foot pain  -     XR FOOT LEFT (MIN 3 VIEWS); Future    Venous insufficiency (chronic) (peripheral)  -     US DUP LOWER EXTREMITY MAPPING BILAT VENOUS; Future    Pain in both lower legs  -     US DUP LOWER EXTREMITY MAPPING BILAT VENOUS; Future        1. New patient evaluation and management  2. We did review x-ray results with patient today. We did recommend obtaining a venous ultrasound with vein mapping bilateral lower extremities due to her chronic insufficiency issues.   3. Compression

## 2020-11-10 ENCOUNTER — CARE COORDINATION (OUTPATIENT)
Dept: OTHER | Facility: CLINIC | Age: 45
End: 2020-11-10

## 2020-11-12 ENCOUNTER — TELEPHONE (OUTPATIENT)
Dept: PHARMACY | Facility: CLINIC | Age: 45
End: 2020-11-12

## 2020-11-12 RX ORDER — ALPRAZOLAM 0.5 MG/1
0.5 TABLET ORAL 2 TIMES DAILY PRN
Qty: 60 TABLET | Refills: 2 | Status: SHIPPED
Start: 2020-11-12 | End: 2021-02-10

## 2020-11-13 NOTE — TELEPHONE ENCOUNTER
Patient returned call. Patients with an A1c of 8% or higher are required to meet in person or via phone with an [address] [place] [address] or Diabetes Educator. Advised her to email them: [email or web address] with her name and

## 2020-11-16 ENCOUNTER — CARE COORDINATION (OUTPATIENT)
Dept: OTHER | Facility: CLINIC | Age: 45
End: 2020-11-16

## 2020-11-16 NOTE — TELEPHONE ENCOUNTER
CLINICAL PHARMACY CONSULT: MED RECONCILIATION/REVIEW ADDENDUM    For Pharmacy Admin Tracking Only    PHSO: Yes  Recommended intervention potential cost savings: 1  Time Spent (min): 175 [facility], PharmD  [address]

## 2020-11-16 NOTE — CARE COORDINATION
Associate Care Manager [facility]) called patient for CC outreach for Diabetes management program. No answer; ACM left HIPAA compliant voicemail message with request for return call. ACM will continue to outreach. ACM also sent unable to reach letter via [address] Jay Vernon RN BSN  Associate Care Manager  Phone: [phone]  Email: [email or web address]

## 2020-11-20 ENCOUNTER — CARE COORDINATION (OUTPATIENT)
Dept: OTHER | Facility: CLINIC | Age: 45
End: 2020-11-20

## 2020-11-20 NOTE — CARE COORDINATION
Associate Care Manager (TREMAINE) attempted to reach patient by phone after referral received from  regarding need for diabetes counseling due to elevated A1C. No answer; ACM left HIPAA compliant voicemail message with request for return call. ACM also sent final unable to reach letter to patient via [address] ACM signing off unless return call received. Sagar Shukla RN BSN  Associate Care Manager  Phone: [phone]  Email: [email or web address]

## 2020-12-01 ENCOUNTER — OFFICE VISIT (OUTPATIENT)
Dept: FAMILY MEDICINE CLINIC | Age: 45
End: 2020-12-01
Payer: COMMERCIAL

## 2020-12-01 VITALS
WEIGHT: 285 LBS | BODY MASS INDEX: 48.65 KG/M2 | RESPIRATION RATE: 16 BRPM | HEART RATE: 73 BPM | TEMPERATURE: 97.1 F | SYSTOLIC BLOOD PRESSURE: 128 MMHG | DIASTOLIC BLOOD PRESSURE: 84 MMHG | HEIGHT: 64 IN | OXYGEN SATURATION: 96 %

## 2020-12-01 PROBLEM — M79.662 PAIN IN BOTH LOWER LEGS: Status: RESOLVED | Noted: 2020-11-04 | Resolved: 2020-12-01

## 2020-12-01 PROBLEM — M79.672 LEFT FOOT PAIN: Status: RESOLVED | Noted: 2020-11-04 | Resolved: 2020-12-01

## 2020-12-01 PROBLEM — M79.661 PAIN IN BOTH LOWER LEGS: Status: RESOLVED | Noted: 2020-11-04 | Resolved: 2020-12-01

## 2020-12-01 PROBLEM — M76.62 ACHILLES BURSITIS OF LEFT LOWER EXTREMITY: Status: RESOLVED | Noted: 2020-11-04 | Resolved: 2020-12-01

## 2020-12-01 PROBLEM — R60.0 LOCALIZED EDEMA: Status: RESOLVED | Noted: 2020-11-04 | Resolved: 2020-12-01

## 2020-12-01 PROCEDURE — 99214 OFFICE O/P EST MOD 30 MIN: CPT | Performed by: FAMILY MEDICINE

## 2020-12-01 NOTE — PROGRESS NOTES
12/2/2020    Chief Complaint   Patient presents with    Hypertension     routine check up       Diabetes Mellitus Type II, Follow-up: Patient here for follow-up of Type 2 diabetes mellitus. This is a longstanding problem. Is taking all medications as prescribed and is tolerating well. Has been monitoring blood glucose at home. Lab Results   Component Value Date    LABA1C 9.4 10/02/2020    LABA1C 8.4 02/14/2020    LABA1C 7.9 10/06/2019       ACE inhibitor or angiotensin II receptor blocker? Yes     Statin: Yes     Microalbumin: Microalbumen/Creatinine ratio:  No components found for: RUCREAT     Hypertension: Patient is here for follow up chronic hypertension. Patient is  compliant with lifestyle modifications like exercising and adherence to a low salt diet. Patient denies chest pain, diaphoresis, dyspnea, dyspnea on exertion, peripheral edema, palpitations, HA, visual issues. See List for current meds. Taking as prescribed. No adverse effects. Hyperlipidemia:  Patient presents with hyperlipidemia. Is asymptomatic. This is a chronic problem. Patient is  well controlled, as reviewed and seen on most recent labs Is currently on Simvastatin 40 mg . Compliance with treatment thus far has been adequate. No adverse effects. Patient's past medical, surgical, social and/or family history reviewed, updated in chart, and are non-contributory (unless otherwise stated). Medications and allergies also reviewed and updated in chart.     ROS Unless otherwise specified  Review of Systems - General ROS: negative for - chills, fatigue, fever, night sweats, sleep disturbance, weight gain or weight loss  Psychological ROS: negative for - anxiety, behavioral disorder, depression, hallucinations, irritability, memory difficulties, mood swings, sleep disturbances or suicidal ideation  ENT ROS: negative for - epistaxis, headaches, hearing change, nasal congestion, nasal discharge, nasal polyps, sinus pain, tinnitus, vertigo or visual changes  Hematological and Lymphatic ROS: negative for - bleeding problems, blood clots, fatigue or swollen lymph nodes  Respiratory ROS: negative for - cough, orthopnea, shortness of breath, sputum changes, tachypnea or wheezing  Cardiovascular ROS: negative for - chest pain, dyspnea on exertion, irregular heartbeat, loss of consciousness, palpitations, paroxysmal nocturnal dyspnea or rapid heart rate  Gastrointestinal ROS: negative for - abdominal pain, blood in stools, change in bowel habits, constipation, diarrhea, gas/bloating, heartburn or nausea/vomiting  Musculoskeletal ROS: negative for - joint pain, joint stiffness, joint swelling or muscle, back pain, bowel or bladder incontinence  Neurological ROS: negative for - behavioral changes, confusion, dizziness, headaches, memory loss, numbness/tingling, seizures or speech problems, weakness  Dermatological ROS: negative for - dry skin, mole changes, nail changes, pruritus, rash or skin lesion changes    Physical Exam  Wt Readings from Last 3 Encounters:   12/01/20 285 lb (129.3 kg)   11/04/20 275 lb (124.7 kg)   10/31/20 278 lb (126.1 kg)     Temp Readings from Last 3 Encounters:   12/01/20 97.1 °F (36.2 °C)   11/04/20 97.3 °F (36.3 °C)   10/31/20 97.5 °F (36.4 °C) (Oral)     BP Readings from Last 3 Encounters:   12/01/20 128/84   10/31/20 (!) 116/57   09/14/20 126/78     Pulse Readings from Last 3 Encounters:   12/01/20 73   10/31/20 77   09/14/20 71       General appearance: alert, well appearing, and in no distress, oriented to person, place, and time and normal appearing weight. CVS exam: normal rate, regular rhythm, normal S1, S2, no murmurs, rubs, clicks or gallops. Radial pulses 2+ bilateral.  PT/DP pulse 2+ bilat. No C/C/E    Chest: clear to auscultation, no wheezes, rales or rhonchi, symmetric air entry.      Abdomen: Soft, non-tender, non-distended, positive BS in all 4 quadrants    Extremities:Dorsalis pedis pulses palpated bilaterally, no clubbing, cyanosis, edema or erythema, Sensory exam of the foot is normal, tested with the monofilament. Good pulses, no lesions or ulcers, good peripheral pulses. SKIN: warm, dry, no lesions, jaundice, petechiae, pallor, cyanosis, ecchymosis    NEURO: gross motor exam normal by observation, gait normal    Mental status - alert, oriented to person, place, and time, normal mood, behavior, speech, dress, motor activity, and thought processes      Assessment/Plan  Nik Whitfield was seen today for hypertension. Diagnoses and all orders for this visit:    Type 1 diabetes mellitus without complication ([facility]  - Per Endocrine    Mixed hyperlipidemia  Essential hypertension  -  Stable, continue medications as prescribed. Obesity, Class III, BMI 40-49.9 (morbid obesity) (HCC)  - Body mass index is 48.92 kg/m². BMI was elevated today, and weight loss plan recommended is : conventional weight loss and daily exercise regimen. Other orders  -     celecoxib (CELEBREX) 200 MG capsule; Take 1 capsule by mouth daily        Return in about 6 months (around 6/1/2021). Call or go to ED immediately if symptoms worsen or persist.    Educational materials and/or home exercises printed for patient's review and were included in patient instructions on his/her After Visit Summary and given to patient at the end of visit. Counseled regarding above diagnosis, including possible risks and complications,  especially if left uncontrolled. Counseled regarding the possible side effects, risks, benefits and alternatives to treatment; patient and/or guardian verbalizes understanding, agrees, feels comfortable with and wishes to proceed with above treatment plan. Advised patient to call with any new medication issues, and read all Rx info from pharmacy to assure aware of all possible risks and side effects of medication before taking. Reviewed age and gender appropriate health screening exams and vaccinations. Advised patient regarding importance of keeping up with recommended health maintenance and to schedule as soon as possible if overdue, as this is important in assessing for undiagnosed pathology, especially cancer, as well as protecting against potentially harmful/life threatening disease. Patient and/or guardian verbalizes understanding and agrees with above counseling, assessment and plan. All questions answered.     Digna Oneill, DO

## 2020-12-02 ENCOUNTER — CARE COORDINATION (OUTPATIENT)
Dept: OTHER | Facility: CLINIC | Age: 45
End: 2020-12-02

## 2020-12-02 RX ORDER — CELECOXIB 200 MG/1
200 CAPSULE ORAL DAILY
Qty: 90 CAPSULE | Refills: 1 | Status: SHIPPED
Start: 2020-12-02 | End: 2021-05-03

## 2020-12-02 NOTE — LETTER
Dear Casimiro Keyes,    I hope this letter finds you doing well! I am sending you a few patient education hand-outs that I felt would be useful to you. I have highlighted or made note of a few things that I would like to emphasize or stress the importance of. I hope you find these helpful. Please look them over and let me know if you have any questions. You can contact me at my number or email listed below. Have a blessed day,      Kalina Rodriguez RN BSN  Associate Care Manager  [phone]  [email or web address]

## 2020-12-02 NOTE — CARE COORDINATION
Ambulatory Care Coordination Note  12/2/2020  CM Risk Score: 5  Charlson 10 Year Mortality Risk Score: 23%     ACC: Rigo Duong RN    Summary Note: Associate Care Manager (ACM) has attempted to reach this patient multiple times in the past without success. ACM received call from patient this date apologizing for not being available to ACM in the past but requested enrollment in ACM program this date for DM management. Patient states her goal is to lower A1C; most recent A1C=9.4% (10/2/2020). Patient  has been type 1 diabetic for 41 years and controls DM with medications and diet modification. States since October, she has been trying to limit carbohydrate intake due to elevated A1C. States she currently has a goal of <60 carbs/day, which she states she usually stays within that goal. States she is not able to exercise like she would want to due to \"issues with my knees\". Reports eats out ~2x/week but states she tries to choose low carbohydrate options even when eating out. Reports usually drinks Propel or Life Water throughout the day; coffee in the morning. States both the Propel & Life Water are sugar free. Patient  has insulin pump and Freestyle Syed monitor. Reports scans Syed at least 5-6 times per day. Reports recent highest blood glucose 237, lowest 74. States BG this am 188. Patient states her BG has a tendency to run high during the night but she does not want to take extra insulin at the risk of hypoglycemia during the night.  was in ED in October for hypoglycemia. States the CGM that she uses does not \"talk\" to the insulin pump and when she was entering her BG of 163, she got confused with the amount of insulin she needed and overdosed. States she took 12 units insulin for  and son found her unresponsive. Denies recent symptoms of hypo or hyperglycemia. Patient benjamin has regular follow up appts with eye dr and goes Q 6 months.  Reports next eye appt is in Feb 2021. [facility] she had initial appt with podiatry (Dr. Fidel Mejias) ~3 weeks ago. [facility] she is up to date on influenza vaccine. Ambulatory Care Coordination Assessment    Care Coordination Protocol  Program Enrollment:  Complex Care  Referral from Primary Care Provider:  No  Week 1 - Initial Assessment     Do you have all of your prescriptions and are they filled?:  Yes  Barriers to medication adherence:  None  Are you able to afford your medications?:  Yes  How often do you have trouble taking your medications the way you have been told to take them?:  I always take them as prescribed. Do you have Home O2 Therapy?:  No      Ability to seek help/take action for Emergent Urgent situations i.e. fire, crime, inclement weather or health crisis. :  Independent  Ability to ambulate to restroom:  Independent  Ability handle personal hygeine needs (bathing/dressing/grooming): Independent  Ability to manage Medications: Independent  Ability to prepare Food Preparation:  Independent  Ability to maintain home (clean home, laundry): Independent  Ability to drive and/or has transportation:  Independent  Ability to do shopping:  Independent  Ability to manage finances: Independent  Is patient able to live independently?:  Yes     Current Housing:  Private Residence  Does the person that you care for see a [facility] PCP?:  Yes                 Suggested Interventions and Community Resources  Diabetes Education: In Process (Comment: mailed DM education handouts to patient 12/2/2020)    Registered Dietician:  Declined   Zone Management Tools: In Process         Set up/Review an Education Plan, Set up/Review Goals              Prior to Admission medications    Medication Sig Start Date End Date Taking?  Authorizing Provider   celecoxib (CELEBREX) 200 MG capsule Take 1 capsule by mouth daily 12/2/20   Jaiden Terry, DO   ALPRAZolam (XANAX) 0.5 MG tablet Take 1 tablet by mouth 2 times daily as needed for Sleep (depression) for up to 90 days. 11/12/20 2/10/21  Digna Terry,    ramipril (ALTACE) 10 MG capsule TAKE 1 CAPSULE BY MOUTH ONE TIME A DAY 11/2/20   Digna Terry, DO   omeprazole (PRILOSEC) 40 MG delayed release capsule Take 1 capsule by mouth daily 9/28/20   Pee Terry, DO   buPROPion (WELLBUTRIN XL) 300 MG extended release tablet Take 1 tablet by mouth nightly 9/28/20   [facility], DO   albuterol sulfate HFA (VENTOLIN HFA) 108 (90 Base) MCG/ACT inhaler Inhale 2 puffs into the lungs 4 times daily as needed for Wheezing 9/14/20   ABELINO Carrera CNP   montelukast (SINGULAIR) 10 MG tablet Take 1 tablet by mouth nightly 8/12/20   [facility], DO   simvastatin (ZOCOR) 40 MG tablet Take 1 tablet by mouth daily 8/10/20   [facility], DO   potassium chloride (KLOR-CON M) 20 MEQ extended release tablet Take 1 tablet by mouth 2 times daily 5/26/20   [facility], DO   furosemide (LASIX) 40 MG tablet Take 1 tablet by mouth 2 times daily 5/26/20   [facility], DO   metoprolol tartrate (LOPRESSOR) 25 MG tablet Take 1 tablet by mouth 2 times daily 4/9/20   [facility], DO   triamcinolone (KENALOG) 0.1 % cream Apply topically to affected areas 2 times daily. 1/20/20   Digna Terry DO   levothyroxine (SYNTHROID) 200 MCG tablet Take 200 mcg by mouth Daily    Historical Provider, MD   hydrocortisone 2.5 % cream Apply topically 2 times daily. 3/18/19   Digna Terry DO   Insulin Pump - insulin lispro Inject into the skin continuous 7791-4478 2.35 units/hr  3093-6894 2.40 units/hr   1432-1790 1.8 units/hr    Historical Provider, MD   docusate sodium (COLACE) 100 MG capsule Take 100 mg by mouth daily    Historical Provider, MD   vitamin B-12 (CYANOCOBALAMIN) 500 MCG tablet Take 1,000 mcg by mouth daily     Historical Provider, MD   aspirin 81 MG tablet Take 81 mg by mouth nightly.     Historical Provider, MD       Future Appointments   Date Time Provider Department Center   6/3/2021  8:00 AM DO [facility]      and   Diabetes Assessment    Medic Alert ID:  Yes (Comment: patient states she does not wear medic alert ID)  Meal Planning:  Carb counting   How often do you test your blood sugar?:  Other, Meals, Bedtime (Comment: Patient states checks blood glucose 5-6 times per day \"at least\" as she has a [employer] 12/2/2020)   Do you have barriers with adherence to non-pharmacologic self-management interventions? (Nutrition/Exercise/Self-Monitoring):  Yes   Have you ever had to go to the ED for symptoms of low blood sugar?:  Yes   What is the date of your last ED visit for low blood sugar?:  10/31/20       No patient-reported symptoms   Do you have hyperglycemia symptoms?:  No   Do you have hypoglycemia symptoms?:  No   Last Blood Sugar Value:  188   Blood Sugar Trends:  Fluctuating        Goals      A1C Goal      Goal is for A1C to be < 7%.    12/2/2020: Patient to work with AC to get A1C controlled. [facility] mailed diabetes education handouts and DM zone tool to patient this date. Plan:  -[facility] mailed diabetes education handouts and DM zone tool to patient.     -Patient to continue to limit carbohydrate intake. -[facility] to follow up with patient next week    Jay Vernon RN BSN  Associate Care Manager  Phone: [phone]  Email: [email or web address]

## 2020-12-14 ENCOUNTER — CARE COORDINATION (OUTPATIENT)
Dept: OTHER | Facility: CLINIC | Age: 45
End: 2020-12-14

## 2020-12-14 NOTE — CARE COORDINATION
Associate Care Management (ACM) called patient for CC outreach. No answer; ACM left HIPAA compliant voicemail message with request for return call. Will continue to follow. Kalina Rodriguez RN BSN  Associate Care Manager  Phone: [phone]  Email: [email or web address]

## 2020-12-15 ENCOUNTER — TELEPHONE (OUTPATIENT)
Dept: FAMILY MEDICINE CLINIC | Age: 45
End: 2020-12-15

## 2020-12-15 ENCOUNTER — HOSPITAL ENCOUNTER (OUTPATIENT)
Age: 45
Discharge: HOME | End: 2020-12-15
Payer: COMMERCIAL

## 2020-12-15 LAB
BACTERIA: ABNORMAL /HPF
BILIRUBIN URINE: NEGATIVE
BLOOD, URINE: ABNORMAL
CLARITY: ABNORMAL
COLOR: YELLOW
EPITHELIAL CELLS, UA: ABNORMAL /HPF
GLUCOSE URINE: NEGATIVE MG/DL
KETONES, URINE: NEGATIVE MG/DL
LEUKOCYTE ESTERASE, URINE: ABNORMAL
NITRITE, URINE: POSITIVE
PH UA: 6.5 (ref 5–9)
PROTEIN UA: 30 MG/DL
RBC UA: >20 /HPF (ref 0–2)
SPECIFIC GRAVITY UA: 1.02 (ref 1–1.03)
UROBILINOGEN, URINE: 1 E.U./DL
WBC UA: >20 /HPF (ref 0–5)
YEAST: PRESENT /HPF

## 2020-12-15 PROCEDURE — 81001 URINALYSIS AUTO W/SCOPE: CPT

## 2020-12-15 RX ORDER — NITROFURANTOIN 25; 75 MG/1; MG/1
100 CAPSULE ORAL 2 TIMES DAILY
Qty: 10 CAPSULE | Refills: 0 | Status: SHIPPED | OUTPATIENT
Start: 2020-12-15 | End: 2020-12-20

## 2020-12-15 NOTE — TELEPHONE ENCOUNTER
She just did a ua at the hospital. She is having severe urgency and pain.  if you can send in a script if before 430  Send to hospital if after end to renuka redd and Horace

## 2020-12-21 ENCOUNTER — OFFICE VISIT (OUTPATIENT)
Dept: FAMILY MEDICINE CLINIC | Age: 45
End: 2020-12-21
Payer: COMMERCIAL

## 2020-12-21 VITALS
BODY MASS INDEX: 48.65 KG/M2 | DIASTOLIC BLOOD PRESSURE: 78 MMHG | SYSTOLIC BLOOD PRESSURE: 140 MMHG | HEART RATE: 82 BPM | TEMPERATURE: 97.3 F | WEIGHT: 285 LBS | RESPIRATION RATE: 18 BRPM | OXYGEN SATURATION: 98 % | HEIGHT: 64 IN

## 2020-12-21 PROCEDURE — 99214 OFFICE O/P EST MOD 30 MIN: CPT | Performed by: PHYSICIAN ASSISTANT

## 2020-12-21 RX ORDER — NAPROXEN 500 MG/1
500 TABLET ORAL 2 TIMES DAILY PRN
Qty: 20 TABLET | Refills: 0 | Status: SHIPPED
Start: 2020-12-21 | End: 2021-01-20 | Stop reason: ALTCHOICE

## 2020-12-21 RX ORDER — CEFDINIR 300 MG/1
300 CAPSULE ORAL 2 TIMES DAILY
Qty: 20 CAPSULE | Refills: 0 | Status: SHIPPED | OUTPATIENT
Start: 2020-12-21 | End: 2020-12-31

## 2020-12-21 NOTE — PROGRESS NOTES
 Primary osteoarthritis of knee     B/L    Retinopathy due to secondary DM ([facility] [address]     LASER TX    Sleep apnea 3/2/2012    uses cpap, instructed to bring    Thyroid disease        Past Surgical History:   Procedure Laterality Date    CARDIOVASCULAR STRESS TEST  2017    NEG     SECTION      ENDOMETRIAL ABLATION      FOOT SURGERY Bilateral ,    to treat fascitis bilateral, to remove a nerve left    HERNIA REPAIR  2015    VENTRAL    SHOULDER SURGERY Left 2013    to release impingement       Family History   Problem Relation Age of Onset    Diabetes Mother         Age 59, 2018    Heart Disease Mother 47        ACB    Diabetes Father         Age 70, 2018    Cancer Father         Skin cancer    Other Father         AFIB    No Known Problems Sister     Diabetes Maternal Grandmother     Diabetes Maternal Grandfather     Diabetes Paternal Grandfather     Cancer Paternal Grandfather         lung       Medications:     Current Outpatient Medications:     cefdinir (OMNICEF) 300 MG capsule, Take 1 capsule by mouth 2 times daily for 10 days, Disp: 20 capsule, Rfl: 0    naproxen (NAPROSYN) 500 MG tablet, Take 1 tablet by mouth 2 times daily as needed for Pain, Disp: 20 tablet, Rfl: 0    celecoxib (CELEBREX) 200 MG capsule, Take 1 capsule by mouth daily, Disp: 90 capsule, Rfl: 1    ALPRAZolam (XANAX) 0.5 MG tablet, Take 1 tablet by mouth 2 times daily as needed for Sleep (depression) for up to 90 days. , Disp: 60 tablet, Rfl: 2    ramipril (ALTACE) 10 MG capsule, TAKE 1 CAPSULE BY MOUTH ONE TIME A DAY, Disp: 90 capsule, Rfl: 1    omeprazole (PRILOSEC) 40 MG delayed release capsule, Take 1 capsule by mouth daily, Disp: 90 capsule, Rfl: 1    buPROPion (WELLBUTRIN XL) 300 MG extended release tablet, Take 1 tablet by mouth nightly, Disp: 90 tablet, Rfl: 0    montelukast (SINGULAIR) 10 MG tablet, Take 1 tablet by mouth nightly, Disp: 30 tablet, Rfl: 1   simvastatin (ZOCOR) 40 MG tablet, Take 1 tablet by mouth daily, Disp: 90 tablet, Rfl: 1    potassium chloride (KLOR-CON M) 20 MEQ extended release tablet, Take 1 tablet by mouth 2 times daily, Disp: 180 tablet, Rfl: 1    furosemide (LASIX) 40 MG tablet, Take 1 tablet by mouth 2 times daily, Disp: 180 tablet, Rfl: 1    metoprolol tartrate (LOPRESSOR) 25 MG tablet, Take 1 tablet by mouth 2 times daily, Disp: 180 tablet, Rfl: 3    triamcinolone (KENALOG) 0.1 % cream, Apply topically to affected areas 2 times daily. , Disp: 80 g, Rfl: 0    levothyroxine (SYNTHROID) 200 MCG tablet, Take 200 mcg by mouth Daily, Disp: , Rfl:     hydrocortisone 2.5 % cream, Apply topically 2 times daily. , Disp: 30 g, Rfl: 2    Insulin Pump - insulin lispro, Inject into the skin continuous 0780-0719 2.35 units/hr 5978-0350 2.40 units/hr  2820-6310 1.8 units/hr, Disp: , Rfl:     docusate sodium (COLACE) 100 MG capsule, Take 100 mg by mouth daily, Disp: , Rfl:     vitamin B-12 (CYANOCOBALAMIN) 500 MCG tablet, Take 1,000 mcg by mouth daily , Disp: , Rfl:     aspirin 81 MG tablet, Take 81 mg by mouth nightly., Disp: , Rfl:     albuterol sulfate HFA (VENTOLIN HFA) 108 (90 Base) MCG/ACT inhaler, Inhale 2 puffs into the lungs 4 times daily as needed for Wheezing, Disp: 3 Inhaler, Rfl: 1    Allergies:   No Known Allergies    Social History:     Social History     Tobacco Use    Smoking status: Former Smoker     Packs/day: 0.50     Years: 19.00     Pack years: 9.50     Start date: 2013     Quit date: 2014     Years since quittin.9    Smokeless tobacco: Never Used   Substance Use Topics    Alcohol use: Yes     Alcohol/week: 2.0 standard drinks     Types: 2 Glasses of wine per week    Drug use: No       Patient lives at home.     Physical Exam:     Vitals:    20 1407   BP: (!) 140/78   Pulse: 82   Resp: 18   Temp: 97.3 °F (36.3 °C)   SpO2: 98%   Weight: 285 lb (129.3 kg)   Height: 5' 4\" (1.626 m)       Exam: Physical Exam  Nurses note and vital signs reviewed and patient is not hypoxic. General: The patient appears well and in no apparent distress. Patient is resting comfortably on cart. Obese female  Skin: Warm, dry, no pallor noted. There is no rash noted. Head: Normocephalic, atraumatic. Eye: Normal conjunctiva  Ears, Nose, Mouth, and Throat: Oral mucosa is moist  Cardiovascular: Regular Rate and Rhythm  Respiratory: Patient is in no distress, no accessory muscle use, lungs are clear to auscultation, no wheezing, crackles or rhonchi  Musculoskeletal: There is no obvious deformity noted to the right lower extremity or to the right leg. The patient does have some redness and edema noted. There are some excoriations and abrasions noted to the medial aspect of the right ankle. There is some warmth around the area with evidence of erythema. There are some minimal calf tenderness. Patient pulses intact at DP/PT 2+. The patient had no cyanosis or mottling. Neurological: A&O x4, normal speech      Testing:     Us Dup Lower Extremity Right Piter    Result Date: 12/21/2020  EXAMINATION: DUPLEX VENOUS ULTRASOUND OF THE RIGHT LOWER EXTREMITY, 12/21/2020 2:54 pm TECHNIQUE: Duplex ultrasound and Doppler images were obtained of the right lower extremity. COMPARISON: None. HISTORY: ORDERING SYSTEM PROVIDED HISTORY: Right leg swelling TECHNOLOGIST PROVIDED HISTORY: Reason for exam:->right leg pain and swelling FINDINGS: The visualized veins of the right lower extremity are patent and free of echogenic thrombus. The veins are normally compressible and have normal phasic flow. No evidence of DVT in the right lower extremity. Medical Decision Making:     Vital signs reviewed    Past medical history reviewed. Allergies reviewed. Medications reviewed. Patient on arrival does not appear to be in any apparent distress or discomfort. The patient had an ultrasound of the right lower extremity that did not show any evidence of DVT. The patient does have some redness noted to the medial aspect of the right leg and some excoriations in the area. The patient had the stat keep and call ultrasound that was negative. This likely is more cellulitic in nature. Patient was on Macrobid previously which really would not have much coverage for any of the skin sakina. The patient will be treated with Omnicef. The patient is to rest, elevate. Patient is to use compression stockings. Monitor the area closely. If any the signs or symptoms worsen go directly to the ED for further evaluation. Patient was comfortable with the plan. The patient was neurovascular intact. Clinical Impression:   Mirtha Camacho was seen today for leg pain. Diagnoses and all orders for this visit:    Right leg swelling  -     US DUP LOWER EXTREMITY RIGHT JORDI; Future    Cellulitis of right leg    Other orders  -     cefdinir (OMNICEF) 300 MG capsule; Take 1 capsule by mouth 2 times daily for 10 days  -     naproxen (NAPROSYN) 500 MG tablet; Take 1 tablet by mouth 2 times daily as needed for Pain        The patient is to call for any concerns or return if any of the signs or symptoms worsen. The patient is to follow-up with PCP in the next 2-3 days for repeat evaluation repeat assessment or go directly to the emergency department.      SIGNATURE: Sarah Mcintosh III, PA-C

## 2020-12-28 RX ORDER — BUPROPION HYDROCHLORIDE 300 MG/1
300 TABLET ORAL NIGHTLY
Qty: 90 TABLET | Refills: 0 | Status: SHIPPED
Start: 2020-12-28 | End: 2021-03-09 | Stop reason: SDUPTHER

## 2021-01-08 ENCOUNTER — CARE COORDINATION (OUTPATIENT)
Dept: OTHER | Facility: CLINIC | Age: 46
End: 2021-01-08

## 2021-01-08 NOTE — CARE COORDINATION
Associate Care Manager (ACM) called patient for CC outreach. No answer; ACM left HIPAA compliant voicemail message with request for return call. ACM also sent lost to follow up letter to patient via [address] ACM will continue to follow. Stephon Musa RN BSN  Associate Care Manager  Phone: [phone]  Email: [email or web address]

## 2021-01-12 ENCOUNTER — HOSPITAL ENCOUNTER (OUTPATIENT)
Dept: ULTRASOUND IMAGING | Age: 46
Discharge: HOME | End: 2021-01-14
Payer: COMMERCIAL

## 2021-01-12 ENCOUNTER — TELEPHONE (OUTPATIENT)
Dept: PHARMACY | Facility: CLINIC | Age: 46
End: 2021-01-12

## 2021-01-12 DIAGNOSIS — M79.661 PAIN IN BOTH LOWER LEGS: ICD-10-CM

## 2021-01-12 DIAGNOSIS — M79.662 PAIN IN BOTH LOWER LEGS: ICD-10-CM

## 2021-01-12 DIAGNOSIS — I87.2 VENOUS INSUFFICIENCY (CHRONIC) (PERIPHERAL): ICD-10-CM

## 2021-01-12 PROCEDURE — 93970 EXTREMITY STUDY: CPT

## 2021-01-18 ENCOUNTER — OFFICE VISIT (OUTPATIENT)
Dept: PODIATRY | Age: 46
End: 2021-01-18
Payer: COMMERCIAL

## 2021-01-18 VITALS — HEIGHT: 64 IN | BODY MASS INDEX: 48.65 KG/M2 | WEIGHT: 285 LBS

## 2021-01-18 DIAGNOSIS — I87.2 VENOUS INSUFFICIENCY (CHRONIC) (PERIPHERAL): Primary | ICD-10-CM

## 2021-01-18 DIAGNOSIS — M79.662 PAIN IN BOTH LOWER LEGS: ICD-10-CM

## 2021-01-18 DIAGNOSIS — E10.69 TYPE 1 DIABETES MELLITUS WITH OTHER SPECIFIED COMPLICATION (HCC): ICD-10-CM

## 2021-01-18 DIAGNOSIS — R26.2 DIFFICULTY WALKING: ICD-10-CM

## 2021-01-18 DIAGNOSIS — M79.661 PAIN IN BOTH LOWER LEGS: ICD-10-CM

## 2021-01-18 PROCEDURE — 99213 OFFICE O/P EST LOW 20 MIN: CPT | Performed by: PODIATRIST

## 2021-01-19 ENCOUNTER — CARE COORDINATION (OUTPATIENT)
Dept: OTHER | Facility: CLINIC | Age: 46
End: 2021-01-19

## 2021-01-19 NOTE — PROGRESS NOTES
21     Johan Mcginnis    : 1975   Sex: female    Age: 54 y.o. Patient's PCP/Provider is:  Digna Oneill DO    Subjective:  Patient is seen today for follow-up regarding evaluation chronic edematous issues into both lower extremities. Patient presents to discuss her venous study results. Patient is still wearing her compression garments with minimal improvement in symptoms. No other additional issues noted. Chief Complaint   Patient presents with    Foot Pain     right heel       ROS:  Const: Positives and pertinent negatives as per HPI. Musculo: Denies symptoms other than stated above. Neuro: Denies symptoms other than stated above. Skin: Denies symptoms other than stated above. Current Medications:    Current Outpatient Medications:     buPROPion (WELLBUTRIN XL) 300 MG extended release tablet, Take 1 tablet by mouth nightly, Disp: 90 tablet, Rfl: 0    naproxen (NAPROSYN) 500 MG tablet, Take 1 tablet by mouth 2 times daily as needed for Pain, Disp: 20 tablet, Rfl: 0    celecoxib (CELEBREX) 200 MG capsule, Take 1 capsule by mouth daily, Disp: 90 capsule, Rfl: 1    ALPRAZolam (XANAX) 0.5 MG tablet, Take 1 tablet by mouth 2 times daily as needed for Sleep (depression) for up to 90 days. , Disp: 60 tablet, Rfl: 2    ramipril (ALTACE) 10 MG capsule, TAKE 1 CAPSULE BY MOUTH ONE TIME A DAY, Disp: 90 capsule, Rfl: 1    omeprazole (PRILOSEC) 40 MG delayed release capsule, Take 1 capsule by mouth daily, Disp: 90 capsule, Rfl: 1    montelukast (SINGULAIR) 10 MG tablet, Take 1 tablet by mouth nightly, Disp: 30 tablet, Rfl: 1    simvastatin (ZOCOR) 40 MG tablet, Take 1 tablet by mouth daily, Disp: 90 tablet, Rfl: 1    potassium chloride (KLOR-CON M) 20 MEQ extended release tablet, Take 1 tablet by mouth 2 times daily, Disp: 180 tablet, Rfl: 1    furosemide (LASIX) 40 MG tablet, Take 1 tablet by mouth 2 times daily, Disp: 180 tablet, Rfl: 1   metoprolol tartrate (LOPRESSOR) 25 MG tablet, Take 1 tablet by mouth 2 times daily, Disp: 180 tablet, Rfl: 3    triamcinolone (KENALOG) 0.1 % cream, Apply topically to affected areas 2 times daily. , Disp: 80 g, Rfl: 0    levothyroxine (SYNTHROID) 200 MCG tablet, Take 200 mcg by mouth Daily, Disp: , Rfl:     hydrocortisone 2.5 % cream, Apply topically 2 times daily. , Disp: 30 g, Rfl: 2    Insulin Pump - insulin lispro, Inject into the skin continuous 7384-0776 2.35 units/hr 1111-5687 2.40 units/hr  0815-1574 1.8 units/hr, Disp: , Rfl:     docusate sodium (COLACE) 100 MG capsule, Take 100 mg by mouth daily, Disp: , Rfl:     vitamin B-12 (CYANOCOBALAMIN) 500 MCG tablet, Take 1,000 mcg by mouth daily , Disp: , Rfl:     aspirin 81 MG tablet, Take 81 mg by mouth nightly., Disp: , Rfl:     Allergies:  No Known Allergies    Vitals:    01/18/21 1652   Weight: 285 lb (129.3 kg)   Height: 5' 4\" (1.626 m)       Exam:  NVS unchanged. Edema with varicosities and stasis skin changes noted to both lower extremities. No ulcerations or signs of infection noted bilaterally. Tenderness noted into both lower extremities with active ROM and muscle testing.         Diagnostic Studies:     Us Dup Lower Extremity Mapping Bilat Venous    Result Date: 1/12/2021 EXAMINATION: ULTRASOUND OF THE BILATERAL LOWER EXTREMITIES FOR VEIN MAPPING, 1/12/2021 1:27 pm TECHNIQUE: Sonographic evaluation of the bilateral greater saphenous veins was performed. Color flow Doppler imaging was also acquired. COMPARISON: None. HISTORY: ORDERING SYSTEM PROVIDED HISTORY: Venous insufficiency (chronic) (peripheral) TECHNOLOGIST PROVIDED HISTORY: Reason for exam:->Chronic venous insufficiency What reading provider will be dictating this exam?->CRC FINDINGS: Great saphenous vein venous valvular mechanism competence. Right lower extremity: There is  competent venous vascular mechanism for the right common femoral vein, saphenofemoral junction, great saphenous vein mid segment. There is incompetent venous vascular mechanism for the right great saphenous vein distal segment in the calf area and for the lesser great saphenous vein. Left lower extremity: There are competent venous vascular mechanism for the left common femoral vein, saphenofemoral junction, distal great saphenous vein, and for the lesser saphenous vein. There are incompetent venous vascular mechanism for the mid left great saphenous vein. Communicating/perforating veins evaluation of the lower extremities Right lower extremity: There are communicating/ veins at the level of the Birch's  group of vein, Cockett II and Cockett I vein. Left lower extremity: There communicating/ veins at the level of the Birch's group of vein, 24 cm perforating vein, and Cockett II vein. Ankle:  . ..mm.    Presence of incompetent vascular mechanism bilateral as above commented the in some of the veins of the great saphenous venous system of the right and left lower extremity. There communicating/ veins at multiple levels in both lower extremities as above commented.     Us Dup Lower Extremity Right Piter    Result Date: 12/21/2020

## 2021-01-20 ENCOUNTER — TELEPHONE (OUTPATIENT)
Dept: VASCULAR SURGERY | Age: 46
End: 2021-01-20

## 2021-01-20 ENCOUNTER — SCHEDULED TELEPHONE ENCOUNTER (OUTPATIENT)
Dept: PHARMACY | Facility: CLINIC | Age: 46
End: 2021-01-20

## 2021-01-20 NOTE — LETTER
HOME DELIVERY PRESCRIPTION REQUEST  BE WELL WITH DIABETES PROGRAM  Prescriber:  Dr. Elizabeth Wiggins, DO  [facility]  Phone: [phone]       Fax: [phone]     Patient:  Benny Brock 1975  [place] [address]  [phone] (home)      Rationale:   Patient is enrolled in the [facility] Be Well With Diabetes program. Copays for certain diabetes related medications and supplies will be waived if filled through [address] while participating in the program. 90 day supplies are preferred. Your patient can now get her Freestyle Syed at her preferred pharmacy for $0     Covered Medication(s) for Patient:    Medication: Freestyle Syed 14 (Sensors ONLY)     Sig: Change every 2 weeks                                   #: 6 sensors  R: 4     Prescriber Response:    Prescription(s) approved (please [place] one):  YES  NO    Other response: ________________________________________    ______________________________________   __________________  Authorized By       Date     Pharmacy: [address] [place] [address]                        Phone:  [phone]    Alma Patel, [facility]  Fax:  [phone]    [address]     The information transmitted is intended only for the person or entity to which it is addressed and may contain confidential and/or privileged material. Any review, retransmission, dissemination or other use of, or taking of any action in reliance upon, this information by persons or entities other than the intended recipient is prohibited. This document contains information covered under the Privacy Act, 5 (a), and/or the [employer] and Accountability Act ([address]) and its various implementing regulations and must be protected in accordance with those provisions. If you received this in error, please contact the sender and delete the material from any computer.

## 2021-01-20 NOTE — TELEPHONE ENCOUNTER
[facility] CLINICAL PHARMACY REVIEW - BE WELL WITH DIABETES  =================================================================  Gin Manuel is a 54 y.o. female enrolled in the [address] [place], [place] [address] Employee Diabetes Program. Patient provided Mata Childes with verbal consent to remain in the program for this year. Patient enrolled 4/1/19. Medications:  Current Outpatient Medications   Medication Sig Dispense Refill    buPROPion (WELLBUTRIN XL) 300 MG extended release tablet Take 1 tablet by mouth nightly 90 tablet 0    naproxen (NAPROSYN) 500 MG tablet Take 1 tablet by mouth 2 times daily as needed for Pain 20 tablet 0    celecoxib (CELEBREX) 200 MG capsule Take 1 capsule by mouth daily 90 capsule 1    ALPRAZolam (XANAX) 0.5 MG tablet Take 1 tablet by mouth 2 times daily as needed for Sleep (depression) for up to 90 days. 60 tablet 2    ramipril (ALTACE) 10 MG capsule TAKE 1 CAPSULE BY MOUTH ONE TIME A DAY 90 capsule 1    omeprazole (PRILOSEC) 40 MG delayed release capsule Take 1 capsule by mouth daily 90 capsule 1    montelukast (SINGULAIR) 10 MG tablet Take 1 tablet by mouth nightly 30 tablet 1    simvastatin (ZOCOR) 40 MG tablet Take 1 tablet by mouth daily 90 tablet 1    potassium chloride (KLOR-CON M) 20 MEQ extended release tablet Take 1 tablet by mouth 2 times daily 180 tablet 1    furosemide (LASIX) 40 MG tablet Take 1 tablet by mouth 2 times daily 180 tablet 1    metoprolol tartrate (LOPRESSOR) 25 MG tablet Take 1 tablet by mouth 2 times daily 180 tablet 3    triamcinolone (KENALOG) 0.1 % cream Apply topically to affected areas 2 times daily. 80 g 0    levothyroxine (SYNTHROID) 200 MCG tablet Take 200 mcg by mouth Daily      hydrocortisone 2.5 % cream Apply topically 2 times daily.  30 g 2    Insulin Pump - insulin lispro Inject into the skin continuous 9307-6985 2.35 units/hr  9464-3968 2.40 units/hr   3098-0681 1.8 units/hr      docusate sodium (COLACE) 100 MG capsule Take 100 mg by mouth daily      vitamin B-12 (CYANOCOBALAMIN) 500 MCG tablet Take 1,000 mcg by mouth daily       aspirin 81 MG tablet Take 81 mg by mouth nightly. No current facility-administered medications for this visit. Current Pharmacy: [facility]  Current testing supplies/frequency: Angel Rodriguez- Has been paying out OOP ~$60 from [facility].   Working to get PA and prescription so she can begin filling at [facility]  Pen needles/syringes: n/a    Allergies:  No Known Allergies     Vitals/Labs:  BP Readings from Last 3 Encounters:   12/21/20 (!) 140/78   12/01/20 128/84   10/31/20 (!) 116/57     Lab Results   Component Value Date    LABMICR 172.0 (H) 10/02/2020     Lab Results   Component Value Date    LABA1C 9.4 (H) 10/02/2020    LABA1C 8.4 (H) 02/14/2020    LABA1C 7.9 (H) 10/06/2019     Lab Results   Component Value Date    CHOL 155 02/14/2020    TRIG 104 02/14/2020    HDL 52 02/14/2020    LDLCHOLESTEROL 90 12/03/2014    LDLCALC 82 02/14/2020     ALT   Date Value Ref Range Status   10/02/2020 47 (H) 0 - 32 U/L Final     AST   Date Value Ref Range Status   10/02/2020 45 (H) 0 - 31 U/L Final     The 10-year ASCVD risk score (Gisele Shone., et al., 2013) is: 2%    Values used to calculate the score:      Age: 54 years      Sex: Female      Is Non- : No      Diabetic: Yes      Tobacco smoker: No      Systolic Blood Pressure: 388 mmHg      Is BP treated: Yes      HDL Cholesterol: 52 mg/dL      Total Cholesterol: 155 mg/dL     Lab Results   Component Value Date    CREATININE 0.7 10/31/2020     eGFR: >60 mL/min/1.73 m^2    Immunizations:  Immunization History   Administered Date(s) Administered    Influenza A ([id number]) Vaccine PF IM 12/11/2009    Influenza Virus Vaccine 10/04/2016, 10/03/2017, 10/08/2018, 10/14/2020    Pneumococcal Conjugate 13-valent ([id number]) 04/16/2010    Pneumococcal Polysaccharide ([id number]) 09/18/2019      Social History:  Social History Date Time Provider [place]   1/20/2021  5:00 PM SCHEDULE, S CLINICAL PHARMACY [facility] None   2/8/2021 11:00 AM Marlyn Pink MD [facility]   6/3/2021  8:00 AM DO [facility]     WJl Prather, PharmD, [address]  Direct: [phone]  Department, toll free: [phone], option 7    CLINICAL PHARMACY CONSULT: MED RECONCILIATION/REVIEW ADDENDUM    For Pharmacy Admin Tracking Only    PHSO: Yes  Total # of Interventions Recommended: 2  - New Order #: 1 New Medication Order Reason(s): Patient Preference  - Updated Order #: 1 Updated Order Reason(s):  Other  Recommended intervention potential cost savings: 1  Total Interventions Accepted: 2  Time Spent (min): [address], PharmD  [address]

## 2021-01-27 NOTE — TELEPHONE ENCOUNTER
Prescription for [facility] has not yet been received. Sent fax early this week. Left VM with Dr. Delphine Castro office on 1/27. Will continue to followup as necessary    W. Baruch Riedel, PharmD, [address]  Direct: [phone]  Department, toll free: [phone], option 7

## 2021-01-28 NOTE — TELEPHONE ENCOUNTER
Prescription for [facility] sensors sent in by endo. Sent email to benefits for PA. Will continue to follow.     Anny Crystal, PharmD, [address]  Direct: [phone]  Department, toll free: [phone], option 7

## 2021-01-29 NOTE — TELEPHONE ENCOUNTER
Lvm with patient that her sensors had been received and filled and instructed to call [phone] option 7 with any questions.     Nikkie Lay, PharmD, [address] [facility]  Direct: [phone]  Department, toll free: [phone], option 7

## 2021-02-01 ENCOUNTER — CARE COORDINATION (OUTPATIENT)
Dept: OTHER | Facility: CLINIC | Age: 46
End: 2021-02-01

## 2021-02-01 NOTE — CARE COORDINATION
Ambulatory Care Coordination Note  2/1/2021  CM Risk Score: 5  Charlson 10 Year Mortality Risk Score: 23%     ACC: Shekhar Muñoz, RN    Summary Note: Associate Care Manager (ACM) called patient after patient contacted AC with interest for ongoing enrollment in ACM program. Patient reports to AC that things have been going \"pretty good\" lately but states blood glucose not as good as it had been. States in Dec 2020, she was following low carbohydrate diet but states with the holidays, she got away from that. States trying to get back on low carb diet. Reports recent highest blood glucose 354 but states that was the morning after getting second dose of COVID vaccine. States had the following symptoms the day after second dose of COVID vaccine: \"heart racing\", pulse ox 85% at home, fever (max 103), and fatigue. States symptoms lasted 2 days then when she woke up on Friday (got vaccine on Tuesday) she was fine and no symptoms. Patient made the comment \"I've never been so sick\" than she was after the second dose of COVID vaccine. States no recent hypoglycemia symptoms. Reports has been scanning Freestyle Syed meter 6-7 times per day. Reports no blood glucose readings >300 since the week of second dose COVID vaccine. States had to reschedule office visit with eye dr to March 2021. States also had to reschedule office visit with endocrinology to May 2021. States both appts rescheduled due to COVID symptoms after second dose vaccine. States plans to get labs drawn for endocrinology soon. Patient reports bilateral lower extremities continue to be swollen. States wears compression stockings to work daily but states amount of swelling remains unchanged. States recently had follow up with podiatrist after ultrasound was done. States now being referred to vascular with initial office visit 2/8/2021 for venous insufficiency. Patient reports BLE are \"heavy\".      Patient reports received DM education from [facility] couple months ago. Thanked [facility] for the education and denies questions regarding education. Patient agreeable to follow up calls from [facility] with next outreach planned for 2 weeks. [facility] instructed patient to reach out if needs arise before next scheduled follow up. Patient verbalized understanding. Ambulatory Care Coordination Assessment    Care Coordination Protocol  Program Enrollment: Complex Care  Referral from Primary Care Provider: No  Week 1 - Initial Assessment     Do you have all of your prescriptions and are they filled?: Yes  Barriers to medication adherence: None  Are you able to afford your medications?: Yes  How often do you have trouble taking your medications the way you have been told to take them?: I always take them as prescribed. Do you have Home O2 Therapy?: No      Ability to seek help/take action for Emergent Urgent situations i.e. fire, crime, inclement weather or health crisis. : Independent  Ability to ambulate to restroom: Independent  Ability handle personal hygeine needs (bathing/dressing/grooming): Independent  Ability to manage Medications: Independent  Ability to prepare Food Preparation: Independent  Ability to maintain home (clean home, laundry): Independent  Ability to drive and/or has transportation: Independent  Ability to do shopping: Independent  Ability to manage finances: Independent  Is patient able to live independently?: Yes     Current Housing: Private Residence  Does the person that you care for see a [facility] PCP?: Yes                 Suggested Interventions and Community Resources  Diabetes Education: In Process          Set up/Review an Education Plan, Set up/Review Goals              Prior to Admission medications    Medication Sig Start Date End Date Taking?  Authorizing Provider   buPROPion (WELLBUTRIN XL) 300 MG extended release tablet Take 1 tablet by mouth nightly 12/28/20   Brittany Terry, DO   celecoxib (CELEBREX) 200 MG capsule Take 1 capsule by mouth support. Magan Escalera RN BSN  Associate Care Manager  Phone: [phone]  Email: [email or web address]

## 2021-02-05 ENCOUNTER — TELEPHONE (OUTPATIENT)
Dept: VASCULAR SURGERY | Age: 46
End: 2021-02-05

## 2021-02-08 ENCOUNTER — OFFICE VISIT (OUTPATIENT)
Dept: VASCULAR SURGERY | Age: 46
End: 2021-02-08
Payer: COMMERCIAL

## 2021-02-08 VITALS — DIASTOLIC BLOOD PRESSURE: 72 MMHG | SYSTOLIC BLOOD PRESSURE: 126 MMHG

## 2021-02-08 DIAGNOSIS — I87.2 VENOUS INSUFFICIENCY (CHRONIC) (PERIPHERAL): Primary | ICD-10-CM

## 2021-02-08 DIAGNOSIS — I83.813 VARICOSE VEINS OF BILATERAL LOWER EXTREMITIES WITH PAIN: ICD-10-CM

## 2021-02-08 PROCEDURE — 99204 OFFICE O/P NEW MOD 45 MIN: CPT | Performed by: SURGERY

## 2021-02-08 NOTE — PATIENT INSTRUCTIONS
· If you cut or scratch the skin over a vein, it may bleed a lot. Prop up your leg and apply firm pressure with a clean bandage over the site of the bleeding. Continue to apply pressure for a full 15 minutes. Do not check sooner to see if the bleeding has stopped. If the bleeding has not stopped after 15 minutes, apply pressure again for another 15 minutes. You can repeat this up to 3 times for a total of 45 minutes. If you have a blood clot in a varicose vein, you may have tenderness and swelling over the vein. The vein may feel firm. Be sure to call your doctor right away if you have these symptoms. If your doctor has told you how to care for the clot, follow his or her instructions. Care may include the following:  · Prop up your leg and apply heat with a warm, damp cloth or a heating pad set on low (put a towel or cloth between your leg and the heating pad to prevent burns). · Ask your doctor if you can take an over-the-counter pain medicine, such as acetaminophen (Tylenol), ibuprofen (Advil, Motrin), or naproxen (Aleve). Be safe with medicines. Read and follow all instructions on the label. When should you call for help? Call 911 anytime you think you may need emergency care. For example, call if:    · You have sudden chest pain and shortness of breath, or you cough up blood. Call your doctor now or seek immediate medical care if:    · You have signs of a blood clot, such as:  ? Pain in your calf, back of the knee, thigh, or groin. ? Redness and swelling in your leg or groin.     · A varicose vein begins to bleed and you cannot stop it.     · You have a tender lump in your leg.     · You get an open sore. Watch closely for changes in your health, and be sure to contact your doctor if:    · Your varicose vein symptoms do not improve with home treatment. Where can you learn more? Go to [email or web address] and sign in to your [employer] account. Enter D847 in the [employer] box to learn more about \"Varicose Veins: Care Instructions. \"     If you do not have an account, please click on the \"Sign Up Now\" link. Current as of: March 4, 2020               Content Version: 12.6  © 5257-3345 [employer], [facility]. Care instructions adapted under license by [facility] ([facility]). If you have questions about a medical condition or this instruction, always ask your healthcare professional. [address] any warranty or liability for your use of this information.

## 2021-02-08 NOTE — PROGRESS NOTES
Vascular Surgery Outpatient Consultation    Reason for Consult:  Venous insufficiency    PCP : Tasha Moseley DO  Podiatrist : Dr. Rajiv Tompkins:    The patient is a 54 y.o. female who presents in regards to venous insufficiency. She has been having problems in both legs since about 2016. The symptoms are left greater than right. It has been getting progressively worse since about 9/2020. Symptoms include pain, aching, fatigue, burning, edema and dermatitis typically worse as on feet more, at end of the day. Pain at its worst is a 8/10. The varicose veins seems to be related to venous insufficiency. She is taking ibuprofen 4 pills a day in the evening. She did have an episode of cellulitis 11/2020 which was tx with oral abx and resolved. The patient has been using over the counter compression stockings from [facility] consistently since 11/2020. They do help to get through the day and than when she takes then off after  getting home from work, she develops significant swelling and pain. They denies hx of DVT in the past.      Dad had DVT associated when he was sickly. She works as a  on [employer] at [employer] [facility] [employer] [employer].       She had venous reflux study done at hospital.    · Right distal GSV incompetence  · Right lesser saphenous vein incompetence  · Left mid gsv incompetence     ROS : All others Negative if blank [], Positive if [x]  General Urinary   [] Fevers [] Hematuria   [] Chills [] Dysuria   [] Weight Loss Vascular   Skin [] Claudication   [] Tissue Loss [] Rest Pain   Eyes Neurologic   [x] Wears Glasses/Contacts [] Stroke/TIA   [] Vision Changes [] Focal weakness   Respiratory [] Slurred Speech    [] Shortness of breath ENT   Cardiovascular [] Difficulty swallowing   [] Chest Pain Endocrine    [] Shortness of breath with exertion [] Increased Thirst   Gastrointestinal    [] Abdominal Pain    [] Melena   [] Hematochezia         Past Medical History: Diagnosis Date    Capsulitis     right shoulder    Depression     Diabetes mellitus ([facility]     AGE 2, TYPE 1    Diabetic frozen shoulder associated with type 1 diabetes mellitus ([facility]     RIGHT    GERD (gastroesophageal reflux disease)     HTN (hypertension)     Hyperlipidemia     Hypothyroid 2012    Insulin pump in place     Nausea & vomiting     resolved    Obesity 2012    Obesity, Class III, BMI 40-49.9 (morbid obesity) ([facility] 2018    BMI 47.3    MILENA (obstructive sleep apnea) 2012    USES CPAP    Primary osteoarthritis of knee     B/L    Retinopathy due to secondary DM ([facility] 2011    LASER TX    Sleep apnea 3/2/2012    uses cpap, instructed to bring    Thyroid disease      Past Surgical History:        Procedure Laterality Date    CARDIOVASCULAR STRESS TEST  2017    NEG     SECTION      ENDOMETRIAL ABLATION      FOOT SURGERY Bilateral ,    to treat fascitis bilateral, to remove a nerve left    HERNIA REPAIR  2015    VENTRAL    SHOULDER SURGERY Left 2013    to release impingement     Current Medications:   Current Outpatient Medications   Medication Sig Dispense Refill    buPROPion (WELLBUTRIN XL) 300 MG extended release tablet Take 1 tablet by mouth nightly 90 tablet 0    celecoxib (CELEBREX) 200 MG capsule Take 1 capsule by mouth daily 90 capsule 1    ALPRAZolam (XANAX) 0.5 MG tablet Take 1 tablet by mouth 2 times daily as needed for Sleep (depression) for up to 90 days.  60 tablet 2    ramipril (ALTACE) 10 MG capsule TAKE 1 CAPSULE BY MOUTH ONE TIME A DAY 90 capsule 1    omeprazole (PRILOSEC) 40 MG delayed release capsule Take 1 capsule by mouth daily 90 capsule 1    simvastatin (ZOCOR) 40 MG tablet Take 1 tablet by mouth daily 90 tablet 1    potassium chloride (KLOR-CON M) 20 MEQ extended release tablet Take 1 tablet by mouth 2 times daily 180 tablet 1  furosemide (LASIX) 40 MG tablet Take 1 tablet by mouth 2 times daily 180 tablet 1    metoprolol tartrate (LOPRESSOR) 25 MG tablet Take 1 tablet by mouth 2 times daily 180 tablet 3    triamcinolone (KENALOG) 0.1 % cream Apply topically to affected areas 2 times daily. 80 g 0    levothyroxine (SYNTHROID) 200 MCG tablet Take 200 mcg by mouth Daily      hydrocortisone 2.5 % cream Apply topically 2 times daily. 30 g 2    Insulin Pump - insulin lispro Inject into the skin continuous 5128-6564 2.35 units/hr  7560-8916 2.40 units/hr   5870-1525 1.8 units/hr      docusate sodium (COLACE) 100 MG capsule Take 100 mg by mouth daily      vitamin B-12 (CYANOCOBALAMIN) 500 MCG tablet Take 1,000 mcg by mouth daily       aspirin 81 MG tablet Take 81 mg by mouth nightly. No current facility-administered medications for this visit. Allergies:  Patient has no known allergies. Social History     Socioeconomic History    Marital status: Single     Spouse name: Not on file    Number of children: 1    Years of education: Not on file    Highest education level: Not on file   Occupational History    Occupation: [place] [employer]     Employer: [employer]   Social Needs    Financial resource strain: Not on file    Food insecurity     Worry: Not on file     Inability: Not on file   [employer] needs     Medical: Not on file     Non-medical: Not on file   Tobacco Use    Smoking status: Former Smoker     Packs/day: 0.50     Years: 19.00     Pack years: 9.50     Start date: 2013     Quit date: 2014     Years since quittin.0    Smokeless tobacco: Never Used   Substance and Sexual Activity    Alcohol use:  Yes     Alcohol/week: 2.0 standard drinks     Types: 2 Glasses of wine per week    Drug use: No    Sexual activity: Never   Lifestyle    Physical activity     Days per week: Not on file     Minutes per session: Not on file    Stress: Not on file   Relationships    Social connections Talks on phone: Not on file     Gets together: Not on file     Attends Holiness service: Not on file     Active member of club or organization: Not on file     Attends meetings of clubs or organizations: Not on file     Relationship status: Not on file    Intimate partner violence     Fear of current or ex partner: Not on file     Emotionally abused: Not on file     Physically abused: Not on file     Forced sexual activity: Not on file   Other Topics Concern    Not on file   Social History Narrative    Lives in a house in [place] with mother, step-father and son        Family History   Problem Relation Age of Onset    Diabetes Mother         Age 59, 2018    Heart Disease Mother 47        ACB    Diabetes Father         Age 70, 2018    Cancer Father         Skin cancer    Other Father         AFIB    No Known Problems Sister     Diabetes Maternal Grandmother     Diabetes Maternal Grandfather     Diabetes Paternal Grandfather     Cancer Paternal Grandfather         lung     Labs  Lab Results   Component Value Date    WBC 13.2 (H) 10/31/2020    HGB 13.8 10/31/2020    HCT 42.5 10/31/2020     10/31/2020    PROTIME 10.6 04/24/2016    INR 1.0 04/24/2016    APTT 31.5 04/24/2016    K 4.0 10/31/2020    BUN 16 10/31/2020    CREATININE 0.7 10/31/2020     PHYSICAL EXAM  There were no vitals taken for this visit.   CONSTITUTIONAL:   Awake, alert, cooperative  PSYCHIATRIC :  Oriented to time, place and person     Appropriate insight to disease process  EYES: Lids and lashes normal  ENT:  External ears and nose without lesions   Hearing deficits not noted  NECK: Supple, symmetrical, trachea midline   Thyroid goiter not appreciated   Carotid bruit notnoted  LUNGS:  No increased work of breathing                 Clear to auscultation  CARDIOVASCULAR:  regular rate and rhythm   ABDOMEN:  soft, non-distended, non-tender   Hernias notnoted   Insulin pump in place   Aorta is not palpable

## 2021-02-09 DIAGNOSIS — E78.2 MIXED HYPERLIPIDEMIA: ICD-10-CM

## 2021-02-09 DIAGNOSIS — F41.9 ANXIETY: ICD-10-CM

## 2021-02-09 DIAGNOSIS — R00.2 HEART PALPITATIONS: ICD-10-CM

## 2021-02-09 RX ORDER — SIMVASTATIN 40 MG
40 TABLET ORAL DAILY
Qty: 90 TABLET | Refills: 1 | Status: SHIPPED
Start: 2021-02-09 | End: 2021-08-13 | Stop reason: SDUPTHER

## 2021-02-10 RX ORDER — ALPRAZOLAM 0.5 MG/1
TABLET ORAL
Qty: 60 TABLET | Refills: 2 | Status: SHIPPED
Start: 2021-02-10 | End: 2021-05-10

## 2021-02-16 ENCOUNTER — CARE COORDINATION (OUTPATIENT)
Dept: OTHER | Facility: CLINIC | Age: 46
End: 2021-02-16

## 2021-02-16 ENCOUNTER — TELEPHONE (OUTPATIENT)
Dept: ADMINISTRATIVE | Age: 46
End: 2021-02-16

## 2021-02-16 DIAGNOSIS — I10 ESSENTIAL HYPERTENSION: ICD-10-CM

## 2021-02-16 NOTE — TELEPHONE ENCOUNTER
Reason:  Pt called stating she is needing a 10 day Rx of the following medication as she will not receive her actual Rx from [facility] until end of the week depending on the weather and she was told by lissa that they could over-ride the Rx since she is waiting for a full Rx at the moment. **metoprolol tartrate (LOPRESSOR) 25 MG tablet**    Due to patient care no one was available to speak with at the office. Pt can be reached back @ [phone] as she is at work. She is requesting this Rx be send to [facility]         PCP:  Alexia, DO      Thank You So Much!!

## 2021-02-16 NOTE — CARE COORDINATION
Ambulatory Care Coordination Note  2/16/2021  CM Risk Score: 5  Charlson 10 Year Mortality Risk Score: 23%     ACC: Teodora Horn, RN    Summary Note: Associate Care Manager (ACM) called patient for CC outreach. Patient states had initial office visit with Dr. Virginia Trinidad and GINA hose were ordered. States GINA hose should be in the end of this week or the beginning of next week. States she is still wearing OTC compression stockings until GINA hose arrive. Patient states has been elevating BLE. Reports BLE swelling improving. Patient states blood glucose fluctuating lately. States highest . Reports checks BG if wakes up in night; reports BG has been 150 around 0400 then when wakes ~4 hours later and BG in the 300s. States needs to schedule follow up with endo for possible insulin dose adjustment. Patient reports compliance with DM diet. Reports eating breakfast ~9318-8369 q am at work. Patient states called PCP office this date to request short term RX Metoprolol be sent to local pharmacy due to [address] delivery of med delayed due to holiday yesterday and weather. ACM confirmed for patient that PCP sent RX to [address] [facility] but notified patient to reach out to [facility] to make sure RX ready for . Care Coordination Interventions    Program Enrollment: Complex Care  Referral from Primary Care Provider: No  Suggested Interventions and Community Resources  Diabetes Education: In Process         Goals Addressed                 This Visit's Progress     HEMOGLOBIN A1C < 7        2/16/2021: Patient to reschedule follow up with endocrinology for next A1C check & possible medication adjustment. Prior to Admission medications    Medication Sig Start Date End Date Taking?  Authorizing Provider   metoprolol tartrate (LOPRESSOR) 25 MG tablet Take 1 tablet by mouth 2 times daily 2/16/21 5/17/21  Moe Terry DO   ALPRAZolam (XANAX) 0.5 MG tablet TAKE 1 TABLET BY MOUTH TWICE DAILY AS HP      and   Diabetes Assessment    Medic Alert ID: Yes (Comment: patient states she does not wear medic alert ID)  Meal Planning: Carb counting   How often do you test your blood sugar?: Other, Meals, Bedtime (Comment: Patient states checks blood glucose 5-6 times per day \"at least\" as she has a [address] 12/2/2020)   Do you have barriers with adherence to non-pharmacologic self-management interventions? (Nutrition/Exercise/Self-Monitoring): Yes   Have you ever had to go to the ED for symptoms of low blood sugar?: Yes   What is the date of your last ED visit for low blood sugar?: 10/31/20       Blood Sugar Trends: Fluctuating        Plan:  -Patient to call endocrinologist to schedule appointment due to blood glucose fluctuating     -Patient waiting arrival of GINA escotoe; wearing OTC compression stockings until GINA hose arrive    -Patient to  short term RX Metoprolol from local pharmacy to take until Metoprolol arrives from [facility] [address]  Phone: [phone]  Email: [email or web address]

## 2021-03-02 ENCOUNTER — CARE COORDINATION (OUTPATIENT)
Dept: OTHER | Facility: CLINIC | Age: 46
End: 2021-03-02

## 2021-03-02 NOTE — CARE COORDINATION
Associate Care Manager (ACM) received return call from patient this date. Patient states she received GINA hose today. States she just came from appointment with eye dr and eyes were dilated. Patient states she called endocrinologist since last ACM outreach and her insulin doses were adjusted per endo. Patient tried to read updated settings on insulin pump but unable due to eyes still dilated. ACM offered to call patient later this week to follow up when she can tell me updated insulin pump settings. Patient requested ACM call back this Friday after 4:30 pm; [facility] will do so. Antonietta Grove RN BSN  Associate Care Manager  Phone: [phone]  Email: [email or web address]

## 2021-03-05 ENCOUNTER — CARE COORDINATION (OUTPATIENT)
Dept: OTHER | Facility: CLINIC | Age: 46
End: 2021-03-05

## 2021-03-08 ENCOUNTER — OFFICE VISIT (OUTPATIENT)
Dept: PODIATRY | Age: 46
End: 2021-03-08
Payer: COMMERCIAL

## 2021-03-08 VITALS — BODY MASS INDEX: 47.12 KG/M2 | HEIGHT: 64 IN | WEIGHT: 276 LBS

## 2021-03-08 DIAGNOSIS — S92.215A CLOSED NONDISPLACED FRACTURE OF CUBOID OF LEFT FOOT, INITIAL ENCOUNTER: Primary | ICD-10-CM

## 2021-03-08 DIAGNOSIS — M79.672 LEFT FOOT PAIN: Primary | ICD-10-CM

## 2021-03-08 DIAGNOSIS — R26.2 DIFFICULTY WALKING: ICD-10-CM

## 2021-03-08 DIAGNOSIS — M67.88 LEFT PERONEAL TENDINOSIS: ICD-10-CM

## 2021-03-08 DIAGNOSIS — M25.572 PAIN IN LEFT ANKLE AND JOINTS OF LEFT FOOT: ICD-10-CM

## 2021-03-08 DIAGNOSIS — R60.0 LOCALIZED EDEMA: ICD-10-CM

## 2021-03-08 PROCEDURE — 99213 OFFICE O/P EST LOW 20 MIN: CPT | Performed by: PODIATRIST

## 2021-03-08 NOTE — PROGRESS NOTES
Patient is in today for evaluation of left heel pain. Patient says the pain has been bothering her for about a month with it increasing in pain lately. Patient says the pain is constant.  pcp is Digna Oneill, DO

## 2021-03-08 NOTE — LETTER
[address] [facility] [address]  Phone: [phone]  Fax: [place], [place]        March 9, 2021     Patient: Johan Mcginnis   YOB: 1975   Date of Visit: 3/8/2021       To Whom It May Concern: It is my medical opinion that Mirna Cox may return to full duty immediately with no restrictions with the Cam walker. If you have any questions or concerns, please don't hesitate to call.     Sincerely,        Aleida Bee DPM

## 2021-03-09 DIAGNOSIS — F32.A DEPRESSION, UNSPECIFIED DEPRESSION TYPE: ICD-10-CM

## 2021-03-09 RX ORDER — BUPROPION HYDROCHLORIDE 300 MG/1
300 TABLET ORAL NIGHTLY
Qty: 90 TABLET | Refills: 0 | Status: SHIPPED
Start: 2021-03-09 | End: 2021-07-01

## 2021-03-09 RX ORDER — OMEPRAZOLE 40 MG/1
40 CAPSULE, DELAYED RELEASE ORAL DAILY
Qty: 90 CAPSULE | Refills: 1 | Status: SHIPPED
Start: 2021-03-09 | End: 2021-08-06 | Stop reason: SDUPTHER

## 2021-03-09 NOTE — CARE COORDINATION
Ambulatory Care Coordination Note  3/5/2021  CM Risk Score: 5  Charlson 10 Year Mortality Risk Score: 23%     ACC: Yadira Aquino RN    Summary Note: Associate Care Manager [facility]) called patient for CC outreach follow up. Patient provided ACM with updated insulin pump settings: 9275-9170: 2.1 units/hour, 1636-0821: 1.8 units/hour, 3986-2664: 2.2 units/hour. States recent highest blood glucose 211 (@1130 today); recent lowest BG 70 (before lunch yesterday). States BG during this outreach 149. Patient denies symptoms of hypoglycemia with BG 70. Patient states following diabetic diet recently and states normally eats lunch at work ~1330. Patient  has been wearing GINA hose and states first day they felt good. States since then, she has had issues with the hose slipping and pinching just below the knees. Patient  has noticed swelling in the evenings.  has appt with podiatry 3/8/2021 and will discuss alternative option. States dad had compression stockings that were sleeves that he had good outcomes with. Patient states unable to elevated BLE in the evenings much due to has to work around the home. Patient c/p left heel pain that feels like plantar fasciitis .  has had plantar fasciitis in the past and has been having difficulty walking from car to the building at work due to pain.  has to sit and rest once she gets to the building prior to going to her unit. Patient reports received Metoprolol refill. Care Coordination Interventions    Program Enrollment: Complex Care  Referral from Primary Care Provider: No  Suggested Interventions and Community Resources  Diabetes Education: In Process         Goals Addressed                 This Visit's Progress     HEMOGLOBIN A1C < 7        2/16/2021: Patient to reschedule follow up with endocrinology for next A1C check & possible medication adjustment. 3/5/2021: Patient has not yet had A1C rechecked.             Prior to Admission medications    Medication Sig Start Date End Date Taking? Authorizing Provider   Insulin Pump - insulin lispro Inject into the skin continuous 2373-8773: 2.1 units/hr  2955-7103: 1.8 units/hr   9549-5950: 2.2 units/hr   Yes Historical Provider, MD   omeprazole (PRILOSEC) 40 MG delayed release capsule Take 1 capsule by mouth daily 3/9/21   Tomasz Terry, DO   buPROPion (WELLBUTRIN XL) 300 MG extended release tablet Take 1 tablet by mouth nightly 3/9/21   Tomasz Terry, DO   metoprolol tartrate (LOPRESSOR) 25 MG tablet Take 1 tablet by mouth 2 times daily 2/16/21 5/17/21  Tomasz Terry, DO   ALPRAZolam (XANAX) 0.5 MG tablet TAKE 1 TABLET BY MOUTH TWICE DAILY AS NEEDED FOR SLEEP(DEPRESSION) 2/10/21 3/12/21  Digna Terry, DO   simvastatin (ZOCOR) 40 MG tablet Take 1 tablet by mouth daily 2/9/21   Tomasz Terry, DO   Elastic Bandages & Supports (JOBST KNEE HIGH COMPRESSION SM) MISC Compression stockings 20-30 mm hg knee high bilaterally  Dx : Venous Insufficiency, Swelling 2/8/21   Joann Garcia MD   celecoxib (CELEBREX) 200 MG capsule Take 1 capsule by mouth daily 12/2/20   Tomasz Terry, DO   ramipril (ALTACE) 10 MG capsule TAKE 1 CAPSULE BY MOUTH ONE TIME A DAY 11/2/20   Digna Terry DO   potassium chloride (KLOR-CON M) 20 MEQ extended release tablet Take 1 tablet by mouth 2 times daily 5/26/20   Tomasz Terry, DO   furosemide (LASIX) 40 MG tablet Take 1 tablet by mouth 2 times daily  Patient taking differently: Take 40 mg by mouth daily  5/26/20   Tomasz Terry, DO   triamcinolone (KENALOG) 0.1 % cream Apply topically to affected areas 2 times daily.  1/20/20   Digna Terry,    levothyroxine (SYNTHROID) 200 MCG tablet Take 200 mcg by mouth Daily    Historical Provider, MD   docusate sodium (COLACE) 100 MG capsule Take 100 mg by mouth daily    Historical Provider, MD   vitamin B-12 (CYANOCOBALAMIN) 500 MCG tablet Take 1,000 mcg by mouth daily

## 2021-03-09 NOTE — PROGRESS NOTES
3/9/21     Gin Manuel    : 1975   Sex: female    Age: 54 y.o. Patient's PCP/Provider is:  Digna Oneill DO    Subjective:  Patient is seen today for evaluation regarding some pain into her lateral left midfoot region. She stated the issue has been getting progressively worse over the last few weeks. She denies any recent injury or changes in activities. Chief Complaint   Patient presents with    Foot Pain     left heel        ROS:  Const: Positives and pertinent negatives as per HPI. Musculo: Denies symptoms other than stated above. Neuro: Denies symptoms other than stated above. Skin: Denies symptoms other than stated above. Current Medications:    Current Outpatient Medications:     metoprolol tartrate (LOPRESSOR) 25 MG tablet, Take 1 tablet by mouth 2 times daily, Disp: 180 tablet, Rfl: 2    ALPRAZolam (XANAX) 0.5 MG tablet, TAKE 1 TABLET BY MOUTH TWICE DAILY AS NEEDED FOR SLEEP(DEPRESSION), Disp: 60 tablet, Rfl: 2    simvastatin (ZOCOR) 40 MG tablet, Take 1 tablet by mouth daily, Disp: 90 tablet, Rfl: 1    Elastic Bandages & Supports (JOBST KNEE HIGH COMPRESSION SM) MISC, Compression stockings 20-30 mm hg knee high bilaterally Dx : Venous Insufficiency, Swelling, Disp: 2 each, Rfl: 2    celecoxib (CELEBREX) 200 MG capsule, Take 1 capsule by mouth daily, Disp: 90 capsule, Rfl: 1    ramipril (ALTACE) 10 MG capsule, TAKE 1 CAPSULE BY MOUTH ONE TIME A DAY, Disp: 90 capsule, Rfl: 1    potassium chloride (KLOR-CON M) 20 MEQ extended release tablet, Take 1 tablet by mouth 2 times daily, Disp: 180 tablet, Rfl: 1    furosemide (LASIX) 40 MG tablet, Take 1 tablet by mouth 2 times daily (Patient taking differently: Take 40 mg by mouth daily ), Disp: 180 tablet, Rfl: 1    triamcinolone (KENALOG) 0.1 % cream, Apply topically to affected areas 2 times daily. , Disp: 80 g, Rfl: 0    levothyroxine (SYNTHROID) 200 MCG tablet, Take 200 mcg by mouth Daily, Disp: , Rfl:     Insulin Pump - insulin lispro, Inject into the skin continuous 2145-2582: 2.1 units/hr 8549-4524: 1.8 units/hr  7284-8334: 2.2 units/hr, Disp: , Rfl:     docusate sodium (COLACE) 100 MG capsule, Take 100 mg by mouth daily, Disp: , Rfl:     vitamin B-12 (CYANOCOBALAMIN) 500 MCG tablet, Take 1,000 mcg by mouth daily , Disp: , Rfl:     aspirin 81 MG tablet, Take 81 mg by mouth nightly., Disp: , Rfl:     omeprazole (PRILOSEC) 40 MG delayed release capsule, Take 1 capsule by mouth daily, Disp: 90 capsule, Rfl: 1    buPROPion (WELLBUTRIN XL) 300 MG extended release tablet, Take 1 tablet by mouth nightly, Disp: 90 tablet, Rfl: 0    Allergies:  No Known Allergies    Vitals:    03/08/21 1713   Weight: 276 lb (125.2 kg)   Height: 5' 4\" (1.626 m)       Exam:  NVS unchanged. Lateral left cuboid region tender to palpation and with active ROM left ankle and STJ. Tenderness noted with ROM along the course of the Peroneal region left ankle. Mild edema noted without ecchymosis left lateral hindfoot/ankle. Diagnostic Studies:     Xr Foot Left (min 3 Views)    Result Date: 3/9/2021  EXAMINATION: THREE XRAY VIEWS OF THE LEFT FOOT 3/8/2021 5:09 pm COMPARISON: 11/04/2020 HISTORY: ORDERING SYSTEM PROVIDED HISTORY: Left foot pain TECHNOLOGIST PROVIDED HISTORY: Reason for exam:->left foot pain FINDINGS: There are no fractures or dislocations. There is a plantar calcaneal spur. There is a moderate-sized dorsal spur along the proximal navicular. There are no fractures or dislocations. No acute process and unchanged         Procedures:    None    Plan Per Assessment  Juliette Callejas was seen today for foot pain. Diagnoses and all orders for this visit:    Closed nondisplaced fracture of cuboid of left foot, initial encounter  -     MRI FOOT LEFT WO CONTRAST; Future    Left peroneal tendinosis  -     MRI FOOT LEFT WO CONTRAST; Future    Pain in left ankle and joints of left foot  -     MRI FOOT LEFT WO CONTRAST;  Future    Localized edema -     MRI FOOT LEFT WO CONTRAST; Future    Difficulty walking  -     MRI FOOT LEFT WO CONTRAST; Future      1. Evaluation and management; Ordered and reviewed radiographs. Radiographs did reveal cortical irregularities lateral surface cuboid. 2. We did recommend obtaining an MRI for further evaluation lateral cuboid and peroneal tendon integrity. 3. Patient was placed into a camwalker for protected ambulation. The patient was dispensed a/an pneumatic walker. It is medically necessary and within the standard of care for the patient's diagnosis. Its purpose is to immobilize the lower extremity, decrease edema, and promote healing of the affected area. The patient was instructed on is proper application and use. The patient was also instructed to watch for areas of running, irritation, blister formation, or any other signs of abnormal pressure. If this occurs, the patient is to contact the office immediately. The ABN was reviewed and signed by the patient prior to leaving this appointment. 4. Patient will be followed up to discuss MRI findings and further treatment options available. She was advised to call the office with any questions in the interim. Seen By:    Susan Velasquez DPM    Electronically signed by Susan Velasquez DPM on 3/9/2021 at 3:27 PM    This note was created using voice recognition software. The note was reviewed however may contain grammatical errors.

## 2021-03-16 ENCOUNTER — CARE COORDINATION (OUTPATIENT)
Dept: OTHER | Facility: CLINIC | Age: 46
End: 2021-03-16

## 2021-03-25 ENCOUNTER — CARE COORDINATION (OUTPATIENT)
Dept: OTHER | Facility: CLINIC | Age: 46
End: 2021-03-25

## 2021-03-25 NOTE — CARE COORDINATION
Associate Care Manager (ACM) called patient for CC outreach. No answer; ACM left HIPAA compliant voicemail message with request for return call. ACM also sent lost to follow up letter to patient via [address] ACM will continue to follow. Masoud Sheldon RN BSN  Associate Care Manager  Phone: [phone]  Email: [email or web address]

## 2021-03-28 ENCOUNTER — HOSPITAL ENCOUNTER (OUTPATIENT)
Dept: MRI IMAGING | Age: 46
Discharge: HOME | End: 2021-03-30
Payer: COMMERCIAL

## 2021-03-28 DIAGNOSIS — R60.0 LOCALIZED EDEMA: ICD-10-CM

## 2021-03-28 DIAGNOSIS — M25.572 PAIN IN LEFT ANKLE AND JOINTS OF LEFT FOOT: ICD-10-CM

## 2021-03-28 DIAGNOSIS — R26.2 DIFFICULTY WALKING: ICD-10-CM

## 2021-03-28 DIAGNOSIS — S92.215A CLOSED NONDISPLACED FRACTURE OF CUBOID OF LEFT FOOT, INITIAL ENCOUNTER: ICD-10-CM

## 2021-03-28 DIAGNOSIS — M67.88 LEFT PERONEAL TENDINOSIS: ICD-10-CM

## 2021-03-28 PROCEDURE — 73718 MRI LOWER EXTREMITY W/O DYE: CPT

## 2021-04-01 ENCOUNTER — CARE COORDINATION (OUTPATIENT)
Dept: OTHER | Facility: CLINIC | Age: 46
End: 2021-04-01

## 2021-04-01 RX ORDER — IBUPROFEN 200 MG
400 TABLET ORAL EVERY 6 HOURS PRN
COMMUNITY
End: 2022-04-27

## 2021-04-01 NOTE — CARE COORDINATION
Ambulatory Care Coordination Note  4/1/2021  CM Risk Score: 5  Charlson 10 Year Mortality Risk Score: 23%     ACC: Nazario Disla, RN    Summary Note: Associate Care Manager called patient for CC outreach. Patient  had MRI left foot and has follow up with podiatrist tomorrow to discuss results. [facility] she has viewed results in [address] Reports to [facility] she is currently wearing camwalker to LLE as prescribed per podiatrist at last office visit. C/o \"constant burning\" pain to LLE. States taking Ibuprofen PRN but states \"not helping much\". States has difficulty staying still at work or at home. Patient states plans to sit and watch television tonight, while keeping LLE elevated and iced. States camwalker is heavy and comber some. [facility] does not currently have FMLA for left foot but does have it for DM. [facility] will discuss this with podiatrist tomorrow, when discussing plan of care. Patient reports still with edema to BLE (L > R). Reports wearing compression stockings to BLE. States currently wearing [employer] compression stockings and [facility] has ordered a second pair. [facility] has follow up with vascular 4/12/2021. Patient reports blood glucose high most of day today. States BG 300s this am and staying in 200s rest of the day. States lowest BG today 232, current  per CGM. States prior to today, lowest BG 77 (3/29/2021 @ 10 am prior to eating) which she reports hypoglycemia symptoms with.  normally takes mid-morning break at work and eats small amount like yogurt. States highest BG prior to today (3/29/2021 after lunch) 304. States prior to lunch that date,  and did not take insulin coverage due to low BG prior to that. Patient states due to change site to insulin pump tonight. Care Coordination Interventions    Program Enrollment: Complex Care  Referral from Primary Care Provider: No  Suggested Interventions and Community Resources  Diabetes Education: In Process  Fall Risk Prevention:  In Process  Physical Therapy: Completed (Comment: Patient current with [facility] with Diabetes program (4/1/2021) )         Goals Addressed                 This Visit's Progress       Patient Stated     Patient Stated (pt-stated)        Left foot pain will be controlled. (Baseline: patient c/o \"constant burning\" foot pain 4/1/2021.)    Barriers: stress and time constraints  Plan for overcoming my barriers: I will work with my ACM to overcome barriers. Confidence: 5/10  Anticipated Goal Completion Date: 5/15/2021 & ongoing    4/1/2021: Patient reports \"constant burning\" pain to left foot. Currently wearing camwalker as ordered per podiatrist. Follow up with podiatrist tomorrow for MRI results. Other     HEMOGLOBIN A1C < 7        2/16/2021: Patient to reschedule follow up with endocrinology for next A1C check & possible medication adjustment. 3/5/2021: Patient has not yet had A1C rechecked. 4/1/2021: Patient has not had A1C rechecked since 10/2/2020 per [facility] records            Prior to Admission medications    Medication Sig Start Date End Date Taking? Authorizing Provider   omeprazole (PRILOSEC) 40 MG delayed release capsule Take 1 capsule by mouth daily 3/9/21   Alannah Broody Mara, DO   buPROPion (WELLBUTRIN XL) 300 MG extended release tablet Take 1 tablet by mouth nightly 3/9/21   Alannah Broody Mara, DO   metoprolol tartrate (LOPRESSOR) 25 MG tablet Take 1 tablet by mouth 2 times daily 2/16/21 5/17/21  Alannah Broody Mara, DO   simvastatin (ZOCOR) 40 MG tablet Take 1 tablet by mouth daily 2/9/21   Alannah Broody Mara, DO   Elastic Bandages & Supports (JOBST KNEE HIGH COMPRESSION SM) MISC Compression stockings 20-30 mm hg knee high bilaterally  Dx :  Venous Insufficiency, Swelling 2/8/21   Dawson Swift MD   celecoxib (CELEBREX) 200 MG capsule Take 1 capsule by mouth daily 12/2/20   Alannah Broody Mara, DO   ramipril (ALTACE) 10 MG capsule TAKE 1 CAPSULE BY MOUTH ONE TIME A DAY 11/2/20   Cale Fuller Man Terry DO   potassium chloride (KLOR-CON M) 20 MEQ extended release tablet Take 1 tablet by mouth 2 times daily 5/26/20   Meredith Terry DO   furosemide (LASIX) 40 MG tablet Take 1 tablet by mouth 2 times daily  Patient taking differently: Take 40 mg by mouth daily  5/26/20   Meredith Terry DO   triamcinolone (KENALOG) 0.1 % cream Apply topically to affected areas 2 times daily. 1/20/20   Digna Terry DO   levothyroxine (SYNTHROID) 200 MCG tablet Take 200 mcg by mouth Daily    Historical Provider, MD   Insulin Pump - insulin lispro Inject into the skin continuous 8543-4836: 2.1 units/hr  7998-8487: 1.8 units/hr   1980-5864: 2.2 units/hr    Historical Provider, MD   docusate sodium (COLACE) 100 MG capsule Take 100 mg by mouth daily    Historical Provider, MD   vitamin B-12 (CYANOCOBALAMIN) 500 MCG tablet Take 1,000 mcg by mouth daily     Historical Provider, MD   aspirin 81 MG tablet Take 81 mg by mouth nightly. Historical Provider, MD       Future Appointments   Date Time Provider [place]   4/2/2021 10:30 AM SIERRA Ruano [facility]   4/12/2021 11:00 AM Marcello Leon MD [facility]   6/3/2021  8:00 AM [facility]      and   Diabetes Assessment    Medic Alert ID: Yes (Comment: patient states she does not wear medic alert ID)  Meal Planning: Carb counting   How often do you test your blood sugar?: Other, Meals, Bedtime (Comment: Patient states checks blood glucose 5-6 times per day \"at least\" as she has a [facility] 12/2/2020)   Do you have barriers with adherence to non-pharmacologic self-management interventions?  (Nutrition/Exercise/Self-Monitoring): Yes   Have you ever had to go to the ED for symptoms of low blood sugar?: Yes   What is the date of your last ED visit for low blood sugar?: 10/31/20       Symptoms of Low Blood Sugar, Increase or Decrease trend in Blood Sugars   Do you have hypoglycemia symptoms?: Yes Frequency of Episodes: 1 Per: Week   Last Blood Sugar Value: 266   Blood Sugar Trends: Fluctuating        Plan:  -Patient to see podiatrist tomorrow for left foot MRI result.    -Patient to follow up with vascular 4/12/2021.    -Patient to change insulin pump site tonight. -ACM to follow up with patient next week. Sim Lamas RN BSN  Associate Care Manager  Phone: [phone]  Email: [email or web address]

## 2021-04-02 ENCOUNTER — OFFICE VISIT (OUTPATIENT)
Dept: PODIATRY | Age: 46
End: 2021-04-02
Payer: COMMERCIAL

## 2021-04-02 VITALS — HEIGHT: 64 IN | WEIGHT: 276 LBS | BODY MASS INDEX: 47.12 KG/M2

## 2021-04-02 DIAGNOSIS — M79.672 PAIN IN LEFT FOOT: ICD-10-CM

## 2021-04-02 DIAGNOSIS — E10.9 TYPE 1 DIABETES MELLITUS WITHOUT COMPLICATION (HCC): ICD-10-CM

## 2021-04-02 DIAGNOSIS — S86.012A ACHILLES TENDON TEAR, LEFT, INITIAL ENCOUNTER: Primary | ICD-10-CM

## 2021-04-02 DIAGNOSIS — R26.2 DIFFICULTY WALKING: ICD-10-CM

## 2021-04-02 PROCEDURE — 99214 OFFICE O/P EST MOD 30 MIN: CPT | Performed by: PODIATRIST

## 2021-04-02 NOTE — PROGRESS NOTES
21     Jasmina Kay    : 1975   Sex: female    Age: 54 y.o. Patient's PCP/Provider is:  Digna Oneill DO    Subjective:  Patient is seen today for follow-up regarding continued evaluation regarding chronic pain into her posterior left heel region. Patient presents to discuss her MRI results. Patient has been continuing to wear her cam walker as instructed with minimal improvement in symptoms. When patient does take off her cam walker at home at the end of the day she is still experiencing pain and discomfort into the heel region. No other additional abnormalities noted at this time. Chief Complaint   Patient presents with    Follow-up     MRI results       ROS:  Const: Positives and pertinent negatives as per HPI. Musculo: Denies symptoms other than stated above. Neuro: Denies symptoms other than stated above. Skin: Denies symptoms other than stated above. Current Medications:    Current Outpatient Medications:     ibuprofen (ADVIL;MOTRIN) 200 MG tablet, Take 400 mg by mouth every 6 hours as needed for Pain, Disp: , Rfl:     omeprazole (PRILOSEC) 40 MG delayed release capsule, Take 1 capsule by mouth daily, Disp: 90 capsule, Rfl: 1    buPROPion (WELLBUTRIN XL) 300 MG extended release tablet, Take 1 tablet by mouth nightly, Disp: 90 tablet, Rfl: 0    metoprolol tartrate (LOPRESSOR) 25 MG tablet, Take 1 tablet by mouth 2 times daily, Disp: 180 tablet, Rfl: 2    simvastatin (ZOCOR) 40 MG tablet, Take 1 tablet by mouth daily, Disp: 90 tablet, Rfl: 1    Elastic Bandages & Supports (JOBST KNEE HIGH COMPRESSION SM) MISC, Compression stockings 20-30 mm hg knee high bilaterally Dx :  Venous Insufficiency, Swelling, Disp: 2 each, Rfl: 2    celecoxib (CELEBREX) 200 MG capsule, Take 1 capsule by mouth daily, Disp: 90 capsule, Rfl: 1    ramipril (ALTACE) 10 MG capsule, TAKE 1 CAPSULE BY MOUTH ONE TIME A DAY, Disp: 90 capsule, Rfl: 1    potassium chloride (KLOR-CON M) 20 MEQ proximal navicular. There are no fractures or dislocations. No acute process and unchanged     Mri Foot Left Wo Contrast    Result Date: 3/29/2021  EXAMINATION: MRI OF THE LEFT ANKLE WITHOUT CONTRAST 3/28/2021 11:59 am TECHNIQUE: Multiplanar multisequence MRI of the left ankle was performed without the administration of intravenous contrast. COMPARISON: Left foot plain radiographs from 03/08/2021 HISTORY: ORDERING SYSTEM PROVIDED HISTORY: Closed nondisplaced fracture of cuboid of left foot, initial encounter TECHNOLOGIST PROVIDED HISTORY: What is the area of interest?->Hindfoot 79-year-old female with possible closed nondisplaced fracture of cuboid of left foot FINDINGS: SYNDESMOTIC LIGAMENTS:  The anterior-inferior tibiofibular ligament, interosseous membrane and posterior-inferior tibiofibular ligaments are normal. LATERAL COLLATERAL LIGAMENT COMPLEX: Severe thinning of the anterior talofibular ligament, likely related to remote partial tearing. Posterior talofibular ligament and calcaneofibular ligament appear intact. DELTOID LIGAMENT COMPLEX:  The superficial and deep components of the deltoid ligament complex are normal. SINUS TARSI AND SPRING LIGAMENT:  The anterior-inferior tibiofibular ligament, interosseous membrane and posterior-inferior tibiofibular ligaments are normal. Lisfranc ligament complex appears intact. MEDIAL TENDONS: The posterior tibialis, flexor digitorum longus and flexor hallucis longus tendons are intact. LATERAL TENDONS:  The peroneus longus and brevis tendons are intact. ANTERIOR TENDONS: The tibialis anterior, extensor hallucis longus and extensor digitorum longus tendons are normal in position, morphology and signal. ACHILLES TENDON: Moderate to severe distal Achilles tendinosis with low-grade partial-thickness interstitial tearing of the distal Achilles tendon.  PLANTAR FASCIA: Moderate enlargement of the central cord of the plantar fascia with intermediate T1 signal on image 12, series 5 and series 6. TARSAL TUNNEL: There are no obstructing lesions within the tarsal tunnel. BONE MARROW: Subcortical cystic changes at the lateral talar dome. No osteochondral lesion or defect at the talar dome. Osseous alignment is normal. No acute fracture or dislocation. No evidence for cuboid fracture. No marginal erosions. JOINT SPACES: Mild degenerative changes of the midfoot and tarsal metatarsal joints with associated subcortical cystic changes. No tibiotalar, subtalar, or sizable intertarsal joint effusion. SOFT TISSUES: Moderate edema in the subcutaneous fat about the ankle. No discrete organized fluid collection. 1. Moderate to severe distal Achilles tendinosis with low-grade partial thickness interstitial tearing of the distal Achilles tendon. 2. Moderate central cord plantar fasciitis. 3. No acute osseous abnormality. No cuboid fracture. 4. Moderate edema in the subcutaneous fat about the ankle. 5. Mild diffuse degenerative changes as detailed above. 6. Severe thinning of the ATFL, likely related to remote partial tearing. Procedures:    None    Plan Per Assessment  Bozman Room was seen today for follow-up. Diagnoses and all orders for this visit:    Achilles tendon tear, left, initial encounter    Pain in left foot    Difficulty walking    Type 1 diabetes mellitus without complication ([facility] [address]      1. Evaluation and management  2. We did discuss previous x-rays and current MRI findings with patient today. Due to the distal Achilles tendinosis present with interstitial tear we did discuss further options including outpatient surgical intervention for repair. Patient stated that due to the chronic issues in her left heel and inability to wear the cam walker at all times patient does want to proceed with surgical intervention.   She has tried other conservative treatment options to this point in time as well including anti-inflammatories both oral and topical, compression garments, home therapies without improvement noted. 3. The reason for surgery is due to failed conservative treatment and/or conservative treatment is not a viable option. It was discussed with the patient that compliance postoperatively is of utmost importance. Any deviation on behalf of the patient will decrease the chances of a successful outcome. The risks of surgery were discussed in detail with the patient. They include but are not limited to: Infection, failure, prolonged pain, swelling, numbness, recurrence, limited mobility, painful scar, reflex sympathetic dystrophy, over correction, under correction, and loss of limb/life. It was also discussed in detail that no guarantees could be made in regards to a good cosmetic result. The patient understands all of the potential complications. All questions were thoroughly answered and the patient consented to proceed with the proposed surgery. Consent is located in the patient record. The patient was counseled at length about the risks of stephani Covid-19 during their perioperative period and any recovery window from their procedure. The patient was made aware that stephani Covid-19  may worsen their prognosis for recovering from their procedure  and lend to a higher morbidity and/or mortality risk. All material risks, benefits, and reasonable alternatives including postponing the procedure were discussed. The patient does wish to proceed with the procedure at this time. 4. Patient will be notified of the exact date and time the procedure is scheduled. All necessary evaluations, labs, and testing will be performed prior to scheduling the procedure. She was advised continued use of her cam walker with all ambulatory activities. Patient was advised to call the office with any questions or concerns in the interim.       Seen By:    Phyllis Nair DPM    Electronically signed by Phyllis Nair DPM on 4/2/2021 at 7:14 PM    This note was created using voice recognition software. The note was reviewed however may contain grammatical errors.

## 2021-04-08 ENCOUNTER — CARE COORDINATION (OUTPATIENT)
Dept: OTHER | Facility: CLINIC | Age: 46
End: 2021-04-08

## 2021-04-13 ENCOUNTER — OFFICE VISIT (OUTPATIENT)
Dept: FAMILY MEDICINE CLINIC | Age: 46
End: 2021-04-13
Payer: COMMERCIAL

## 2021-04-13 VITALS
SYSTOLIC BLOOD PRESSURE: 128 MMHG | WEIGHT: 288 LBS | HEART RATE: 67 BPM | DIASTOLIC BLOOD PRESSURE: 82 MMHG | RESPIRATION RATE: 16 BRPM | HEIGHT: 64 IN | TEMPERATURE: 98.2 F | OXYGEN SATURATION: 95 % | BODY MASS INDEX: 49.17 KG/M2

## 2021-04-13 DIAGNOSIS — Z01.818 PRE-OP TESTING: ICD-10-CM

## 2021-04-13 DIAGNOSIS — E78.2 MIXED HYPERLIPIDEMIA: ICD-10-CM

## 2021-04-13 DIAGNOSIS — E03.9 ACQUIRED HYPOTHYROIDISM: ICD-10-CM

## 2021-04-13 DIAGNOSIS — I10 ESSENTIAL HYPERTENSION: ICD-10-CM

## 2021-04-13 DIAGNOSIS — I10 ESSENTIAL HYPERTENSION: Primary | ICD-10-CM

## 2021-04-13 DIAGNOSIS — R30.0 DYSURIA: ICD-10-CM

## 2021-04-13 DIAGNOSIS — E10.29 TYPE 1 DIABETES MELLITUS WITH OTHER KIDNEY COMPLICATION (HCC): ICD-10-CM

## 2021-04-13 LAB
ALBUMIN SERPL-MCNC: 4.4 G/DL (ref 3.5–5.2)
ALP BLD-CCNC: 153 U/L (ref 35–104)
ALT SERPL-CCNC: 25 U/L (ref 0–32)
ANION GAP SERPL CALCULATED.3IONS-SCNC: 11 MMOL/L (ref 7–16)
AST SERPL-CCNC: 21 U/L (ref 0–31)
BILIRUB SERPL-MCNC: 0.9 MG/DL (ref 0–1.2)
BILIRUBIN, POC: NORMAL
BLOOD URINE, POC: NORMAL
BUN BLDV-MCNC: 19 MG/DL (ref 6–20)
CALCIUM SERPL-MCNC: 9.8 MG/DL (ref 8.6–10.2)
CHLORIDE BLD-SCNC: 100 MMOL/L (ref 98–107)
CHOLESTEROL, TOTAL: 177 MG/DL (ref 0–199)
CLARITY, POC: CLEAR
CO2: 25 MMOL/L (ref 22–29)
COLOR, POC: YELLOW
CREAT SERPL-MCNC: 0.7 MG/DL (ref 0.5–1)
CREATININE URINE POCT: 100
GFR AFRICAN AMERICAN: >60
GFR NON-AFRICAN AMERICAN: >60 ML/MIN/1.73
GLUCOSE BLD-MCNC: 172 MG/DL (ref 74–99)
GLUCOSE URINE, POC: 100
HBA1C MFR BLD: 9 % (ref 4–5.6)
HCT VFR BLD CALC: 41.9 % (ref 34–48)
HDLC SERPL-MCNC: 59 MG/DL
HEMOGLOBIN: 13.3 G/DL (ref 11.5–15.5)
KETONES, POC: NORMAL
LDL CHOLESTEROL CALCULATED: 96 MG/DL (ref 0–99)
LEUKOCYTE EST, POC: NORMAL
MCH RBC QN AUTO: 28.6 PG (ref 26–35)
MCHC RBC AUTO-ENTMCNC: 31.7 % (ref 32–34.5)
MCV RBC AUTO: 90.1 FL (ref 80–99.9)
MICROALBUMIN/CREAT 24H UR: 30 MG/G{CREAT}
MICROALBUMIN/CREAT UR-RTO: NORMAL
NITRITE, POC: NORMAL
PDW BLD-RTO: 13.2 FL (ref 11.5–15)
PH, POC: 6.5
PLATELET # BLD: 336 E9/L (ref 130–450)
PMV BLD AUTO: 11.9 FL (ref 7–12)
POTASSIUM SERPL-SCNC: 4.9 MMOL/L (ref 3.5–5)
PROTEIN, POC: NORMAL
RBC # BLD: 4.65 E12/L (ref 3.5–5.5)
SODIUM BLD-SCNC: 136 MMOL/L (ref 132–146)
SPECIFIC GRAVITY, POC: 1.01
TOTAL PROTEIN: 7.3 G/DL (ref 6.4–8.3)
TRIGL SERPL-MCNC: 110 MG/DL (ref 0–149)
TSH SERPL DL<=0.05 MIU/L-ACNC: 3.89 UIU/ML (ref 0.27–4.2)
UROBILINOGEN, POC: 0.2
VLDLC SERPL CALC-MCNC: 22 MG/DL
WBC # BLD: 7.7 E9/L (ref 4.5–11.5)

## 2021-04-13 PROCEDURE — 99214 OFFICE O/P EST MOD 30 MIN: CPT | Performed by: FAMILY MEDICINE

## 2021-04-13 PROCEDURE — 93000 ELECTROCARDIOGRAM COMPLETE: CPT | Performed by: FAMILY MEDICINE

## 2021-04-13 PROCEDURE — 82044 UR ALBUMIN SEMIQUANTITATIVE: CPT | Performed by: FAMILY MEDICINE

## 2021-04-13 PROCEDURE — 81002 URINALYSIS NONAUTO W/O SCOPE: CPT | Performed by: FAMILY MEDICINE

## 2021-04-13 PROCEDURE — 3052F HG A1C>EQUAL 8.0%<EQUAL 9.0%: CPT | Performed by: FAMILY MEDICINE

## 2021-04-13 NOTE — PROGRESS NOTES
Assessment/Plan  Ruthanna Ormond was seen today for pre-op exam.    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 Return if symptoms worsen or fail to improve. Digna Oneill, DO    Call or go to ED immediately if symptoms worsen or persist.    Educational materials and/or home exercises printed for patient's review and were included in patient instructions on his/her After Visit Summary and given to patient at the end of visit. Counseled regarding above diagnosis, including possible risks and complications,  especially if left uncontrolled. Counseled regarding the possible side effects, risks, benefits and alternatives to treatment; patient and/or guardian verbalizes understanding, agrees, feels comfortable with and wishes to proceed with above treatment plan. Advised patient to call with any new medication issues, and read all Rx info from pharmacy to assure aware of all possible risks and side effects of medication before taking. Reviewed age and gender appropriate health screening exams and vaccinations. Advised patient regarding importance of keeping up with recommended health maintenance and to schedule as soon as possible if overdue, as this is important in assessing for undiagnosed pathology, especially cancer, as well as protecting against potentially harmful/life threatening disease. Patient and/or guardian verbalizes understanding and agrees with above counseling, assessment and plan. All questions answered.

## 2021-04-15 ENCOUNTER — HOSPITAL ENCOUNTER (OUTPATIENT)
Age: 46
Discharge: HOME | End: 2021-04-17
Payer: COMMERCIAL

## 2021-04-15 DIAGNOSIS — M79.672 PAIN IN LEFT FOOT: ICD-10-CM

## 2021-04-15 PROCEDURE — U0005 INFEC AGEN DETEC AMPLI PROBE: HCPCS

## 2021-04-15 PROCEDURE — U0003 INFECTIOUS AGENT DETECTION BY NUCLEIC ACID (DNA OR RNA); SEVERE ACUTE RESPIRATORY SYNDROME CORONAVIRUS 2 (SARS-COV-2) (CORONAVIRUS DISEASE [COVID-19]), AMPLIFIED PROBE TECHNIQUE, MAKING USE OF HIGH THROUGHPUT TECHNOLOGIES AS DESCRIBED BY CMS-2020-01-R: HCPCS

## 2021-04-16 ENCOUNTER — ANESTHESIA EVENT (OUTPATIENT)
Dept: OPERATING ROOM | Age: 46
End: 2021-04-16
Payer: COMMERCIAL

## 2021-04-16 ENCOUNTER — CARE COORDINATION (OUTPATIENT)
Dept: OTHER | Facility: CLINIC | Age: 46
End: 2021-04-16

## 2021-04-16 LAB
SARS-COV-2: NOT DETECTED
SOURCE: NORMAL

## 2021-04-16 NOTE — CARE COORDINATION
Ambulatory Care Coordination Note  4/16/2021  CM Risk Score: 5  Charlson 10 Year Mortality Risk Score: 23%     ACC: Crista Aguillon, RN    Summary Note: Associate Care Manager (ACM) called patient for CC outreach. Patient states having surgery 4/20/2021 on left foot for repair of achilles tendon. Patient states wearing CAM walker. Has had pre-op testing and pre-op clearance from PCP. Patient states has started FMLA paperwork and working on short term disability paperwork. Patient states still with BLE edema. States wearing compression stockings all day at work but states removes compression stockings when arrives home. States elevates BLE at home. Patient states recent highest blood glucose 329, recent lowest BG 72. BG now 226. States had A1C checked recently= 9.0%. States next appt with Dr. Marleny Briones ([facility]) in May 2021. Patient denies needs for ACM. Agreeable to follow up calls from [employer] [facility] which will be after surgery. ACM notified patient to reach out if needs arise. Patient verbalized understanding. Care Coordination Interventions    Program Enrollment: Complex Care  Referral from Primary Care Provider: No  Suggested Interventions and Community Resources  Diabetes Education: In Process  Fall Risk Prevention: In Process  Physical Therapy: Completed (Comment: Patient current with Be Well with Diabetes program (4/1/2021) )         Goals Addressed                 This Visit's Progress       Patient Stated     Patient Stated (pt-stated)   No change     Left foot pain will be controlled. (Baseline: patient c/o \"constant burning\" foot pain 4/1/2021.)    Barriers: stress and time constraints  Plan for overcoming my barriers: I will work with my ACM to overcome barriers. Confidence: 5/10  Anticipated Goal Completion Date: 5/15/2021 & ongoing    4/1/2021: Patient reports \"constant burning\" pain to left foot. Currently wearing camwalker as ordered per podiatrist. Follow up with podiatrist tomorrow for MRI results. 4/16/2021: Patient having surgery for left achilles tendon repair 4/20/2021. Other     HEMOGLOBIN A1C < 7        2/16/2021: Patient to reschedule follow up with endocrinology for next A1C check & possible medication adjustment. 3/5/2021: Patient has not yet had A1C rechecked. 4/1/2021: Patient has not had A1C rechecked since 10/2/2020 per Epic records    4/16/2021: Patient had A1C checked = 9.0%. Prior to Admission medications    Medication Sig Start Date End Date Taking? Authorizing Provider   ibuprofen (ADVIL;MOTRIN) 200 MG tablet Take 400 mg by mouth every 6 hours as needed for Pain    Historical Provider, MD   omeprazole (PRILOSEC) 40 MG delayed release capsule Take 1 capsule by mouth daily 3/9/21   Gabriel Terry, DO   buPROPion (WELLBUTRIN XL) 300 MG extended release tablet Take 1 tablet by mouth nightly 3/9/21   Gabriel Terry, DO   metoprolol tartrate (LOPRESSOR) 25 MG tablet Take 1 tablet by mouth 2 times daily 2/16/21 5/17/21  Gabriel Terry, DO   simvastatin (ZOCOR) 40 MG tablet Take 1 tablet by mouth daily 2/9/21   Gabriel Terry, DO   Elastic Bandages & Supports (JOBST KNEE HIGH COMPRESSION ) MISC Compression stockings 20-30 mm hg knee high bilaterally  Dx : Venous Insufficiency, Swelling 2/8/21   Juan Carias MD   celecoxib (CELEBREX) 200 MG capsule Take 1 capsule by mouth daily 12/2/20   Gabriel Terry, DO   ramipril (ALTACE) 10 MG capsule TAKE 1 CAPSULE BY MOUTH ONE TIME A DAY 11/2/20   Digna Terry, DO   potassium chloride (KLOR-CON M) 20 MEQ extended release tablet Take 1 tablet by mouth 2 times daily 5/26/20   Gabriel Terry, DO   furosemide (LASIX) 40 MG tablet Take 1 tablet by mouth 2 times daily  Patient taking differently: Take 40 mg by mouth daily  5/26/20   Gabriel Terry, DO   triamcinolone (KENALOG) 0.1 % cream Apply topically to affected areas 2 times daily.  1/20/20   Digna Terry, DO   levothyroxine

## 2021-04-19 ENCOUNTER — PREP FOR PROCEDURE (OUTPATIENT)
Dept: PODIATRY | Age: 46
End: 2021-04-19

## 2021-04-19 RX ORDER — SODIUM CHLORIDE 9 MG/ML
25 INJECTION, SOLUTION INTRAVENOUS PRN
Status: CANCELLED | OUTPATIENT
Start: 2021-04-19

## 2021-04-19 RX ORDER — SODIUM CHLORIDE 0.9 % (FLUSH) 0.9 %
10 SYRINGE (ML) INJECTION EVERY 12 HOURS SCHEDULED
Status: CANCELLED | OUTPATIENT
Start: 2021-04-19

## 2021-04-19 RX ORDER — SODIUM CHLORIDE 0.9 % (FLUSH) 0.9 %
10 SYRINGE (ML) INJECTION PRN
Status: CANCELLED | OUTPATIENT
Start: 2021-04-19

## 2021-04-19 ASSESSMENT — LIFESTYLE VARIABLES: SMOKING_STATUS: 0

## 2021-04-20 ENCOUNTER — ANESTHESIA (OUTPATIENT)
Dept: OPERATING ROOM | Age: 46
End: 2021-04-20
Payer: COMMERCIAL

## 2021-04-20 ENCOUNTER — HOSPITAL ENCOUNTER (OUTPATIENT)
Age: 46
Setting detail: OUTPATIENT SURGERY
Discharge: HOME | End: 2021-04-20
Attending: PODIATRIST | Admitting: PODIATRIST
Payer: COMMERCIAL

## 2021-04-20 VITALS
BODY MASS INDEX: 49.17 KG/M2 | HEIGHT: 64 IN | SYSTOLIC BLOOD PRESSURE: 141 MMHG | OXYGEN SATURATION: 95 % | TEMPERATURE: 97 F | DIASTOLIC BLOOD PRESSURE: 50 MMHG | RESPIRATION RATE: 16 BRPM | WEIGHT: 288 LBS | HEART RATE: 63 BPM

## 2021-04-20 VITALS
SYSTOLIC BLOOD PRESSURE: 103 MMHG | DIASTOLIC BLOOD PRESSURE: 56 MMHG | OXYGEN SATURATION: 97 % | RESPIRATION RATE: 11 BRPM

## 2021-04-20 DIAGNOSIS — M25.572 PAIN IN LEFT ANKLE AND JOINTS OF LEFT FOOT: ICD-10-CM

## 2021-04-20 DIAGNOSIS — S86.002A ACHILLES TENDON INJURY, LEFT, INITIAL ENCOUNTER: ICD-10-CM

## 2021-04-20 DIAGNOSIS — M79.672 PAIN IN LEFT FOOT: Primary | ICD-10-CM

## 2021-04-20 DIAGNOSIS — S86.012A ACHILLES TENDON TEAR, LEFT, INITIAL ENCOUNTER: ICD-10-CM

## 2021-04-20 LAB
CHP ED QC CHECK: NORMAL
CHP ED QC CHECK: NORMAL
CONTROL: NORMAL
GLUCOSE BLD-MCNC: 153 MG/DL
GLUCOSE BLD-MCNC: 275 MG/DL
PREGNANCY TEST URINE, POC: NEGATIVE

## 2021-04-20 PROCEDURE — 6360000002 HC RX W HCPCS: Performed by: PODIATRIST

## 2021-04-20 PROCEDURE — 27654 REPAIR OF ACHILLES TENDON: CPT | Performed by: PODIATRIST

## 2021-04-20 PROCEDURE — 2500000003 HC RX 250 WO HCPCS: Performed by: ANESTHESIOLOGY

## 2021-04-20 PROCEDURE — 7100000011 HC PHASE II RECOVERY - ADDTL 15 MIN: Performed by: PODIATRIST

## 2021-04-20 PROCEDURE — 6360000002 HC RX W HCPCS

## 2021-04-20 PROCEDURE — 3700000001 HC ADD 15 MINUTES (ANESTHESIA): Performed by: PODIATRIST

## 2021-04-20 PROCEDURE — 81025 URINE PREGNANCY TEST: CPT | Performed by: PODIATRIST

## 2021-04-20 PROCEDURE — 2580000003 HC RX 258: Performed by: ANESTHESIOLOGY

## 2021-04-20 PROCEDURE — 2720000010 HC SURG SUPPLY STERILE: Performed by: PODIATRIST

## 2021-04-20 PROCEDURE — 7100000000 HC PACU RECOVERY - FIRST 15 MIN: Performed by: PODIATRIST

## 2021-04-20 PROCEDURE — 2500000003 HC RX 250 WO HCPCS: Performed by: PODIATRIST

## 2021-04-20 PROCEDURE — 6360000002 HC RX W HCPCS: Performed by: NURSE ANESTHETIST, CERTIFIED REGISTERED

## 2021-04-20 PROCEDURE — 6370000000 HC RX 637 (ALT 250 FOR IP): Performed by: ANESTHESIOLOGY

## 2021-04-20 PROCEDURE — 6360000002 HC RX W HCPCS: Performed by: ANESTHESIOLOGY

## 2021-04-20 PROCEDURE — 2500000003 HC RX 250 WO HCPCS: Performed by: NURSE ANESTHETIST, CERTIFIED REGISTERED

## 2021-04-20 PROCEDURE — 2709999900 HC NON-CHARGEABLE SUPPLY: Performed by: PODIATRIST

## 2021-04-20 PROCEDURE — 7100000001 HC PACU RECOVERY - ADDTL 15 MIN: Performed by: PODIATRIST

## 2021-04-20 PROCEDURE — 2580000003 HC RX 258: Performed by: PODIATRIST

## 2021-04-20 PROCEDURE — 3700000000 HC ANESTHESIA ATTENDED CARE: Performed by: PODIATRIST

## 2021-04-20 PROCEDURE — 3600000013 HC SURGERY LEVEL 3 ADDTL 15MIN: Performed by: PODIATRIST

## 2021-04-20 PROCEDURE — 7100000010 HC PHASE II RECOVERY - FIRST 15 MIN: Performed by: PODIATRIST

## 2021-04-20 PROCEDURE — 3600000003 HC SURGERY LEVEL 3 BASE: Performed by: PODIATRIST

## 2021-04-20 PROCEDURE — 88304 TISSUE EXAM BY PATHOLOGIST: CPT

## 2021-04-20 DEVICE — GRAFT HUM TISS W30XL40MM THK0.5MM ACELLULAR DERM RM TEMP: Type: IMPLANTABLE DEVICE | Site: HEEL | Status: FUNCTIONAL

## 2021-04-20 RX ORDER — SODIUM CHLORIDE 0.9 % (FLUSH) 0.9 %
10 SYRINGE (ML) INJECTION EVERY 12 HOURS SCHEDULED
Status: DISCONTINUED | OUTPATIENT
Start: 2021-04-20 | End: 2021-04-20 | Stop reason: HOSPADM

## 2021-04-20 RX ORDER — SODIUM CHLORIDE, SODIUM LACTATE, POTASSIUM CHLORIDE, CALCIUM CHLORIDE 600; 310; 30; 20 MG/100ML; MG/100ML; MG/100ML; MG/100ML
INJECTION, SOLUTION INTRAVENOUS CONTINUOUS
Status: DISCONTINUED | OUTPATIENT
Start: 2021-04-20 | End: 2021-04-20 | Stop reason: HOSPADM

## 2021-04-20 RX ORDER — PROCHLORPERAZINE EDISYLATE 5 MG/ML
5 INJECTION INTRAMUSCULAR; INTRAVENOUS
Status: DISCONTINUED | OUTPATIENT
Start: 2021-04-20 | End: 2021-04-20 | Stop reason: HOSPADM

## 2021-04-20 RX ORDER — MEPERIDINE HYDROCHLORIDE 25 MG/ML
12.5 INJECTION INTRAMUSCULAR; INTRAVENOUS; SUBCUTANEOUS EVERY 5 MIN PRN
Status: DISCONTINUED | OUTPATIENT
Start: 2021-04-20 | End: 2021-04-20 | Stop reason: HOSPADM

## 2021-04-20 RX ORDER — MIDAZOLAM HYDROCHLORIDE 1 MG/ML
INJECTION INTRAMUSCULAR; INTRAVENOUS PRN
Status: DISCONTINUED | OUTPATIENT
Start: 2021-04-20 | End: 2021-04-20 | Stop reason: SDUPTHER

## 2021-04-20 RX ORDER — PROPOFOL 10 MG/ML
INJECTION, EMULSION INTRAVENOUS PRN
Status: DISCONTINUED | OUTPATIENT
Start: 2021-04-20 | End: 2021-04-20 | Stop reason: SDUPTHER

## 2021-04-20 RX ORDER — LIDOCAINE HYDROCHLORIDE 20 MG/ML
INJECTION, SOLUTION EPIDURAL; INFILTRATION; INTRACAUDAL; PERINEURAL PRN
Status: DISCONTINUED | OUTPATIENT
Start: 2021-04-20 | End: 2021-04-20 | Stop reason: SDUPTHER

## 2021-04-20 RX ORDER — SODIUM CHLORIDE 0.9 % (FLUSH) 0.9 %
10 SYRINGE (ML) INJECTION PRN
Status: DISCONTINUED | OUTPATIENT
Start: 2021-04-20 | End: 2021-04-20 | Stop reason: HOSPADM

## 2021-04-20 RX ORDER — GLYCOPYRROLATE 1 MG/5 ML
SYRINGE (ML) INTRAVENOUS PRN
Status: DISCONTINUED | OUTPATIENT
Start: 2021-04-20 | End: 2021-04-20 | Stop reason: SDUPTHER

## 2021-04-20 RX ORDER — PROMETHAZINE HYDROCHLORIDE 25 MG/ML
6.25 INJECTION, SOLUTION INTRAMUSCULAR; INTRAVENOUS
Status: DISCONTINUED | OUTPATIENT
Start: 2021-04-20 | End: 2021-04-20 | Stop reason: HOSPADM

## 2021-04-20 RX ORDER — SODIUM CHLORIDE 9 MG/ML
25 INJECTION, SOLUTION INTRAVENOUS PRN
Status: DISCONTINUED | OUTPATIENT
Start: 2021-04-20 | End: 2021-04-20 | Stop reason: HOSPADM

## 2021-04-20 RX ORDER — ROCURONIUM BROMIDE 10 MG/ML
INJECTION, SOLUTION INTRAVENOUS PRN
Status: DISCONTINUED | OUTPATIENT
Start: 2021-04-20 | End: 2021-04-20 | Stop reason: SDUPTHER

## 2021-04-20 RX ORDER — SUCCINYLCHOLINE CHLORIDE 20 MG/ML
INJECTION INTRAMUSCULAR; INTRAVENOUS PRN
Status: DISCONTINUED | OUTPATIENT
Start: 2021-04-20 | End: 2021-04-20 | Stop reason: SDUPTHER

## 2021-04-20 RX ORDER — DIPHENHYDRAMINE HYDROCHLORIDE 50 MG/ML
12.5 INJECTION INTRAMUSCULAR; INTRAVENOUS
Status: DISCONTINUED | OUTPATIENT
Start: 2021-04-20 | End: 2021-04-20 | Stop reason: HOSPADM

## 2021-04-20 RX ORDER — HYDROCODONE BITARTRATE AND ACETAMINOPHEN 5; 325 MG/1; MG/1
1 TABLET ORAL EVERY 6 HOURS PRN
Qty: 28 TABLET | Refills: 0 | Status: SHIPPED | OUTPATIENT
Start: 2021-04-20 | End: 2021-04-27

## 2021-04-20 RX ORDER — HYDROCODONE BITARTRATE AND ACETAMINOPHEN 5; 325 MG/1; MG/1
2 TABLET ORAL PRN
Status: DISCONTINUED | OUTPATIENT
Start: 2021-04-20 | End: 2021-04-20 | Stop reason: HOSPADM

## 2021-04-20 RX ORDER — SCOLOPAMINE TRANSDERMAL SYSTEM 1 MG/1
1 PATCH, EXTENDED RELEASE TRANSDERMAL
Status: DISCONTINUED | OUTPATIENT
Start: 2021-04-20 | End: 2021-04-20 | Stop reason: HOSPADM

## 2021-04-20 RX ORDER — ONDANSETRON 2 MG/ML
INJECTION INTRAMUSCULAR; INTRAVENOUS PRN
Status: DISCONTINUED | OUTPATIENT
Start: 2021-04-20 | End: 2021-04-20 | Stop reason: SDUPTHER

## 2021-04-20 RX ORDER — KETOROLAC TROMETHAMINE 30 MG/ML
INJECTION, SOLUTION INTRAMUSCULAR; INTRAVENOUS PRN
Status: DISCONTINUED | OUTPATIENT
Start: 2021-04-20 | End: 2021-04-20 | Stop reason: SDUPTHER

## 2021-04-20 RX ORDER — HYDROCODONE BITARTRATE AND ACETAMINOPHEN 5; 325 MG/1; MG/1
1 TABLET ORAL PRN
Status: DISCONTINUED | OUTPATIENT
Start: 2021-04-20 | End: 2021-04-20 | Stop reason: HOSPADM

## 2021-04-20 RX ORDER — NEOSTIGMINE METHYLSULFATE 1 MG/ML
INJECTION, SOLUTION INTRAVENOUS PRN
Status: DISCONTINUED | OUTPATIENT
Start: 2021-04-20 | End: 2021-04-20 | Stop reason: SDUPTHER

## 2021-04-20 RX ORDER — BUPIVACAINE HYDROCHLORIDE 5 MG/ML
INJECTION, SOLUTION EPIDURAL; INTRACAUDAL PRN
Status: DISCONTINUED | OUTPATIENT
Start: 2021-04-20 | End: 2021-04-20 | Stop reason: ALTCHOICE

## 2021-04-20 RX ORDER — LABETALOL HYDROCHLORIDE 5 MG/ML
5 INJECTION, SOLUTION INTRAVENOUS EVERY 10 MIN PRN
Status: DISCONTINUED | OUTPATIENT
Start: 2021-04-20 | End: 2021-04-20 | Stop reason: HOSPADM

## 2021-04-20 RX ORDER — FENTANYL CITRATE 50 UG/ML
INJECTION, SOLUTION INTRAMUSCULAR; INTRAVENOUS PRN
Status: DISCONTINUED | OUTPATIENT
Start: 2021-04-20 | End: 2021-04-20 | Stop reason: SDUPTHER

## 2021-04-20 RX ADMIN — HYDROMORPHONE HYDROCHLORIDE 0.25 MG: 1 INJECTION, SOLUTION INTRAMUSCULAR; INTRAVENOUS; SUBCUTANEOUS at 08:18

## 2021-04-20 RX ADMIN — Medication 3 MG: at 07:39

## 2021-04-20 RX ADMIN — CEFAZOLIN 3000 MG: 1 INJECTION, POWDER, FOR SOLUTION INTRAMUSCULAR; INTRAVENOUS at 06:41

## 2021-04-20 RX ADMIN — SUCCINYLCHOLINE CHLORIDE 120 MG: 20 INJECTION, SOLUTION INTRAMUSCULAR; INTRAVENOUS at 06:40

## 2021-04-20 RX ADMIN — PROPOFOL 200 MG: 10 INJECTION, EMULSION INTRAVENOUS at 06:40

## 2021-04-20 RX ADMIN — HYDROMORPHONE HYDROCHLORIDE 0.25 MG: 1 INJECTION, SOLUTION INTRAMUSCULAR; INTRAVENOUS; SUBCUTANEOUS at 08:33

## 2021-04-20 RX ADMIN — SODIUM CHLORIDE, POTASSIUM CHLORIDE, SODIUM LACTATE AND CALCIUM CHLORIDE: 600; 310; 30; 20 INJECTION, SOLUTION INTRAVENOUS at 05:57

## 2021-04-20 RX ADMIN — FENTANYL CITRATE 50 MCG: 50 INJECTION, SOLUTION INTRAMUSCULAR; INTRAVENOUS at 07:26

## 2021-04-20 RX ADMIN — MIDAZOLAM 2 MG: 1 INJECTION INTRAMUSCULAR; INTRAVENOUS at 06:35

## 2021-04-20 RX ADMIN — FENTANYL CITRATE 50 MCG: 50 INJECTION, SOLUTION INTRAMUSCULAR; INTRAVENOUS at 07:33

## 2021-04-20 RX ADMIN — SODIUM CHLORIDE, POTASSIUM CHLORIDE, SODIUM LACTATE AND CALCIUM CHLORIDE: 600; 310; 30; 20 INJECTION, SOLUTION INTRAVENOUS at 06:35

## 2021-04-20 RX ADMIN — Medication 0.6 MG: at 07:39

## 2021-04-20 RX ADMIN — Medication 0.25 MG: at 08:18

## 2021-04-20 RX ADMIN — FENTANYL CITRATE 100 MCG: 50 INJECTION, SOLUTION INTRAMUSCULAR; INTRAVENOUS at 06:40

## 2021-04-20 RX ADMIN — ONDANSETRON 4 MG: 2 INJECTION INTRAMUSCULAR; INTRAVENOUS at 07:32

## 2021-04-20 RX ADMIN — KETOROLAC TROMETHAMINE 30 MG: 30 INJECTION, SOLUTION INTRAMUSCULAR; INTRAVENOUS at 07:33

## 2021-04-20 RX ADMIN — ROCURONIUM BROMIDE 30 MG: 10 SOLUTION INTRAVENOUS at 06:53

## 2021-04-20 RX ADMIN — FAMOTIDINE 20 MG: 10 INJECTION, SOLUTION INTRAVENOUS at 06:38

## 2021-04-20 RX ADMIN — LIDOCAINE HYDROCHLORIDE 50 MG: 20 INJECTION, SOLUTION EPIDURAL; INFILTRATION; INTRACAUDAL; PERINEURAL at 06:40

## 2021-04-20 RX ADMIN — HYDROMORPHONE HYDROCHLORIDE 0.25 MG: 1 INJECTION, SOLUTION INTRAMUSCULAR; INTRAVENOUS; SUBCUTANEOUS at 08:23

## 2021-04-20 RX ADMIN — HYDROMORPHONE HYDROCHLORIDE 0.25 MG: 1 INJECTION, SOLUTION INTRAMUSCULAR; INTRAVENOUS; SUBCUTANEOUS at 08:28

## 2021-04-20 ASSESSMENT — PULMONARY FUNCTION TESTS
PIF_VALUE: 35
PIF_VALUE: 33
PIF_VALUE: 35
PIF_VALUE: 33
PIF_VALUE: 2
PIF_VALUE: 27
PIF_VALUE: 32
PIF_VALUE: 26
PIF_VALUE: 1
PIF_VALUE: 4
PIF_VALUE: 24
PIF_VALUE: 2
PIF_VALUE: 4
PIF_VALUE: 2
PIF_VALUE: 33
PIF_VALUE: 34
PIF_VALUE: 36
PIF_VALUE: 36
PIF_VALUE: 33
PIF_VALUE: 33
PIF_VALUE: 2
PIF_VALUE: 34
PIF_VALUE: 34
PIF_VALUE: 2
PIF_VALUE: 33
PIF_VALUE: 35
PIF_VALUE: 3
PIF_VALUE: 3
PIF_VALUE: 33
PIF_VALUE: 35
PIF_VALUE: 36
PIF_VALUE: 3
PIF_VALUE: 1
PIF_VALUE: 33
PIF_VALUE: 33
PIF_VALUE: 35

## 2021-04-20 ASSESSMENT — PAIN - FUNCTIONAL ASSESSMENT: PAIN_FUNCTIONAL_ASSESSMENT: 0-10

## 2021-04-20 ASSESSMENT — PAIN DESCRIPTION - DESCRIPTORS: DESCRIPTORS: BURNING;SHARP

## 2021-04-20 ASSESSMENT — PAIN SCALES - GENERAL
PAINLEVEL_OUTOF10: 8

## 2021-04-20 NOTE — PROGRESS NOTES
Discussed pt hx with anesthesia, pt desats during deep sleep, pt uses cpap for sleep apnea, pt will use when she gets home

## 2021-04-20 NOTE — H&P
Update History & Physical     The patient's History and Physical this morning was reviewed with the patient and there were no significant changes. I examined the patient and there were no significant changes from the previous History and Physical.     Plan: The risk, benefits, expected outcome, and alternative to the recommended procedure have been discussed with the patient. Patient understands and wants to proceed with the procedure. The procedure discussed is 1. Achilles tendon repair left lower extremity.          Electronically signed by Jo Cook DPM on 4/20/2021 at 6:21 AM

## 2021-04-20 NOTE — OP NOTE
Operative Note      Patient: Taiwo Conley  YOB: 1975  MRN: [id number]    Date of Procedure: 4/20/2021    Pre-Op Diagnosis: 1. ACHILLES TENDON TEAR LEFT  2. PAIN IN LEFT FOOT    Post-Op Diagnosis: Same       Procedure(s):  REPAIR ACHILLES TENDON LEFT LOWER EXTREMITY    Surgeon(s):  Liliana Borges DPM    Assistant:   * No surgical staff found *    Anesthesia: General    Estimated Blood Loss (mL): Minimal    Complications: None    Specimens:   ID Type Source Tests Collected by Time Destination   A : TENDON LEFT FOOT Tissue Tissue SURGICAL PATHOLOGY Liliana Borges DPM 4/20/2021 2830        Implants:  Implant Name Type Inv. Item Serial No.  Lot No. LRB No. Used Action   GRAFT HUM TISS [id number] THK0.5MM ACELLULAR DERM RM TEMP  GRAFT HUM TISS [id number] THK0.5MM ACELLULAR DERM RM TEMP  [facility] TISSUE [facility]-  Left 1 Implanted         Drains: * No LDAs found *    Findings: Thickening/tendinosis just proximal to Achilles insertional area left heel    Detailed Description of Procedure:   After proper preoperative evaluation, patient was brought back to the operating room where initially general anesthetic was administered per anesthesia department. Patient was then properly placed in a prone position. Tourniquet was placed around the patient's well-padded left calf inflated to 325 mmHg then lowered to the surgical field once proper prepping and draping was performed. Attention was directed towards the posterior aspect left heel where at this time approximately a 4 cm linear incision was made, incision was carried down through subcutaneous tissue layers all vital structures were bovied and tied off as necessary. The all soft tissue adhesions were freed up from the posterior Achilles tendon. At this time the thickening/tendinosis region of the distal tendon was noted with intrasubstance partial tearing of the posterior fibers.   We did utilize a 15 blade for sharp excision of all the nonviable tendinous tissue. Specimen was sent to pathology for gross examination. We did not detach the distal insertional area of the Achilles tendon. Surgical site was flushed with copious amounts of saline. We did utilize 2-0 PDS to reapproximate the posterior tear in the Achilles tendon in a running stitch fashion. Once adequate reapproximation was obtained, we did apply the Arthrex the decelluarized dermis graft overlying the repair site. The graft was properly held in place with 2-0 Vicryl and properly incorporated into the underlying tendon repair. All redundant graft was sharply excised from the surgical site. At this time we did utilize 3-0 Vicryl in the intradermal layer to reapproximate the region. Authorization of some of the superficial bleeders was again performed to allow for proper hemostasis and surgical site healing. Epidermal closure was performed with 4-0 nylon in a continuous interlocking fashion. Good approximation noted along the surgical site without tension noted. We did utilize a total of 10 cc of 0.5% Marcaine plain to anesthetize the surgical site left lower extremity. Betadine soaked Adaptic dressing, followed by a dry padded dressing was applied to the left lower extremity. Compressive double Ace was applied left lower extremity. Tourniquet was released with total tourniquet time of 44 minutes noted, good vascularity was reestablished to the left foot and digital regions. The patient tolerated the procedure and anesthesia well and left the operating room in stable condition. The patient is being transported to the recovery room for post operative management. Patient and/or caregiver was given postoperative home-going instructions and prescriptions to be taken as directed. Patient and/or caregiver were advised to call the office with any questions or concerns prior to their scheduled postoperative appointment.         Electronically signed by Mukund Lisa

## 2021-04-20 NOTE — ANESTHESIA POSTPROCEDURE EVALUATION
Department of Anesthesiology  Postprocedure Note    Patient: Ben Moseley  MRN: [id number]  YOB: 1975  Date of evaluation: 4/20/2021  Time:  10:01 AM     Procedure Summary     Date: 04/20/21 Room / Location: [address] [place] [address]    Anesthesia Start: 8552 Anesthesia Stop: 1782    Procedure: REPAIR ACHILLES TENDON LEFT LOWER EXTREMITY (Left ) Diagnosis: (ACHILLES TENDON TEAR LEFT, PAIN IN LEFT FOOT)    Surgeons: Kd Vincent DPM Responsible Provider: Nicole Horne MD    Anesthesia Type: general ASA Status: 3          Anesthesia Type: general    Ekaterina Phase I: Ekaterina Score: 9    Ekaterina Phase II: Ekaterina Score: 10    Last vitals: Reviewed and per EMR flowsheets.        Anesthesia Post Evaluation

## 2021-04-20 NOTE — PROGRESS NOTES
4/12/2021 1528, chart reviewed with anesthesia for upcomming surgery on 4/20/2021. Ok to proceed at [facility] surgery center per anesthesia.   bjp

## 2021-04-21 ENCOUNTER — CARE COORDINATION (OUTPATIENT)
Dept: OTHER | Facility: CLINIC | Age: 46
End: 2021-04-21

## 2021-04-21 NOTE — CARE COORDINATION
[facility] Transitions Initial Follow Up Call    Call within 2 business days of discharge: Yes    Patient: Taiwo Conley Patient : 1975   MRN: [id number]  Reason for Admission: Left achilles tendon repair  Discharge Date: 21 RARS: No data recorded    Last Discharge [address] [address]       Complaint Diagnosis Description Type Department Provider    21  Pain in left foot . .. Admission (Discharged) [address] [facility] [address] DPM           Spoke with: N/A    Facility: [address] [place] [address]    ACM attempted to reach patient for follow up call after recent surgery. HIPAA compliant message left requesting a return phone call at patients convenience. Will continue to follow. Follow Up  Future Appointments   Date Time Provider Kyung Nj   2021  2:30 PM SIERRA Galvan [facility]   6/3/2021  8:00 AM DO SANTY Chen INA Garcia RN BSN  Associate Care Manager  Phone: [phone]  Email: [email or web address]

## 2021-04-24 ENCOUNTER — CARE COORDINATION (OUTPATIENT)
Dept: OTHER | Facility: CLINIC | Age: 46
End: 2021-04-24

## 2021-04-24 NOTE — CARE COORDINATION
[facility] Transitions Follow Up Call    2021    Patient: Tami Gonzalez  Patient : 1975   MRN: [id number]  Reason for Admission: Left achilles tendon repair  Discharge Date: 21 RARS: No data recorded       Spoke with: Balbina Prince, patient    Care Transitions Subsequent and Final Call    Subsequent and Final Calls  Do you have any ongoing symptoms?: Yes  Onset of Patient-reported symptoms: Other  Patient-reported symptoms: Pain  Have your medications changed?: No  Do you have any questions related to your medications?: No  Do you currently have any active services?: No  Do you have any needs or concerns that I can assist you with?: No  Identified Barriers: None  Care Transitions Interventions  Other Interventions:           Was this an external facility discharge? No Discharge Facility: [facility] to be reviewed by the provider   Additional needs identified to be addressed with provider No  none             Method of communication with provider : none    Discussed COVID-19 related testing which was available at this time. Test results were negative. Patient informed of results, if available? Patient already aware of result    Advance Care Planning:   Does patient have an Advance Directive:  not addressed this date. Was this a readmission? No  Patient stated reason for admission: left achilles surgery  Patients top risk factors for readmission: functional physical ability and medical condition-recent surgery and history of type 1 DM    Associate Care Manager [facility]) contacted the patient by telephone to perform post hospital discharge assessment. Verified name and  with patient as identifiers. Provided introduction to self, and explanation of the ACM role. ACM called patient for CC outreach after recent surgery on 2021 for repair of left achilles tendon. Patient reports post-op pain has worsened yesterday and today.  States pain now \"8\"/10 and reports most recent Norco dose was last night around 2230. States slept \"some\" last night. States plans to take Norco this morning as soon as she eats something. Rates current pain \"8\"/10 to op site. Describes pain as \"burning\" and \"throbbing\". Patient states post-op pain is improvement from pre-op pain. ACM discussed option of alternating Norco & Ibuprofen Q 3 hrs, as per post op AVS okay to take OTC NSAID prn. Patient verbalized understanding. Patient states has been elevating LLE on ottoman with sitting. Patient states has not been taking Colace once daily since surgery and has been taking Probiotic. Patient states now having issues with having BM. ACM discussed side effect of constipation related to Norco and advised patient to take Colace regularly. States she will do so. ACM also discussed need to increase water intake. Patient verbalized understanding. States has had RLE swelling but has not resumed Lasix post-op due to difficulty ambulating. ACM discussed importance of resuming Lasix as soon as possible and able to ambulate a little better. ACM also discussed ordered Lasix frequency of BID. Patient verbalized understanding. Patient states using knee scooter for ambulation. States surgical dressing to left foot dry and intact. ACM reinforced importance of not getting site wet or removing dressing prior to appt with Dr. Haris Sanchez on 4/26/2021. Patient verbalized understanding. Patient denies fever or chills. Denies issues with urination. States most recent BM was today. Reports blood glucose 123, 132, 168 pre-op; states  this am.     ACM reviewed discharge instructions, medical action plan and red flags with patient who verbalized understanding. Patient given an opportunity to ask questions and does not have any further questions or concerns at this time. Were discharge instructions available to patient? Yes.  Reviewed appropriate site of care based on symptoms and resources available to patient including: PCP, Specialist and Benefits related nurse triage line. The patient agrees to contact the PCP office for questions related to their healthcare. Medication reconciliation was performed with patient, who verbalizes understanding of administration of home medications. Advised obtaining a 90-day supply of all daily and as-needed medications. Covid Risk Education    Patient has following risk factors of: diabetes. Education provided regarding infection prevention, and signs and symptoms of COVID-19 and when to seek medical attention with patient who verbalized understanding. Discussed exposure protocols and quarantine From CDC: Are you at higher risk for severe illness?   and given an opportunity for questions and concerns. The patient agrees to contact the COVID-19 hotline [phone] or PCP office for questions related to COVID-19. For more information on steps you can take to protect yourself, see CDC's How to Protect Yourself     Discussed follow-up appointments. If no appointment was previously scheduled, appointment scheduling offered: N/A- already scheduled. Is follow up appointment scheduled within 7 days of discharge? Yes  [facility](s): None known    Plan for follow-up call in 3-5 days based on severity of symptoms and risk factors. Plan for next call: symptom management-post-op pain, self management-BG readings and follow up appointment-podiatrist 4/26/2021  ACM provided contact information for future needs. Follow Up  Future Appointments   Date Time Provider Kyung Nj   4/26/2021  2:30 PM SIERRA [facility]ac[facility]   6/3/2021  8:00 AM DO Michael Campbell [facility]       Goals        Patient Stated     Patient Stated (pt-stated)      Left foot pain will be controlled.  (Baseline: patient c/o \"constant burning\" foot pain 4/1/2021.)    Barriers: stress and time constraints  Plan for overcoming my barriers: I will work with my ACM

## 2021-04-26 ENCOUNTER — OFFICE VISIT (OUTPATIENT)
Dept: PODIATRY | Age: 46
End: 2021-04-26

## 2021-04-26 VITALS
HEIGHT: 64 IN | DIASTOLIC BLOOD PRESSURE: 70 MMHG | SYSTOLIC BLOOD PRESSURE: 138 MMHG | WEIGHT: 288 LBS | BODY MASS INDEX: 49.17 KG/M2

## 2021-04-26 DIAGNOSIS — S86.012A ACHILLES TENDON TEAR, LEFT, INITIAL ENCOUNTER: Primary | ICD-10-CM

## 2021-04-26 PROBLEM — M67.88 LEFT PERONEAL TENDINOSIS: Status: RESOLVED | Noted: 2021-03-08 | Resolved: 2021-04-26

## 2021-04-26 PROCEDURE — 99024 POSTOP FOLLOW-UP VISIT: CPT | Performed by: PODIATRIST

## 2021-04-26 NOTE — PROGRESS NOTES
Patient is here post op week. Patient states she is having some pain. Patient is taking Narco in morning and night and ibuprofen in between.  Digna Oneill DO 4/15/2021  Electronically signed by Sowmya Jaimes LPN on 4/38/9972 at 1:90 PM

## 2021-04-27 NOTE — PROGRESS NOTES
21     Yann Found    : 1975   Sex: female    Age: 54 y.o. Patient's PCP/Provider is:  Digna Oneill DO    Subjective:  Patient is seen today for follow-up regarding continued evaluation regarding Achilles tendon repair left lower extremity. Patient denies any additional issues at this time. Patient has been trying to remain nonweightbearing left lower extremity as instructed with use of her knee walker. Patient has been utilizing crutch assistance at home as well. Patient is in no acute distress. Patient denies any nausea, vomiting, fever, chills. Chief Complaint   Patient presents with    Post-Op Check     left foot       ROS:  Const: Positives and pertinent negatives as per HPI. Musculo: Denies symptoms other than stated above. Neuro: Denies symptoms other than stated above. Skin: Denies symptoms other than stated above. Current Medications:    Current Outpatient Medications:     Probiotic Product (PROBIOTIC DAILY PO), Take 1 capsule by mouth daily, Disp: , Rfl:     Ascorbic Acid (VITAMIN C PO), Take by mouth Patient states anesthesiologist instructed her to start Vitamin C. [facility] plans to purchase and start taking. (2021), Disp: , Rfl:     HYDROcodone-acetaminophen (NORCO) 5-325 MG per tablet, Take 1 tablet by mouth every 6 hours as needed for Pain for up to 7 days. Intended supply: 7 days.  Take lowest dose possible to manage pain, Disp: 28 tablet, Rfl: 0    ibuprofen (ADVIL;MOTRIN) 200 MG tablet, Take 400 mg by mouth every 6 hours as needed for Pain, Disp: , Rfl:     omeprazole (PRILOSEC) 40 MG delayed release capsule, Take 1 capsule by mouth daily, Disp: 90 capsule, Rfl: 1    buPROPion (WELLBUTRIN XL) 300 MG extended release tablet, Take 1 tablet by mouth nightly, Disp: 90 tablet, Rfl: 0    metoprolol tartrate (LOPRESSOR) 25 MG tablet, Take 1 tablet by mouth 2 times daily, Disp: 180 tablet, Rfl: 2    simvastatin (ZOCOR) 40 MG tablet, Take 1 tablet by mouth daily, Disp: 90 tablet, Rfl: 1    Elastic Bandages & Supports (JOBST KNEE HIGH COMPRESSION SM) MISC, Compression stockings 20-30 mm hg knee high bilaterally Dx : Venous Insufficiency, Swelling, Disp: 2 each, Rfl: 2    celecoxib (CELEBREX) 200 MG capsule, Take 1 capsule by mouth daily, Disp: 90 capsule, Rfl: 1    ramipril (ALTACE) 10 MG capsule, TAKE 1 CAPSULE BY MOUTH ONE TIME A DAY, Disp: 90 capsule, Rfl: 1    potassium chloride (KLOR-CON M) 20 MEQ extended release tablet, Take 1 tablet by mouth 2 times daily, Disp: 180 tablet, Rfl: 1    furosemide (LASIX) 40 MG tablet, Take 1 tablet by mouth 2 times daily, Disp: 180 tablet, Rfl: 1    triamcinolone (KENALOG) 0.1 % cream, Apply topically to affected areas 2 times daily. , Disp: 80 g, Rfl: 0    levothyroxine (SYNTHROID) 200 MCG tablet, Take 200 mcg by mouth Daily, Disp: , Rfl:     Insulin Pump - insulin lispro, Inject into the skin continuous 8568-0551: 2.1 units/hr 0901-2551: 1.8 units/hr  3284-8155: 2.2 units/hr, Disp: , Rfl:     docusate sodium (COLACE) 100 MG capsule, Take 100 mg by mouth daily, Disp: , Rfl:     vitamin B-12 (CYANOCOBALAMIN) 500 MCG tablet, Take 1,000 mcg by mouth daily , Disp: , Rfl:     aspirin 81 MG tablet, Take 81 mg by mouth nightly., Disp: , Rfl:     Allergies:  No Known Allergies    Vitals:    04/26/21 1447   BP: 138/70   Site: Right Upper Arm   Position: Sitting   Cuff Size: Large Adult   Weight: 288 lb (130.6 kg)   Height: 5' 4\" (1.626 m)       Exam:  VASCULAR: Pedal pulses palpable left foot  NEUROLOGICAL: Epicritic sensations intact left lower extremity  DERMATOLOGICAL: Surgical site is stable with sutures intact. No signs of infection noted left foot. Minimal edema noted left foot surgical site.   MUSCULOSKELETAL: Adequate range of motion digital regions left foot    Diagnostic Studies:     Mri Foot Left Wo Contrast    Result Date: 3/29/2021  EXAMINATION: MRI OF THE LEFT ANKLE WITHOUT CONTRAST 3/28/2021 11:59 am TECHNIQUE: Multiplanar multisequence MRI of the left ankle was performed without the administration of intravenous contrast. COMPARISON: Left foot plain radiographs from 03/08/2021 HISTORY: ORDERING SYSTEM PROVIDED HISTORY: Closed nondisplaced fracture of cuboid of left foot, initial encounter TECHNOLOGIST PROVIDED HISTORY: What is the area of interest?->[place]foot 43-year-old female with possible closed nondisplaced fracture of cuboid of left foot FINDINGS: SYNDESMOTIC LIGAMENTS:  The anterior-inferior tibiofibular ligament, interosseous membrane and posterior-inferior tibiofibular ligaments are normal. LATERAL COLLATERAL LIGAMENT COMPLEX: Severe thinning of the anterior talofibular ligament, likely related to remote partial tearing. Posterior talofibular ligament and calcaneofibular ligament appear intact. DELTOID LIGAMENT COMPLEX:  The superficial and deep components of the deltoid ligament complex are normal. SINUS TARSI AND SPRING LIGAMENT:  The anterior-inferior tibiofibular ligament, interosseous membrane and posterior-inferior tibiofibular ligaments are normal. Lisfranc ligament complex appears intact. MEDIAL TENDONS: The posterior tibialis, flexor digitorum longus and flexor hallucis longus tendons are intact. LATERAL TENDONS:  The peroneus longus and brevis tendons are intact. ANTERIOR TENDONS: The tibialis anterior, extensor hallucis longus and extensor digitorum longus tendons are normal in position, morphology and signal. ACHILLES TENDON: Moderate to severe distal Achilles tendinosis with low-grade partial-thickness interstitial tearing of the distal Achilles tendon. PLANTAR FASCIA: Moderate enlargement of the central cord of the plantar fascia with intermediate T1 signal on image 12, series 5 and series 6. TARSAL TUNNEL: There are no obstructing lesions within the tarsal tunnel. BONE MARROW: Subcortical cystic changes at the lateral talar dome. No osteochondral lesion or defect at the talar dome.  Osseous

## 2021-04-29 ENCOUNTER — CARE COORDINATION (OUTPATIENT)
Dept: OTHER | Facility: CLINIC | Age: 46
End: 2021-04-29

## 2021-04-29 NOTE — CARE COORDINATION
[facility] Transitions Follow Up Call    2021    Patient: Jean Carlos Andrews  Patient : 1975   MRN: [id number]  Reason for Admission: Left Achilles tendon repair  Discharge Date: 21 RARS: No data recorded       ACM contacted patient by telephone to follow up on progress, reinforce previous education/ provide patient education, and discuss any new issues or concerns. HIPAA compliant message left requesting a return phone call at patients convenience to discuss. Will continue to follow. Care Transitions Subsequent and Final Call    Subsequent and Final Calls  Care Transitions Interventions  Other Interventions:            Follow Up  Future Appointments   Date Time Provider Kyung Nj   2021 11:30 AM SIERRA Solomon [facility]   6/3/2021  8:00 AM DO SANTY Narayanan [facility]       Brandee Huffman RN

## 2021-04-30 ENCOUNTER — OFFICE VISIT (OUTPATIENT)
Dept: PODIATRY | Age: 46
End: 2021-04-30

## 2021-04-30 VITALS — HEIGHT: 64 IN | WEIGHT: 288 LBS | BODY MASS INDEX: 49.17 KG/M2

## 2021-04-30 DIAGNOSIS — M79.672 PAIN IN LEFT FOOT: ICD-10-CM

## 2021-04-30 DIAGNOSIS — S86.012A ACHILLES TENDON TEAR, LEFT, INITIAL ENCOUNTER: Primary | ICD-10-CM

## 2021-04-30 DIAGNOSIS — R60.0 LOCALIZED EDEMA: ICD-10-CM

## 2021-04-30 PROCEDURE — 99024 POSTOP FOLLOW-UP VISIT: CPT | Performed by: PODIATRIST

## 2021-04-30 RX ORDER — NAPROXEN 500 MG/1
500 TABLET ORAL 2 TIMES DAILY WITH MEALS
Qty: 60 TABLET | Refills: 0 | Status: SHIPPED
Start: 2021-04-30 | End: 2021-05-03

## 2021-04-30 NOTE — PROGRESS NOTES
Patient is here for landing on left foot wrong. Patient stated there was some drainage from the suture site. Patient would like to know why her legs are always swollen.  Digna Oneill DO 4/15/2021  Electronically signed by Shanna Clark LPN on 2/29/1994 at 87:23 AM

## 2021-05-01 NOTE — PROGRESS NOTES
21     Carmella De La Cruz    : 1975   Sex: female    Age: 54 y.o. Patient's PCP/Provider is:  Digna Oneill DO    Subjective:  Patient is seen today for follow-up regarding postoperative evaluation left foot. She stated she was at home yesterday landed awkwardly on her surgical site left foot. She presented today for further evaluation and care. No other additional abnormalities noted at this time. Patient denies any nausea, vomiting, fever, chills. Chief Complaint   Patient presents with    Foot Pain     left foot        ROS:  Const: Positives and pertinent negatives as per HPI. Musculo: Denies symptoms other than stated above. Neuro: Denies symptoms other than stated above. Skin: Denies symptoms other than stated above. Current Medications:    Current Outpatient Medications:     naproxen (EC-NAPROSYN) 500 MG EC tablet, Take 1 tablet by mouth 2 times daily (with meals), Disp: 60 tablet, Rfl: 0    Probiotic Product (PROBIOTIC DAILY PO), Take 1 capsule by mouth daily, Disp: , Rfl:     Ascorbic Acid (VITAMIN C PO), Take by mouth Patient states anesthesiologist instructed her to start Vitamin C. [facility] plans to purchase and start taking. (2021), Disp: , Rfl:     ibuprofen (ADVIL;MOTRIN) 200 MG tablet, Take 400 mg by mouth every 6 hours as needed for Pain, Disp: , Rfl:     omeprazole (PRILOSEC) 40 MG delayed release capsule, Take 1 capsule by mouth daily, Disp: 90 capsule, Rfl: 1    buPROPion (WELLBUTRIN XL) 300 MG extended release tablet, Take 1 tablet by mouth nightly, Disp: 90 tablet, Rfl: 0    metoprolol tartrate (LOPRESSOR) 25 MG tablet, Take 1 tablet by mouth 2 times daily, Disp: 180 tablet, Rfl: 2    simvastatin (ZOCOR) 40 MG tablet, Take 1 tablet by mouth daily, Disp: 90 tablet, Rfl: 1    Elastic Bandages & Supports (JOBST KNEE HIGH COMPRESSION SM) MISC, Compression stockings 20-30 mm hg knee high bilaterally Dx :  Venous Insufficiency, Swelling, Disp: 2 each, Rfl: 2    celecoxib (CELEBREX) 200 MG capsule, Take 1 capsule by mouth daily, Disp: 90 capsule, Rfl: 1    ramipril (ALTACE) 10 MG capsule, TAKE 1 CAPSULE BY MOUTH ONE TIME A DAY, Disp: 90 capsule, Rfl: 1    potassium chloride (KLOR-CON M) 20 MEQ extended release tablet, Take 1 tablet by mouth 2 times daily, Disp: 180 tablet, Rfl: 1    furosemide (LASIX) 40 MG tablet, Take 1 tablet by mouth 2 times daily, Disp: 180 tablet, Rfl: 1    triamcinolone (KENALOG) 0.1 % cream, Apply topically to affected areas 2 times daily. , Disp: 80 g, Rfl: 0    levothyroxine (SYNTHROID) 200 MCG tablet, Take 200 mcg by mouth Daily, Disp: , Rfl:     Insulin Pump - insulin lispro, Inject into the skin continuous 6743-2407: 2.1 units/hr 9595-7652: 1.8 units/hr  3754-7248: 2.2 units/hr, Disp: , Rfl:     docusate sodium (COLACE) 100 MG capsule, Take 100 mg by mouth daily, Disp: , Rfl:     vitamin B-12 (CYANOCOBALAMIN) 500 MCG tablet, Take 1,000 mcg by mouth daily , Disp: , Rfl:     aspirin 81 MG tablet, Take 81 mg by mouth nightly., Disp: , Rfl:     Allergies:  No Known Allergies    Vitals:    04/30/21 1105   Weight: 288 lb (130.6 kg)   Height: 5' 4\" (1.626 m)       Exam:  Vascular status grossly intact left foot. Surgical site is coapted without signs of dehiscence noted. No palpable defect noted along the course of the distal Achilles tendon left lower extremity. Mild edematous issues noted surgical site left lower extremity. Her flexion strength noted lower extremity. Diagnostic Studies:     No results found. Procedures: An unna boot  compressive wrap was applied to the left lower extremity. It's purpose is to  decrease  the amount of edema present, and to allow proper healing of the soft tissues. The patient was instructed to keep the unna boot clean, dry and intact until the next follow up visit. The patient was instructed to look for signs of redness, irritation, blistering and pain.   If these or any other symptoms were to develop, they were advised to contact the office immediately for reevaluation. Plan Per Assessment  Mikki Craig was seen today for foot pain. Diagnoses and all orders for this visit:    Achilles tendon tear, left, initial encounter  -     naproxen (EC-NAPROSYN) 500 MG EC tablet; Take 1 tablet by mouth 2 times daily (with meals)    Localized edema    Pain in left foot  -     naproxen (EC-NAPROSYN) 500 MG EC tablet; Take 1 tablet by mouth 2 times daily (with meals)      1. Postoperative evaluation and management  2. We did apply a compression dressing to the symptomatic left lower extremity to reduce edema present as described above. 3. Patient was given a prescription for oral Naprosyn to be taken as directed. Patient was advised partial weightbearing with her previously dispensed cam walker left heel. She is to utilize her walker and continued use of the knee walker for ambulatory activities. Patient was advised continued icing and elevating left lower extremity, and limiting her daily activities to prevent any further potential injury to the surgical site left foot. 4. Patient will be followed up at a later date for continued evaluation and care. Seen By:    Loraine Amanda DPM    Electronically signed by Loraine Amanda DPM on 5/1/2021 at 11:06 AM    This note was created using voice recognition software. The note was reviewed however may contain grammatical errors.

## 2021-05-03 ENCOUNTER — TELEPHONE (OUTPATIENT)
Dept: PODIATRY | Age: 46
End: 2021-05-03

## 2021-05-03 DIAGNOSIS — S86.012A ACHILLES TENDON TEAR, LEFT, INITIAL ENCOUNTER: Primary | ICD-10-CM

## 2021-05-03 RX ORDER — NAPROXEN 500 MG/1
500 TABLET ORAL 2 TIMES DAILY WITH MEALS
Qty: 60 TABLET | Refills: 3 | Status: SHIPPED
Start: 2021-05-03 | End: 2022-04-27

## 2021-05-07 ENCOUNTER — OFFICE VISIT (OUTPATIENT)
Dept: PODIATRY | Age: 46
End: 2021-05-07

## 2021-05-07 ENCOUNTER — CARE COORDINATION (OUTPATIENT)
Dept: OTHER | Facility: CLINIC | Age: 46
End: 2021-05-07

## 2021-05-07 VITALS — HEIGHT: 64 IN | BODY MASS INDEX: 49.17 KG/M2 | WEIGHT: 288 LBS

## 2021-05-07 DIAGNOSIS — R60.0 LOCALIZED EDEMA: ICD-10-CM

## 2021-05-07 DIAGNOSIS — R26.2 DIFFICULTY WALKING: ICD-10-CM

## 2021-05-07 DIAGNOSIS — S86.012A ACHILLES TENDON TEAR, LEFT, INITIAL ENCOUNTER: Primary | ICD-10-CM

## 2021-05-07 DIAGNOSIS — M79.672 PAIN IN LEFT FOOT: ICD-10-CM

## 2021-05-07 PROCEDURE — 99024 POSTOP FOLLOW-UP VISIT: CPT | Performed by: PODIATRIST

## 2021-05-07 NOTE — PROGRESS NOTES
Patient is here for post op check. Patient has no concerns.  Connor Ervin DO  Electronically signed by Sharmaine Stewart LPN on 8/8/1355 at 23:51 AM

## 2021-05-07 NOTE — CARE COORDINATION
[facility] Transitions Follow Up Call    2021    Patient: Taiwo Conley  Patient : 1975   MRN: [id number]  Reason for Admission: Left achilles tendon repair  Discharge Date: 21 RARS: No data recorded       Spoke with: N/A    ACM attempted to reach patient for follow up call. HIPAA compliant message left requesting a return phone call at patients convenience. Will continue to follow. Follow Up  Future Appointments   Date Time Provider Kyung Nj   2021  8:30 AM SIERRA Galvan [facility]   6/3/2021  8:00 AM DO SANTY Chen RN BSN  Associate Care Manager  Phone: [phone]  Email: [email or web address]

## 2021-05-08 NOTE — PROGRESS NOTES
21     Katiana Terrell    : 1975   Sex: female    Age: 54 y.o. Patient's PCP/Provider is:  Digna Oneill DO    Subjective:  Patient is seen today for follow-up regarding continued care postoperative management right Achilles repair. Overall patient is doing well at this time without any additional issues noted. She denies any nausea, vomiting, fever, chills. Chief Complaint   Patient presents with    Post-Op Check     right foot        ROS:  Const: Positives and pertinent negatives as per HPI. Musculo: Denies symptoms other than stated above. Neuro: Denies symptoms other than stated above. Skin: Denies symptoms other than stated above. Current Medications:    Current Outpatient Medications:     naproxen (NAPROSYN) 500 MG tablet, Take 1 tablet by mouth 2 times daily (with meals), Disp: 60 tablet, Rfl: 3    Probiotic Product (PROBIOTIC DAILY PO), Take 1 capsule by mouth daily, Disp: , Rfl:     Ascorbic Acid (VITAMIN C PO), Take by mouth Patient states anesthesiologist instructed her to start Vitamin C. States plans to purchase and start taking. (2021), Disp: , Rfl:     ibuprofen (ADVIL;MOTRIN) 200 MG tablet, Take 400 mg by mouth every 6 hours as needed for Pain, Disp: , Rfl:     omeprazole (PRILOSEC) 40 MG delayed release capsule, Take 1 capsule by mouth daily, Disp: 90 capsule, Rfl: 1    buPROPion (WELLBUTRIN XL) 300 MG extended release tablet, Take 1 tablet by mouth nightly, Disp: 90 tablet, Rfl: 0    metoprolol tartrate (LOPRESSOR) 25 MG tablet, Take 1 tablet by mouth 2 times daily, Disp: 180 tablet, Rfl: 2    simvastatin (ZOCOR) 40 MG tablet, Take 1 tablet by mouth daily, Disp: 90 tablet, Rfl: 1    Elastic Bandages & Supports (JOBST KNEE HIGH COMPRESSION SM) MISC, Compression stockings 20-30 mm hg knee high bilaterally Dx :  Venous Insufficiency, Swelling, Disp: 2 each, Rfl: 2    ramipril (ALTACE) 10 MG capsule, TAKE 1 CAPSULE BY MOUTH ONE TIME A DAY, Disp: 90 capsule, Rfl: 1    potassium chloride (KLOR-CON M) 20 MEQ extended release tablet, Take 1 tablet by mouth 2 times daily, Disp: 180 tablet, Rfl: 1    furosemide (LASIX) 40 MG tablet, Take 1 tablet by mouth 2 times daily, Disp: 180 tablet, Rfl: 1    triamcinolone (KENALOG) 0.1 % cream, Apply topically to affected areas 2 times daily. , Disp: 80 g, Rfl: 0    levothyroxine (SYNTHROID) 200 MCG tablet, Take 200 mcg by mouth Daily, Disp: , Rfl:     Insulin Pump - insulin lispro, Inject into the skin continuous 9760-8791: 2.1 units/hr 9999-9480: 1.8 units/hr  4944-5061: 2.2 units/hr, Disp: , Rfl:     docusate sodium (COLACE) 100 MG capsule, Take 100 mg by mouth daily, Disp: , Rfl:     vitamin B-12 (CYANOCOBALAMIN) 500 MCG tablet, Take 1,000 mcg by mouth daily , Disp: , Rfl:     aspirin 81 MG tablet, Take 81 mg by mouth nightly., Disp: , Rfl:     Allergies:  No Known Allergies    Vitals:    05/07/21 1149   Weight: 288 lb (130.6 kg)   Height: 5' 4\" (1.626 m)       Exam:  Neurovascular status unchanged. Surgical site is well coapted without signs of dehiscence noted. Edema is stable left lower extremity. Adequate range of motion left ankle noted. Diagnostic Studies:     No results found. Procedures: An unna boot  compressive wrap was applied to the left lower extremity. It's purpose is to  decrease  the amount of edema present, and to allow proper healing of the soft tissues. The patient was instructed to keep the unna boot clean, dry and intact until the next follow up visit. The patient was instructed to look for signs of redness, irritation, blistering and pain. If these or any other symptoms were to develop, they were advised to contact the office immediately for reevaluation. Plan Per Assessment  Jeremias Navas was seen today for post-op check.     Diagnoses and all orders for this visit:    Achilles tendon tear, left, initial encounter  -     [facility], [place]    Pain in left foot  -     Handicap placard  -     [facility], [place]    Difficulty walking  -     Handicap placard  -     [facility], [place]    Localized edema  -     Handicap placard  -     [facility], [place]      1. Postoperative evaluation and management  2. Compression dressing was reapplied left lower extremity to allow for continued postoperative healing. Patient was to continue with the cam walker with all ambulatory activities. Patient is to continue utilizing the knee walker as much as possible with ambulatory activities to allow for complete surgical site healing. 3. Patient will be set up for physical therapy towards the end of next week. Was also given a handicap placard today to help out with her activities over the next 3 months. 4. Patient will be followed up in 1 week's time for continued evaluation and management. Potential suture removal will be performed as well. She was advised to call the office with any questions or concerns in the interim. Seen By:    Liliana Cancino DPM    Electronically signed by Liliana Cancino DPM on 5/7/2021 at 8:08 PM    This note was created using voice recognition software. The note was reviewed however may contain grammatical errors.

## 2021-05-09 DIAGNOSIS — F41.9 ANXIETY: ICD-10-CM

## 2021-05-09 DIAGNOSIS — R00.2 HEART PALPITATIONS: ICD-10-CM

## 2021-05-10 RX ORDER — ALPRAZOLAM 0.5 MG/1
TABLET ORAL
Qty: 60 TABLET | Refills: 2 | Status: SHIPPED
Start: 2021-05-10 | End: 2021-09-08 | Stop reason: SDUPTHER

## 2021-05-11 ENCOUNTER — CARE COORDINATION (OUTPATIENT)
Dept: OTHER | Facility: CLINIC | Age: 46
End: 2021-05-11

## 2021-05-11 NOTE — CARE COORDINATION
[facility] Transitions Follow Up Call    2021    Patient: Romina Brown  Patient : 1975   MRN: [id number]  Reason for Admission: Left achilles tendon repair  Discharge Date: 21 RARS: No data recorded       Spoke with: Maddi Gonzales, patient    Care Transitions Subsequent and Final Call    Subsequent and Final Calls  Do you have any ongoing symptoms?: Yes  Onset of Patient-reported symptoms: Other  Patient-reported symptoms: Other, Pain  Have your medications changed?: No  Do you have any questions related to your medications?: No  Do you currently have any active services?: No  Do you have any needs or concerns that I can assist you with?: No  Identified Barriers: Stress  Care Transitions Interventions  Other Interventions:         Care Transitions Follow Up Call    Needs to be reviewed by the provider   Additional needs identified to be addressed with provider No  none             Method of communication with provider : artemio Flower[facility]) contacted the patient by telephone to follow up after admission on 2021. Verified name and  with patient as identifiers. ACM called patient for CC post-op follow up. Patient reports doing \"ok\" and has follow up with Dr. Lili Winkler tomorrow for sutures to be removed. States had unna boot to LLE but reports she removed unna boot earlier today. [facility] Dr. Lili Winkler said ok to remove unna boot today since follow up appt tomorrow. Patient states swelling to BLE with right worse than left. States has not been wearing compression stocking to RLE and has not been taking Furosemide. ACM advised patient to resume Furosemide asap. Patient states she will do so now that she is getting around better post-op. Patient reports post-op pain to LLE \"5\"/10 at present. States has 2 tablets Norco remaining. Reports has also been taking Naproxen PRN.  Patient states did not get Naproxen RX filled that Dr. Lili Winkler wrote at last office visit due to having some remaining in the home from previous urgent care visit. Patient states she will [facility] Cain [facility] as the plan is for her to begin PT after sutures removed. States hoping to start PT later this week or next week, depending on scheduling. Patient states blood glucose \"not too bad\" recently. States highest , which was last night after eating pretzels with peanut butter. States normally does not eat snack after supper, unless it's popcorn. States BG this am 128. Patient denies hypoglycemic symptoms or low BG recently. Patient reports to [facility] follow up on 5/13/2021 with Dr. Fremont Lesch. Patient states fell x2 since surgery; once when ambulating from shower to bedroom to get dressed after shower. States second fall was the following day when walking inside front door from outside and tripped over metal piece on threshold. States called Dr. Cain Jama after first fall and was scheduled for office visit. States fell second time when returned from office visit with Dr. Cain Jama. Denies injury. Patient states now using cam walker to LLE and states Dr. Cain Jama said ok to bear weight when using cam walker. States used cam walker when went to dog park earlier today with a friend and his dog. States using knee scooter with ambulation when not using cam walker. Patient states took shower today without difficulty. States has been using stool covered with towel in the shower. Addressed changes since last contact: none  Discussed follow-up appointments. If no appointment was previously scheduled, appointment scheduling offered: N/A   Is follow up appointment scheduled within 7 days of discharge? Yes    ACM reviewed discharge instructions, medical action plan and red flags with patient and discussed any barriers to care and/or understanding of plan of care after discharge.  Discussed appropriate site of care based on symptoms and resources available to patient including: Specialist and Benefits related nurse triage line. The patient agrees to contact the PCP office for questions related to their healthcare. Patients top risk factors for readmission: functional physical ability, falls and medical condition-recent left foot surgery, history of type 1 DM  Interventions to address risk factors: Obtained and reviewed discharge summary and/or continuity of care documents      Non-[facility] follow up appointment(s): Dr. Janelle Marino 5/13/2021    ACM provided contact information for future needs. Plan for follow-up call in 10-14 days based on severity of symptoms and risk factors. Plan for next call: follow up after patient has had office visits with Dr. Shara Marino. Also follow up regarding PT    Follow Up  Future Appointments   Date Time Provider Kyung Nj   5/12/2021  8:30 AM SIERRA [facility]   6[facility]3/2021  [facility]     Follow-up With  Details  Why  Contact Info   Nadeem Becker DO  On 5/13/2021  endo follow up  [address] [place] [address]  [phone]       Goals        Patient Stated     Patient Stated (pt-stated)      Left foot pain will be controlled. (Baseline: patient c/o \"constant burning\" foot pain 4/1/2021.)    Barriers: stress and time constraints  Plan for overcoming my barriers: I will work with my ACM to overcome barriers. Confidence: 5/10  Anticipated Goal Completion Date: 5/15/2021 & ongoing    4/1/2021: Patient reports \"constant burning\" pain to left foot. Currently wearing camwalker as ordered per podiatrist. Follow up with podiatrist tomorrow for MRI results. 4/16/2021: Patient having surgery for left achilles tendon repair 4/20/2021.     5/11/2021: Patient reports post-op left foot pain. Reports pain has improved since pre-op.  Patient Stated (pt-stated)      My left foot incision will heal without complications.      Barriers: stress  Plan for overcoming my barriers: I will work with my ACM to overcome barriers. Confidence: 8/10  Anticipated Goal Completion Date: 6/1/2021 4/24/2021: Patient's incision site with surgical dressing dry and intact. Has follow up with foot surgeon 4/26/2021.     5/11/2021: Patient states Dr. Kayla Villanueva to remove sutures at follow up appt tomorrow. Other     HEMOGLOBIN A1C < 7      2/16/2021: Patient to reschedule follow up with endocrinology for next A1C check & possible medication adjustment. 3/5/2021: Patient has not yet had A1C rechecked. 4/1/2021: Patient has not had A1C rechecked since 10/2/2020 per Epic records    4/16/2021: Patient had A1C checked = 9.0%. Plan:  -Patient to have follow up with Dr. Kayla Villanueva tomorrow for suture removal.    -Patient to start OP physical therapy once sutures out and order received. -Patient to have follow up with Dr. Vidhi Blake (endo) 5/13/2021. -ACM to follow up with patient in ~2 weeks. Will Rebolledo RN BSN  Associate Care Manager  Phone: [phone]  Email: [email or web address]

## 2021-05-12 ENCOUNTER — OFFICE VISIT (OUTPATIENT)
Dept: PODIATRY | Age: 46
End: 2021-05-12

## 2021-05-12 VITALS — BODY MASS INDEX: 49.17 KG/M2 | WEIGHT: 288 LBS | HEIGHT: 64 IN

## 2021-05-12 DIAGNOSIS — S86.012A ACHILLES TENDON TEAR, LEFT, INITIAL ENCOUNTER: Primary | ICD-10-CM

## 2021-05-12 PROCEDURE — 99024 POSTOP FOLLOW-UP VISIT: CPT | Performed by: PODIATRIST

## 2021-05-12 NOTE — PROGRESS NOTES
21     Victorina Webb    : 1975   Sex: female    Age: 54 y.o. Patient's PCP/Provider is:  Digna Oneill DO    Subjective:  Patient is seen today for follow-up regarding continued postoperative management Achilles tendon repair left lower extremity. Overall patient is stable at this time with use of the cam walker and knee walker for ambulatory assistance. Patient presents today for suture removal.  Patient denies any additional issues or any recent injuries. Patient is in no acute distress. She denies any nausea, vomiting, fever, chills. Chief Complaint   Patient presents with    Post-Op Check     right foot        ROS:  Const: Positives and pertinent negatives as per HPI. Musculo: Denies symptoms other than stated above. Neuro: Denies symptoms other than stated above. Skin: Denies symptoms other than stated above.     Current Medications:    Current Outpatient Medications:     ALPRAZolam (XANAX) 0.5 MG tablet, TAKE 1 TABLET BY MOUTH TWICE DAILY AS NEEDED FOR SLEEP OR DEPRESSION, Disp: 60 tablet, Rfl: 2    naproxen (NAPROSYN) 500 MG tablet, Take 1 tablet by mouth 2 times daily (with meals), Disp: 60 tablet, Rfl: 3    Probiotic Product (PROBIOTIC DAILY PO), Take 1 capsule by mouth daily, Disp: , Rfl:     Ascorbic Acid (VITAMIN C PO), Take by mouth Patient states anesthesiologist instructed her to start Vitamin C. States plans to purchase and start taking. (2021), Disp: , Rfl:     ibuprofen (ADVIL;MOTRIN) 200 MG tablet, Take 400 mg by mouth every 6 hours as needed for Pain, Disp: , Rfl:     omeprazole (PRILOSEC) 40 MG delayed release capsule, Take 1 capsule by mouth daily, Disp: 90 capsule, Rfl: 1    buPROPion (WELLBUTRIN XL) 300 MG extended release tablet, Take 1 tablet by mouth nightly, Disp: 90 tablet, Rfl: 0    metoprolol tartrate (LOPRESSOR) 25 MG tablet, Take 1 tablet by mouth 2 times daily, Disp: 180 tablet, Rfl: 2    simvastatin (ZOCOR) 40 MG tablet, Take 1 tablet by mouth daily, Disp: 90 tablet, Rfl: 1    Elastic Bandages & Supports (JOBST KNEE HIGH COMPRESSION SM) MISC, Compression stockings 20-30 mm hg knee high bilaterally Dx : Venous Insufficiency, Swelling, Disp: 2 each, Rfl: 2    ramipril (ALTACE) 10 MG capsule, TAKE 1 CAPSULE BY MOUTH ONE TIME A DAY, Disp: 90 capsule, Rfl: 1    potassium chloride (KLOR-CON M) 20 MEQ extended release tablet, Take 1 tablet by mouth 2 times daily, Disp: 180 tablet, Rfl: 1    furosemide (LASIX) 40 MG tablet, Take 1 tablet by mouth 2 times daily, Disp: 180 tablet, Rfl: 1    triamcinolone (KENALOG) 0.1 % cream, Apply topically to affected areas 2 times daily. , Disp: 80 g, Rfl: 0    levothyroxine (SYNTHROID) 200 MCG tablet, Take 200 mcg by mouth Daily, Disp: , Rfl:     Insulin Pump - insulin lispro, Inject into the skin continuous 9973-7120: 2.1 units/hr 4222-2422: 1.8 units/hr  0614-0280: 2.2 units/hr, Disp: , Rfl:     docusate sodium (COLACE) 100 MG capsule, Take 100 mg by mouth daily, Disp: , Rfl:     vitamin B-12 (CYANOCOBALAMIN) 500 MCG tablet, Take 1,000 mcg by mouth daily , Disp: , Rfl:     aspirin 81 MG tablet, Take 81 mg by mouth nightly., Disp: , Rfl:     Allergies:  No Known Allergies    Vitals:    05/12/21 0836   Weight: 288 lb (130.6 kg)   Height: 5' 4\" (1.626 m)       Exam:  VASCULAR: Pedal pulses palpable left foot. Capillary fill time brisk digits 1 through 5 left foot  NEUROLOGICAL: Epicritic sensations intact left lower extremity  DERMATOLOGICAL: Surgical site is well coapted with sutures intact posterior aspect left heel. No signs of infection noted left foot. Edematous changes are stable bilateral lower extremities  MUSCULOSKELETAL: Alignment maintained surgical site left foot. Adequate range of motion digital regions left foot. Diagnostic Studies:     No results found. Procedures:    None    Plan Per Assessment  [facility] was seen today for post-op check.     Diagnoses and all orders for this visit:    Achilles tendon tear, left, initial encounter      1. Postoperative evaluation and management  2. We did discuss continued postoperative care with use of the cam walker and knee scooter for ambulatory assistance. 3. Sutures were removed surgical site left foot to patient tolerance. Dry dressing applied overlying. 4. We will contact physical therapy to make sure she is set up for her initial evaluation and care next week. Patient was continued limited ambulatory activities to allow for continued postoperative healing. 5. Patient will be followed up in 2 weeks time or sooner if needed for reevaluation. They were advised to call the office with any questions or concerns in the interim. We also did discuss potentially getting patient evaluated for diabetic shoes and insoles as well. Seen By:    Kd Gifford DPM    Electronically signed by Kd Gifford DPM on 5/12/2021 at 3:18 PM    This note was created using voice recognition software. The note was reviewed however may contain grammatical errors.

## 2021-05-12 NOTE — PROGRESS NOTES
Patient is here for right foot wound. Patient states she is having pain. The cam walker boot helps while walking.    Tammy Garza DO  Electronically signed by Lam Barrios LPN on 5/00/3198 at 1:04 AM

## 2021-05-17 ENCOUNTER — EVALUATION (OUTPATIENT)
Dept: PHYSICAL THERAPY | Age: 46
End: 2021-05-17
Payer: COMMERCIAL

## 2021-05-17 DIAGNOSIS — R26.2 DIFFICULTY WALKING: ICD-10-CM

## 2021-05-17 DIAGNOSIS — M79.672 PAIN IN LEFT FOOT: ICD-10-CM

## 2021-05-17 DIAGNOSIS — S86.012A ACHILLES TENDON TEAR, LEFT, INITIAL ENCOUNTER: Primary | ICD-10-CM

## 2021-05-17 PROCEDURE — 97161 PT EVAL LOW COMPLEX 20 MIN: CPT | Performed by: PHYSICAL THERAPIST

## 2021-05-17 PROCEDURE — 97110 THERAPEUTIC EXERCISES: CPT | Performed by: PHYSICAL THERAPIST

## 2021-05-17 NOTE — PROGRESS NOTES
[facility]          Phone: [phone] Fax: [phone]    Physical Therapy Daily Treatment Note  Date:  2021    Patient Name:  Mala Velasco    :  1975  MRN: [id number]    Restrictions/Precautions:    Diagnosis:     Diagnosis Orders   1. Achilles tendon tear, left, initial encounter     2. Pain in left foot     3.  Difficulty walking       Treatment Diagnosis:    Insurance/Certification information:  Medical [place]  Referring Physician:  Sarajane Prader., DPM  Plan of care signed (Y/N):    Visit# / total visits:    Pain level: 7/10   Time In:  1000  Time Out:  1035    Subjective:  See evaluation    Exercises:  Exercise/Equipment Resistance/Repetitions Other comments     Alphabet  x1 rep      Ankle circles x10 reps each CW and CCW      Ankle pumps x10 reps      Ankle inv/ever x10 reps      Calf stretch with towel 15 sec x 5 reps      Toe scrunches with towel 3 minutes      Bike/stepper       Ankle ROM with tband     Sit to stand         Step-ups                                                                       Other Therapeutic Activities:  PT evaluation completed    Home Exercise Program:  21 - ankle circles, alphabet, ankle pumps, ankle inv/ever, calf stretch with towel, toe scrunches with towel    Manual Treatments:  N/A    Modalities:  N/A     Time-in Time-out Total Time   72428  Evaluation Low Complexity 1000 1020 20   59424  Evaluation Med Complexity      69361  Evaluation High Complexity      81303  Ther Ex 1020 1035 15   41480  Neuro Re-ed        41053  Ther Activities        11095  Manual Therapy       04272  E-stim       04280  Ultrasound            Session 1000 1035 35       Treatment/Activity Tolerance:  [x] Patient tolerated treatment well [] Patient limited by fatigue  [] Patient limited by pain  [] Patient limited by other medical complications  [] Other:     Prognosis: [x] Good [] Fair  [] Poor    Patient Requires Follow-up: [x] Yes

## 2021-05-20 ENCOUNTER — TREATMENT (OUTPATIENT)
Dept: PHYSICAL THERAPY | Age: 46
End: 2021-05-20
Payer: COMMERCIAL

## 2021-05-20 DIAGNOSIS — M79.672 PAIN IN LEFT FOOT: ICD-10-CM

## 2021-05-20 DIAGNOSIS — S86.012A ACHILLES TENDON TEAR, LEFT, INITIAL ENCOUNTER: Primary | ICD-10-CM

## 2021-05-20 DIAGNOSIS — R26.2 DIFFICULTY WALKING: ICD-10-CM

## 2021-05-20 PROCEDURE — 97110 THERAPEUTIC EXERCISES: CPT

## 2021-05-20 NOTE — PROGRESS NOTES
[facility]          Phone: [phone] Fax: [phone]    Physical Therapy Daily Treatment Note  Date:  2021    Patient Name:  Julianne Regan    :  1975  MRN: [id number]    Restrictions/Precautions:    Diagnosis:     Diagnosis Orders   1. Achilles tendon tear, left, initial encounter     2. Pain in left foot     3. Difficulty walking       Treatment Diagnosis:    Insurance/Certification information:  Medical [place]  Referring Physician:  Leticia Hanson DPM  Plan of care signed (Y/N):    Visit# / total visits:    Pain level: 7-8/10   Time In:  900  Time Out:  935    Subjective:  Pt reported she has a follow up w/ Dr. Yakelin Sanders 21. Pt asking if she can put anything on her incision that is dry and scabbed. Therapist educated pt this was not a good idea at this point. Softening the scabs may hinder healing or open up allowing possible infection. Pt was instructed to discuss this w/ physician at her appointment. Pt is also to remain in walking boot for WB activities, at this time    Exercises:  Exercise/Equipment Resistance/Repetitions Other comments     Alphabet  x1 rep      Ankle circles x10 reps each CW and CCW      Ankle pumps x10 reps      Ankle inv/ever x10 reps      Calf stretch with towel 15 sec x 5 reps      Toe scrunches with towel 3 minutes      Bike/stepper 7 mins       Ankle ROM with tband OTB x 10 reps each    Sit to stand TBA        Step-ups TBA                                                                      Other : Tolerated exercises , and progressions today. Gait remains slow and antalgic.  Pt reported compliant w/ HEP    Home Exercise Program:  21 - ankle circles, alphabet, ankle pumps, ankle inv/ever, calf stretch with towel, toe scrunches with towel    Manual Treatments:  N/A    Modalities:  N/A     Time-in Time-out Total Time   47877  Evaluation Low Complexity      56132  Evaluation Med Complexity      32457 Evaluation High Complexity      44711  Ther Ex 900 645 35   [id number]  Neuro Re-ed        98310  Ther Activities        86359  Manual Therapy       49364  E-stim       74452  Ultrasound      51707  Soft tissue Massage            Session 253 935 35       Treatment/Activity Tolerance:  [x] Patient tolerated treatment well [] Patient limited by fatigue  [] Patient limited by pain  [] Patient limited by other medical complications  [] Other:     Prognosis: [x] Good [] Fair  [] Poor    Patient Requires Follow-up: [x] Yes  [] No    Plan:   [x] Continue per plan of care [] Alter current plan (see comments)  [] Plan of care initiated [] Hold pending MD visit [] Discharge    Plan for Next Session:  Plantar facia cross friction massage L       Electronically signed by:  Amanda Matthew, PTA [id number]

## 2021-05-25 ENCOUNTER — TREATMENT (OUTPATIENT)
Dept: PHYSICAL THERAPY | Age: 46
End: 2021-05-25
Payer: COMMERCIAL

## 2021-05-25 ENCOUNTER — CARE COORDINATION (OUTPATIENT)
Dept: OTHER | Facility: CLINIC | Age: 46
End: 2021-05-25

## 2021-05-25 DIAGNOSIS — M17.12 PRIMARY OSTEOARTHRITIS OF LEFT KNEE: ICD-10-CM

## 2021-05-25 DIAGNOSIS — S92.215D CLOSED NONDISPLACED FRACTURE OF CUBOID OF LEFT FOOT WITH ROUTINE HEALING, SUBSEQUENT ENCOUNTER: ICD-10-CM

## 2021-05-25 DIAGNOSIS — S86.012A ACHILLES TENDON TEAR, LEFT, INITIAL ENCOUNTER: Primary | ICD-10-CM

## 2021-05-25 PROCEDURE — 97124 MASSAGE THERAPY: CPT

## 2021-05-25 PROCEDURE — 97110 THERAPEUTIC EXERCISES: CPT

## 2021-05-25 RX ORDER — SEMAGLUTIDE 1.34 MG/ML
1 INJECTION, SOLUTION SUBCUTANEOUS WEEKLY
COMMUNITY

## 2021-05-25 NOTE — PROGRESS NOTES
[facility]          Phone: [phone] Fax: [phone]    Physical Therapy Daily Treatment Note  Date:  2021    Patient Name:  Dash Samuel    :  1975  MRN: [id number]    Restrictions/Precautions:    Diagnosis:     Diagnosis Orders   1. Achilles tendon tear, left, initial encounter     2. Primary osteoarthritis of left knee     3. Closed nondisplaced fracture of cuboid of left foot with routine healing, subsequent encounter       Treatment Diagnosis:    Insurance/Certification information:  Medical [place]  Referring Physician:  Mary Reynoso DPM  Plan of care signed (Y/N):    Visit# / total visits:  3/12  Pain level: 6-7/10   Time In:  3537  Time Out:  0612    Subjective:  Pt reported she has a follow up w/ Dr. Jena Kanner 21. Pt is also to remain in walking boot for WB activities, at this time    Exercises:  Exercise/Equipment Resistance/Repetitions Other comments     Alphabet  x1 rep      Ankle circles x10 reps each CW and CCW      Ankle pumps x10 reps      Ankle inv/ever x10 reps      Calf stretch with towel 15 sec x 5 reps      Toe scrunches with towel 3 minutes      Bike/stepper 5.5 mins       Ankle ROM with tband OTB x 10 reps each    Sit to stand TBA        Step-ups TBA                                                                      Other : Tolerated exercises , and progressions today. Gait remains slow and antalgic.  Pt reported compliant w/ HEP    Home Exercise Program:  21 - ankle circles, alphabet, ankle pumps, ankle inv/ever, calf stretch with towel, toe scrunches with towel    Manual Treatments:  Soft tissue massage to L plantar fascia x 10 mins      Modalities:  N/A     Time-in Time-out Total Time   08788  Evaluation Low Complexity      55032  Evaluation Med Complexity      38707  Evaluation High Complexity      30863  Ther Ex 1405 1428 23   81749  Neuro Re-ed        16327  Ther Activities        04410  Manual Therapy 79317  E-stim       26781  Ultrasound      93406  Soft tissue Massage 0031 4695 10         Session 9532 2314 33       Treatment/Activity Tolerance:  [x] Patient tolerated treatment well [] Patient limited by fatigue  [] Patient limited by pain  [] Patient limited by other medical complications  [] Other:     Prognosis: [x] Good [] Fair  [] Poor    Patient Requires Follow-up: [x] Yes  [] No    Plan:   [x] Continue per plan of care [] Alter current plan (see comments)  [] Plan of care initiated [] Hold pending MD visit [] Discharge    Plan for Next Session:        Electronically signed by:  Osbaldo Carl, PTA [id number]

## 2021-05-25 NOTE — CARE COORDINATION
Ambulatory Care Coordination Note  5/25/2021  CM Risk Score: 5  Charlson 10 Year Mortality Risk Score: 23%     ACC: Elder De La Torre, RN    Summary Note: Associate Care Manager (ACM) called patient for CC outreach after recent surgery. Patient returned call to [employer] after voicemail message left. Patient states she has been more sore lately since starting physical therapy. States had office visit with Dr. Aileen Esteves 5/12/2021; next appointment is tomorrow. Patient states sutures removed from op site at last office visit. States op site now with dried scab and questioned if she should be applying anything to site. ACM instructed patient to discuss this with Dr. Aileen Esteves at office visit tomorrow. Patient verbalized understanding and states she will do so. Patient states BLE swelling \"not as bad\" as it had been. States has not resumed compression stockings but has resumed Lasix. States does not take Lasix daily (as prescribed) but takes med for a couple days, forgets a couple days, and will resume for a couple days. States she notices the most improvement to BLE swelling when keeping BLE elevated when sitting. Patient reports blood glucose elevated the past couple days. Reports BG this am 207; now 229. States  @ 0445; 211 @ 0100; 218 @ 2350 last night. States when she notices elevated BG in am, it is usually time to change insulin pump site. States will plan to change site today. Patient denies recent diet changes. States had appt with Dr. Sade Vides and was started on Ozempic for insulin resistance. States Ozempic dose is 0.25 mg once weekly x4 weeks then increased to 0.5 mg once weekly. Patient states Dr. Sade Vides said patient can go up to 1 mg once weekly but that she would most likely be calling Dr. Sade Vides after being on 0.5 mg dose once weekly as she should see BG start to decrease. Patient states no issue getting Ozempic covered by insurance. States taking Ozempic on Mondays.      Patient reports still using cam walker with ambulation. States PT called Dr. Brock Rivers office last week to see about discontinuing cam walker and was told she still needs to use it at this point. Patient states her biggest issue with ambulating currently is getting inside her home through the front door over the step. States has been using quad cane to lean weight on when getting into the front door, which she states seems to help. States prior to using quad cane, the pain with getting into front door wast \"brining tears to my eyes it hurts so bad\". Patient denies falls since the falls reported with last ACM outreach. Patient states current estimated return to work date is 6/15/2021, pending how PT goes and the progress she makes with that. States this will be discussed at office visit tomorrow. Patient denies needs for ACM at present time. ACM to follow up with patient in ~2 weeks. Care Coordination Interventions    Program Enrollment: Complex Care  Referral from Primary Care Provider: No  Suggested Interventions and Community Resources  Diabetes Education: In Process  Fall Risk Prevention: In Process  Physical Therapy: Completed (Comment: Patient current with [facility] with Diabetes program (4/1/2021) )         Goals Addressed                    This Visit's Progress       Patient Stated      Patient Stated (pt-stated)   Worsening      Left foot pain will be controlled. (Baseline: patient c/o \"constant burning\" foot pain 4/1/2021.)    Barriers: stress and time constraints  Plan for overcoming my barriers: I will work with my ACM to overcome barriers. Confidence: 5/10  Anticipated Goal Completion Date: 5/15/2021 & ongoing    4/1/2021: Patient reports \"constant burning\" pain to left foot. Currently wearing camwalker as ordered per podiatrist. Follow up with podiatrist tomorrow for MRI results. 4/16/2021: Patient having surgery for left achilles tendon repair 4/20/2021.     5/11/2021: Patient reports post-op left foot pain.  Reports [facility] anesthesiologist instructed her to start Vitamin C. [facility] plans to purchase and start taking. (4/24/2021)    Historical Provider, MD   ibuprofen (ADVIL;MOTRIN) 200 MG tablet Take 400 mg by mouth every 6 hours as needed for Pain    Historical Provider, MD   omeprazole (PRILOSEC) 40 MG delayed release capsule Take 1 capsule by mouth daily 3/9/21   Kiara Terry, DO   buPROPion (WELLBUTRIN XL) 300 MG extended release tablet Take 1 tablet by mouth nightly 3/9/21   Kiara Terry, DO   metoprolol tartrate (LOPRESSOR) 25 MG tablet Take 1 tablet by mouth 2 times daily 2/16/21 5/17/21  Kiara Terry, DO   simvastatin (ZOCOR) 40 MG tablet Take 1 tablet by mouth daily 2/9/21   Kiara Terry, DO   Elastic Bandages & Supports (JOBST KNEE HIGH COMPRESSION SM) MISC Compression stockings 20-30 mm hg knee high bilaterally  Dx : Venous Insufficiency, Swelling 2/8/21   Gonzalo Hair MD   ramipril (ALTACE) 10 MG capsule TAKE 1 CAPSULE BY MOUTH ONE TIME A DAY 11/2/20   Digna Terry, DO   potassium chloride (KLOR-CON M) 20 MEQ extended release tablet Take 1 tablet by mouth 2 times daily 5/26/20   Kiara Terry, DO   furosemide (LASIX) 40 MG tablet Take 1 tablet by mouth 2 times daily 5/26/20   Kiara Terry, DO   triamcinolone (KENALOG) 0.1 % cream Apply topically to affected areas 2 times daily. 1/20/20   Digna Terry, DO   levothyroxine (SYNTHROID) 200 MCG tablet Take 200 mcg by mouth Daily    Historical Provider, MD   Insulin Pump - insulin lispro Inject into the skin continuous 0395-7694: 2.1 units/hr  2133-8139: 1.8 units/hr   6068-6596: 2.2 units/hr    Historical Provider, MD   docusate sodium (COLACE) 100 MG capsule Take 100 mg by mouth daily    Historical Provider, MD   vitamin B-12 (CYANOCOBALAMIN) 500 MCG tablet Take 1,000 mcg by mouth daily     Historical Provider, MD   aspirin 81 MG tablet Take 81 mg by mouth nightly.     Historical Provider, MD       Future Appointments   Date Time Provider [place]   5/25/2021  2:00 PM Ulysses Friday ATOWN PT [facility]   5/26/2021 10:30 AM Sarajane Prader., DPM [place] [facility]   5/27/2021  9:00 AM Ulysses Friday ATOWN PT [facility]   5/27/2021  2:00 PM Ulysses Friday ATOWN PT [facility]   6/3/2021  8:00 AM DO Michael Mares  [facility]      and   Diabetes Assessment    Medic Alert ID: Yes (Comment: patient states she does not wear medic alert ID)  Meal Planning: Carb counting   How often do you test your blood sugar?: Other, Meals, Bedtime (Comment: Patient states checks blood glucose 5-6 times per day \"at least\" as she has a [facility] 12/2/2020)   Do you have barriers with adherence to non-pharmacologic self-management interventions? (Nutrition/Exercise/Self-Monitoring): Yes   Have you ever had to go to the ED for symptoms of low blood sugar?: Yes   What is the date of your last ED visit for low blood sugar?: 10/31/20       Increase or Decrease trend in Blood Sugars   Last Blood Sugar Value: 229   Blood Sugar Trends: Steady Increase        Plan:  -Patient to have follow up with Dr. Julia Hermosillo tomorrow. Patient to ask Dr. Julia Hermosillo re: current op site care. -Patient to continue with outpatient physical therapy.    -Patient to continue Ozempic dose 0.25 mg once weekly x4 weeks then increased to 0.5 mg once weekly. Patient to call Dr. Alexia Miller if BG drops too much with Ozempic.    -Patient to continue to wear cam walker until instructed differently per Dr. Julia Hermosillo.    -ACM to follow up with patient in ~2 weeks. Eber Rutledge RN BSN  Associate Care Manager  Phone: [phone]  Email: [email or web address]

## 2021-05-26 ENCOUNTER — OFFICE VISIT (OUTPATIENT)
Dept: PODIATRY | Age: 46
End: 2021-05-26

## 2021-05-26 VITALS — WEIGHT: 288 LBS | HEIGHT: 64 IN | BODY MASS INDEX: 49.17 KG/M2

## 2021-05-26 DIAGNOSIS — I87.2 VENOUS INSUFFICIENCY (CHRONIC) (PERIPHERAL): ICD-10-CM

## 2021-05-26 DIAGNOSIS — S86.012A ACHILLES TENDON TEAR, LEFT, INITIAL ENCOUNTER: Primary | ICD-10-CM

## 2021-05-26 DIAGNOSIS — R60.0 LOCALIZED EDEMA: ICD-10-CM

## 2021-05-26 DIAGNOSIS — E10.9 TYPE 1 DIABETES MELLITUS WITHOUT COMPLICATION (HCC): ICD-10-CM

## 2021-05-26 PROCEDURE — 99024 POSTOP FOLLOW-UP VISIT: CPT | Performed by: PODIATRIST

## 2021-05-26 NOTE — PROGRESS NOTES
Patient presents for a 2 week post op appointment for her right foot. She is experiencing a lot of swelling and pain. She is not wearing her emelina hose with her boot. They would like to know when she will be able to walk again without the boot.     pcp is DO RANDI Nicholas 04/2021  Electronically signed by Karl Mendoza LPN on 5/71/7424 at 50:16 AM

## 2021-05-27 ENCOUNTER — TREATMENT (OUTPATIENT)
Dept: PHYSICAL THERAPY | Age: 46
End: 2021-05-27
Payer: COMMERCIAL

## 2021-05-27 DIAGNOSIS — R26.2 DIFFICULTY WALKING: ICD-10-CM

## 2021-05-27 DIAGNOSIS — S86.012A ACHILLES TENDON TEAR, LEFT, INITIAL ENCOUNTER: Primary | ICD-10-CM

## 2021-05-27 DIAGNOSIS — S92.215D CLOSED NONDISPLACED FRACTURE OF CUBOID OF LEFT FOOT WITH ROUTINE HEALING, SUBSEQUENT ENCOUNTER: ICD-10-CM

## 2021-05-27 DIAGNOSIS — M17.12 PRIMARY OSTEOARTHRITIS OF LEFT KNEE: ICD-10-CM

## 2021-05-27 DIAGNOSIS — M79.672 PAIN IN LEFT FOOT: ICD-10-CM

## 2021-05-27 PROCEDURE — 97110 THERAPEUTIC EXERCISES: CPT

## 2021-05-27 PROCEDURE — 97124 MASSAGE THERAPY: CPT

## 2021-05-27 NOTE — PROGRESS NOTES
[facility]          Phone: [phone] Fax: [phone]    Physical Therapy Daily Treatment Note  Date:  2021    Patient Name:  Vince Gamble    :  1975  MRN: [id number]    Restrictions/Precautions:    Diagnosis:     Diagnosis Orders   1. Achilles tendon tear, left, initial encounter     2. Primary osteoarthritis of left knee     3. Closed nondisplaced fracture of cuboid of left foot with routine healing, subsequent encounter     4. Pain in left foot     5. Difficulty walking       Treatment Diagnosis:    Insurance/Certification information:  Medical [place]  Referring Physician:  Javy Small DPM  Plan of care signed (Y/N):    Visit# / total visits:    Pain level: 6/10   Time In:  1400  Time Out:  1439    Subjective:  Pt had her follow up w/ Dr. Julia Luciano today, and noted in chart pt is to begin weaning out of cam boot , however pt presented to therapy without cam boot or compression stockings. L LE is edematous. Exercises:  Exercise/Equipment Resistance/Repetitions Other comments     Alphabet  x1 rep      Ankle circles x10 reps each CW and CCW      Ankle pumps x10 reps      Ankle inv/ever x10 reps      Calf stretch with towel 15 sec x 5 reps      Toe scrunches with towel 3 minutes      Bike/stepper 5.5 mins       Ankle ROM with tband OTB x 10 reps each    Sit to stand X 5 reps from mat  tolerated       Step-ups TBA                                                                      Other : Tolerated exercises , and progressions today. Gait remains slow and antalgic.  Pt reported compliant w/ HEP    Home Exercise Program:  21 - ankle circles, alphabet, ankle pumps, ankle inv/ever, calf stretch with towel, toe scrunches with towel    Manual Treatments:  Soft tissue massage to L plantar fascia x 10 mins      Modalities:  N/A     Time-in Time-out Total Time   56664  Evaluation Low Complexity      79086  Evaluation Med Complexity      73552 Evaluation High Complexity      51997  Ther Ex 1400 1429 29   52577  Neuro Re-ed        31346  Ther Activities        51287  Manual Therapy       45057  E-stim       03854  Ultrasound      54272  Soft tissue Massage 1429 1439 10         Session 1400 1439 39       Treatment/Activity Tolerance:  [x] Patient tolerated treatment well [] Patient limited by fatigue  [] Patient limited by pain  [] Patient limited by other medical complications  [] Other:     Prognosis: [x] Good [] Fair  [] Poor    Patient Requires Follow-up: [x] Yes  [] No    Plan:   [x] Continue per plan of care [] Alter current plan (see comments)  [] Plan of care initiated [] Hold pending MD visit [] Discharge    Plan for Next Session:        Electronically signed by:  Albert Tapia, PTA [id number]

## 2021-05-27 NOTE — PROGRESS NOTES
21     Victorina Webb    : 1975   Sex: female    Age: 51 y.o. Patient's PCP/Provider is:  Digna Oneill DO    Subjective:  Patient is seen today for follow-up regarding continued care regarding Achilles tendon repair left lower extremity. Patient has not been wearing her compression garment as instructed. We did recommend continued use of her cam walker with ambulatory activities. We do want to start transitioning her into the supportive shoe gear with compression dressing and scheduled therapy to increase her rehabilitation potential and healing. Patient denies any additional issues at this time. Chief Complaint   Patient presents with   Saúl Parents     her foot hurts constantly she is not emelina hose and she is experiencing swelling all the time; when can she stand without the boot she would like to know       ROS:  Const: Positives and pertinent negatives as per HPI. Musculo: Denies symptoms other than stated above. Neuro: Denies symptoms other than stated above. Skin: Denies symptoms other than stated above. Current Medications:    Current Outpatient Medications:     Semaglutide,0.25 or 0.5MG/DOS, (OZEMPIC, 0.25 OR 0.5 MG/DOSE,) 2 MG/1.5ML SOPN, Inject 0.25 mg into the skin once a week Patient takes on .  Patient states instructed to take 0.25 mg x4 weeks; then increase to 0.5 mg, Disp: , Rfl:     ALPRAZolam (XANAX) 0.5 MG tablet, TAKE 1 TABLET BY MOUTH TWICE DAILY AS NEEDED FOR SLEEP OR DEPRESSION, Disp: 60 tablet, Rfl: 2    naproxen (NAPROSYN) 500 MG tablet, Take 1 tablet by mouth 2 times daily (with meals), Disp: 60 tablet, Rfl: 3    Probiotic Product (PROBIOTIC DAILY PO), Take 1 capsule by mouth daily, Disp: , Rfl:     Ascorbic Acid (VITAMIN C PO), Take by mouth Patient states anesthesiologist instructed her to start Vitamin C. States plans to purchase and start taking. (2021), Disp: , Rfl:     ibuprofen (ADVIL;MOTRIN) 200 MG tablet, Take 400 mg by mouth every 6 hours as needed for Pain, Disp: , Rfl:     omeprazole (PRILOSEC) 40 MG delayed release capsule, Take 1 capsule by mouth daily, Disp: 90 capsule, Rfl: 1    buPROPion (WELLBUTRIN XL) 300 MG extended release tablet, Take 1 tablet by mouth nightly, Disp: 90 tablet, Rfl: 0    simvastatin (ZOCOR) 40 MG tablet, Take 1 tablet by mouth daily, Disp: 90 tablet, Rfl: 1    Elastic Bandages & Supports (JOBST KNEE HIGH COMPRESSION SM) MISC, Compression stockings 20-30 mm hg knee high bilaterally Dx : Venous Insufficiency, Swelling, Disp: 2 each, Rfl: 2    ramipril (ALTACE) 10 MG capsule, TAKE 1 CAPSULE BY MOUTH ONE TIME A DAY, Disp: 90 capsule, Rfl: 1    potassium chloride (KLOR-CON M) 20 MEQ extended release tablet, Take 1 tablet by mouth 2 times daily, Disp: 180 tablet, Rfl: 1    furosemide (LASIX) 40 MG tablet, Take 1 tablet by mouth 2 times daily, Disp: 180 tablet, Rfl: 1    triamcinolone (KENALOG) 0.1 % cream, Apply topically to affected areas 2 times daily. , Disp: 80 g, Rfl: 0    levothyroxine (SYNTHROID) 200 MCG tablet, Take 200 mcg by mouth Daily, Disp: , Rfl:     Insulin Pump - insulin lispro, Inject into the skin continuous 6889-3707: 2.1 units/hr 2351-9449: 1.8 units/hr  0275-3145: 2.2 units/hr, Disp: , Rfl:     docusate sodium (COLACE) 100 MG capsule, Take 100 mg by mouth daily, Disp: , Rfl:     vitamin B-12 (CYANOCOBALAMIN) 500 MCG tablet, Take 1,000 mcg by mouth daily , Disp: , Rfl:     aspirin 81 MG tablet, Take 81 mg by mouth nightly., Disp: , Rfl:     metoprolol tartrate (LOPRESSOR) 25 MG tablet, Take 1 tablet by mouth 2 times daily, Disp: 180 tablet, Rfl: 2    Allergies:  No Known Allergies    Vitals:    05/26/21 1046   Weight: 288 lb (130.6 kg)   Height: 5' 4\" (1.626 m)       Exam:  Neurovascular status unchanged. Surgical incisional area stable with mild hyperkeratosis noted. No signs of infection noted left lower extremity. Increased range of motion noted left ankle joint. Edematous issues noted to the left lower extremity. Diagnostic Studies:     No results found. Procedures:    None    Plan Per Assessment  Carmella Liang was seen today for post-op check. Diagnoses and all orders for this visit:    Achilles tendon tear, left, initial encounter    Venous insufficiency (chronic) (peripheral)  -     Amb External Referral For Orthotics    Localized edema  -     Amb External Referral For Orthotics    Type 1 diabetes mellitus without complication ([facility] [address]  -     Amb External Referral For Orthotics      1. Postoperative evaluation and management  2. We did recommend continued use of her compression garments with all ambulatory activities bilateral lower extremities. Patient is to continue with her scheduled rehabilitation and PT. 3. Patient was given a prescription to get evaluated for diabetic shoes and insoles to aid in her rehabilitation. 4. Patient will be followed up in 2 weeks time for continued evaluation and care. She was advised to call the office with any questions or concerns in the interim. Seen By:    Dipak Quintero DPM    Electronically signed by Dipak Quintero DPM on 5/27/2021 at 11:03 AM    This note was created using voice recognition software. The note was reviewed however may contain grammatical errors.

## 2021-06-01 ENCOUNTER — TREATMENT (OUTPATIENT)
Dept: PHYSICAL THERAPY | Age: 46
End: 2021-06-01
Payer: COMMERCIAL

## 2021-06-01 DIAGNOSIS — M17.12 PRIMARY OSTEOARTHRITIS OF LEFT KNEE: ICD-10-CM

## 2021-06-01 DIAGNOSIS — M79.672 PAIN IN LEFT FOOT: ICD-10-CM

## 2021-06-01 DIAGNOSIS — R26.2 DIFFICULTY WALKING: ICD-10-CM

## 2021-06-01 DIAGNOSIS — I10 ESSENTIAL HYPERTENSION: ICD-10-CM

## 2021-06-01 DIAGNOSIS — S92.215D CLOSED NONDISPLACED FRACTURE OF CUBOID OF LEFT FOOT WITH ROUTINE HEALING, SUBSEQUENT ENCOUNTER: ICD-10-CM

## 2021-06-01 DIAGNOSIS — S86.012A ACHILLES TENDON TEAR, LEFT, INITIAL ENCOUNTER: Primary | ICD-10-CM

## 2021-06-01 PROCEDURE — 97110 THERAPEUTIC EXERCISES: CPT

## 2021-06-01 PROCEDURE — 97124 MASSAGE THERAPY: CPT

## 2021-06-01 NOTE — PROGRESS NOTES
[facility]          Phone: [phone] Fax: [phone]    Physical Therapy Daily Treatment Note  Date:  2021    Patient Name:  Charles Magallon    :  1975  MRN: [id number]    Restrictions/Precautions:    Diagnosis:     Diagnosis Orders   1. Achilles tendon tear, left, initial encounter     2. Primary osteoarthritis of left knee     3. Closed nondisplaced fracture of cuboid of left foot with routine healing, subsequent encounter     4. Pain in left foot     5. Difficulty walking       Treatment Diagnosis:    Insurance/Certification information:  Medical [place]  Referring Physician:  Jaycee Triana DPM  Plan of care signed (Y/N):    Visit# / total visits:    Pain level: 5/10   Time In:  1408  Time Out:  1450    Subjective:   Pt presented to therapy without cam boot or compression stockings. L LE remains edematous. Pt reported she uses her compression stockings at home but they are painful. She has a fitting for diabetic shoes Thursday 6/3/21    Exercises:  Exercise/Equipment Resistance/Repetitions Other comments     Alphabet  x1 rep      Ankle circles x10 reps each CW and CCW      Ankle pumps x10 reps      Ankle inv/ever x10 reps      Calf stretch with towel 15 sec x 5 reps      Toe scrunches with towel 3 minutes      Bike/stepper 5.5 mins       Ankle ROM with tband OTB x 10 reps each    Sit to stand X 5 reps from mat  tolerated       Step-ups Attempted see below                                                                      Other : Tolerated exercises , and progressions today. Gait remains slow and antalgic. Pt reported compliant w/ HEP. Pt reported she has pain in her L ankle and achilles tendon when ascending curbs. Attempted a 6 inch step in gym without rails to simulate curb, when pt PF to push off L foot to ascend step, she reports increased pain in L ankle and achilles tendon.   When she uses B rails and unloads L foot to ascend, she has no pain.  Pt reports she can do curb, or steps at home w/ quad cane, but she leaves it in the car frequently. Instructed pt to use QC when negotiating curbs and steps, and to bring it to next therapy session.   Pt reported understanding     Home Exercise Program:  5/17/21 - ankle circles, alphabet, ankle pumps, ankle inv/ever, calf stretch with towel, toe scrunches with towel    Manual Treatments:  Soft tissue massage to L plantar fascia x 10 mins      Modalities:  N/A     Time-in Time-out Total Time   74957  Evaluation Low Complexity      69060  Evaluation Med Complexity      31178  Evaluation High Complexity      72558  Ther Ex 1408 1440 32   75638  Neuro Re-ed        17865  Ther Activities        81560  Manual Therapy       29978  E-stim       18227  Ultrasound      67105  Soft tissue Massage 1440 1450 10         Session 1408 1450 42       Treatment/Activity Tolerance:  [x] Patient tolerated treatment well [] Patient limited by fatigue  [] Patient limited by pain  [] Patient limited by other medical complications  [] Other:     Prognosis: [x] Good [] Fair  [] Poor    Patient Requires Follow-up: [x] Yes  [] No    Plan:   [x] Continue per plan of care [] Alter current plan (see comments)  [] Plan of care initiated [] Hold pending MD visit [] Discharge    Plan for Next Session:        Electronically signed by:  Goldy Schumacher, PTA [id number]

## 2021-06-03 ENCOUNTER — TREATMENT (OUTPATIENT)
Dept: PHYSICAL THERAPY | Age: 46
End: 2021-06-03
Payer: COMMERCIAL

## 2021-06-03 DIAGNOSIS — M17.12 PRIMARY OSTEOARTHRITIS OF LEFT KNEE: ICD-10-CM

## 2021-06-03 DIAGNOSIS — S86.012A ACHILLES TENDON TEAR, LEFT, INITIAL ENCOUNTER: Primary | ICD-10-CM

## 2021-06-03 DIAGNOSIS — R26.2 DIFFICULTY WALKING: ICD-10-CM

## 2021-06-03 DIAGNOSIS — S92.215D CLOSED NONDISPLACED FRACTURE OF CUBOID OF LEFT FOOT WITH ROUTINE HEALING, SUBSEQUENT ENCOUNTER: ICD-10-CM

## 2021-06-03 DIAGNOSIS — M79.672 PAIN IN LEFT FOOT: ICD-10-CM

## 2021-06-03 PROCEDURE — 97110 THERAPEUTIC EXERCISES: CPT

## 2021-06-03 PROCEDURE — 97124 MASSAGE THERAPY: CPT

## 2021-06-03 NOTE — PROGRESS NOTES
[facility]          Phone: [phone] Fax: [phone]    Physical Therapy Daily Treatment Note  Date:  6/3/2021    Patient Name:  Jamey Pittman    :  1975  MRN: [id number]    Restrictions/Precautions:    Diagnosis:     Diagnosis Orders   1. Achilles tendon tear, left, initial encounter     2. Primary osteoarthritis of left knee     3. Closed nondisplaced fracture of cuboid of left foot with routine healing, subsequent encounter     4. Pain in left foot     5. Difficulty walking       Treatment Diagnosis:    Insurance/Certification information:  Medical [place]  Referring Physician:  Tremayne Lanier DPM  Plan of care signed (Y/N):    Visit# / total visits:    Pain level: 4/10   Time In:  1400  Time Out:  1440    Subjective:   Pt presented to therapy without cam boot or compression stockings. L LE remains edematous. Pt  Was fitted for her diabetic shoes and awaiting delivery   Exercises:  Exercise/Equipment Resistance/Repetitions Other comments     Alphabet  x1 rep      Ankle circles x10 reps each CW and CCW      Ankle pumps x10 reps      Ankle inv/ever x10 reps      Calf stretch with towel 15 sec x 5 reps      Toe scrunches with towel 3 minutes      Bike/stepper 5.5 mins       Ankle ROM with tband OTB x 10 reps each    Sit to stand X 5 reps from mat  tolerated       Step-ups 6\" ascend/descend 2 laps w/ 1 rail and SBQC Supervision Cued for sequencing and safety                                                                     Other : Tolerated exercises , and progressions today. Gait remains slow and antalgic. Pt reported compliant w/ HEP. Pt was instructed to use [email or web address] when negotiating curb, and steps w/ supervision  . Pt reported understanding.        Home Exercise Program:  21 - ankle circles, alphabet, ankle pumps, ankle inv/ever, calf stretch with towel, toe scrunches with towel    Manual Treatments:  Soft tissue massage to L plantar fascia x 10 mins      Modalities:  N/A     Time-in Time-out Total Time   77282  Evaluation Low Complexity      03752  Evaluation Med Complexity      57282  Evaluation High Complexity      40353  Ther Ex 1400 1430 30   08468  Neuro Re-ed        73948  Ther Activities        40124  Manual Therapy       12755  E-stim       13835  Ultrasound      87360  Soft tissue Massage 1430 1440 10         Session 1400 1440 40       Treatment/Activity Tolerance:  [x] Patient tolerated treatment well [] Patient limited by fatigue  [] Patient limited by pain  [] Patient limited by other medical complications  [] Other:     Prognosis: [x] Good [] Fair  [] Poor    Patient Requires Follow-up: [x] Yes  [] No    Plan:   [x] Continue per plan of care [] Alter current plan (see comments)  [] Plan of care initiated [] Hold pending MD visit [] Discharge    Plan for Next Session:        Electronically signed by:  Viviana Rosario, PTA [id number]

## 2021-06-08 ENCOUNTER — TREATMENT (OUTPATIENT)
Dept: PHYSICAL THERAPY | Age: 46
End: 2021-06-08
Payer: COMMERCIAL

## 2021-06-08 DIAGNOSIS — S86.012A ACHILLES TENDON TEAR, LEFT, INITIAL ENCOUNTER: Primary | ICD-10-CM

## 2021-06-08 DIAGNOSIS — M79.672 PAIN IN LEFT FOOT: ICD-10-CM

## 2021-06-08 DIAGNOSIS — R26.2 DIFFICULTY WALKING: ICD-10-CM

## 2021-06-08 DIAGNOSIS — S92.215D CLOSED NONDISPLACED FRACTURE OF CUBOID OF LEFT FOOT WITH ROUTINE HEALING, SUBSEQUENT ENCOUNTER: ICD-10-CM

## 2021-06-08 DIAGNOSIS — I10 ESSENTIAL HYPERTENSION: ICD-10-CM

## 2021-06-08 DIAGNOSIS — M17.12 PRIMARY OSTEOARTHRITIS OF LEFT KNEE: ICD-10-CM

## 2021-06-08 PROCEDURE — 97124 MASSAGE THERAPY: CPT

## 2021-06-08 PROCEDURE — 97110 THERAPEUTIC EXERCISES: CPT

## 2021-06-08 RX ORDER — RAMIPRIL 10 MG/1
CAPSULE ORAL
Qty: 90 CAPSULE | Refills: 1 | Status: SHIPPED | OUTPATIENT
Start: 2021-06-08 | End: 2022-02-09 | Stop reason: SDUPTHER

## 2021-06-08 NOTE — PROGRESS NOTES
[facility]          Phone: [phone] Fax: [phone]    Physical Therapy Daily Treatment Note  Date:  2021    Patient Name:  Yann Lucero    :  1975  MRN: [id number]    Restrictions/Precautions:    Diagnosis:     Diagnosis Orders   1. Achilles tendon tear, left, initial encounter     2. Primary osteoarthritis of left knee     3. Closed nondisplaced fracture of cuboid of left foot with routine healing, subsequent encounter     4. Pain in left foot     5. Difficulty walking       Treatment Diagnosis:    Insurance/Certification information:  Medical [place]  Referring Physician:  Sourav Lemus DPM  Plan of care signed (Y/N):    Visit# / total visits:    Pain level: 5-/10   Time In:  1402  Time Out:  1437    Subjective:   Pt presented to therapy without cam boot or compression stockings. L LE remains edematous. Gait remains slow and antalgic. Pt has not received her diabetic shoes yet. Pt also reported that next week she is tentatively to return to work, and has a follow up w/ her surgeon tomorrow to determine if she will return to work. Exercises:  Exercise/Equipment Resistance/Repetitions Other comments     Alphabet  x1 rep      Ankle circles x10 reps each CW and CCW      Ankle pumps x10 reps      Ankle inv/ever x10 reps      Calf stretch with towel 15 sec x 5 reps      Toe scrunches with towel 3 minutes      Bike/stepper 5.5 mins       Ankle ROM with tband OTB x 10 reps each 6/8  HEP   Sit to stand X 5 reps from mat  6/8 HEP       Step-ups 6\" ascend/descend 2 laps w/ 1 rail and SBQC Supervision Cued for sequencing and safety                                                                     Other : Tolerated exercises , and progressions today. Gait remains slow and antalgic. Pt reported compliant w/ HEP. Pt was instructed to use [employer] when negotiating curb, and steps w/ supervision  . Pt reported understanding.        Home Exercise Program: 5/17/21 - ankle circles, alphabet, ankle pumps, ankle inv/ever, calf stretch with towel, toe scrunches with towel. 6/8/21 Added to HEP as noted above. Pt demonstrated good technique and understanding of ex to therapist.  OTB provided to pt.      Manual Treatments:  Soft tissue massage to L plantar fascia x 10 mins      Modalities:  N/A     Time-in Time-out Total Time   10011  Evaluation Low Complexity      50275  Evaluation Med Complexity      91254  Evaluation High Complexity      49632  Ther Ex 1402 1428 26 ( rested x 5 mins )    14554  Neuro Re-ed        69134  Ther Activities        19378  Manual Therapy       57811  E-stim       02403  Ultrasound      57271  Soft tissue Massage 0157 3194 10         Session 1402 1438 36       Treatment/Activity Tolerance:  [x] Patient tolerated treatment well [] Patient limited by fatigue  [] Patient limited by pain  [] Patient limited by other medical complications  [] Other:     Prognosis: [x] Good [] Fair  [] Poor    Patient Requires Follow-up: [x] Yes  [] No    Plan:   [x] Continue per plan of care [] Alter current plan (see comments)  [] Plan of care initiated [] Hold pending MD visit [] Discharge    Plan for Next Session:        Electronically signed by:  Veronica Call, PTA [id number]

## 2021-06-09 ENCOUNTER — OFFICE VISIT (OUTPATIENT)
Dept: PODIATRY | Age: 46
End: 2021-06-09

## 2021-06-09 VITALS — RESPIRATION RATE: 22 BRPM | WEIGHT: 288 LBS | HEIGHT: 64 IN | BODY MASS INDEX: 49.17 KG/M2

## 2021-06-09 DIAGNOSIS — S86.012A ACHILLES TENDON TEAR, LEFT, INITIAL ENCOUNTER: Primary | ICD-10-CM

## 2021-06-09 PROCEDURE — 99024 POSTOP FOLLOW-UP VISIT: CPT | Performed by: PODIATRIST

## 2021-06-10 NOTE — PROGRESS NOTES
Patient presents for 3 week post op left foot. Left leg appears swollen, doesn't wear compression socks as ordered.     pcp is DO RANDI Nicholas 07/2021    Electronically signed by Esperanza English LPN on 9/6/2947 at 92:46 AM
None    Plan Per Assessment  Mukesh Galvin was seen today for follow-up. Diagnoses and all orders for this visit:    Achilles tendon tear, left, initial encounter      1. Postoperative evaluation and management  2. We did advised the patient to try and wear her compression garments on a more regular basis to reduce current edematous issues left lower leg. 3. Patient is to continue with her scheduled physical therapy regimen to improve range of motion and strength. 4. Patient will be followed up in 2 weeks time or sooner if needed for continued evaluation and management. Seen By:    Sasha Reyes DPM    Electronically signed by Sasha Reyes DPM on 6/9/2021 at 8:49 PM    This note was created using voice recognition software. The note was reviewed however may contain grammatical errors.

## 2021-06-11 ENCOUNTER — TELEPHONE (OUTPATIENT)
Dept: FAMILY MEDICINE CLINIC | Age: 46
End: 2021-06-11

## 2021-06-11 ENCOUNTER — CARE COORDINATION (OUTPATIENT)
Dept: OTHER | Facility: CLINIC | Age: 46
End: 2021-06-11

## 2021-06-11 NOTE — CARE COORDINATION
Ambulatory Care Coordination Note  6/11/2021  CM Risk Score: 5  [place] 10 Year Mortality Risk Score: 23%     ACC: Lian Guillen RN    Summary Note: Associate Care Manager (ACM) called patient for CC outreach. Patient reports left foot op site not hurting as bad as it had been. States still \"wobbely\" with ambulation at times. States no longer using cam walker but still using knee scooter at times. States has quad cane to use PRN when going up steps. Patient states Dr. Lorelee Mohs removed scabs from op site at office visit this week. Patient reports some bleeding in office after scab removal; denies drainage since then. States site is now healed. Patient states has been wearing crocs due to swelling unable to wear house slippers. Patient states increased edema which has caused her to wear son's flip flops. States she recently was fitted for diabetic shoes which will be ready for  in ~1 month. Patient states not sleeping well due to difficulty falling asleep and \"tossing and turning all night\". States she wears cpap with sleeping at night and if she takes nap during the day. [facility] called home care to see if cpap settings needed adjusting due to insomnia. States she spoke to respiratory therapist (RT) who notified her that her current score is 5, which she states RT said is considered good. [facility] home care sent request to provider to request home nocturnal sleep study order. Patient states she spoke to Dion Grissom in home care who said if she does not hear back by Monday at noon, she will follow up with provider by phone. Patient states has checked pulse ox while awake and at rest recently; reports reading 85% \"and did not budge\". States she put her cpap on and checked pulse ox at that time. Reports pulse ox went up to 88-89% then back down to ~85%. Patient states she checked pulse ox next am and was 92-93% room air.     Patient states has been wearing compression stocking to RLE when home or not going to PT/podiatry office visit. States she is reluctant to wear compression stocking to LLE due to op site. States swelling LLE worse than RLE. States she has been elevating BLE with sitting while at home. Patient states pain to LLE yesterday \"pretty bad\" and while at podiatrist on Weds this week. States took Naproxen and used ice application which helped somewhat. Reports felt better by this morning. Patient states she is getting \"tired of the pain\". States she has also been cranky due to insomnia. Patient states blood glucose fluctuating. Reports current . Also reports the following readings: 143, 193, 258, 307, 322 (recent highest; which was last night ~6 pm before eating dinner; states  @ 8 pm when she ate dinner). Patient states still on 0.25 mg Ozempic until Mon, June 14 when she will start 0.5 mg dose. Care Coordination Interventions    Program Enrollment: Complex Care  Referral from Primary Care Provider: No  Suggested Interventions and Community Resources  Diabetes Education: In Process  Fall Risk Prevention: In Process  Physical Therapy: Completed (Comment: Patient current with [facility] with Diabetes program (4/1/2021) )         Goals Addressed                    This Visit's Progress       Patient Stated      COMPLETED: Patient Stated (pt-stated)         Left foot pain will be controlled. (Baseline: patient c/o \"constant burning\" foot pain 4/1/2021.)    Barriers: stress and time constraints  Plan for overcoming my barriers: I will work with my ACM to overcome barriers. Confidence: 5/10  Anticipated Goal Completion Date: 5/15/2021 & ongoing    4/1/2021: Patient reports \"constant burning\" pain to left foot. Currently wearing camwalker as ordered per podiatrist. Follow up with podiatrist tomorrow for MRI results. 4/16/2021: Patient having surgery for left achilles tendon repair 4/20/2021.     5/11/2021: Patient reports post-op left foot pain.  Reports pain has improved since (OZEMPIC, 0.25 OR 0.5 MG/DOSE,) 2 MG/1.5ML SOPN Inject 0.25 mg into the skin once a week Patient takes on Mondays. Patient states instructed to take 0.25 mg x4 weeks; then increase to 0.5 mg    Historical Provider, MD   naproxen (NAPROSYN) 500 MG tablet Take 1 tablet by mouth 2 times daily (with meals) 5/3/21   Moses Ku DPM   Probiotic Product (PROBIOTIC DAILY PO) Take 1 capsule by mouth daily    Historical Provider, MD   Ascorbic Acid (VITAMIN C PO) Take by mouth Patient states anesthesiologist instructed her to start Vitamin C. States plans to purchase and start taking. (4/24/2021)    Historical Provider, MD   ibuprofen (ADVIL;MOTRIN) 200 MG tablet Take 400 mg by mouth every 6 hours as needed for Pain    Historical Provider, MD   omeprazole (PRILOSEC) 40 MG delayed release capsule Take 1 capsule by mouth daily 3/9/21   Demetria Terry DO   buPROPion (WELLBUTRIN XL) 300 MG extended release tablet Take 1 tablet by mouth nightly 3/9/21   Demetria Terry DO   simvastatin (ZOCOR) 40 MG tablet Take 1 tablet by mouth daily 2/9/21   Demetria Terry DO   Elastic Bandages & Supports (JOBST KNEE HIGH COMPRESSION ) MISC Compression stockings 20-30 mm hg knee high bilaterally  Dx : Venous Insufficiency, Swelling 2/8/21   Jeff Camarillo MD   potassium chloride (KLOR-CON M) 20 MEQ extended release tablet Take 1 tablet by mouth 2 times daily 5/26/20   Demetria Terry DO   furosemide (LASIX) 40 MG tablet Take 1 tablet by mouth 2 times daily 5/26/20   Demetria Terry DO   triamcinolone (KENALOG) 0.1 % cream Apply topically to affected areas 2 times daily.  1/20/20   Digna Terry DO   levothyroxine (SYNTHROID) 200 MCG tablet Take 200 mcg by mouth Daily    Historical Provider, MD   Insulin Pump - insulin lispro Inject into the skin continuous 7260-4672: 2.1 units/hr  5208-9376: 1.8 units/hr   2567-1843: 2.2 units/hr    Historical Provider, MD   docusate sodium (COLACE) 100 MG capsule Take 100 mg by mouth daily    Historical Provider, MD   vitamin B-12 (CYANOCOBALAMIN) 500 MCG tablet Take 1,000 mcg by mouth daily     Historical Provider, MD   aspirin 81 MG tablet Take 81 mg by mouth nightly. Historical Provider, MD       Future Appointments   Date Time Provider [place]   6/16/2021  1:00 PM JASPAL Jacobo PT [facility]   6/23/2021 10:00 AM SIERRA Goddard [facility]      and   Diabetes Assessment    Medic Alert ID: Yes (Comment: patient states she does not wear medic alert ID)  Meal Planning: Carb counting   How often do you test your blood sugar?: Other, Meals, Bedtime (Comment: Patient states checks blood glucose 5-6 times per day \"at least\" [facility]   Do you have barriers with adherence to non-pharmacologic self-management interventions? (Nutrition/Exercise/Self-Monitoring): Yes   Have you ever had to go to the ED for symptoms of low blood sugar?: Yes   What is the date of your last ED visit for low blood sugar?: 10/31/20       Increase or Decrease trend in Blood Sugars   Last Blood Sugar Value: 145   Blood Sugar Trends: Fluctuating        Plan:  -Patient waiting order from provider for home nocturnal pulse ox study.    -Patient to increase Ozempic dose to 0.5 mg starting 6/14/2021.    -Patient waiting arrival of diabetic shoes.    -Patient to continue to elevate BLE with sitting and wear compression stockings. -ACM to follow up with patient in ~1 week. Ayaka Santamaria RN BSN  Associate Care Manager  Phone: [phone]  Email: [email or web address]

## 2021-06-16 ENCOUNTER — TREATMENT (OUTPATIENT)
Dept: PHYSICAL THERAPY | Age: 46
End: 2021-06-16
Payer: COMMERCIAL

## 2021-06-16 DIAGNOSIS — S92.215D CLOSED NONDISPLACED FRACTURE OF CUBOID OF LEFT FOOT WITH ROUTINE HEALING, SUBSEQUENT ENCOUNTER: ICD-10-CM

## 2021-06-16 DIAGNOSIS — M17.12 PRIMARY OSTEOARTHRITIS OF LEFT KNEE: ICD-10-CM

## 2021-06-16 DIAGNOSIS — R26.2 DIFFICULTY WALKING: ICD-10-CM

## 2021-06-16 DIAGNOSIS — S86.012A ACHILLES TENDON TEAR, LEFT, INITIAL ENCOUNTER: Primary | ICD-10-CM

## 2021-06-16 DIAGNOSIS — M79.672 PAIN IN LEFT FOOT: ICD-10-CM

## 2021-06-16 PROCEDURE — 97124 MASSAGE THERAPY: CPT | Performed by: PHYSICAL THERAPIST

## 2021-06-16 PROCEDURE — 97110 THERAPEUTIC EXERCISES: CPT | Performed by: PHYSICAL THERAPIST

## 2021-06-16 NOTE — PROGRESS NOTES
[facility]          Phone: [phone] Fax: [phone]    Physical Therapy Daily Treatment Note  Date:  2021    Patient Name:  Jose Miguel Yepez    :  1975  MRN: [id number]    Restrictions/Precautions:    Diagnosis:     Diagnosis Orders   1. Achilles tendon tear, left, initial encounter     2. Primary osteoarthritis of left knee     3. Closed nondisplaced fracture of cuboid of left foot with routine healing, subsequent encounter     4. Pain in left foot     5. Difficulty walking       Treatment Diagnosis:    Insurance/Certification information:  Medical [place]  Referring Physician:  Jo Prater DPM  Plan of care signed (Y/N):    Visit# / total visits:    Pain level: 5-10   Time In:  1300  Time Out:  1340    Subjective:   Pt reports that she is getting frustrated because of continued pain and difficulty walking. Exercises:  Exercise/Equipment Resistance/Repetitions Other comments     Alphabet  x1 rep      Ankle circles x10 reps each CW and CCW      Ankle pumps x10 reps      Ankle inv/ever x10 reps      Calf stretch with towel 15 sec x 5 reps      Toe scrunches with towel 3 minutes      Bike/stepper 10 mins       Ankle ROM with tband OTB x 10 reps each   HEP   Sit to stand X 5 reps from mat   HEP       Step-ups 6\" ascend/descend 2 laps w/ 1 rail and SBQC Supervision Cued for sequencing and safety                                                                     Other :   N/A    Home Exercise Program:  21 - ankle circles, alphabet, ankle pumps, ankle inv/ever, calf stretch with towel, toe scrunches with towel. 21 Added to HEP as noted above. Pt demonstrated good technique and understanding of ex to therapist.  OTB provided to pt.      Manual Treatments:  Soft tissue massage to L plantar fascia x 10 mins      Modalities:  N/A     Time-in Time-out Total Time   13383  Evaluation Low Complexity      85208  Evaluation Med Complexity

## 2021-06-18 ENCOUNTER — TELEPHONE (OUTPATIENT)
Dept: ADMINISTRATIVE | Age: 46
End: 2021-06-18

## 2021-06-18 ENCOUNTER — CARE COORDINATION (OUTPATIENT)
Dept: OTHER | Facility: CLINIC | Age: 46
End: 2021-06-18

## 2021-06-18 NOTE — CARE COORDINATION
Ambulatory Care Coordination Note  6/18/2021  CM Risk Score: 5  [place] 10 Year Mortality Risk Score: 23%     ACC: Clinton Hillman, RN    Summary Note: Associate Care Manager (ACM) called patient for CC outreach. Patient states had nocturnal pulse ox study which showed desats as low as 73%. States she received call from [facility] who states they are working with PCP to get RX for home oxygen nighttime use. Patient states both suze and Shannon have called PCP office today to request RX be faxed to [facility]. Patient states has not heard back anything yet. ACM to call PCP office on Monday to follow up. Patient states \"I don't sleep at night. I just toss and turn\". States feeling sluggish all day. Patient states posterior left foot with scabbed area that rubs if she has regular shoes on. States still wearing crocs until diabetic shoes get delivered and spoke to rep who said it would take 3-4 weeks for shoes to arrive. Patient reports went to store today and shopped without any cart to lean on. States noticed BLE \"crampy\" but states overall went well. Reports still having \"rough\" time getting up 1 step from porch into her residence. States this was an issue prior to recent surgery. States still working with PT and had exercises increased this week. Follow up with Dr. Edelmira Uriostegui 6/23/2021. Patient states BLE swelling at present L>R despite wearing compression stockings. States has noticed LLE quite a bit more swollen than RLE. ACM reinforced importance of elevating BLE while sitting and regular use of compression stockings. Patient states now on 0.5 mg Ozempic once weekly. States has noticed blood glucose more often in 100s since dose increase. Also states has noticed decreased appetite since increased dose. States now eating ~1/2 amount of serving. Reports BG today 187 @ 0715, 197 @ 0730, 165 @ 1300, 203 during this phone call.        Care Coordination Interventions    Program Enrollment: Complex Care  Referral from Primary Care Provider: No  Suggested Interventions and Community Resources  Diabetes Education: In Process  Fall Risk Prevention: In Process  Physical Therapy: Completed (Comment: Patient current with [facility] with Diabetes program (4/1/2021) )         Goals Addressed                    This Visit's Progress       Patient Stated      COMPLETED: Patient Stated (pt-stated)         I will have home nocturnal pulse ox study done. Barriers: none  Plan for overcoming my barriers: N/A  Confidence: 9/10  Anticipated Goal Completion Date: 7/11/2021 6/11/2021: Patient states she called home care and spoke to resp therapist re: CPAP and not sleeping well. [facility] she was told her score is 5 and that is considered good. [facility] home care is sending request for home nocturnal pulse ox study for patient to evaluate due to patient states not sleeping. [facility] she checked pulse ox recently and was 85% at rest. Patient states anxious to have nocturnal pulse ox study done. 6/18/2021: Patient states had nocturnal pulse ox study done earlier this week and received information that it was abnormal. [facility] now working towards getting order from PCP faxed to DME for home oxygen to be used with CPAP. Goal: MET        Patient Stated (pt-stated)         I will get home oxygen delivered to use at night with CPAP. Barriers: none  Plan for overcoming my barriers: N/A  Confidence: 9/10  Anticipated Goal Completion Date: 6/25/2021 6/18/2021: Patient states she and Shannon @ [facility] has been in contact with PCP office for needed RX for home oxygen to use at night with CPAP. ACM to follow up on this on Monday. Prior to Admission medications    Medication Sig Start Date End Date Taking?  Authorizing Provider   ramipril (ALTACE) 10 MG capsule TAKE 1 CAPSULE BY MOUTH ONE TIME A DAY 6/8/21   Digna Terry, DO   metoprolol tartrate (LOPRESSOR) 25 MG tablet TAKE 1 TABLET BY MOUTH 2 TIMES A DAY 6/1/21   Digna Terry, DO Semaglutide,0.25 or 0.5MG/DOS, (OZEMPIC, 0.25 OR 0.5 MG/DOSE,) 2 MG/1.5ML SOPN Inject 0.5 mg into the skin once a week Patient takes on Mondays. Patient states instructed to take 0.25 mg x4 weeks; then increase to 0.5 mg     Historical Provider, MD   naproxen (NAPROSYN) 500 MG tablet Take 1 tablet by mouth 2 times daily (with meals) 5/3/21   Annabel Cat DPM   Probiotic Product (PROBIOTIC DAILY PO) Take 1 capsule by mouth daily    Historical Provider, MD   Ascorbic Acid (VITAMIN C PO) Take by mouth Patient states anesthesiologist instructed her to start Vitamin C. States plans to purchase and start taking. (4/24/2021)    Historical Provider, MD   ibuprofen (ADVIL;MOTRIN) 200 MG tablet Take 400 mg by mouth every 6 hours as needed for Pain    Historical Provider, MD   omeprazole (PRILOSEC) 40 MG delayed release capsule Take 1 capsule by mouth daily 3/9/21   Raji Terry DO   buPROPion (WELLBUTRIN XL) 300 MG extended release tablet Take 1 tablet by mouth nightly 3/9/21   Raji Terry DO   simvastatin (ZOCOR) 40 MG tablet Take 1 tablet by mouth daily 2/9/21   Raji Terry DO   Elastic Bandages & Supports (JOBST KNEE HIGH COMPRESSION ) MISC Compression stockings 20-30 mm hg knee high bilaterally  Dx : Venous Insufficiency, Swelling 2/8/21   Nancy Gustafson MD   potassium chloride (KLOR-CON M) 20 MEQ extended release tablet Take 1 tablet by mouth 2 times daily 5/26/20   Raji Terry DO   furosemide (LASIX) 40 MG tablet Take 1 tablet by mouth 2 times daily 5/26/20   Raji Terry DO   triamcinolone (KENALOG) 0.1 % cream Apply topically to affected areas 2 times daily.  1/20/20   Digna Terry DO   levothyroxine (SYNTHROID) 200 MCG tablet Take 200 mcg by mouth Daily    Historical Provider, MD   Insulin Pump - insulin lispro Inject into the skin continuous 7780-3264: 2.1 units/hr  5676-2500: 1.8 units/hr   2080-1489: 2.2 units/hr    Historical Provider, MD docusate sodium (COLACE) 100 MG capsule Take 100 mg by mouth daily    Historical Provider, MD   vitamin B-12 (CYANOCOBALAMIN) 500 MCG tablet Take 1,000 mcg by mouth daily     Historical Provider, MD   aspirin 81 MG tablet Take 81 mg by mouth nightly. Historical Provider, MD       Future Appointments   Date Time Provider [place]   6/23/2021 10:00 AM SIERRA Brown POD [facility]   6/23/2021  3:30 PM Veronica Call [facility] PT [facility]   6/25/2021  3:30 PM [facility]      and   Diabetes Assessment    Medic Alert ID: Yes (Comment: patient states she does not wear medic alert ID)  Meal Planning: Carb counting   How often do you test your blood sugar?: Other, Meals, Bedtime (Comment: Patient states checks blood glucose 5-6 times per day \"at least\" as she has a [facility] 12/2/2020)   Do you have barriers with adherence to non-pharmacologic self-management interventions? (Nutrition/Exercise/Self-Monitoring): Yes   Have you ever had to go to the ED for symptoms of low blood sugar?: Yes   What is the date of your last ED visit for low blood sugar?: 10/31/20       Increase or Decrease trend in Blood Sugars   Last Blood Sugar Value: 203   Blood Sugar Trends: Steady Decrease        Plan:  -ACM to call PCP office on Monday, June 21 to follow up re: RX needed for DME for home oxygen to use at night with CPAP. -Patient to follow up with Dr. Monster Ghotra 6/23/2021.    -Patient still waiting delivery of diabetic shoes.     -Patient to continue use of compression stockings and elevate BLE with sitting. Margarita Baig RN BSN  Associate Care Manager  Phone: [phone]  Email: [email or web address]

## 2021-06-23 ENCOUNTER — TREATMENT (OUTPATIENT)
Dept: PHYSICAL THERAPY | Age: 46
End: 2021-06-23
Payer: COMMERCIAL

## 2021-06-23 ENCOUNTER — OFFICE VISIT (OUTPATIENT)
Dept: PODIATRY | Age: 46
End: 2021-06-23

## 2021-06-23 VITALS — BODY MASS INDEX: 49.17 KG/M2 | HEIGHT: 64 IN | WEIGHT: 288 LBS

## 2021-06-23 DIAGNOSIS — R26.2 DIFFICULTY WALKING: ICD-10-CM

## 2021-06-23 DIAGNOSIS — S92.215D CLOSED NONDISPLACED FRACTURE OF CUBOID OF LEFT FOOT WITH ROUTINE HEALING, SUBSEQUENT ENCOUNTER: ICD-10-CM

## 2021-06-23 DIAGNOSIS — S86.012A ACHILLES TENDON TEAR, LEFT, INITIAL ENCOUNTER: Primary | ICD-10-CM

## 2021-06-23 DIAGNOSIS — M17.12 PRIMARY OSTEOARTHRITIS OF LEFT KNEE: ICD-10-CM

## 2021-06-23 DIAGNOSIS — M79.672 PAIN IN LEFT FOOT: ICD-10-CM

## 2021-06-23 PROCEDURE — 97110 THERAPEUTIC EXERCISES: CPT

## 2021-06-23 PROCEDURE — 97124 MASSAGE THERAPY: CPT

## 2021-06-23 PROCEDURE — 99024 POSTOP FOLLOW-UP VISIT: CPT | Performed by: PODIATRIST

## 2021-06-23 NOTE — PROGRESS NOTES
Patient is here for post op left foot tendon tear. Patient states she has not tried to put on a closed show. Patient states swelling with and without emelina hose.  Елена Villegas DO  Electronically signed by Mercedez Lynn LPN on 7/12/1325 at 59:64 AM

## 2021-06-23 NOTE — PROGRESS NOTES
21     Victorina Webb    : 1975   Sex: female    Age: 51 y.o. Patient's PCP/Provider is:  Digna Oneill DO    Subjective:  Patient is seen today for follow-up regarding continued evaluation regarding postoperative care Achilles tendon repair left lower extremity. She has been attending physical therapy sessions as prescribed with noted gradual improvement. Patient denies any additional issues at this time. Patient still has not worn the compression garment on the left lower extremity as recommended to reduce the residual edema present. Patient is back into her regular shoe gear which she is tolerating with her ambulatory activities. No other additional abnormalities noted. Chief Complaint   Patient presents with    Post-Op Check     left foot        ROS:  Const: Positives and pertinent negatives as per HPI. Musculo: Denies symptoms other than stated above. Neuro: Denies symptoms other than stated above. Skin: Denies symptoms other than stated above. Current Medications:    Current Outpatient Medications:     mineral oil-hydrophilic petrolatum (AQUAPHOR) ointment, Apply topically as needed for Dry Skin Apply topically as needed. , Disp: , Rfl:     ramipril (ALTACE) 10 MG capsule, TAKE 1 CAPSULE BY MOUTH ONE TIME A DAY, Disp: 90 capsule, Rfl: 1    metoprolol tartrate (LOPRESSOR) 25 MG tablet, TAKE 1 TABLET BY MOUTH 2 TIMES A DAY, Disp: 180 tablet, Rfl: 3    Semaglutide,0.25 or 0.5MG/DOS, (OZEMPIC, 0.25 OR 0.5 MG/DOSE,) 2 MG/1.5ML SOPN, Inject 0.5 mg into the skin once a week Patient takes on .  Patient states instructed to take 0.25 mg x4 weeks; then increase to 0.5 mg , Disp: , Rfl:     naproxen (NAPROSYN) 500 MG tablet, Take 1 tablet by mouth 2 times daily (with meals), Disp: 60 tablet, Rfl: 3    Probiotic Product (PROBIOTIC DAILY PO), Take 1 capsule by mouth daily, Disp: , Rfl:     Ascorbic Acid (VITAMIN C PO), Take by mouth Patient states anesthesiologist instructed her to start Vitamin C. States plans to purchase and start taking. (4/24/2021), Disp: , Rfl:     ibuprofen (ADVIL;MOTRIN) 200 MG tablet, Take 400 mg by mouth every 6 hours as needed for Pain, Disp: , Rfl:     omeprazole (PRILOSEC) 40 MG delayed release capsule, Take 1 capsule by mouth daily, Disp: 90 capsule, Rfl: 1    buPROPion (WELLBUTRIN XL) 300 MG extended release tablet, Take 1 tablet by mouth nightly, Disp: 90 tablet, Rfl: 0    simvastatin (ZOCOR) 40 MG tablet, Take 1 tablet by mouth daily, Disp: 90 tablet, Rfl: 1    Elastic Bandages & Supports (JOBST KNEE HIGH COMPRESSION SM) MISC, Compression stockings 20-30 mm hg knee high bilaterally Dx : Venous Insufficiency, Swelling, Disp: 2 each, Rfl: 2    potassium chloride (KLOR-CON M) 20 MEQ extended release tablet, Take 1 tablet by mouth 2 times daily, Disp: 180 tablet, Rfl: 1    furosemide (LASIX) 40 MG tablet, Take 1 tablet by mouth 2 times daily, Disp: 180 tablet, Rfl: 1    triamcinolone (KENALOG) 0.1 % cream, Apply topically to affected areas 2 times daily. , Disp: 80 g, Rfl: 0    levothyroxine (SYNTHROID) 200 MCG tablet, Take 200 mcg by mouth Daily, Disp: , Rfl:     Insulin Pump - insulin lispro, Inject into the skin continuous 6739-9146: 2.1 units/hr 3159-0185: 1.8 units/hr  3186-3710: 2.2 units/hr, Disp: , Rfl:     docusate sodium (COLACE) 100 MG capsule, Take 100 mg by mouth daily, Disp: , Rfl:     vitamin B-12 (CYANOCOBALAMIN) 500 MCG tablet, Take 1,000 mcg by mouth daily , Disp: , Rfl:     aspirin 81 MG tablet, Take 81 mg by mouth nightly., Disp: , Rfl:     Allergies:  No Known Allergies    Vitals:    06/23/21 1031   Weight: 288 lb (130.6 kg)   Height: 5' 4\" (1.626 m)       Exam:  Neurovascular status unchanged. Surgical site well coapted without signs of dehiscence noted. No signs of infection noted left heel. Increased range of motion noted left ankle and subtalar joint.   Residual, dependent edematous issues noted left lower extremity without ulceration noted. Diagnostic Studies:     No results found. Procedures:    None    Plan Per Assessment  [place] [facility] was seen today for post-op check. Diagnoses and all orders for this visit:    Achilles tendon tear, left, initial encounter      1. Postoperative evaluation and management  2. We did discuss continued physical therapy sessions and home stretching and strengthening exercises to improve her gait and flexibility. 3. Patient was advised to utilize her compression garments as much as possible daily to both lower extremities to improve her postoperative rehabilitation. 4. Patient will be followed up in 2 weeks time or sooner if needed for reevaluation. She was advised to call the office with any questions or concerns in the interim. Seen By:    Jo Cook DPM    Electronically signed by Jo Cook DPM on 6/23/2021 at 7:48 PM    This note was created using voice recognition software. The note was reviewed however may contain grammatical errors.

## 2021-06-23 NOTE — PROGRESS NOTES
Based on today's exam, diagnostic studies, and/or review of records, the determination was made for treatment today. AVASTIN 1.25mg recommended TODAY after discussion of benefits, risks and alternatives. The injection was given without complication and tolerated well by the patient. Discussed risks, benefits, and alternatives of Intravitreal Avastin. Patient understands and accepts that the Avastin is being used off label. Patient understand and accepts that the Avastin comes from a formulating pharmacy, which may increase the risk of sever vision threatening intraocular infections. Post-injection instructions were reviewed and understood by the patient. Procedure was performed without complications. Instructed to call immediately if any new distortion, blurring, decreased vision or eye pain. [facility]          Phone: [phone] Fax: [phone]    Physical Therapy Daily Treatment Note  Date:  2021    Patient Name:  Jose Miguel Yepez    :  1975  MRN: [id number]    Restrictions/Precautions:    Diagnosis:     Diagnosis Orders   1. Achilles tendon tear, left, initial encounter     2. Primary osteoarthritis of left knee     3. Closed nondisplaced fracture of cuboid of left foot with routine healing, subsequent encounter     4. Pain in left foot     5. Difficulty walking       Treatment Diagnosis:    Insurance/Certification information:  [employer] [place]  Referring Physician:  Jo Prater DPM  Plan of care signed (Y/N):    Visit# / total visits:    Pain level: 5/10   Time In:  2339  Time Out:  1608    Subjective:   Pt reports that the podiatrist , debrided dead skin around her incision of L achilles tendon and cleared her for massage to area. Exercises:  Exercise/Equipment Resistance/Repetitions Other comments     Alphabet  x1 rep      Ankle circles x10 reps each CW and CCW      Ankle pumps x10 reps      Ankle inv/ever x10 reps      Calf stretch with towel 15 sec x 5 reps      Toe scrunches with towel 3 minutes      Bike/stepper 10 mins       Ankle ROM with tband OTB x 10 reps each   HEP   Sit to stand X 5 reps from mat   HEP       Step-ups 6\" ascend/descend 2 laps w/ 1 rail and SBQC Supervision Cued for sequencing and safety                                                                     Other :   N/A    Home Exercise Program:  21 - ankle circles, alphabet, ankle pumps, ankle inv/ever, calf stretch with towel, toe scrunches with towel. 21 Added to HEP as noted above. Pt demonstrated good technique and understanding of ex to therapist.  OTB provided to pt.      Manual Treatments:  Soft tissue massage to L achilles tendon lateral to the incision x 10 mins , avoiding direct contact with the scabbed area to prevent disruption to the healing incision    Modalities:  N/A     Time-in Time-out Total Time   00439  Evaluation Low Complexity      49883  Evaluation Med Complexity      48183  Evaluation High Complexity      37161  Ther Ex 1532 1558 26   53274  Neuro Re-ed        23841  Ther Activities        65680  Manual Therapy       35145  E-stim       43416  Ultrasound      03923  Soft tissue Massage 1687 1052 10         Session [id number]        Treatment/Activity Tolerance:  [x] Patient tolerated treatment well [] Patient limited by fatigue  [] Patient limited by pain  [] Patient limited by other medical complications  [] Other:     Prognosis: [x] Good [] Fair  [] Poor    Patient Requires Follow-up: [x] Yes  [] No    Plan:   [x] Continue per plan of care [] Alter current plan (see comments)  [] Plan of care initiated [] Hold pending MD visit [] Discharge    Plan for Next Session:        Electronically signed by:  Antionette Colón, PTA [id number]

## 2021-06-29 ENCOUNTER — TREATMENT (OUTPATIENT)
Dept: PHYSICAL THERAPY | Age: 46
End: 2021-06-29
Payer: COMMERCIAL

## 2021-06-29 DIAGNOSIS — S86.012A ACHILLES TENDON TEAR, LEFT, INITIAL ENCOUNTER: Primary | ICD-10-CM

## 2021-06-29 DIAGNOSIS — M79.672 PAIN IN LEFT FOOT: ICD-10-CM

## 2021-06-29 DIAGNOSIS — R26.2 DIFFICULTY WALKING: ICD-10-CM

## 2021-06-29 DIAGNOSIS — S92.215D CLOSED NONDISPLACED FRACTURE OF CUBOID OF LEFT FOOT WITH ROUTINE HEALING, SUBSEQUENT ENCOUNTER: ICD-10-CM

## 2021-06-29 DIAGNOSIS — M17.12 PRIMARY OSTEOARTHRITIS OF LEFT KNEE: ICD-10-CM

## 2021-06-29 PROCEDURE — 97110 THERAPEUTIC EXERCISES: CPT

## 2021-06-29 PROCEDURE — 97124 MASSAGE THERAPY: CPT

## 2021-06-29 NOTE — PROGRESS NOTES
[facility]          Phone: [phone] Fax: [phone]    Physical Therapy Daily Treatment Note  Date:  2021    Patient Name:  Breanna Espino    :  1975  MRN: [id number]    Restrictions/Precautions:    Diagnosis:     Diagnosis Orders   1. Achilles tendon tear, left, initial encounter     2. Primary osteoarthritis of left knee     3. Closed nondisplaced fracture of cuboid of left foot with routine healing, subsequent encounter     4. Pain in left foot     5. Difficulty walking       Treatment Diagnosis:    Insurance/Certification information:  [employer] [place]  Referring Physician:  Krunal Middleton DPM  Plan of care signed (Y/N):    Visit# / total visits:  10/12  Pain level: 4/10   Time In:  902  Time Out:  940    Subjective:   Pt reports she has not received diabetic shoes yet. She is in contact w/ the provider. Pt is not compliant w/ compression stocking wear. L LE moderately edematous, compared to R LE. Exercises:  Exercise/Equipment Resistance/Repetitions Other comments     Alphabet  x1 rep      Ankle circles x10 reps each CW and CCW      Ankle pumps x10 reps      Ankle inv/ever x10 reps      Calf stretch with towel 15 sec x 5 reps      Toe scrunches with towel 3 minutes      Bike/stepper 10 mins       Ankle ROM with tband OTB x 10 reps each   HEP   Sit to stand X 5 reps from mat   HEP       Step-ups 6 \" x 10 reps                                                                       Other :   N/A    Home Exercise Program:  21 - ankle circles, alphabet, ankle pumps, ankle inv/ever, calf stretch with towel, toe scrunches with towel. 21 Added to HEP as noted above. Pt demonstrated good technique and understanding of ex to therapist.  OTB provided to pt.      Manual Treatments:  Soft tissue massage to L achilles tendon lateral to the incision x 10 mins , avoiding direct contact with the scabbed area to prevent disruption to the healing incision    Modalities:  N/A     Time-in Time-out Total Time   96437  Evaluation Low Complexity      85022  Evaluation Med Complexity      45608  Evaluation High Complexity      17855  Ther Ex 902 930 28   99434  Neuro Re-ed        65874  Ther Activities        87936  Manual Therapy       52013  E-stim       00044  Ultrasound      40910  Soft tissue Massage 930 940 10         Session 902 940 38        Treatment/Activity Tolerance:  [x] Patient tolerated treatment well [] Patient limited by fatigue  [] Patient limited by pain  [] Patient limited by other medical complications  [] Other:     Prognosis: [x] Good [] Fair  [] Poor    Patient Requires Follow-up: [x] Yes  [] No    Plan:   [x] Continue per plan of care [] Alter current plan (see comments)  [] Plan of care initiated [] Hold pending MD visit [] Discharge    Plan for Next Session:        Electronically signed by:  Royer Casanova, PTA [id number]

## 2021-06-30 ENCOUNTER — VIRTUAL VISIT (OUTPATIENT)
Dept: FAMILY MEDICINE CLINIC | Age: 46
End: 2021-06-30
Payer: COMMERCIAL

## 2021-06-30 DIAGNOSIS — F32.A DEPRESSION, UNSPECIFIED DEPRESSION TYPE: ICD-10-CM

## 2021-06-30 DIAGNOSIS — G47.33 OBSTRUCTIVE SLEEP APNEA SYNDROME: Primary | ICD-10-CM

## 2021-06-30 PROCEDURE — 99213 OFFICE O/P EST LOW 20 MIN: CPT | Performed by: FAMILY MEDICINE

## 2021-06-30 SDOH — ECONOMIC STABILITY: FOOD INSECURITY: WITHIN THE PAST 12 MONTHS, YOU WORRIED THAT YOUR FOOD WOULD RUN OUT BEFORE YOU GOT MONEY TO BUY MORE.: NEVER TRUE

## 2021-06-30 SDOH — ECONOMIC STABILITY: FOOD INSECURITY: WITHIN THE PAST 12 MONTHS, THE FOOD YOU BOUGHT JUST DIDN'T LAST AND YOU DIDN'T HAVE MONEY TO GET MORE.: NEVER TRUE

## 2021-06-30 ASSESSMENT — SOCIAL DETERMINANTS OF HEALTH (SDOH): HOW HARD IS IT FOR YOU TO PAY FOR THE VERY BASICS LIKE FOOD, HOUSING, MEDICAL CARE, AND HEATING?: NOT VERY HARD

## 2021-06-30 NOTE — PROGRESS NOTES
TeleMedicine Patient Consent    This visit was performed as a virtual video visit using a synchronous, two-way, audio-video telehealth technology platform. Patient identification was verified at the start of the visit, including the patient's telephone number and physical location. I discussed with the patient the nature of our telehealth visits, that:     1. Due to the nature of an audio- video modality, the only components of a physical exam that could be done are the elements supported by direct observation. 2. I would evaluate the patient and recommend diagnostics and treatments based on my assessment. 3. If it was felt that the patient should be evaluated in clinic or an emergency room setting, then they would be directed there. 4. Our sessions are not being recorded and that personal health information is protected. 5. Our team would provide follow up care in person if/when the patient needs it. Patient does agree to proceed with telemedicine consultation. Patient's location: home address in [place]. Is there anyone else present for this visit: No  This visit was completed virtually using [email or web address]    Physician Location:   [address] [facility], [address] [facility]  Angeline Rivas [place] [address]    Time spent: Greater than Not billed by time    2021    TELEHEALTH EVALUATION -- Audio or Visual (During [facility] public health emergency)    HPI:    Yasmani Luu (:  1975) has requested an audio/video evaluation for the following concern(s):    Last sleep study was 9-10 years ago. Does qualify for O2 due to overnight pulse oximetry 73-87%.   Does use qhs.     ROS Unless otherwise specified  Review of Systems - General ROS: negative for - chills, fatigue, fever, night sweats, sleep disturbance, weight gain or weight loss  Psychological ROS: negative for - anxiety, behavioral disorder, depression, hallucinations, irritability, memory difficulties, mood swings, sleep disturbances or suicidal ideation  ENT ROS: negative for - epistaxis, headaches, hearing change, nasal congestion, nasal discharge, nasal polyps, sinus pain, tinnitus, vertigo or visual changes  Hematological and Lymphatic ROS: negative for - bleeding problems, blood clots, fatigue or swollen lymph nodes  Respiratory ROS: negative for - cough, orthopnea, shortness of breath, sputum changes, tachypnea or wheezing  Cardiovascular ROS: negative for - chest pain, dyspnea on exertion, irregular heartbeat, loss of consciousness, palpitations, paroxysmal nocturnal dyspnea or rapid heart rate  Gastrointestinal ROS: negative for - abdominal pain, blood in stools, change in bowel habits, constipation, diarrhea, gas/bloating, heartburn or nausea/vomiting  Musculoskeletal ROS: negative for - joint pain, joint stiffness, joint swelling or muscle, back pain, bowel or bladder incontinence  Neurological ROS: negative for - behavioral changes, confusion, dizziness, headaches, memory loss, numbness/tingling, seizures or speech problems, weakness  Dermatological ROS: negative for - dry skin, mole changes, nail changes, pruritus, rash or skin lesion changes    Prior to Visit Medications    Medication Sig Taking? Authorizing Provider   mineral oil-hydrophilic petrolatum (AQUAPHOR) ointment Apply topically as needed for Dry Skin Apply topically as needed. Yes Historical Provider, MD   ramipril (ALTACE) 10 MG capsule TAKE 1 CAPSULE BY MOUTH ONE TIME A DAY Yes Digna Terry, DO   metoprolol tartrate (LOPRESSOR) 25 MG tablet TAKE 1 TABLET BY MOUTH 2 TIMES A DAY Yes Digna Terry, DO   Semaglutide,0.25 or 0.5MG/DOS, (OZEMPIC, 0.25 OR 0.5 MG/DOSE,) 2 MG/1.5ML SOPN Inject 0.5 mg into the skin once a week Patient takes on Mondays.  Patient states instructed to take 0.25 mg x4 weeks; then increase to 0.5 mg  Yes Historical Provider, MD   naproxen (NAPROSYN) 500 MG tablet Take 1 tablet by mouth 2 times daily (with meals) Yes Devaughn Cunha, SIERRA Probiotic Product (PROBIOTIC DAILY PO) Take 1 capsule by mouth daily Yes Historical Provider, MD   Ascorbic Acid (VITAMIN C PO) Take by mouth Patient states anesthesiologist instructed her to start Vitamin C. [facility] [place] plans to purchase and start taking. (4/24/2021) Yes Historical Provider, MD   ibuprofen (ADVIL;MOTRIN) 200 MG tablet Take 400 mg by mouth every 6 hours as needed for Pain Yes Historical Provider, MD   omeprazole (PRILOSEC) 40 MG delayed release capsule Take 1 capsule by mouth daily Yes Digna Terry DO   buPROPion (WELLBUTRIN XL) 300 MG extended release tablet Take 1 tablet by mouth nightly Yes Digna Terry DO   simvastatin (ZOCOR) 40 MG tablet Take 1 tablet by mouth daily Yes Digna Terry DO   Elastic Bandages & Supports (JOBST KNEE HIGH COMPRESSION SM) MISC Compression stockings 20-30 mm hg knee high bilaterally  Dx : Venous Insufficiency, Swelling Yes Josie Davis MD   potassium chloride (KLOR-CON M) 20 MEQ extended release tablet Take 1 tablet by mouth 2 times daily Yes Digna Terry DO   furosemide (LASIX) 40 MG tablet Take 1 tablet by mouth 2 times daily Yes Digna Terry DO   triamcinolone (KENALOG) 0.1 % cream Apply topically to affected areas 2 times daily. Yes Digna Terry DO   levothyroxine (SYNTHROID) 200 MCG tablet Take 200 mcg by mouth Daily Yes Historical Provider, MD   Insulin Pump - insulin lispro Inject into the skin continuous 7706-5449: 2.1 units/hr  4020-6849: 1.8 units/hr   6477-8697: 2.2 units/hr Yes Historical Provider, MD   docusate sodium (COLACE) 100 MG capsule Take 100 mg by mouth daily Yes Historical Provider, MD   vitamin B-12 (CYANOCOBALAMIN) 500 MCG tablet Take 1,000 mcg by mouth daily  Yes Historical Provider, MD   aspirin 81 MG tablet Take 81 mg by mouth nightly.  Yes Historical Provider, MD       Social History     Tobacco Use    Smoking status: Former Smoker     Packs/day: 0.50     Years: 19.00     Pack years: 9.50 Types: Cigarettes     Start date: 2013     Quit date: 2014     Years since quittin.4    Smokeless tobacco: Never Used   Vaping Use    Vaping Use: Never used   Substance Use Topics    Alcohol use:  Yes     Alcohol/week: 2.0 standard drinks     Types: 2 Glasses of wine per week     Comment: socially    Drug use: No        No Known Allergies,   Past Medical History:   Diagnosis Date    Capsulitis     right shoulder    Depression     Diabetes mellitus ([facility]     AGE 2, TYPE 1  HAS INSULIN PUMP    Diabetic frozen shoulder associated with type 1 diabetes mellitus ([facility]     RIGHT    GERD (gastroesophageal reflux disease)     HTN (hypertension)     Hyperlipidemia     Hypothyroid 2012    Insulin pump in place     Nausea & vomiting     resolved    Obesity 2012    Obesity, Class III, BMI 40-49.9 (morbid obesity) ([facility] 2018    BMI 47.3    MILENA (obstructive sleep apnea) 2012    USES CPAP    PONV (postoperative nausea and vomiting)     Primary osteoarthritis of knee     B/L    Retinopathy due to secondary DM ([facility]     LASER TX    Sleep apnea 3/2/2012    uses cpap, instructed to bring    Thyroid disease    ,   Past Surgical History:   Procedure Laterality Date    ACHILLES TENDON SURGERY Left 2021    ACHILLES TENDON SURGERY Left 2021    REPAIR ACHILLES TENDON LEFT LOWER EXTREMITY performed by Mary Reynoso DPM [facility] [address] [facility]  2017    NEG     SECTION      ENDOMETRIAL ABLATION  2013    FOOT SURGERY Bilateral ,    to treat fascitis bilateral, to remove a nerve left    HERNIA REPAIR  2015    VENTRAL    SHOULDER SURGERY Left     to release impingement   ,   Social History     Tobacco Use    Smoking status: Former Smoker     Packs/day: 0.50     Years: 19.00     Pack years: 9.50     Types: Cigarettes     Start date: 2013     Quit date: 2014     Years since quittin.4    Smokeless tobacco: Never Used   Vaping Use    Vaping Use: Never used   Substance Use Topics    Alcohol use:  Yes     Alcohol/week: 2.0 standard drinks     Types: 2 Glasses of wine per week     Comment: socially    Drug use: No   ,   Family History   Problem Relation Age of Onset    Diabetes Mother         Age 59, 200    Heart Disease Mother 47        ACB   [facility] Diabetes Father         Age 70, 2018    Cancer Father         Skin cancer    Other Father         AFIB    No Known Problems Sister     Diabetes Maternal Grandmother     Diabetes Maternal Grandfather     Diabetes Paternal Grandfather     Cancer Paternal Grandfather         lung   ,   Immunization History   Administered Date(s) Administered    COVID-19, Moderna, PF, 100mcg/0.5mL 12/29/2020, 01/26/2021    Influenza A ([id number]) Vaccine PF IM 12/11/2009    Influenza Virus Vaccine 10/04/2016, 10/03/2017, 10/08/2018, 10/14/2020    Pneumococcal Conjugate 13-valent ([id number]) 04/16/2010    Pneumococcal Polysaccharide (Tvmovnyrf70) 09/18/2019   ,   Health Maintenance   Topic Date Due    Hepatitis C screen  Never done    Diabetic foot exam  Never done    Diabetic retinal exam  Never done    HIV screen  Never done    Hepatitis B vaccine (1 of 3 - Risk 3-dose series) Never done    DTaP/Tdap/Td vaccine (1 - Tdap) Never done    Cervical cancer screen  04/12/2020    A1C test (Diabetic or Prediabetic)  07/13/2021    Diabetic microalbuminuria test  04/13/2022    Lipid screen  04/13/2022    TSH testing  04/13/2022    Potassium monitoring  04/13/2022    Creatinine monitoring  04/13/2022    Pneumococcal 0-64 years Vaccine (2 of 2 - PPSV23) 05/15/2039    Flu vaccine  Completed    COVID-19 Vaccine  Completed    Hepatitis A vaccine  Aged Out    Hib vaccine  Aged Out    Meningococcal (ACWY) vaccine  Aged Out       PHYSICAL EXAMINATION:  [ INSTRUCTIONS:  \"[x]\" Indicates a positive item  \"[]\" Indicates a negative item  -- DELETE ALL ITEMS NOT EXAMINED]  Vital Signs: (As obtained by patient/caregiver or practitioner observation)    Blood pressure-  Heart rate-    Respiratory rate-    Temperature-  Pulse oximetry-     Constitutional: [x] Appears well-developed and well-nourished [] No apparent distress      [x] Abnormal-   Mental status  [x] Alert and awake  [x] Oriented to person/place/time [x]Able to follow commands      Eyes:  EOM    [x]  Normal  [] Abnormal-  Sclera  [x]  Normal  [] Abnormal -         Discharge [x]  None visible  [] Abnormal -    HENT:   [x] Normocephalic, atraumatic. [] Abnormal   [x] Mouth/Throat: Mucous membranes are moist.     External Ears [x] Normal  [] Abnormal-     Neck: [x] No visualized mass     Pulmonary/Chest: [x] Respiratory effort normal.  [x] No visualized signs of difficulty breathing or respiratory distress        [] Abnormal-      Musculoskeletal:   [] Normal gait with no signs of ataxia         [x] Normal range of motion of neck        [] Abnormal-       Neurological:        [x] No Facial Asymmetry (Cranial nerve 7 motor function) (limited exam to video visit)          [] No gaze palsy        [] Abnormal-         Skin:        [x] No significant exanthematous lesions or discoloration noted on facial skin         [] Abnormal-            Psychiatric:       [x] Normal Affect [x] No Hallucinations        [] Abnormal-       Other pertinent observable physical exam findings-     Due to this being a TeleHealth encounter, evaluation of the following organ systems is limited: Vitals/Constitutional/EENT/Resp/CV/GI//MS/Neuro/Skin/Heme-Lymph-Imm. ASSESSMENT/PLAN:  Princess Varela was seen today for fatigue. Diagnoses and all orders for this visit:    Obstructive sleep apnea syndrome  -     Baseline Diagnostic Sleep Study; Future         No follow-ups on file. An  electronic signature was used to authenticate this note.     --Allan Melton DO on 6/30/2021 at 4:27 }    Pursuant to the emergency declaration under the [address] and

## 2021-06-30 NOTE — PROGRESS NOTES
Jose Miguel Yepez was read the following message We want to confirm that, for purposes of billing, this is a virtual visit with your provider for which we will submit a claim for reimbursement with your insurance company. You will be responsible for any copays, coinsurance amounts or other amounts not covered by your insurance company. If you do not accept this, unfortunately we will not be able to schedule a virtual visit with the provider. Do you accept?  Salazar Foster responded YES

## 2021-07-01 DIAGNOSIS — F32.A DEPRESSION, UNSPECIFIED DEPRESSION TYPE: ICD-10-CM

## 2021-07-01 RX ORDER — BUPROPION HYDROCHLORIDE 300 MG/1
300 TABLET ORAL EVERY MORNING
Qty: 7 TABLET | Refills: 0 | Status: SHIPPED
Start: 2021-07-01 | End: 2021-09-24 | Stop reason: SDUPTHER

## 2021-07-01 RX ORDER — BUPROPION HYDROCHLORIDE 300 MG/1
TABLET ORAL
Qty: 90 TABLET | Refills: 0 | Status: SHIPPED
Start: 2021-07-01 | End: 2021-07-01 | Stop reason: SDUPTHER

## 2021-07-01 NOTE — TELEPHONE ENCOUNTER
Pt called requesting 7 day supply of her wellbutrin. Wellbutrin sent in yesterday to [facility] will take 72 hours to get it mailed out to her and currently in process.        Lucille redd -Horace

## 2021-07-06 ENCOUNTER — TREATMENT (OUTPATIENT)
Dept: PHYSICAL THERAPY | Age: 46
End: 2021-07-06
Payer: COMMERCIAL

## 2021-07-06 DIAGNOSIS — S86.012A ACHILLES TENDON TEAR, LEFT, INITIAL ENCOUNTER: Primary | ICD-10-CM

## 2021-07-06 DIAGNOSIS — M79.672 PAIN IN LEFT FOOT: ICD-10-CM

## 2021-07-06 DIAGNOSIS — M17.12 PRIMARY OSTEOARTHRITIS OF LEFT KNEE: ICD-10-CM

## 2021-07-06 DIAGNOSIS — S92.215D CLOSED NONDISPLACED FRACTURE OF CUBOID OF LEFT FOOT WITH ROUTINE HEALING, SUBSEQUENT ENCOUNTER: ICD-10-CM

## 2021-07-06 DIAGNOSIS — R26.2 DIFFICULTY WALKING: ICD-10-CM

## 2021-07-06 PROCEDURE — 97110 THERAPEUTIC EXERCISES: CPT

## 2021-07-06 PROCEDURE — 97124 MASSAGE THERAPY: CPT

## 2021-07-06 NOTE — PROGRESS NOTES
[facility]          Phone: [phone] Fax: [phone]    Physical Therapy Daily Treatment Note  Date:  2021    Patient Name:  Cecille Ahmadi    :  1975  MRN: [id number]    Restrictions/Precautions:    Diagnosis:     Diagnosis Orders   1. Achilles tendon tear, left, initial encounter     2. Primary osteoarthritis of left knee     3. Closed nondisplaced fracture of cuboid of left foot with routine healing, subsequent encounter     4. Pain in left foot     5. Difficulty walking       Treatment Diagnosis:    Insurance/Certification information:  [employer]  Referring Physician:  Melissa Coleman DPM  Plan of care signed (Y/N):    Visit# / total visits:    Pain level: 3/10   Time In:  903  Time Out:  943    Subjective:   Pt reports she has not received diabetic shoes yet. She is in contact w/ the provider. Pt is not compliant w/ compression stocking wear. L LE moderately edematous, compared to R LE. Pt's gait is reciprocating without AD, mildly antalgic, but w/ less lateral sway noted. Exercises:  Exercise/Equipment Resistance/Repetitions Other comments     Alphabet  x1 rep      Ankle circles x10 reps each CW and CCW      Ankle pumps x10 reps      Ankle inv/ever x10 reps      Calf stretch with towel 15 sec x 5 reps      Toe scrunches with towel 3 minutes      Bike/stepper 10 mins       Ankle ROM with tband GTB x 10 reps each   HEP   Sit to stand X 10 reps from mat   HEP       Step-ups 6 \" x 10 reps               BAPs  #2 ball X 10 reps each CW,CCW, fwd/bwd, side<> side                                                       Other :   N/A    Home Exercise Program:  21 - ankle circles, alphabet, ankle pumps, ankle inv/ever, calf stretch with towel, toe scrunches with towel. 21 Added to HEP as noted above. Pt demonstrated good technique and understanding of ex to therapist.  OTB provided to pt.      Manual Treatments:  Soft tissue massage to L achilles tendon lateral to the incision x 10 mins , avoiding direct contact with the scabbed area to prevent disruption to the healing incision    Modalities:  N/A     Time-in Time-out Total Time   96656  Evaluation Low Complexity      13132  Evaluation Med Complexity      33327  Evaluation High Complexity      25258  Ther Ex 903 933 30   68080  Neuro Re-ed        20612  Ther Activities        01941  Manual Therapy       26407  E-stim       60544  Ultrasound      40907  Soft tissue Massage 933 943 10         Session 903 943 40       Treatment/Activity Tolerance:  [x] Patient tolerated treatment well [] Patient limited by fatigue  [] Patient limited by pain  [] Patient limited by other medical complications  [] Other:     Prognosis: [x] Good [] Fair  [] Poor    Patient Requires Follow-up: [x] Yes  [] No    Plan:   [x] Continue per plan of care [] Alter current plan (see comments)  [] Plan of care initiated [] Hold pending MD visit [] Discharge    Plan for Next Session:        Electronically signed by:  Zhen France, PTA [id number]

## 2021-07-08 ENCOUNTER — HOSPITAL ENCOUNTER (OUTPATIENT)
Dept: SLEEP CENTER | Age: 46
Discharge: HOME | End: 2021-07-08
Payer: COMMERCIAL

## 2021-07-08 ENCOUNTER — TREATMENT (OUTPATIENT)
Dept: PHYSICAL THERAPY | Age: 46
End: 2021-07-08
Payer: COMMERCIAL

## 2021-07-08 DIAGNOSIS — G47.33 OBSTRUCTIVE SLEEP APNEA SYNDROME: ICD-10-CM

## 2021-07-08 DIAGNOSIS — M79.672 PAIN IN LEFT FOOT: ICD-10-CM

## 2021-07-08 DIAGNOSIS — S86.012A ACHILLES TENDON TEAR, LEFT, INITIAL ENCOUNTER: Primary | ICD-10-CM

## 2021-07-08 DIAGNOSIS — R26.2 DIFFICULTY WALKING: ICD-10-CM

## 2021-07-08 DIAGNOSIS — S92.215D CLOSED NONDISPLACED FRACTURE OF CUBOID OF LEFT FOOT WITH ROUTINE HEALING, SUBSEQUENT ENCOUNTER: ICD-10-CM

## 2021-07-08 DIAGNOSIS — M17.12 PRIMARY OSTEOARTHRITIS OF LEFT KNEE: ICD-10-CM

## 2021-07-08 PROCEDURE — 97110 THERAPEUTIC EXERCISES: CPT

## 2021-07-08 PROCEDURE — 95810 POLYSOM 6/> YRS 4/> PARAM: CPT

## 2021-07-08 NOTE — PROGRESS NOTES
[facility]                Phone: [phone] Fax: [phone]    Physical Therapy  Outpatient Discharge Summary     Date:  2021    Patient Name:  Ashanti Dang    :  1975  MRN: [id number]    DIAGNOSIS:     Diagnosis Orders   1. Achilles tendon tear, left, initial encounter     2. Primary osteoarthritis of left knee     3. Closed nondisplaced fracture of cuboid of left foot with routine healing, subsequent encounter     4. Pain in left foot     5. Difficulty walking       REFERRING PHYSICIAN:  Susie Olivarez DPM    ATTENDANCE:  Pt has attended 12 of 12 scheduled treatments from 21 to 21. TREATMENTS RECEIVED:  Soft tissue massage, stretching, strength training, education in a HEP    INITIAL STATUS:  · Pain left heel 7/10  · AROM left ankle: DF -15°, PF 50°, inv 20°, ever 10°  · Strength left ankle 4/5 throughout except eversion 4-/5  · Gait: antalgic, slow pace in CAM boot with a moderate limp on the L LE  · LEFS: 34/80    FINAL STATUS:  · Pain left heel 0-4/10  · AROM left AROM: DF -15°, PF 50°, inv 20°, ever 10°  · Strength left ankle 5/5 throughout  · Gait: reciprocating with mild antalgic pattern in sandals/flip flops  · LEFS: 63/80  · Pt is independent with HEP    GOALS:  5 out of 6 Long Term Goals were obtained. LONG TERM GOALS NOT OBTAINED/REASON:  All goals met except pt continues to demonstrate a mild antalgic gait pattern ans is not able to tolerate shoes due to heel discomfort. PATIENT GOALS:  Return to prior activites    REASON FOR DISCHARGE:  Pt has met most goals and received maximum benefit from physical therapy. PATIENT EDUCATION/INSTRUCTIONS:  Pt educated in stretching, strength training, and functional activities for HEP. RECOMMENDATIONS:  Discharge to HEP. Thank you for the opportunity to work with your patient.  If you have questions or comments, please feel free to contact me by phone or

## 2021-07-08 NOTE — PROGRESS NOTES
[facility]          Phone: [phone] Fax: [phone]    Physical Therapy Daily Treatment Note  Date:  2021    Patient Name:  Nereida Chou    :  1975  MRN: [id number]    Restrictions/Precautions:    Diagnosis:     Diagnosis Orders   1. Achilles tendon tear, left, initial encounter     2. Primary osteoarthritis of left knee     3. Closed nondisplaced fracture of cuboid of left foot with routine healing, subsequent encounter     4. Pain in left foot     5. Difficulty walking       Treatment Diagnosis:    Insurance/Certification information:  [employer]  Referring Physician:  Breanna Restrepo DPM  Plan of care signed (Y/N):    Visit# / total visits:    Pain level: 0-4/10   Time In:  900  Time Out:  935    Subjective:   Pt reports she has not received diabetic shoes yet. Pt is not compliant w/ compression stocking wear. L LE remains moderately edematous, compared to R LE. Pt's gait is reciprocating without AD, mildly antalgic, but w/ less lateral sway noted. Exercises:  Exercise/Equipment Resistance/Repetitions Other comments     Alphabet  x1 rep      Ankle circles x10 reps each CW and CCW      Ankle pumps x10 reps      Ankle inv/ever x10 reps      Calf stretch with towel 15 sec x 5 reps      Toe scrunches with towel 3 minutes      Bike/stepper 10 mins       Ankle ROM with tband GTB x 10 reps each   HEP   Sit to stand X 10 reps from mat   HEP       Step-ups 6 \" x 10 reps               BAPs  #2 ball X 10 reps each CW,CCW, fwd/bwd, side<> side           LEFS  63/80            L ankle MMT   5/5 throughout              L ankle DF  5°                   PF  55°                   INV  20 °                   evr  10°       Other :   Pt reports compliance/ Indep w/ HEP. Home Exercise Program:  21 - ankle circles, alphabet, ankle pumps, ankle inv/ever, calf stretch with towel, toe scrunches with towel. 21 Added to HEP as noted above. Pt demonstrated good technique and understanding of ex to therapist.  OTB provided to pt.      Manual Treatments:      Modalities:  N/A     Time-in Time-out Total Time   61538  Evaluation Low Complexity      58560  Evaluation Med Complexity      24313  Evaluation High Complexity      48044  Ther Ex 900 935 35   75393  Neuro Re-ed        24225  Ther Activities        31893  Manual Therapy       49500  E-stim       26836  Ultrasound      24918  Soft tissue Massage            Session 320 640 35       Treatment/Activity Tolerance:  [x] Patient tolerated treatment well [] Patient limited by fatigue  [] Patient limited by pain  [] Patient limited by other medical complications  [] Other:     Prognosis: [x] Good [] Fair  [] Poor    Patient Requires Follow-up: [x] Yes  [] No    Plan:   [] Continue per plan of care [] Alter current plan (see comments)  [] Plan of care initiated [] Hold pending MD visit [x] Discharge    Plan for Next Session:        Electronically signed by:  Frankie Terry, PTA [id number]

## 2021-07-09 ENCOUNTER — OFFICE VISIT (OUTPATIENT)
Dept: PODIATRY | Age: 46
End: 2021-07-09

## 2021-07-09 VITALS — HEIGHT: 64 IN | BODY MASS INDEX: 47.46 KG/M2 | WEIGHT: 278 LBS

## 2021-07-09 VITALS
OXYGEN SATURATION: 95 % | HEART RATE: 85 BPM | SYSTOLIC BLOOD PRESSURE: 131 MMHG | WEIGHT: 278 LBS | HEIGHT: 64 IN | DIASTOLIC BLOOD PRESSURE: 59 MMHG | BODY MASS INDEX: 47.46 KG/M2

## 2021-07-09 DIAGNOSIS — S86.012A ACHILLES TENDON TEAR, LEFT, INITIAL ENCOUNTER: Primary | ICD-10-CM

## 2021-07-09 PROCEDURE — 99024 POSTOP FOLLOW-UP VISIT: CPT | Performed by: PODIATRIST

## 2021-07-09 ASSESSMENT — SLEEP AND FATIGUE QUESTIONNAIRES
HOW LIKELY ARE YOU TO NOD OFF OR FALL ASLEEP WHILE SITTING QUIETLY AFTER LUNCH WITHOUT ALCOHOL: 0
HOW LIKELY ARE YOU TO NOD OFF OR FALL ASLEEP IN A CAR, WHILE STOPPED FOR A FEW MINUTES IN TRAFFIC: 0
HOW LIKELY ARE YOU TO NOD OFF OR FALL ASLEEP WHILE SITTING AND TALKING TO SOMEONE: 0
HOW LIKELY ARE YOU TO NOD OFF OR FALL ASLEEP WHILE SITTING AND READING: 1
HOW LIKELY ARE YOU TO NOD OFF OR FALL ASLEEP WHEN YOU ARE A PASSENGER IN A CAR FOR AN HOUR WITHOUT A BREAK: 2
HOW LIKELY ARE YOU TO NOD OFF OR FALL ASLEEP WHILE LYING DOWN TO REST IN THE AFTERNOON WHEN CIRCUMSTANCES PERMIT: 3
HOW LIKELY ARE YOU TO NOD OFF OR FALL ASLEEP WHILE SITTING INACTIVE IN A PUBLIC PLACE: 0
ESS TOTAL SCORE: 7
HOW LIKELY ARE YOU TO NOD OFF OR FALL ASLEEP WHILE WATCHING TV: 1

## 2021-07-09 NOTE — PROGRESS NOTES
Patient is here for post op tendon tear let foot. Patient states she still gets spasms. Patient has fallen 2 times and almost fall walking into the house.    Digna Oneill DO 6/30/2021  Electronically signed by Reena Abreu LPN on 6/1/9075 at 59:22 AM

## 2021-07-12 NOTE — PROGRESS NOTES
21     Erwin Olszewski    : 1975   Sex: female    Age: 51 y.o. Patient's PCP/Provider is:  Digna Oneill DO    Subjective:  Patient is seen today for follow-up regarding continued care Achilles tendon repair left lower extremity. Patient finished out her PT sessions with improvement noted. Patient is still having some swelling issues. No other additional issues noted. Chief Complaint   Patient presents with    Post-Op Check     left foot        ROS:  Const: Positives and pertinent negatives as per HPI. Musculo: Denies symptoms other than stated above. Neuro: Denies symptoms other than stated above. Skin: Denies symptoms other than stated above. Current Medications:    Current Outpatient Medications:     mineral oil-hydrophilic petrolatum (AQUAPHOR) ointment, Apply topically as needed for Dry Skin Apply topically as needed. , Disp: , Rfl:     ramipril (ALTACE) 10 MG capsule, TAKE 1 CAPSULE BY MOUTH ONE TIME A DAY, Disp: 90 capsule, Rfl: 1    metoprolol tartrate (LOPRESSOR) 25 MG tablet, TAKE 1 TABLET BY MOUTH 2 TIMES A DAY, Disp: 180 tablet, Rfl: 3    Semaglutide,0.25 or 0.5MG/DOS, (OZEMPIC, 0.25 OR 0.5 MG/DOSE,) 2 MG/1.5ML SOPN, Inject 0.5 mg into the skin once a week Patient takes on .  Patient states instructed to take 0.25 mg x4 weeks; then increase to 0.5 mg , Disp: , Rfl:     naproxen (NAPROSYN) 500 MG tablet, Take 1 tablet by mouth 2 times daily (with meals), Disp: 60 tablet, Rfl: 3    Probiotic Product (PROBIOTIC DAILY PO), Take 1 capsule by mouth daily, Disp: , Rfl:     Ascorbic Acid (VITAMIN C PO), Take by mouth Patient states anesthesiologist instructed her to start Vitamin C. States plans to purchase and start taking. (2021), Disp: , Rfl:     ibuprofen (ADVIL;MOTRIN) 200 MG tablet, Take 400 mg by mouth every 6 hours as needed for Pain, Disp: , Rfl:     omeprazole (PRILOSEC) 40 MG delayed release capsule, Take 1 capsule by mouth daily, Disp: 90 capsule, Rfl: 1    simvastatin (ZOCOR) 40 MG tablet, Take 1 tablet by mouth daily, Disp: 90 tablet, Rfl: 1    Elastic Bandages & Supports (JOBST KNEE HIGH COMPRESSION SM) MISC, Compression stockings 20-30 mm hg knee high bilaterally Dx : Venous Insufficiency, Swelling, Disp: 2 each, Rfl: 2    potassium chloride (KLOR-CON M) 20 MEQ extended release tablet, Take 1 tablet by mouth 2 times daily, Disp: 180 tablet, Rfl: 1    furosemide (LASIX) 40 MG tablet, Take 1 tablet by mouth 2 times daily, Disp: 180 tablet, Rfl: 1    triamcinolone (KENALOG) 0.1 % cream, Apply topically to affected areas 2 times daily. , Disp: 80 g, Rfl: 0    levothyroxine (SYNTHROID) 200 MCG tablet, Take 200 mcg by mouth Daily, Disp: , Rfl:     Insulin Pump - insulin lispro, Inject into the skin continuous 1526-0334: 2.1 units/hr 0153-6401: 1.8 units/hr  2006-9456: 2.2 units/hr, Disp: , Rfl:     docusate sodium (COLACE) 100 MG capsule, Take 100 mg by mouth daily, Disp: , Rfl:     vitamin B-12 (CYANOCOBALAMIN) 500 MCG tablet, Take 1,000 mcg by mouth daily , Disp: , Rfl:     aspirin 81 MG tablet, Take 81 mg by mouth nightly., Disp: , Rfl:     buPROPion (WELLBUTRIN XL) 300 MG extended release tablet, Take 1 tablet by mouth every morning for 7 days, Disp: 7 tablet, Rfl: 0    Allergies:  No Known Allergies    Vitals:    07/09/21 1108   Weight: 278 lb (126.1 kg)   Height: 5' 4\" (1.626 m)       Exam:  NVS intact. Mild edema noted left lower extremity, no surgical site issues noted. No signs of infection noted left lower extremity. Diagnostic Studies:     No results found. Procedures:    None    Plan Per Assessment  Mirtha Camacho was seen today for post-op check. Diagnoses and all orders for this visit:    Achilles tendon tear, left, initial encounter      1. Evaluation and management  2. We did apply a compression dressing to the symptomatic left lower extremity to reduce residual edema present.   3. Patient is to get an additional prescription for her diabetic shoes and insoles which would help alleviate a lot of her lower extremity issues. 4. She will be followed up at a later date for continued evaluation and management. Seen By:    Jordi Elliott DPM    Electronically signed by Jordi Elliott DPM on 7/11/2021 at 9:31 PM    This note was created using voice recognition software. The note was reviewed however may contain grammatical errors.

## 2021-07-12 NOTE — PROGRESS NOTES
[address] [address]                               SLEEP STUDY REPORT    PATIENT NAME: Fiona Mallory                   :        1975  MED REC NO:   [id number]                            ROOM:  ACCOUNT NO:   [de-identified]                           ADMIT DATE: 2021  PROVIDER:     Mark Nava MD    DATE OF STUDY:  2021    NAME OF STUDY:  Polysomnography. Patient of Dr. Librado Alvarez. INDICATION FOR POLYSOMNOGRAPHY:  Restless sleep, morning headaches,  frequent waking to urinate, daytime sleepiness. CURRENT MEDICATIONS:  Omeprazole, Altace, metoprolol, bupropion,  simvastatin, levothyroxine, Xanax. INTERPRETATION:    SLEEP ARCHITECTURE:  During the diagnostic portion of the study, this  patient had a total time in bed of 180 minutes. Total sleep time was  123 minutes. Sleep efficiency was 68% and sleep latency was 26 minutes. REM latency was not seen during this portion of the study. SLEEP STAGING:  The patient was awake 32% of the time in bed. Stage N1  was 9% and N2 was 74% of the total sleep time. Slow wave sleep was 17%  of the sleep time and stage REM sleep was absent. RESPIRATION SUMMARY:    APNEA:  There was 1 apneic event, which was obstructive, occurred during  non-REM stage sleep and lasted 12 seconds. HYPOPNEA:  There were 99 hypopneic events, all occurred during non-REM  stage sleep and maximum duration was 49 seconds. APNEA/HYPOPNEA INDEX:  The apnea/hypopnea index was 49. TITRATION OF CPAP:  At the mid point of the study, it was noted that the  apnea/hypopnea index was greater than 40 and therefore, CPAP was  initiated at 6 cm water pressure and gradually increased to 14. At that  level, the patient slept for 2 minutes in REM stage sleep and 43 minutes  in non-REM stage sleep. The apnea/hypopnea index was less than 3.     AROUSAL ANALYSIS:  There were 28 arousals/awakenings, the sleep  disruption index is 14 (normal less than 10). LIMB MOVEMENT SUMMARY:  There were 35 limb movements, the limb movement  index is 17 (normal less than 15). OXYGEN SATURATION:  Average oxygen saturation while awake was 92%. Lowest saturation was 77%. The patient spent 16% of the time with  saturation at or greater than 90%. 83% of the time, saturation ranged  from 80 to 89% and less than 1% of the time, saturation was less than  80%. HEART RATE SUMMARY:  Average heart rate while awake was 85 beats per  minute. Maximum heart rate during sleep was 92 beats per minute and  minimum heart rate during sleep was 78 beats per minute. MISCELLANEOUS:  [place] Sleepiness Scale score is 7/24. Snoring was  extremely loud. This was graded as 10 on a 1 to 10 scale. There was no  apparent bruxism. IMPRESSION:  1. Severe obstructive sleep apnea. 2.  Good to optimal titration with CPAP. 3.  Extremely loud snoring, significantly improved with CPAP. 4.  Nocturnal hypoxia, eliminated with CPAP. DISCUSSION:  Prior to the titration of CPAP, this patient had an  apnea/hypopnea index of 49. Normal is less than 5 and mild is 5 to 15. Greater than 30 is considered severe, leaving this patient in the severe  category. Along with this, there was extremely loud snoring and  nocturnal hypoxia. With titration of positive airway pressure, which  the patient tolerated well, good to optimal results were achieved. As  the video portion of the test was malfunctioning, it is not possible to  determine if the patient was in the supine position, which would give us  optimal results. In any event, the patient did very well. PLAN:  1. CPAP at 14 cm water pressure. 2.  The patient to be seen in 6 to 10 weeks. 3.  The patient should have a medium Pretty nasal mask with heated  humidification and chin strap.         Jeremy Cardenas MD  Diplomat of Sleep Medicine    D: 07/12/2021 14:13:25 T: 07/12/2021 14:16:48     [email or web address]  Job#: [id number]     Doc#: [id number]    CC:

## 2021-07-14 ENCOUNTER — CARE COORDINATION (OUTPATIENT)
Dept: OTHER | Facility: CLINIC | Age: 46
End: 2021-07-14

## 2021-07-14 ENCOUNTER — TELEPHONE (OUTPATIENT)
Dept: FAMILY MEDICINE CLINIC | Age: 46
End: 2021-07-14

## 2021-07-14 NOTE — TELEPHONE ENCOUNTER
Patient called stating that she had a sleep study done last Thursday. Patient saw results in [facility] and wants to know if her cpap settings should be changed due to the results.

## 2021-07-15 NOTE — TELEPHONE ENCOUNTER
Yes, to be changes per Pulmonology. DId she hear from them? PLAN:  1. CPAP at 14 cm water pressure. 2.  The patient to be seen in 6 to 10 weeks. 3.  The patient should have a medium Pretty nasal mask with heated  humidification and chin strap.

## 2021-07-21 ENCOUNTER — TELEPHONE (OUTPATIENT)
Dept: PODIATRY | Age: 46
End: 2021-07-21

## 2021-07-21 ENCOUNTER — CARE COORDINATION (OUTPATIENT)
Dept: OTHER | Facility: CLINIC | Age: 46
End: 2021-07-21

## 2021-07-21 NOTE — CARE COORDINATION
sleep. States slept good last night. Patient states noticed stinging to back of left foot when spraying bug repellent spray over the weekend. States unsure if she has a small open area at site or if it was stinging due to dry skin as she states it is in an area that she is unable to see. ACM suggested patient get hand held mirror and assess site. ACM notified patient to reach out to Dr. Charito Matthews if open area. Patient states currently waiting next payment from [employer] for short term disability. States she was told per [employer] that they needed office visit notes. States she called Dr. Shivani Leone office on Tuesday this week to request office visit notes be faxed. Care Coordination Interventions    Program Enrollment: Complex Care  Referral from Primary Care Provider: No  Suggested Interventions and Community Resources  Diabetes Education: Completed  Fall Risk Prevention: Completed  Physical Therapy: Completed (Comment: Patient current with [facility] Diabetes program (4/1/2021) )         Goals Addressed                    This Visit's Progress       Patient Stated      I will get diabetic shoes and insoles. (pt-stated)         I will get diabetic shoes and insoles. Barriers: none  Plan for overcoming my barriers: N/A  Confidence: 8/10  Anticipated Goal Completion Date: 8/30/2021 7/21/2021: Patient waiting delivery of diabetic shoes and insoles from [employer].   COMPLETED: Patient Stated (pt-stated)         I will get home oxygen delivered to use at night with CPAP. Barriers: none  Plan for overcoming my barriers: N/A  Confidence: 9/10  Anticipated Goal Completion Date: 6/25/2021 6/18/2021: Patient states she and Shannon @ [address] has been in contact with PCP office for needed RX for home oxygen to use at night with CPAP. ACM to follow up on this on Monday. 7/21/2021: Patient reports now has oxygen to use with CPAP at night.  Goal: MET            Prior to Admission medications Medication Sig Start Date End Date Taking? Authorizing Provider   CPAP Machine MISC by Does not apply route   Yes Historical Provider, MD   OXYGEN Inhale into the lungs nightly Use with CPAP at night   Yes Historical Provider, MD   buPROPion (WELLBUTRIN XL) 300 MG extended release tablet Take 1 tablet by mouth every morning for 7 days 7/1/21 7/8/21  Abhijeet Terry DO   mineral oil-hydrophilic petrolatum (AQUAPHOR) ointment Apply topically as needed for Dry Skin Apply topically as needed. Historical Provider, MD   ramipril (ALTACE) 10 MG capsule TAKE 1 CAPSULE BY MOUTH ONE TIME A DAY 6/8/21   Digna Terry DO   metoprolol tartrate (LOPRESSOR) 25 MG tablet TAKE 1 TABLET BY MOUTH 2 TIMES A DAY 6/1/21   Digna Terry,    Semaglutide,0.25 or 0.5MG/DOS, (OZEMPIC, 0.25 OR 0.5 MG/DOSE,) 2 MG/1.5ML SOPN Inject 0.5 mg into the skin once a week Patient takes on Mondays. Patient states instructed to take 0.25 mg x4 weeks; then increase to 0.5 mg     Historical Provider, MD   naproxen (NAPROSYN) 500 MG tablet Take 1 tablet by mouth 2 times daily (with meals) 5/3/21   Anabelnda Cassette., DPM   Probiotic Product (PROBIOTIC DAILY PO) Take 1 capsule by mouth daily    Historical Provider, MD   Ascorbic Acid (VITAMIN C PO) Take by mouth Patient states anesthesiologist instructed her to start Vitamin C. States plans to purchase and start taking. (4/24/2021)    Historical Provider, MD   ibuprofen (ADVIL;MOTRIN) 200 MG tablet Take 400 mg by mouth every 6 hours as needed for Pain    Historical Provider, MD   omeprazole (PRILOSEC) 40 MG delayed release capsule Take 1 capsule by mouth daily 3/9/21   Abhijeet Terry DO   simvastatin (ZOCOR) 40 MG tablet Take 1 tablet by mouth daily 2/9/21   Abhijeet Terry DO   Elastic Bandages & Supports (JOBST KNEE HIGH COMPRESSION SM) MISC Compression stockings 20-30 mm hg knee high bilaterally  Dx :  Venous Insufficiency, Swelling 2/8/21   Warren Burnett MD potassium chloride (KLOR-CON M) 20 MEQ extended release tablet Take 1 tablet by mouth 2 times daily 5/26/20   Yasmeen Terry, DO   furosemide (LASIX) 40 MG tablet Take 1 tablet by mouth 2 times daily 5/26/20   Yasmeen Terry, DO   triamcinolone (KENALOG) 0.1 % cream Apply topically to affected areas 2 times daily. 1/20/20   Digna Terry, DO   levothyroxine (SYNTHROID) 200 MCG tablet Take 200 mcg by mouth Daily    Historical Provider, MD   Insulin Pump - insulin lispro Inject into the skin continuous 6832-2688: 2.1 units/hr  1892-3890: 1.8 units/hr   0270-6656: 2.2 units/hr    Historical Provider, MD   docusate sodium (COLACE) 100 MG capsule Take 100 mg by mouth daily    Historical Provider, MD   vitamin B-12 (CYANOCOBALAMIN) 500 MCG tablet Take 1,000 mcg by mouth daily     Historical Provider, MD   aspirin 81 MG tablet Take 81 mg by mouth nightly. Historical Provider, MD       Future Appointments   Date Time Provider Kyung Nj   7/30/2021 11:00  [facility] DPM [facility]      and   Diabetes Assessment    Medic Alert ID: Yes (Comment: patient states she does not wear medic alert ID)  Meal Planning: Carb counting   How often do you test your blood sugar?: Other, Meals, Bedtime (Comment: Patient states checks blood glucose 5-6 times per day \"at least\" as she has a [facility] 12/2/2020)   Do you have barriers with adherence to non-pharmacologic self-management interventions?  (Nutrition/Exercise/Self-Monitoring): Yes   Have you ever had to go to the ED for symptoms of low blood sugar?: Yes   What is the date of your last ED visit for low blood sugar?: 10/31/20       Increase or Decrease trend in Blood Sugars   Last Blood Sugar Value: 182   Blood Sugar Trends: Fluctuating          Plan:  -Patient to increase Ozempic to 1 mg once weekly starting tomorrow.    -Patient waiting arrival of diabetic shoes and insoles from [facility].    -Patient to use hand held mirror to assess left foot for open area. If open area noted, patient to reach out to Dr. Hansa Carias.     -[facility] to follow up with patient in ~2 weeks. Amanuel Hanks RN BSN  Associate Care Manager  Phone: [phone]  Email: [email or web address]

## 2021-07-30 ENCOUNTER — OFFICE VISIT (OUTPATIENT)
Dept: PODIATRY | Age: 46
End: 2021-07-30

## 2021-07-30 VITALS — HEIGHT: 64 IN | WEIGHT: 278 LBS | BODY MASS INDEX: 47.46 KG/M2

## 2021-07-30 DIAGNOSIS — S86.012A ACHILLES TENDON TEAR, LEFT, INITIAL ENCOUNTER: Primary | ICD-10-CM

## 2021-07-30 DIAGNOSIS — E66.01 OBESITY, CLASS III, BMI 40-49.9 (MORBID OBESITY) (HCC): ICD-10-CM

## 2021-07-30 DIAGNOSIS — R26.2 DIFFICULTY WALKING: ICD-10-CM

## 2021-07-30 DIAGNOSIS — E10.29 TYPE 1 DIABETES MELLITUS WITH OTHER KIDNEY COMPLICATION (HCC): ICD-10-CM

## 2021-07-30 DIAGNOSIS — I87.2 VENOUS INSUFFICIENCY (CHRONIC) (PERIPHERAL): ICD-10-CM

## 2021-07-30 PROCEDURE — 99024 POSTOP FOLLOW-UP VISIT: CPT | Performed by: PODIATRIST

## 2021-07-30 NOTE — PROGRESS NOTES
21     Edi Hernandez    : 1975   Sex: female    Age: 51 y.o. Patient's PCP/Provider is:  Digna Oneill DO    Subjective:  Patient is seen today for follow-up regarding continued evaluation regarding repair Achilles tendon tear left lower extremity. Patient is still awaiting her diabetic shoes and insoles which she got fitted for over weeks ago. He is to contact the shoe and orthotic facility to check on availability. Patient still refuses to wear her graduated compression garments on the left lower extremity. Patient has been trying to ambulate with her regular shoe gear on a daily basis as recommended. She denies any additional issues at this time. Chief Complaint   Patient presents with    Post-Op Check     left foot        ROS:  Const: Positives and pertinent negatives as per HPI. Musculo: Denies symptoms other than stated above. Neuro: Denies symptoms other than stated above. Skin: Denies symptoms other than stated above. Current Medications:    Current Outpatient Medications:     CPAP Machine MISC, by Does not apply route, Disp: , Rfl:     OXYGEN, Inhale into the lungs nightly Use with CPAP at night, Disp: , Rfl:     mineral oil-hydrophilic petrolatum (AQUAPHOR) ointment, Apply topically as needed for Dry Skin Apply topically as needed. , Disp: , Rfl:     ramipril (ALTACE) 10 MG capsule, TAKE 1 CAPSULE BY MOUTH ONE TIME A DAY, Disp: 90 capsule, Rfl: 1    metoprolol tartrate (LOPRESSOR) 25 MG tablet, TAKE 1 TABLET BY MOUTH 2 TIMES A DAY, Disp: 180 tablet, Rfl: 3    Semaglutide,0.25 or 0.5MG/DOS, (OZEMPIC, 0.25 OR 0.5 MG/DOSE,) 2 MG/1.5ML SOPN, Inject 0.5 mg into the skin once a week Patient takes on .  Patient states instructed to take 0.25 mg x4 weeks; then increase to 0.5 mg , Disp: , Rfl:     naproxen (NAPROSYN) 500 MG tablet, Take 1 tablet by mouth 2 times daily (with meals), Disp: 60 tablet, Rfl: 3    Probiotic Product (PROBIOTIC DAILY PO), Take 1 capsule by mouth daily, Disp: , Rfl:     Ascorbic Acid (VITAMIN C PO), Take by mouth Patient states anesthesiologist instructed her to start Vitamin C. \[facility] [place] [facility]\"" plans to purchase and start taking. (4/24/2021), Disp: , Rfl:     ibuprofen (ADVIL;MOTRIN) 200 MG tablet, Take 400 mg by mouth every 6 hours as needed for Pain, Disp: , Rfl:     omeprazole (PRILOSEC) 40 MG delayed release capsule, Take 1 capsule by mouth daily, Disp: 90 capsule, Rfl: 1    simvastatin (ZOCOR) 40 MG tablet, Take 1 tablet by mouth daily, Disp: 90 tablet, Rfl: 1    Elastic Bandages & Supports (JOBST KNEE HIGH COMPRESSION SM) MISC, Compression stockings 20-30 mm hg knee high bilaterally Dx : Venous Insufficiency, Swelling, Disp: 2 each, Rfl: 2    potassium chloride (KLOR-CON M) 20 MEQ extended release tablet, Take 1 tablet by mouth 2 times daily, Disp: 180 tablet, Rfl: 1    furosemide (LASIX) 40 MG tablet, Take 1 tablet by mouth 2 times daily, Disp: 180 tablet, Rfl: 1    triamcinolone (KENALOG) 0.1 % cream, Apply topically to affected areas 2 times daily. , Disp: 80 g, Rfl: 0    levothyroxine (SYNTHROID) 200 MCG tablet, Take 200 mcg by mouth Daily, Disp: , Rfl:     Insulin Pump - insulin lispro, Inject into the skin continuous 7267-7003: 2.1 units/hr 8646-0709: 1.8 units/hr  5615-7790: 2.2 units/hr, Disp: , Rfl:     docusate sodium (COLACE) 100 MG capsule, Take 100 mg by mouth daily, Disp: , Rfl:     vitamin B-12 (CYANOCOBALAMIN) 500 MCG tablet, Take 1,000 mcg by mouth daily , Disp: , Rfl:     aspirin 81 MG tablet, Take 81 mg by mouth nightly., Disp: , Rfl:     buPROPion (WELLBUTRIN XL) 300 MG extended release tablet, Take 1 tablet by mouth every morning for 7 days, Disp: 7 tablet, Rfl: 0    Allergies:  No Known Allergies    Vitals:    07/30/21 1126   Weight: 278 lb (126.1 kg)   Height: 5' 4\" (1.626 m)       Exam:  Vascular status unchanged. Trickle site is well coapted without signs of infection or issues posterior left heel.  Edematous issues

## 2021-07-30 NOTE — PROGRESS NOTES
Patient is here for post op check left foot. Patient has swelling, pain and has a hard time walking far distances.  Digna Oneill DO 6/30/2021  Electronically signed by Regulo Barros LPN on 2/72/1549 at 73:19 AM

## 2021-08-05 ENCOUNTER — CARE COORDINATION (OUTPATIENT)
Dept: OTHER | Facility: CLINIC | Age: 46
End: 2021-08-05

## 2021-08-05 NOTE — CARE COORDINATION
Associate Care Manager (ACM) called patient for CC outreach. No answer; ACM left HIPAA compliant voicemail message with request for return call. ACM will continue to follow. Davie Mallory RN BSN  Associate Care Manager  Phone: [phone]  Email: [email or web address]

## 2021-08-06 RX ORDER — OMEPRAZOLE 40 MG/1
40 CAPSULE, DELAYED RELEASE ORAL DAILY
Qty: 90 CAPSULE | Refills: 1 | Status: SHIPPED
Start: 2021-08-06 | End: 2022-01-03

## 2021-08-06 RX ORDER — OMEPRAZOLE 40 MG/1
CAPSULE, DELAYED RELEASE ORAL
Qty: 90 CAPSULE | Refills: 1 | OUTPATIENT
Start: 2021-08-06

## 2021-08-13 ENCOUNTER — OFFICE VISIT (OUTPATIENT)
Dept: PODIATRY | Age: 46
End: 2021-08-13
Payer: COMMERCIAL

## 2021-08-13 VITALS — BODY MASS INDEX: 47.46 KG/M2 | WEIGHT: 278 LBS | HEIGHT: 64 IN

## 2021-08-13 DIAGNOSIS — I87.2 VENOUS INSUFFICIENCY (CHRONIC) (PERIPHERAL): ICD-10-CM

## 2021-08-13 DIAGNOSIS — E78.2 MIXED HYPERLIPIDEMIA: ICD-10-CM

## 2021-08-13 DIAGNOSIS — S86.012A ACHILLES TENDON TEAR, LEFT, INITIAL ENCOUNTER: Primary | ICD-10-CM

## 2021-08-13 DIAGNOSIS — E10.9 TYPE 1 DIABETES MELLITUS WITHOUT COMPLICATION (HCC): ICD-10-CM

## 2021-08-13 PROCEDURE — 3052F HG A1C>EQUAL 8.0%<EQUAL 9.0%: CPT | Performed by: PODIATRIST

## 2021-08-13 PROCEDURE — 99213 OFFICE O/P EST LOW 20 MIN: CPT | Performed by: PODIATRIST

## 2021-08-13 RX ORDER — SIMVASTATIN 40 MG
40 TABLET ORAL DAILY
Qty: 90 TABLET | Refills: 1 | Status: SHIPPED
Start: 2021-08-13 | End: 2022-02-08

## 2021-08-13 NOTE — PROGRESS NOTES
Take 1 tablet by mouth 2 times daily (with meals), Disp: 60 tablet, Rfl: 3    Probiotic Product (PROBIOTIC DAILY PO), Take 1 capsule by mouth daily, Disp: , Rfl:     Ascorbic Acid (VITAMIN C PO), Take by mouth Patient states anesthesiologist instructed her to start Vitamin C. States plans to purchase and start taking. (4/24/2021), Disp: , Rfl:     ibuprofen (ADVIL;MOTRIN) 200 MG tablet, Take 400 mg by mouth every 6 hours as needed for Pain, Disp: , Rfl:     Elastic Bandages & Supports (JOBST KNEE HIGH COMPRESSION SM) MISC, Compression stockings 20-30 mm hg knee high bilaterally Dx : Venous Insufficiency, Swelling, Disp: 2 each, Rfl: 2    potassium chloride (KLOR-CON M) 20 MEQ extended release tablet, Take 1 tablet by mouth 2 times daily, Disp: 180 tablet, Rfl: 1    furosemide (LASIX) 40 MG tablet, Take 1 tablet by mouth 2 times daily, Disp: 180 tablet, Rfl: 1    triamcinolone (KENALOG) 0.1 % cream, Apply topically to affected areas 2 times daily. , Disp: 80 g, Rfl: 0    levothyroxine (SYNTHROID) 200 MCG tablet, Take 200 mcg by mouth Daily, Disp: , Rfl:     Insulin Pump - insulin lispro, Inject into the skin continuous 1369-0790: 2.1 units/hr 7219-4046: 1.8 units/hr  3709-0286: 2.2 units/hr, Disp: , Rfl:     docusate sodium (COLACE) 100 MG capsule, Take 100 mg by mouth daily, Disp: , Rfl:     vitamin B-12 (CYANOCOBALAMIN) 500 MCG tablet, Take 1,000 mcg by mouth daily , Disp: , Rfl:     aspirin 81 MG tablet, Take 81 mg by mouth nightly., Disp: , Rfl:     buPROPion (WELLBUTRIN XL) 300 MG extended release tablet, Take 1 tablet by mouth every morning for 7 days, Disp: 7 tablet, Rfl: 0    Allergies:  No Known Allergies    Vitals:    08/13/21 1128   Weight: 278 lb (126.1 kg)   Height: 5' 4\" (1.626 m)       Exam:  Neurovascular status unchanged. Increased range of motion noted left ankle and subtalar joint region. Edematous changes are improved to the left lower extremity.   No incisional site issues noted posterior left heel. Improved gait noted with current shoe gear and insoles. Diagnostic Studies:     No results found. Procedures:    None    Plan Per Assessment  Snow Carpio was seen today for follow-up. Diagnoses and all orders for this visit:    Achilles tendon tear, left, initial encounter  -     Gradual Compression Stockings (GINA)    Type 1 diabetes mellitus without complication (HCC)  -     Gradual Compression Stockings (GINA)    Venous insufficiency (chronic) (peripheral)  -     Gradual Compression Stockings (GINA)      1. Evaluation and management  2. Patient was given a prescription to get evaluated for new compression garments knee-high 30 to 40 mm of strength. 3. Patient is to continue with her current diabetic shoes and insoles with all ambulatory activities. Patient is to continue with her daily stretching and strengthening exercises to improve her gait and strength to be able to return to work within the next few weeks. 4. Patient will be followed up in 2 weeks time or sooner if needed for reevaluation. She was advised to call the office with any questions or concerns in the interim. Seen By:    Sylvester Montgomery DPM    Electronically signed by Sylvester Montgomery DPM on 8/13/2021 at 2:11 PM    This note was created using voice recognition software. The note was reviewed however may contain grammatical errors.

## 2021-08-13 NOTE — PROGRESS NOTES
Patient is here for follow up left foot. Patient received her orthotics about 1 week ago. Patient presents with compression garments. Patient states the back of the foot rubs while driving. Patient states she still gets spasms, not as frequent as they were.  Digna Oneill DO 6/30/2021  Electronically signed by Brian Ruby LPN on 3/97/9162 at 70:74 AM

## 2021-08-19 ENCOUNTER — CARE COORDINATION (OUTPATIENT)
Dept: OTHER | Facility: CLINIC | Age: 46
End: 2021-08-19

## 2021-08-19 NOTE — CARE COORDINATION
Ambulatory Care Coordination Note  8/19/2021  CM Risk Score: 5  Charlson 10 Year Mortality Risk Score: 23%     ACC: Kirt Cope RN    Summary Note: Associate Care Manager (ACM) called patient for CC outreach. Patient reports recovery is \"coming along\" and states return to work date is 9/6/2021. States still with \"issues\" walking length, from point a to point b. States she took son to school yesterday and went in with him to  his school schedule. States she attempted to go up stairs inside the school and was not able to. States she notices she has to start pushing self to ambulate long distances in prep to return to work. Patient reports increased pain/soreness to bilateral feet (L > R) last night which she attributes to walking at son's school yesterday. Patient states has not been taking any medication for pain at this point. Reports has been wearing compression stockings to BLE and notices swelling controlled while stockings are on. States swelling returns in the evening after removing stockings for the day. Patient states Dr. Marco Musa gave RX for new compression stockings but reports since doctor was not adimate that she start using new stockings, she is holding off on filling RX for now. Patient reports surgical site to left foot now healed. States site is \"still rough\" and hurts with any pressure applied. States she received DM shoes/insoles and has been using. Reports pain to left foot when putting shoes on intially but reports DM shoes more comfortable than previous shoes she was wearing regularly. States able to stand and walk without pain now that she is using DM shoes/insoles. Patient reports blood glucose still increasing in the morning just prior to getting out of bed. States when she gets up during the night to use bathroom, blood glucose in 120s; but will increase to 220s by the time she gets up for the day-around 0700. Denies eating in the middle of the night.  States she is thinking about calling Dr. Kori Ramires office to see about having insulin dose increased for early morning. States she was supposed to have appt with Dr. Lenora Valencia yesterday but due to not having labs done prior to appt, she cancelled and will reschedule. Patient reports has been taking 1 mg Ozempic dose, as ordered per Dr. Lenora Valencia. States now taking Ozempic Q Thursday. Reports noticing slight decrease in appetite with increased Ozempic dose. Care Coordination Interventions    Program Enrollment: Complex Care  Referral from Primary Care Provider: No  Suggested Interventions and Community Resources  Diabetes Education: Completed  Fall Risk Prevention: Completed  Physical Therapy: Completed (Comment: Patient current with Be Well with Diabetes program (4/1/2021) )         Goals Addressed                    This Visit's Progress       Patient Stated      COMPLETED: I will get diabetic shoes and insoles. (pt-stated)         I will get diabetic shoes and insoles. Barriers: none  Plan for overcoming my barriers: N/A  Confidence: 8/10  Anticipated Goal Completion Date: 8/30/2021 7/21/2021: Patient waiting delivery of diabetic shoes and insoles from [employer].     8/19/2021: Patient received diabetic shoes and insoles and is wearing. Goal: MET         Other      HEMOGLOBIN A1C < 7         2/16/2021: Patient to reschedule follow up with endocrinology for next A1C check & possible medication adjustment. 3/5/2021: Patient has not yet had A1C rechecked. 4/1/2021: Patient has not had A1C rechecked since 10/2/2020 per Epic records    4/16/2021: Patient had A1C checked = 9.0%.     5/25/2021: Most recent A1C 9.0% (on 4/13/2021). 8/19/2021: Patient to have repeat A1C repeated prior to next appt with Dr. Lenora Valencia. Prior to Admission medications    Medication Sig Start Date End Date Taking?  Authorizing Provider   Semaglutide,0.25 or 0.5MG/DOS, (OZEMPIC, 0.25 OR 0.5 MG/DOSE,) 2 MG/1.5ML SOPN Inject 1 mg into the skin once a week Patient takes on Thursdays   Yes Historical Provider, MD   simvastatin (ZOCOR) 40 MG tablet Take 1 tablet by mouth daily 8/13/21   Kole Terry DO   omeprazole (PRILOSEC) 40 MG delayed release capsule Take 1 capsule by mouth daily 8/6/21   Kole Terry, DO   CPAP Machine MISC by Does not apply route    Historical Provider, MD   OXYGEN Inhale into the lungs nightly Use with CPAP at night    Historical Provider, MD   buPROPion (WELLBUTRIN XL) 300 MG extended release tablet Take 1 tablet by mouth every morning for 7 days 7/1/21 7/8/21  Kole Terry DO   mineral oil-hydrophilic petrolatum (AQUAPHOR) ointment Apply topically as needed for Dry Skin Apply topically as needed. Historical Provider, MD   ramipril (ALTACE) 10 MG capsule TAKE 1 CAPSULE BY MOUTH ONE TIME A DAY 6/8/21   Digna Terry,    metoprolol tartrate (LOPRESSOR) 25 MG tablet TAKE 1 TABLET BY MOUTH 2 TIMES A DAY 6/1/21   Digna Terry DO   naproxen (NAPROSYN) 500 MG tablet Take 1 tablet by mouth 2 times daily (with meals) 5/3/21   Tasia Acosta DPM   Probiotic Product (PROBIOTIC DAILY PO) Take 1 capsule by mouth daily    Historical Provider, MD   Ascorbic Acid (VITAMIN C PO) Take by mouth Patient states anesthesiologist instructed her to start Vitamin C. States plans to purchase and start taking. (4/24/2021)    Historical Provider, MD   ibuprofen (ADVIL;MOTRIN) 200 MG tablet Take 400 mg by mouth every 6 hours as needed for Pain    Historical Provider, MD   Elastic Bandages & Supports (JOBST KNEE HIGH COMPRESSION SM) MISC Compression stockings 20-30 mm hg knee high bilaterally  Dx :  Venous Insufficiency, Swelling 2/8/21   Heydi Foster MD   potassium chloride (KLOR-CON M) 20 MEQ extended release tablet Take 1 tablet by mouth 2 times daily 5/26/20   Kole Terry DO   furosemide (LASIX) 40 MG tablet Take 1 tablet by mouth 2 times daily 5/26/20   Kole Terry DO triamcinolone (KENALOG) 0.1 % cream Apply topically to affected areas 2 times daily. 1/20/20   Digna Terry,    levothyroxine (SYNTHROID) 200 MCG tablet Take 200 mcg by mouth Daily    Historical Provider, MD   Insulin Pump - insulin lispro Inject into the skin continuous 3612-0642: 2.1 units/hr  0264-3988: 1.8 units/hr   8919-6958: 2.2 units/hr    Historical Provider, MD   docusate sodium (COLACE) 100 MG capsule Take 100 mg by mouth daily    Historical Provider, MD   vitamin B-12 (CYANOCOBALAMIN) 500 MCG tablet Take 1,000 mcg by mouth daily     Historical Provider, MD   aspirin 81 MG tablet Take 81 mg by mouth nightly. Historical Provider, MD       Future Appointments   Date Time Provider Kyung Nj   8/27/2021  9:00  [facility] DPM [facility]       Plan:  -Patient to call Dr. Joyce Dumont office and reschedule office visit.    -Patient to have labs drawn prior to Dr. Machelle Sena appointment.    -Patient to fill RX for new compression stockings. -ACM to follow up with patient week of Sept 4 to follow up after patient returns to work. Freddie Huber RN BSN  Associate Care Manager  Phone: [phone]  Email: [email or web address]

## 2021-08-30 ENCOUNTER — OFFICE VISIT (OUTPATIENT)
Dept: PODIATRY | Age: 46
End: 2021-08-30

## 2021-08-30 VITALS — HEIGHT: 64 IN | BODY MASS INDEX: 47.46 KG/M2 | WEIGHT: 278 LBS

## 2021-08-30 DIAGNOSIS — S86.012A ACHILLES TENDON TEAR, LEFT, INITIAL ENCOUNTER: Primary | ICD-10-CM

## 2021-08-30 PROCEDURE — 99024 POSTOP FOLLOW-UP VISIT: CPT | Performed by: PODIATRIST

## 2021-08-30 NOTE — PROGRESS NOTES
Patient is here for follow up left foot. Patient states improvement. She is still having swelling and pain.   Digna Oneill DO  6/30/2021  Electronically signed by Kanchan Smith LPN on 3/16/0518 at 47:47 AM

## 2021-08-30 NOTE — PROGRESS NOTES
21     Fidel Gregorio    : 1975   Sex: female    Age: 51 y.o. Patient's PCP/Provider is:  Digna Oneill DO    Subjective:  Patient is seen today for follow-up regarding continued evaluation regarding Achilles tendon repair left lower extremity. Overall patient is feeling much better at this time with use of the compression stockings and current diabetic shoes and insoles. Patient does believe that she is doing better at this time, and does feel healthy enough to return to work activities. No other additional abnormalities noted. Chief Complaint   Patient presents with    Follow-up     left        ROS:  Const: Positives and pertinent negatives as per HPI. Musculo: Denies symptoms other than stated above. Neuro: Denies symptoms other than stated above. Skin: Denies symptoms other than stated above. Current Medications:    Current Outpatient Medications:     simvastatin (ZOCOR) 40 MG tablet, Take 1 tablet by mouth daily, Disp: 90 tablet, Rfl: 1    omeprazole (PRILOSEC) 40 MG delayed release capsule, Take 1 capsule by mouth daily, Disp: 90 capsule, Rfl: 1    CPAP Machine MISC, by Does not apply route, Disp: , Rfl:     OXYGEN, Inhale into the lungs nightly Use with CPAP at night, Disp: , Rfl:     mineral oil-hydrophilic petrolatum (AQUAPHOR) ointment, Apply topically as needed for Dry Skin Apply topically as needed. , Disp: , Rfl:     ramipril (ALTACE) 10 MG capsule, TAKE 1 CAPSULE BY MOUTH ONE TIME A DAY, Disp: 90 capsule, Rfl: 1    metoprolol tartrate (LOPRESSOR) 25 MG tablet, TAKE 1 TABLET BY MOUTH 2 TIMES A DAY, Disp: 180 tablet, Rfl: 3    Semaglutide,0.25 or 0.5MG/DOS, (OZEMPIC, 0.25 OR 0.5 MG/DOSE,) 2 MG/1.5ML SOPN, Inject 1 mg into the skin once a week Patient takes on , Disp: , Rfl:     naproxen (NAPROSYN) 500 MG tablet, Take 1 tablet by mouth 2 times daily (with meals), Disp: 60 tablet, Rfl: 3    Probiotic Product (PROBIOTIC DAILY PO), Take 1 capsule by mouth daily, Disp: , Rfl:     Ascorbic Acid (VITAMIN C PO), Take by mouth Patient states anesthesiologist instructed her to start Vitamin C. [facility] plans to purchase and start taking. (4/24/2021), Disp: , Rfl:     ibuprofen (ADVIL;MOTRIN) 200 MG tablet, Take 400 mg by mouth every 6 hours as needed for Pain, Disp: , Rfl:     Elastic Bandages & Supports (JOBST KNEE HIGH COMPRESSION SM) MISC, Compression stockings 20-30 mm hg knee high bilaterally Dx : Venous Insufficiency, Swelling, Disp: 2 each, Rfl: 2    potassium chloride (KLOR-CON M) 20 MEQ extended release tablet, Take 1 tablet by mouth 2 times daily, Disp: 180 tablet, Rfl: 1    furosemide (LASIX) 40 MG tablet, Take 1 tablet by mouth 2 times daily, Disp: 180 tablet, Rfl: 1    triamcinolone (KENALOG) 0.1 % cream, Apply topically to affected areas 2 times daily. , Disp: 80 g, Rfl: 0    levothyroxine (SYNTHROID) 200 MCG tablet, Take 200 mcg by mouth Daily, Disp: , Rfl:     Insulin Pump - insulin lispro, Inject into the skin continuous 8129-1422: 2.1 units/hr 3043-2066: 1.8 units/hr  2031-3430: 2.2 units/hr, Disp: , Rfl:     docusate sodium (COLACE) 100 MG capsule, Take 100 mg by mouth daily, Disp: , Rfl:     vitamin B-12 (CYANOCOBALAMIN) 500 MCG tablet, Take 1,000 mcg by mouth daily , Disp: , Rfl:     aspirin 81 MG tablet, Take 81 mg by mouth nightly., Disp: , Rfl:     buPROPion (WELLBUTRIN XL) 300 MG extended release tablet, Take 1 tablet by mouth every morning for 7 days, Disp: 7 tablet, Rfl: 0    Allergies:  No Known Allergies    Vitals:    08/30/21 1008   Weight: 278 lb (126.1 kg)   Height: 5' 4\" (1.626 m)       Exam:  Neurovascular status unchanged. Surgical site well coapted left lower extremity. Edematous changes improved with current compression garments noted bilaterally. Improved gait noted upon evaluation. No other additional abnormalities noted. Diagnostic Studies:     No results found.       Procedures:    None    Plan Per Assessment  Ron Espinal was seen today for follow-up. Diagnoses and all orders for this visit:    Achilles tendon tear, left, initial encounter      1. Postoperative evaluation and management  2. We did advised the patient on appropriate and continued daily stretching and strengthening exercises. 3. Patient is to continue with her current diabetic shoes, insoles, and compression garments daily as well. 4. Patient will be followed up at a later date for continued evaluation and management. Seen By:    Selina Lazaro DPM    Electronically signed by Selina Lazaro DPM on 8/30/2021 at 12:27 PM    This note was created using voice recognition software. The note was reviewed however may contain grammatical errors.

## 2021-09-08 DIAGNOSIS — F41.9 ANXIETY: ICD-10-CM

## 2021-09-08 DIAGNOSIS — R00.2 HEART PALPITATIONS: ICD-10-CM

## 2021-09-08 RX ORDER — ALPRAZOLAM 0.5 MG/1
TABLET ORAL
Qty: 60 TABLET | Refills: 2 | Status: SHIPPED
Start: 2021-09-08 | End: 2021-09-08 | Stop reason: SDUPTHER

## 2021-09-09 ENCOUNTER — CARE COORDINATION (OUTPATIENT)
Dept: OTHER | Facility: CLINIC | Age: 46
End: 2021-09-09

## 2021-09-09 RX ORDER — ALPRAZOLAM 0.5 MG/1
TABLET ORAL
Qty: 60 TABLET | Refills: 2 | Status: SHIPPED
Start: 2021-09-09 | End: 2021-12-08 | Stop reason: SDUPTHER

## 2021-09-10 ENCOUNTER — TELEPHONE (OUTPATIENT)
Dept: PHARMACY | Facility: CLINIC | Age: 46
End: 2021-09-10

## 2021-09-10 NOTE — TELEPHONE ENCOUNTER
As of 9/3/21 patient has used $500 of the maximum of $600 a year in waived co pays for specific medications and pharmacy-related supplies through the [address] [place] for the DM Program.    Patient currently enrolled in the DM Program and is nearing the maximum of $600 a year in waived co pays for specific medications and pharmacy-related supplies through the [address] [place]. Courtesy call placed to patient at mobile number to advise them of the above information. Patient unavailable at the time of call. Message left on mobile TAD: Maximum waived co pays for the DM Program has almost been met. Once maximum has been reached, they will begin to be charged their co-payment once again. I left our number: [phone], option #3 if patient has any questions/concerns.       Reyes [address]   Phone: [phone], option #3

## 2021-09-18 ENCOUNTER — CARE COORDINATION (OUTPATIENT)
Dept: OTHER | Facility: CLINIC | Age: 46
End: 2021-09-18

## 2021-09-18 NOTE — CARE COORDINATION
Ambulatory Care Coordination Note  9/18/2021  CM Risk Score: 5  Charlson 10 Year Mortality Risk Score: 23%     ACC: Nikko Mason RN    Summary Note: Associate Care Manager (ACM) called patient for CC outreach. Patient reports return to work has gone [employer] and reports BLE swelling more controlled now that she is consistently wearing compression stockings. [facility] she does notice left DM shoe to feel tighter than right when she puts shoes on in the morning. Denies shoes rubbing against feet. [facility] has been wearing foam stickers to heels of DM shoes. [facility] she forgot to wear the foam to work this week and she noticed heel pain. Patient reports blood glucose overall \"running high\". Reports blood glucose readings between  over past week. [facility] had blood glucose down to 53 on 9/13/2021 @ 1700. [facility] she ate candy and 2 glucose tabs and BG up to 88 in ~15 minutes. States  later that evening. Patient states she had to cancel appt with Dr. Olegario Castillo (which was scheduled in September 2021) due to had not had labs done yet. [facility] now has VV with Dr. Olegario Castillo 10/13/2021. [facility] she plans to stop into Dr. Gi Jalloh office this week to have CGM downloaded. Also [facility] she is going to discuss possibly being changed to a different medication to replace Ozempic due to not noticing Ozempic keeping BG controlled like it was when she initially started the medication. [facility] she makes sure not to snack at night after dinner. Reports incision to foot now healed. Has follow up with Dr. Celine Novak 9/20/2021. Patient graduated from [address] [place] [address] this date due to goals met and no further needs identified.      Care Coordination Interventions    Program Enrollment: Complex Care  Referral from Primary Care Provider: No  Suggested Interventions and Community Resources  Diabetes Education: Completed  Fall Risk Prevention: Completed  Physical Therapy: Completed (Comment: Patient current with Be Well with Diabetes program (4/1/2021) )       Prior to Admission medications    Medication Sig Start Date End Date Taking? Authorizing Provider   ALPRAZolam Kathy Reed) 0.5 MG tablet TAKE 1 TABLET BY MOUTH TWICE DAILY AS NEEDED FOR SLEEP OR DEPRESSION 9/9/21 10/8/21  Oswaldo [place] Mara, DO   simvastatin (ZOCOR) 40 MG tablet Take 1 tablet by mouth daily 8/13/21   Oswaldo Wilmer Mara, DO   omeprazole (PRILOSEC) 40 MG delayed release capsule Take 1 capsule by mouth daily 8/6/21   Oswaldo Rose Hilljackeline Munguiaes, DO   CPAP Machine MISC by Does not apply route    Historical Provider, MD   OXYGEN Inhale into the lungs nightly Use with CPAP at night    Historical Provider, MD   buPROPion (WELLBUTRIN XL) 300 MG extended release tablet Take 1 tablet by mouth every morning for 7 days 7/1/21 7/8/21  Oswaldo Wilmerjackeline Terry, DO   mineral oil-hydrophilic petrolatum (AQUAPHOR) ointment Apply topically as needed for Dry Skin Apply topically as needed.     Historical Provider, MD   ramipril (ALTACE) 10 MG capsule TAKE 1 CAPSULE BY MOUTH ONE TIME A DAY 6/8/21   Digna Terry, DO   metoprolol tartrate (LOPRESSOR) 25 MG tablet TAKE 1 TABLET BY MOUTH 2 TIMES A DAY 6/1/21   Digna Terry, DO   Semaglutide,0.25 or 0.5MG/DOS, (OZEMPIC, 0.25 OR 0.5 MG/DOSE,) 2 MG/1.5ML SOPN Inject 1 mg into the skin once a week Patient takes on Thursdays    Historical Provider, MD   naproxen (NAPROSYN) 500 MG tablet Take 1 tablet by mouth 2 times daily (with meals) 5/3/21   Selina Plasencia DPM   Probiotic Product (PROBIOTIC DAILY PO) Take 1 capsule by mouth daily    Historical Provider, MD   Ascorbic Acid (VITAMIN C PO) Take by mouth Patient states anesthesiologist instructed her to start Vitamin C. States plans to purchase and start taking. (4/24/2021)    Historical Provider, MD   ibuprofen (ADVIL;MOTRIN) 200 MG tablet Take 400 mg by mouth every 6 hours as needed for Pain    Historical Provider, MD   Elastic Bandages & Supports (JOBST KNEE HIGH COMPRESSION SM) MISC Compression stockings 20-30 mm hg knee high bilaterally  Dx : Venous Insufficiency, Swelling 2/8/21   Samara Childers MD   potassium chloride (KLOR-CON M) 20 MEQ extended release tablet Take 1 tablet by mouth 2 times daily 5/26/20   Pepe Terry,    furosemide (LASIX) 40 MG tablet Take 1 tablet by mouth 2 times daily 5/26/20   Pepe Terry, DO   triamcinolone (KENALOG) 0.1 % cream Apply topically to affected areas 2 times daily. 1/20/20   Digna Terry,    levothyroxine (SYNTHROID) 200 MCG tablet Take 200 mcg by mouth Daily    Historical Provider, MD   Insulin Pump - insulin lispro Inject into the skin continuous 4039-8160: 2.1 units/hr  2156-0400: 1.8 units/hr   9549-5764: 2.2 units/hr    Historical Provider, MD   docusate sodium (COLACE) 100 MG capsule Take 100 mg by mouth daily    Historical Provider, MD   vitamin B-12 (CYANOCOBALAMIN) 500 MCG tablet Take 1,000 mcg by mouth daily     Historical Provider, MD   aspirin 81 MG tablet Take 81 mg by mouth nightly. Historical Provider, MD       Future Appointments   Date Time Provider Kyung Nj   9/20/2021  4:00  [facility] DPM [facility]      and   Diabetes Assessment    Medic Alert ID: Yes (Comment: patient states she does not wear medic alert ID)  Meal Planning: Carb counting   How often do you test your blood sugar?: Other, Meals, Bedtime (Comment: Patient states checks blood glucose 5-6 times per day \"at least\" as she has a [facility] 12/2/2020)   Do you have barriers with adherence to non-pharmacologic self-management interventions?  (Nutrition/Exercise/Self-Monitoring): Yes   Have you ever had to go to the ED for symptoms of low blood sugar?: Yes   What is the date of your last ED visit for low blood sugar?: 10/31/20       Blood Sugars less than 70, Increase or Decrease trend in Blood Sugars, Symptoms of Low Blood Sugar   Do you have hypoglycemia symptoms?: Yes   Frequency of Episodes: 1 Per: Week   Last Blood Sugar Value: 265   Blood Sugar Trends: Fluctuating        Plan:  -Patient graduated from [address] [place] [address] this date with goals met and no further needs identified. Miriam Hensley RN BSN  Associate Care Manager  Phone: [phone]  Email: [email or web address]

## 2021-09-24 DIAGNOSIS — F32.A DEPRESSION, UNSPECIFIED DEPRESSION TYPE: ICD-10-CM

## 2021-09-24 RX ORDER — BUPROPION HYDROCHLORIDE 300 MG/1
300 TABLET ORAL EVERY MORNING
Qty: 7 TABLET | Refills: 0 | Status: SHIPPED
Start: 2021-09-24 | End: 2021-09-28 | Stop reason: SDUPTHER

## 2021-09-28 ENCOUNTER — TELEPHONE (OUTPATIENT)
Dept: FAMILY MEDICINE CLINIC | Age: 46
End: 2021-09-28

## 2021-09-28 DIAGNOSIS — F32.A DEPRESSION, UNSPECIFIED DEPRESSION TYPE: ICD-10-CM

## 2021-09-28 RX ORDER — BUPROPION HYDROCHLORIDE 300 MG/1
300 TABLET ORAL EVERY MORNING
Qty: 90 TABLET | Refills: 1 | Status: SHIPPED
Start: 2021-09-28 | End: 2022-04-04 | Stop reason: SDUPTHER

## 2021-09-28 NOTE — TELEPHONE ENCOUNTER
Patient's Wellbutrin was filled with only 7 pills, could the rest of the script be sent to [facility]

## 2021-10-07 ENCOUNTER — TELEPHONE (OUTPATIENT)
Dept: PHARMACY | Facility: CLINIC | Age: 46
End: 2021-10-07

## 2021-10-07 NOTE — TELEPHONE ENCOUNTER
As of 10/7/21 patient has used $630 of the maximum of $600 a year in waived co pays for specific medications and pharmacy-related supplies through the [address] for the DM Program.    Patient currently enrolled in the DM Program and has used all of the maximum of $600 a year in waived co pays for specific medications and pharmacy-related supplies through the [address]. Courtesy call placed to patient at mobile number to advise them of the above information. Patient unavailable at the time of call. Message left on mobile TAD: Maximum waived co pays for the DM Program has been met and they will begin to be charged their co-payments once again. I left our number: [phone], option #3 if patient has any questions/concerns.       [employer] , [address]   Phone: [phone], option #3

## 2021-10-11 ENCOUNTER — HOSPITAL ENCOUNTER (OUTPATIENT)
Age: 46
Discharge: HOME | End: 2021-10-11
Payer: COMMERCIAL

## 2021-10-11 LAB
ALBUMIN SERPL-MCNC: 4.4 G/DL (ref 3.5–5.2)
ALP BLD-CCNC: 149 U/L (ref 35–104)
ALT SERPL-CCNC: 25 U/L (ref 0–32)
ANION GAP SERPL CALCULATED.3IONS-SCNC: 11 MMOL/L (ref 7–16)
AST SERPL-CCNC: 26 U/L (ref 0–31)
BILIRUB SERPL-MCNC: 0.6 MG/DL (ref 0–1.2)
BUN BLDV-MCNC: 11 MG/DL (ref 6–20)
CALCIUM SERPL-MCNC: 9.6 MG/DL (ref 8.6–10.2)
CHLORIDE BLD-SCNC: 100 MMOL/L (ref 98–107)
CHOLESTEROL, FASTING: 191 MG/DL (ref 0–199)
CO2: 27 MMOL/L (ref 22–29)
CREAT SERPL-MCNC: 0.7 MG/DL (ref 0.5–1)
GFR AFRICAN AMERICAN: >60
GFR NON-AFRICAN AMERICAN: >60 ML/MIN/1.73
GLUCOSE BLD-MCNC: 82 MG/DL (ref 74–99)
HBA1C MFR BLD: 7.9 % (ref 4–5.6)
HDLC SERPL-MCNC: 58 MG/DL
LDL CHOLESTEROL CALCULATED: 99 MG/DL (ref 0–99)
POTASSIUM SERPL-SCNC: 4.4 MMOL/L (ref 3.5–5)
SODIUM BLD-SCNC: 138 MMOL/L (ref 132–146)
T4 FREE: 1.73 NG/DL (ref 0.93–1.7)
TOTAL PROTEIN: 7.4 G/DL (ref 6.4–8.3)
TRIGLYCERIDE, FASTING: 172 MG/DL (ref 0–149)
TSH SERPL DL<=0.05 MIU/L-ACNC: 2.3 UIU/ML (ref 0.27–4.2)
VLDLC SERPL CALC-MCNC: 34 MG/DL

## 2021-10-11 PROCEDURE — 80053 COMPREHEN METABOLIC PANEL: CPT

## 2021-10-11 PROCEDURE — 36415 COLL VENOUS BLD VENIPUNCTURE: CPT

## 2021-10-11 PROCEDURE — 80061 LIPID PANEL: CPT

## 2021-10-11 PROCEDURE — 84439 ASSAY OF FREE THYROXINE: CPT

## 2021-10-11 PROCEDURE — 83721 ASSAY OF BLOOD LIPOPROTEIN: CPT

## 2021-10-11 PROCEDURE — 83036 HEMOGLOBIN GLYCOSYLATED A1C: CPT

## 2021-10-11 PROCEDURE — 84443 ASSAY THYROID STIM HORMONE: CPT

## 2021-10-14 LAB
Lab: NORMAL
REPORT: NORMAL
THIS TEST SENT TO: NORMAL

## 2021-10-23 ENCOUNTER — HOSPITAL ENCOUNTER (EMERGENCY)
Age: 46
Discharge: HOME | End: 2021-10-23
Attending: STUDENT IN AN ORGANIZED HEALTH CARE EDUCATION/TRAINING PROGRAM
Payer: COMMERCIAL

## 2021-10-23 ENCOUNTER — APPOINTMENT (OUTPATIENT)
Dept: CT IMAGING | Age: 46
End: 2021-10-23
Payer: COMMERCIAL

## 2021-10-23 ENCOUNTER — APPOINTMENT (OUTPATIENT)
Dept: GENERAL RADIOLOGY | Age: 46
End: 2021-10-23
Payer: COMMERCIAL

## 2021-10-23 VITALS
HEIGHT: 64 IN | OXYGEN SATURATION: 98 % | BODY MASS INDEX: 45.58 KG/M2 | TEMPERATURE: 98.4 F | DIASTOLIC BLOOD PRESSURE: 66 MMHG | RESPIRATION RATE: 16 BRPM | WEIGHT: 267 LBS | HEART RATE: 78 BPM | SYSTOLIC BLOOD PRESSURE: 138 MMHG

## 2021-10-23 DIAGNOSIS — R11.0 NAUSEA: ICD-10-CM

## 2021-10-23 DIAGNOSIS — R10.13 EPIGASTRIC PAIN: Primary | ICD-10-CM

## 2021-10-23 LAB
ALBUMIN SERPL-MCNC: 4.5 G/DL (ref 3.5–5.2)
ALP BLD-CCNC: 127 U/L (ref 35–104)
ALT SERPL-CCNC: 19 U/L (ref 0–32)
ANION GAP SERPL CALCULATED.3IONS-SCNC: 13 MMOL/L (ref 7–16)
AST SERPL-CCNC: 19 U/L (ref 0–31)
BASOPHILS ABSOLUTE: 0.03 E9/L (ref 0–0.2)
BASOPHILS RELATIVE PERCENT: 0.5 % (ref 0–2)
BILIRUB SERPL-MCNC: 0.9 MG/DL (ref 0–1.2)
BILIRUBIN URINE: NEGATIVE
BLOOD, URINE: NEGATIVE
BUN BLDV-MCNC: 8 MG/DL (ref 6–20)
CALCIUM SERPL-MCNC: 9.7 MG/DL (ref 8.6–10.2)
CHLORIDE BLD-SCNC: 99 MMOL/L (ref 98–107)
CLARITY: CLEAR
CO2: 27 MMOL/L (ref 22–29)
COLOR: YELLOW
CREAT SERPL-MCNC: 0.7 MG/DL (ref 0.5–1)
EKG ATRIAL RATE: 76 BPM
EKG P AXIS: 28 DEGREES
EKG P-R INTERVAL: 152 MS
EKG Q-T INTERVAL: 400 MS
EKG QRS DURATION: 78 MS
EKG QTC CALCULATION (BAZETT): 450 MS
EKG R AXIS: 20 DEGREES
EKG T AXIS: 21 DEGREES
EKG VENTRICULAR RATE: 76 BPM
EOSINOPHILS ABSOLUTE: 0.07 E9/L (ref 0.05–0.5)
EOSINOPHILS RELATIVE PERCENT: 1.1 % (ref 0–6)
GFR AFRICAN AMERICAN: >60
GFR NON-AFRICAN AMERICAN: >60 ML/MIN/1.73
GLUCOSE BLD-MCNC: 191 MG/DL (ref 74–99)
GLUCOSE URINE: NEGATIVE MG/DL
HCT VFR BLD CALC: 42.5 % (ref 34–48)
HEMOGLOBIN: 13.9 G/DL (ref 11.5–15.5)
IMMATURE GRANULOCYTES #: 0.02 E9/L
IMMATURE GRANULOCYTES %: 0.3 % (ref 0–5)
KETONES, URINE: NEGATIVE MG/DL
LEUKOCYTE ESTERASE, URINE: NEGATIVE
LIPASE: 31 U/L (ref 13–60)
LYMPHOCYTES ABSOLUTE: 1.01 E9/L (ref 1.5–4)
LYMPHOCYTES RELATIVE PERCENT: 16.4 % (ref 20–42)
MCH RBC QN AUTO: 30.4 PG (ref 26–35)
MCHC RBC AUTO-ENTMCNC: 32.7 % (ref 32–34.5)
MCV RBC AUTO: 93 FL (ref 80–99.9)
MONOCYTES ABSOLUTE: 0.55 E9/L (ref 0.1–0.95)
MONOCYTES RELATIVE PERCENT: 8.9 % (ref 2–12)
NEUTROPHILS ABSOLUTE: 4.49 E9/L (ref 1.8–7.3)
NEUTROPHILS RELATIVE PERCENT: 72.8 % (ref 43–80)
NITRITE, URINE: NEGATIVE
PDW BLD-RTO: 12.1 FL (ref 11.5–15)
PH UA: 6 (ref 5–9)
PLATELET # BLD: 300 E9/L (ref 130–450)
PMV BLD AUTO: 11 FL (ref 7–12)
POTASSIUM REFLEX MAGNESIUM: 4.3 MMOL/L (ref 3.5–5)
PROTEIN UA: NEGATIVE MG/DL
RBC # BLD: 4.57 E12/L (ref 3.5–5.5)
SODIUM BLD-SCNC: 139 MMOL/L (ref 132–146)
SPECIFIC GRAVITY UA: 1.01 (ref 1–1.03)
TOTAL PROTEIN: 7.3 G/DL (ref 6.4–8.3)
TROPONIN, HIGH SENSITIVITY: 7 NG/L (ref 0–9)
UROBILINOGEN, URINE: 0.2 E.U./DL
WBC # BLD: 6.2 E9/L (ref 4.5–11.5)

## 2021-10-23 PROCEDURE — 2500000003 HC RX 250 WO HCPCS: Performed by: STUDENT IN AN ORGANIZED HEALTH CARE EDUCATION/TRAINING PROGRAM

## 2021-10-23 PROCEDURE — 74177 CT ABD & PELVIS W/CONTRAST: CPT

## 2021-10-23 PROCEDURE — 36415 COLL VENOUS BLD VENIPUNCTURE: CPT

## 2021-10-23 PROCEDURE — 96374 THER/PROPH/DIAG INJ IV PUSH: CPT

## 2021-10-23 PROCEDURE — 84484 ASSAY OF TROPONIN QUANT: CPT

## 2021-10-23 PROCEDURE — 85025 COMPLETE CBC W/AUTO DIFF WBC: CPT

## 2021-10-23 PROCEDURE — 80053 COMPREHEN METABOLIC PANEL: CPT

## 2021-10-23 PROCEDURE — 96375 TX/PRO/DX INJ NEW DRUG ADDON: CPT

## 2021-10-23 PROCEDURE — 83690 ASSAY OF LIPASE: CPT

## 2021-10-23 PROCEDURE — 93010 ELECTROCARDIOGRAM REPORT: CPT | Performed by: INTERNAL MEDICINE

## 2021-10-23 PROCEDURE — 81003 URINALYSIS AUTO W/O SCOPE: CPT

## 2021-10-23 PROCEDURE — 6360000002 HC RX W HCPCS: Performed by: STUDENT IN AN ORGANIZED HEALTH CARE EDUCATION/TRAINING PROGRAM

## 2021-10-23 PROCEDURE — 99283 EMERGENCY DEPT VISIT LOW MDM: CPT

## 2021-10-23 PROCEDURE — 6360000004 HC RX CONTRAST MEDICATION: Performed by: RADIOLOGY

## 2021-10-23 PROCEDURE — 2580000003 HC RX 258: Performed by: STUDENT IN AN ORGANIZED HEALTH CARE EDUCATION/TRAINING PROGRAM

## 2021-10-23 PROCEDURE — 71045 X-RAY EXAM CHEST 1 VIEW: CPT

## 2021-10-23 PROCEDURE — 93005 ELECTROCARDIOGRAM TRACING: CPT | Performed by: STUDENT IN AN ORGANIZED HEALTH CARE EDUCATION/TRAINING PROGRAM

## 2021-10-23 RX ORDER — FAMOTIDINE 20 MG/1
20 TABLET, FILM COATED ORAL 2 TIMES DAILY
Qty: 60 TABLET | Refills: 0 | Status: SHIPPED | OUTPATIENT
Start: 2021-10-23 | End: 2022-11-02 | Stop reason: SDUPTHER

## 2021-10-23 RX ORDER — KETOROLAC TROMETHAMINE 30 MG/ML
15 INJECTION, SOLUTION INTRAMUSCULAR; INTRAVENOUS ONCE
Status: COMPLETED | OUTPATIENT
Start: 2021-10-23 | End: 2021-10-23

## 2021-10-23 RX ORDER — 0.9 % SODIUM CHLORIDE 0.9 %
500 INTRAVENOUS SOLUTION INTRAVENOUS ONCE
Status: COMPLETED | OUTPATIENT
Start: 2021-10-23 | End: 2021-10-23

## 2021-10-23 RX ORDER — ONDANSETRON 4 MG/1
4 TABLET, ORALLY DISINTEGRATING ORAL EVERY 8 HOURS PRN
Qty: 10 TABLET | Refills: 0 | Status: SHIPPED | OUTPATIENT
Start: 2021-10-23 | End: 2022-04-27 | Stop reason: ALTCHOICE

## 2021-10-23 RX ADMIN — FAMOTIDINE 20 MG: 10 INJECTION INTRAVENOUS at 15:13

## 2021-10-23 RX ADMIN — SODIUM CHLORIDE 500 ML: 9 INJECTION, SOLUTION INTRAVENOUS at 15:12

## 2021-10-23 RX ADMIN — KETOROLAC TROMETHAMINE 15 MG: 30 INJECTION, SOLUTION INTRAMUSCULAR at 15:12

## 2021-10-23 RX ADMIN — IOPAMIDOL 75 ML: 755 INJECTION, SOLUTION INTRAVENOUS at 16:26

## 2021-10-23 ASSESSMENT — PAIN DESCRIPTION - LOCATION: LOCATION: ABDOMEN;BACK

## 2021-10-23 ASSESSMENT — PAIN DESCRIPTION - ORIENTATION: ORIENTATION: RIGHT;LEFT;MID

## 2021-10-23 ASSESSMENT — PAIN DESCRIPTION - PROGRESSION: CLINICAL_PROGRESSION: NOT CHANGED

## 2021-10-23 ASSESSMENT — PAIN SCALES - GENERAL
PAINLEVEL_OUTOF10: 5
PAINLEVEL_OUTOF10: 5

## 2021-10-23 ASSESSMENT — PAIN DESCRIPTION - FREQUENCY: FREQUENCY: CONTINUOUS

## 2021-10-23 ASSESSMENT — PAIN DESCRIPTION - ONSET: ONSET: SUDDEN

## 2021-10-23 ASSESSMENT — PAIN DESCRIPTION - PAIN TYPE: TYPE: ACUTE PAIN

## 2021-10-23 ASSESSMENT — PAIN DESCRIPTION - DESCRIPTORS: DESCRIPTORS: PRESSURE

## 2021-10-23 NOTE — ED PROVIDER NOTES
Department of Emergency Medicine   ED  Provider Note  Admit Date/RoomTime: 10/23/2021  2:30 PM  ED Room: 02/02          History of Present Illness:  10/23/21, Time: 2:58 PM EDT  Chief Complaint   Patient presents with    Abdominal Pain     pain in abdomen that is in her back also. Both sides. +nausea. Pain worsens when she eats or drinks. Alina Burgos is a 51 y.o. female presenting to the ED for abdominal pain. The patient states that she has been having abdominal pain for the past 3 days. Is mainly in the epigastrium but radiates to the left and right upper quadrants as well as to her back. She describes it as a pressure sensation. Eating and drinking worsens the pain. It is constant but intermittently worsened by the food. Gas-X seems to help. She also has been feeling bloated. Initially she thought she was constipated so took laxatives and magnesium citrate. She has had a bowel movement without blood. Denies any chest pain, shortness of breath, fevers or cough. No dysuria or hematuria. No history of nephrolithiasis. She drinks alcohol approximately 3 times a week. Denies any trauma. Only abdominal surgery is hernia repair. No nausea or vomiting.     Review of Systems:   Constitutional symptoms: Denies fever  Eyes: Denies eye pain  Ears, Nose, Mouth, Throat: Denies ear pain  Cardiovascular: Denies chest pain  Respiratory: Denies shortness of breath  Gastrointestinal: Positive for abdominal pain and bloating  Genitourinary: Denies hematuria  Skin: Denies rashes  Neurological: Denies headache  Musculoskeletal: Denies extremity pain    --------------------------------------------- PAST HISTORY ---------------------------------------------  Past Medical History:  has a past medical history of Capsulitis, Depression, Diabetes mellitus ([facility] Diabetic frozen shoulder associated with type 1 diabetes mellitus ([facility] GERD (gastroesophageal reflux disease), HTN (hypertension), Hyperlipidemia, Hypothyroid, Insulin pump in place, Nausea & vomiting, Obesity, Obesity, Class III, BMI 40-49.9 (morbid obesity) ([facility] MILENA (obstructive sleep apnea), PONV (postoperative nausea and vomiting), Primary osteoarthritis of knee, Retinopathy due to secondary DM [facility]), Sleep apnea, and Thyroid disease. Past Surgical History:  has a past surgical history that includes shoulder surgery (Left, );  section; Endometrial ablation (); Foot surgery (Bilateral, [id number]); hernia repair (); cardiovascular stress test (2017); Achilles tendon surgery (Left, 2021); and Achilles tendon surgery (Left, 2021). Social History:  reports that she quit smoking about 7 years ago. Her smoking use included cigarettes. She started smoking about 8 years ago. She has a 9.50 pack-year smoking history. She has never used smokeless tobacco. She reports current alcohol use of about 2.0 standard drinks of alcohol per week. She reports that she does not use drugs. Family History: family history includes Cancer in her father and paternal grandfather; Diabetes in her father, maternal grandfather, maternal grandmother, mother, and paternal grandfather; Heart Disease (age of onset: 47) in her mother; No Known Problems in her sister; Other in her father. . Unless otherwise noted, family history is non contributory    The patients home medications have been reviewed. Allergies: Patient has no known allergies. I have reviewed the past medical history, past surgical history, social history, and family history    ---------------------------------------------------PHYSICAL EXAM--------------------------------------    General: The patient is conversational and lying comfortably in bed. Head: Normocephalic and atraumatic. Eyes: Sclera non-icteric and no conjunctival injection  ENT: Nasal and oral membranes appear mildly dry  Neck: Trachea midline. No JVD  Respiratory: Lungs clear to auscultation bilaterally. Patient speaking in full sentences. Cardiovascular: Regular rate. No pedal edema. Gastrointestinal:  Abdomen is soft and nondistended. Bowel sounds present. Tenderness of the epigastrium and right upper quadrant. Negative Merritt sign. There is no guarding or rebound. Genitourinary: No CVA tenderness  Musculoskeletal: No deformity  Skin: Skin warm and dry. No rashes. The patient has her insulin pump in place with needles along the abdomen without surrounding erythema, fluctuance or drainage  Neurologic: No gross motor deficits. No aphasia. Psychiatric: Normal affect.    -------------------------------------------------- RESULTS -------------------------------------------------  I have personally reviewed all laboratory and imaging results for this patient. Results are listed below.      LABS: (Lab results interpreted by me)  Results for orders placed or performed during the hospital encounter of 10/23/21   Urinalysis   Result Value Ref Range    Color, UA Yellow Straw/Yellow    Clarity, UA Clear Clear    Glucose, Ur Negative Negative mg/dL    Bilirubin Urine Negative Negative    Ketones, Urine Negative Negative mg/dL    Specific Gravity, UA 1.015 1.005 - 1.030    Blood, Urine Negative Negative    pH, UA 6.0 5.0 - 9.0    Protein, UA Negative Negative mg/dL    Urobilinogen, Urine 0.2 <2.0 E.U./dL    Nitrite, Urine Negative Negative    Leukocyte Esterase, Urine Negative Negative   Comprehensive Metabolic Panel w/ Reflex to MG   Result Value Ref Range    Sodium 139 132 - 146 mmol/L    Potassium reflex Magnesium 4.3 3.5 - 5.0 mmol/L    Chloride 99 98 - 107 mmol/L    CO2 27 22 - 29 mmol/L    Anion Gap 13 7 - 16 mmol/L    Glucose 191 (H) 74 - 99 mg/dL    BUN 8 6 - 20 mg/dL    CREATININE 0.7 0.5 - 1.0 mg/dL    GFR Non-African American >60 >=60 mL/min/1.73    GFR African American >60     Calcium 9.7 8.6 - 10.2 mg/dL    Total Protein 7.3 6.4 - 8.3 g/dL    Albumin 4.5 3.5 - 5.2 g/dL    Total Bilirubin 0.9 0.0 - 1.2 mg/dL    Alkaline Phosphatase 127 (H) 35 - 104 U/L    ALT 19 0 - 32 U/L    AST 19 0 - 31 U/L   CBC Auto Differential   Result Value Ref Range    WBC 6.2 4.5 - 11.5 E9/L    RBC 4.57 3.50 - 5.50 E12/L    Hemoglobin 13.9 11.5 - 15.5 g/dL    Hematocrit 42.5 34.0 - 48.0 %    MCV 93.0 80.0 - 99.9 fL    MCH 30.4 26.0 - 35.0 pg    MCHC 32.7 32.0 - 34.5 %    RDW 12.1 11.5 - 15.0 fL    Platelets 843 770 - 810 E9/L    MPV 11.0 7.0 - 12.0 fL    Neutrophils % 72.8 43.0 - 80.0 %    Immature Granulocytes % 0.3 0.0 - 5.0 %    Lymphocytes % 16.4 (L) 20.0 - 42.0 %    Monocytes % 8.9 2.0 - 12.0 %    Eosinophils % 1.1 0.0 - 6.0 %    Basophils % 0.5 0.0 - 2.0 %    Neutrophils Absolute 4.49 1.80 - 7.30 E9/L    Immature Granulocytes # 0.02 E9/L    Lymphocytes Absolute 1.01 (L) 1.50 - 4.00 E9/L    Monocytes Absolute 0.55 0.10 - 0.95 E9/L    Eosinophils Absolute 0.07 0.05 - 0.50 E9/L    Basophils Absolute 0.03 0.00 - 0.20 E9/L   Lipase   Result Value Ref Range    Lipase 31 13 - 60 U/L   Troponin   Result Value Ref Range    Troponin, High Sensitivity 7 0 - 9 ng/L   EKG 12 Lead   Result Value Ref Range    Ventricular Rate 76 BPM    Atrial Rate 76 BPM    P-R Interval 152 ms    QRS Duration 78 ms    Q-T Interval 400 ms    QTc Calculation (Bazett) 450 ms    P Axis 28 degrees    R Axis 20 degrees    T Axis 21 degrees   ,     RADIOLOGY:  Interpreted by Radiologist unless otherwise specified  XR CHEST PORTABLE   Final Result   No acute cardiopulmonary process. CT ABDOMEN PELVIS W IV CONTRAST Additional Contrast? None   Final Result   1. Liquid stool in the colon indicates a diarrheal illness. No bowel wall   thickening or obstruction noted. 2. Fibroid uterus.              ------------------------- NURSING NOTES AND VITALS REVIEWED ---------------------------   The nursing notes within the ED encounter and vital signs as below have been reviewed by myself  /66   Pulse 78   Temp 98.4 °F (36.9 °C) (Oral)   Resp 16   Ht 5' 4\" (1.626 m)   Wt 267 lb (121.1 kg)   SpO2 98%   BMI 45.83 kg/m²     Oxygen Saturation Interpretation: Normal    The patients available past medical records and past encounters were reviewed. ------------------------------ ED COURSE/MEDICAL DECISION MAKING----------------------  Medications   0.9 % sodium chloride bolus (0 mLs IntraVENous Stopped 10/23/21 1620)   ketorolac (TORADOL) injection 15 mg (15 mg IntraVENous Given 10/23/21 1512)   famotidine (PEPCID) injection 20 mg (20 mg IntraVENous Given 10/23/21 1513)   iopamidol (ISOVUE-370) 76 % injection 75 mL (75 mLs IntraVENous Given 10/23/21 1626)       Medical Decision Making: This is a 51 y.o. female presenting to the ED for abdominal pain. On initial presentation, the patient's vital signs are interpreted as hypertensive, afebrile and saturating well on room air. Based on history and physical exam, we have a broad differential.  We considered pancreatitis, cholecystitis, biliary colic, gastritis, as well as atypical ACS. The patient does have tenderness but a soft nonsurgical abdomen. At this time will obtain chest x-ray, EKG, CT abdomen pelvis and laboratory studies. The patient will be hydrated and given famotidine as well as Toradol for symptomatic relief. A 12-lead EKG was performed and interpreted by myself. The rate is 76 with normal sinus rhythm and normal axis. There is no ectopy. The AL interval is 152, QRS interval is 78, and QTC interval is 450. The there is a T wave inversion in V1. No acute ST elevation or depression. Good R wave progression. This is normal sinus rhythm with nonspecific abnormality. Laboratory studies show electrolytes within normal limits. Troponin is 7. Alkaline phosphatase mildly elevated LFTs otherwise within normal limits. No leukocytosis or anemia. Urinalysis within normal limits. CT abdomen pelvis shows liquid stool in the colon which indicates diarrheal illness.   No bowel wall thickening or Sharifa Blanca am the primary provider of record    NOTE: This report was transcribed using voice recognition software.  Every effort was made to ensure accuracy; however, inadvertent computerized transcription errors may be present        Carmen Hare MD  10/24/21 9731

## 2021-10-23 NOTE — ED NOTES
Iv established and labs drawn/sent, ekg done     Brittany Herrera RN  10/23/21 9501 Procedure To Be Performed At Next Visit: Cryotherapy Detail Level: Detailed Introduction Text (Please End With A Colon): The following procedure was deferred:

## 2021-10-25 ENCOUNTER — TELEPHONE (OUTPATIENT)
Dept: FAMILY MEDICINE CLINIC | Age: 46
End: 2021-10-25

## 2021-10-25 ENCOUNTER — CARE COORDINATION (OUTPATIENT)
Dept: OTHER | Facility: CLINIC | Age: 46
End: 2021-10-25

## 2021-10-25 RX ORDER — POLYETHYLENE GLYCOL 3350 17 G/17G
17 POWDER, FOR SOLUTION ORAL DAILY PRN
COMMUNITY
End: 2022-04-27

## 2021-10-25 NOTE — CARE COORDINATION
Ambulatory Care Coordination Note  10/25/2021  CM Risk Score: 5  Charlson 10 Year Mortality Risk Score: 23%     ACC: Izquierdo Other, RN    Summary Note: Associate Care Manager [facility]) called patient for CC ED follow up. Patient reports she has had epigastric discomfort that started mid last week. Patient describes discomfort as \"weighted\" feeling. States she has been trying Gas-X, Mag Citrate, Tums PRN with some relief. Patient denies taking Zofran as she states discomfort is not nausea. States she is taking Famotidine BID, as ordered per ED provider. Patient reports frequent belching and states now with metallic taste in mouth. Reports decreased PO intake/appetite since mid-last week when symptoms started. Patient states she has lost ~9 lbs since symptoms started. Reports eating small amount meat and mashed potatoes last night. Patient states she is wondering if she is developing lactose intolerance due to able to see stomach bloating after intake of dairy foods like yogurt recently. Patient states she would like referral to GI. ACM located in network GI providers in patient's area. Patient requested referral to Dr. Emelia Santamaria. AC offered to contact patient's PCP to request referral. Patient accepted offer. Chart routed to PCP. Patient reports constipation recently. States purchased OTC Mag Citrate mid-last week and had small BM on Thursday. Reports scant BM amount on Friday. Denies BM since Friday. Denies abdominal discomfort related to constipation. Patient states endo recently changed insulin pump settings. Reports has been having episodes of hypoglycemia and has taken insulin pump off at times when going low. States blood sugar recently dropped after eating mashed potatoes. Reports was able to get BS back up with couple pieces of candy. Patient agreeable to follow up calls from [employer].      Ambulatory Care Coordination Assessment    Care Coordination Protocol  Program Enrollment: Complex Care  Referral from Primary Care Provider: No  Week 1 - Initial Assessment     Barriers to medication adherence: None  Are you able to afford your medications?: Yes  How often do you have trouble taking your medications the way you have been told to take them?: I always take them as prescribed. Do you have Home O2 Therapy?: No      Ability to seek help/take action for Emergent Urgent situations i.e. fire, crime, inclement weather or health crisis. : Independent  Ability to ambulate to restroom: Independent  Ability handle personal hygeine needs (bathing/dressing/grooming): Independent  Ability to manage Medications: Independent  Ability to prepare Food Preparation: Independent  Ability to maintain home (clean home, laundry): Independent  Ability to drive and/or has transportation: Independent  Ability to do shopping: Independent  Ability to manage finances: Independent  Is patient able to live independently?: Yes     Current Housing: Private Residence  Does the person that you care for see a [facility] PCP?: Yes                 Suggested Interventions and Community Resources   Specialty Service Referral: In Process         Set up/Review an Education Plan, Set up/Review Goals              Prior to Admission medications    Medication Sig Start Date End Date Taking?  Authorizing Provider   Calcium Carbonate Antacid (TUMS PO) Take by mouth as needed (acid reflux, epigastric discomfort)   Yes Historical Provider, MD   polyethylene glycol (MIRALAX) 17 GM/SCOOP powder Take 17 g by mouth daily   Yes Historical Provider, MD   famotidine (PEPCID) 20 MG tablet Take 1 tablet by mouth 2 times daily 10/23/21  Yes Maxim Simms MD   ondansetron (ZOFRAN ODT) 4 MG disintegrating tablet Take 1 tablet by mouth every 8 hours as needed for Nausea or Vomiting 10/23/21  Yes Maxim Simms MD   buPROPion (WELLBUTRIN XL) 300 MG extended release tablet Take 1 tablet by mouth every morning 9/28/21 12/27/21 Yes Digna Terry DO   simvastatin (ZOCOR) 40 MG Not Applicable tablet Take 1 tablet by mouth daily 8/13/21  Yes David Terry, DO   omeprazole (PRILOSEC) 40 MG delayed release capsule Take 1 capsule by mouth daily 8/6/21  Yes David Terry DO   CPAP Machine MISC by Does not apply route   Yes Historical Provider, MD   ramipril (ALTACE) 10 MG capsule TAKE 1 CAPSULE BY MOUTH ONE TIME A DAY 6/8/21  Yes Digna Terry DO   metoprolol tartrate (LOPRESSOR) 25 MG tablet TAKE 1 TABLET BY MOUTH 2 TIMES A DAY 6/1/21  Yes Digna Terry, DO   Semaglutide,0.25 or 0.5MG/DOS, (OZEMPIC, 0.25 OR 0.5 MG/DOSE,) 2 MG/1.5ML SOPN Inject 1 mg into the skin once a week Patient takes on Thursdays   Yes Historical Provider, MD   naproxen (NAPROSYN) 500 MG tablet Take 1 tablet by mouth 2 times daily (with meals) 5/3/21  Yes Boni Copper., DPM   Probiotic Product (PROBIOTIC DAILY PO) Take 1 capsule by mouth daily   Yes Historical Provider, MD   Ascorbic Acid (VITAMIN C PO) Take by mouth Patient states anesthesiologist instructed her to start Vitamin C. States plans to purchase and start taking. (4/24/2021)   Yes Historical Provider, MD   ibuprofen (ADVIL;MOTRIN) 200 MG tablet Take 400 mg by mouth every 6 hours as needed for Pain   Yes Historical Provider, MD   Elastic Bandages & Supports (JOBST KNEE HIGH COMPRESSION SM) MISC Compression stockings 20-30 mm hg knee high bilaterally  Dx : Venous Insufficiency, Swelling 2/8/21  Yes Melchor Lozada MD   levothyroxine (SYNTHROID) 200 MCG tablet Take 200 mcg by mouth Daily   Yes Historical Provider, MD   Insulin Pump - insulin lispro Inject into the skin continuous 0669-1559: 1.9 units/hr  2883-8849: 2.1 units/hr   9481-5419: 2.2 units/hr   Yes Historical Provider, MD   docusate sodium (COLACE) 100 MG capsule Take 100 mg by mouth daily   Yes Historical Provider, MD   vitamin B-12 (CYANOCOBALAMIN) 500 MCG tablet Take 1,000 mcg by mouth daily    Yes Historical Provider, MD   aspirin 81 MG tablet Take 81 mg by mouth nightly. Yes Historical Provider, MD       No future appointments. Plan:  -ACM to route message to PCP to request referral to Dr. Amari Price (done- chart routed). -ACM to follow up with patient later this week regarding GI referral.    Hali De La Cruz RN BSN  Associate Care Manager  Phone: [phone]     Email: [email or web address]

## 2021-10-27 ENCOUNTER — OFFICE VISIT (OUTPATIENT)
Dept: FAMILY MEDICINE CLINIC | Age: 46
End: 2021-10-27
Payer: COMMERCIAL

## 2021-10-27 VITALS
SYSTOLIC BLOOD PRESSURE: 136 MMHG | TEMPERATURE: 97.6 F | HEART RATE: 85 BPM | DIASTOLIC BLOOD PRESSURE: 80 MMHG | WEIGHT: 270 LBS | BODY MASS INDEX: 46.35 KG/M2 | OXYGEN SATURATION: 94 % | RESPIRATION RATE: 18 BRPM

## 2021-10-27 DIAGNOSIS — K58.1 IRRITABLE BOWEL SYNDROME WITH CONSTIPATION: Primary | ICD-10-CM

## 2021-10-27 DIAGNOSIS — K21.9 GASTROESOPHAGEAL REFLUX DISEASE, UNSPECIFIED WHETHER ESOPHAGITIS PRESENT: ICD-10-CM

## 2021-10-27 PROCEDURE — 99214 OFFICE O/P EST MOD 30 MIN: CPT | Performed by: FAMILY MEDICINE

## 2021-10-27 RX ORDER — DICYCLOMINE HYDROCHLORIDE 10 MG/1
10 CAPSULE ORAL 4 TIMES DAILY
Qty: 120 CAPSULE | Refills: 2 | Status: SHIPPED | OUTPATIENT
Start: 2021-10-27

## 2021-10-27 NOTE — PATIENT INSTRUCTIONS
Characteristics and sources of common FODMAPs    Word that corresponds to letter in acronym Compounds in this category Foods that contain these compounds   F Fermentable   O Oligosaccharides Fructans, galacto-oligosaccharides Wheat, barley, rye, onion, talia, white part of spring onion, garlic, shallots, artichokes, beetroot, fennel, peas, chicory, pistachio, cashews, legumes, lentils, and chickpeas   D Disaccharides Lactose Milk, custard, ice cream, and yogurt   M Monosaccharides \"Free fructose\" (fructose in excess of glucose) Apples, pears, mangoes, cherries, watermelon, asparagus, sugar snap peas, honey, high-fructose corn syrup   A And   P Polyols Sorbitol, mannitol, maltitol, and xylitol Apples, pears, apricots, cherries, nectarines, peaches, plums, watermelon, mushrooms, cauliflower, artificially sweetened chewing gum and confectionery   FODMAPs: fermentable oligosaccharides, disaccharides, monosaccharides, and polyols. Adapted by permission from [employer]: [employer] [facility] of Gastroenterology. [facility], [facility]. Short-chain carbohydrates and functional gastrointestinal disorders. Am J Gastroenterol 2013; 108:707. Copyright © 2013. [employer]/savannahg.  Graphic 12183 Version 2.0  © 2021 UpToDate, Inc. and/or its affiliates. All Rights Reserved.

## 2021-10-27 NOTE — PROGRESS NOTES
10/31/2021    Chief Complaint   Patient presents with    Bloated     stomach is getting bloated and distended with anything she eats        HPI    Suki Hart is a 51 y.o. patient that presents today for:    Irritable Bowel Syndrome: Patient complains of abdominal pain. Onset of symptoms was several weeks ago. She describes symptoms as constipation alternating with loose stools. Patient denies weight loss. Symptoms have stabilized and progressed to a point and plateaued. Previous visits for this problem: none. Evaluation to date has been none. Treatment to date has been no changes since last visit. .      Patient's past medical, surgical, social and/or family history reviewed, updated in chart, and are non-contributory (unless otherwise stated). Medications and allergies also reviewed and updated in chart. ROS negative unless otherwise specified    Physical Exam  Temp Readings from Last 3 Encounters:   10/27/21 97.6 °F (36.4 °C)   10/23/21 98.4 °F (36.9 °C) (Oral)   04/20/21 97 °F (36.1 °C) (Skin)     Wt Readings from Last 3 Encounters:   10/27/21 270 lb (122.5 kg)   10/23/21 267 lb (121.1 kg)   08/30/21 278 lb (126.1 kg)     BP Readings from Last 3 Encounters:   10/27/21 136/80   10/23/21 138/66   07/09/21 (!) 131/59     Pulse Readings from Last 3 Encounters:   10/27/21 85   10/23/21 78   07/09/21 85       General appearance: alert, well appearing, and in no distress, oriented to person, place, and time and normal appearing weight. CVS exam: normal rate, regular rhythm, normal S1, S2, no murmurs, rubs, clicks or gallops. Radial pulses 2+ bilateral.  PT/DP pulse 2+ bilat. No C/C/E    Chest: clear to auscultation, no wheezes, rales or rhonchi, symmetric air entry.      Abdomen: Soft, non-tender, non-distended, positive BS in all 4 quadrants    Extremities:Dorsalis pedis pulses palpated bilaterally, no clubbing, cyanosis, edema or erythema,     SKIN: no lesions, jaundice, petechiae, pallor, cyanosis, ecchymosis    NEURO: gross motor exam normal by observation, gait normal    Mental status - alert, oriented to person, place, and time, normal mood, behavior, speech, dress, motor activity, and thought processes      Assessment/Plan  Jose Rivera was seen today for bloated. Diagnoses and all orders for this visit:    Irritable bowel syndrome with constipation  -     Amb External Referral To Gastroenterology    Gastroesophageal reflux disease, unspecified whether esophagitis present    Other orders  -     dicyclomine (BENTYL) 10 MG capsule; Take 1 capsule by mouth 4 times daily          No follow-ups on file. Digna Oneill, DO    Call or go to ED immediately if symptoms worsen or persist.    Educational materials and/or home exercises printed for patient's review and were included in patient instructions on his/her After Visit Summary and given to patient at the end of visit. Counseled regarding above diagnosis, including possible risks and complications,  especially if left uncontrolled. Counseled regarding the possible side effects, risks, benefits and alternatives to treatment; patient and/or guardian verbalizes understanding, agrees, feels comfortable with and wishes to proceed with above treatment plan. Advised patient to call with any new medication issues, and read all Rx info from pharmacy to assure aware of all possible risks and side effects of medication before taking. Reviewed age and gender appropriate health screening exams and vaccinations. Advised patient regarding importance of keeping up with recommended health maintenance and to schedule as soon as possible if overdue, as this is important in assessing for undiagnosed pathology, especially cancer, as well as protecting against potentially harmful/life threatening disease. Patient and/or guardian verbalizes understanding and agrees with above counseling, assessment and plan. All questions answered.

## 2021-10-28 ENCOUNTER — CARE COORDINATION (OUTPATIENT)
Dept: OTHER | Facility: CLINIC | Age: 46
End: 2021-10-28

## 2021-10-28 NOTE — CARE COORDINATION
Ambulatory Care Coordination Note  10/28/2021  CM Risk Score: 5  Charlson 10 Year Mortality Risk Score: 23%     ACC: Samuel Mora RN    Summary Note: Associate Care Manager (ACM) called patient for CC outreach. Patient reports had appt with PCP yesterday and was started on Bentyl and referral was sent to GI. Patient  has started Bentyl and it seems to be helping.  has not yet heard from GI office but reports missed a couple calls when she was at work today. Patient reports still gassy, denies abdominal distention. States still belching. Reports had small BM today; denies discomfort. States today was able to eat breakfast and lunch and now having small amount (mashed potatoes) for dinner. Denies nausea or vomiting. Denoes metallic taste. Patient states had insulin pump turned off today for a while due to hypoglycemia. Reports blood sugar down to 80 & dropping so she ate candy at work which brought BS up. States she has adjusted insulin pump settings to: 7772-5846=1.9 units, 4751-4073=1.9 units, 2686-4161=2.0 units to try to decrease hypoglycemia episodes. Patient to continue to monitor BS. Care Coordination Interventions    Program Enrollment: Complex Care  Referral from Primary Care Provider: No  Suggested Interventions and Community Resources  Specialty Services Referral: In Process (Comment: ACM routed chart to PCP to request GI referral (10/25/2021))         Goals Addressed                    This Visit's Progress       Patient Stated      I will have office visit with GI provider. (pt-stated)   On track      I will have office visit with GI provider. Barriers: stress  Plan for overcoming my barriers: I will work with my ACM to overcome barriers. Confidence: 9/10  Anticipated Goal Completion Date: 11/15/2021    10/25/2021: AC routed message to PCP to request GI referral to Dr. Ankit Guthrie.      10/28/2021: Patient had appointment with PCP yesterday and referral was sent to  [facility]. Patient waiting call to schedule initial office visit. Prior to Admission medications    Medication Sig Start Date End Date Taking?  Authorizing Provider   dicyclomine (BENTYL) 10 MG capsule Take 1 capsule by mouth 4 times daily 10/27/21  Yes Digna Terry DO   Insulin Pump - insulin lispro Inject into the skin continuous 9100-7036: 1.9 units/hr  8006-9805: 2.1 units/hr   0568-2564: 1.9 units/hr   Yes Historical Provider, MD   Calcium Carbonate Antacid (TUMS PO) Take by mouth as needed (acid reflux, epigastric discomfort)    Historical Provider, MD   polyethylene glycol (MIRALAX) 17 GM/SCOOP powder Take 17 g by mouth daily    Historical Provider, MD   famotidine (PEPCID) 20 MG tablet Take 1 tablet by mouth 2 times daily 10/23/21   Lalo Kelly MD   ondansetron (ZOFRAN ODT) 4 MG disintegrating tablet Take 1 tablet by mouth every 8 hours as needed for Nausea or Vomiting 10/23/21   Lalo Kelly MD   buPROPion (WELLBUTRIN XL) 300 MG extended release tablet Take 1 tablet by mouth every morning 9/28/21 12/27/21  Tyree Terry,    simvastatin (ZOCOR) 40 MG tablet Take 1 tablet by mouth daily 8/13/21   Tyree Terry DO   omeprazole (PRILOSEC) 40 MG delayed release capsule Take 1 capsule by mouth daily 8/6/21   Tyree Terry, DO   CPAP Machine MISC by Does not apply route    Historical Provider, MD   ramipril (ALTACE) 10 MG capsule TAKE 1 CAPSULE BY MOUTH ONE TIME A DAY 6/8/21   Digna Terry DO   metoprolol tartrate (LOPRESSOR) 25 MG tablet TAKE 1 TABLET BY MOUTH 2 TIMES A DAY 6/1/21   Digna Terry, DO   Semaglutide,0.25 or 0.5MG/DOS, (OZEMPIC, 0.25 OR 0.5 MG/DOSE,) 2 MG/1.5ML SOPN Inject 1 mg into the skin once a week Patient takes on Thursdays    Historical Provider, MD   naproxen (NAPROSYN) 500 MG tablet Take 1 tablet by mouth 2 times daily (with meals) 5/3/21   Anil Ou., DPM   Probiotic Product (PROBIOTIC DAILY PO) Take 1 capsule by mouth daily Historical Provider, MD   Ascorbic Acid (VITAMIN C PO) Take by mouth Patient states anesthesiologist instructed her to start Vitamin C. States plans to purchase and start taking. (4/24/2021)    Historical Provider, MD   ibuprofen (ADVIL;MOTRIN) 200 MG tablet Take 400 mg by mouth every 6 hours as needed for Pain    Historical Provider, MD   Elastic Bandages & Supports (JOBST KNEE HIGH COMPRESSION SM) MISC Compression stockings 20-30 mm hg knee high bilaterally  Dx : Venous Insufficiency, Swelling 2/8/21   Heriberto Parsons MD   levothyroxine (SYNTHROID) 200 MCG tablet Take 200 mcg by mouth Daily    Historical Provider, MD   docusate sodium (COLACE) 100 MG capsule Take 100 mg by mouth daily    Historical Provider, MD   vitamin B-12 (CYANOCOBALAMIN) 500 MCG tablet Take 1,000 mcg by mouth daily     Historical Provider, MD   aspirin 81 MG tablet Take 81 mg by mouth nightly. Historical Provider, MD       No future appointments. and   Diabetes Assessment    Medic Alert ID: Yes (Comment: patient states she does not wear medic alert ID)  Meal Planning: Carb counting   How often do you test your blood sugar?: Other, Meals, Bedtime (Comment: Patient states checks blood glucose 5-6 times per day \"at least\" as she has a [facility] 12/2/2020)   Do you have barriers with adherence to non-pharmacologic self-management interventions? (Nutrition/Exercise/Self-Monitoring): Yes   Have you ever had to go to the ED for symptoms of low blood sugar?: Yes   What is the date of your last ED visit for low blood sugar?: 10/31/20            Plan:  -Patient waiting call to schedule initial office visit with GI.    -Patient to continue to monitor blood sugar. -ACM to follow up with patient next week. Shannan Esquivel RN BSN  Associate Care Manager  Phone: [phone]  Email: [email or web address]

## 2021-11-06 ENCOUNTER — CARE COORDINATION (OUTPATIENT)
Dept: OTHER | Facility: CLINIC | Age: 46
End: 2021-11-06

## 2021-11-06 NOTE — CARE COORDINATION
Ambulatory Care Coordination Note  11/6/2021  CM Risk Score: 5  Charlson 10 Year Mortality Risk Score: 23%     ACC: Adelita Staples, RN    Summary Note: Associate Care Manager (ACM) called patient for CC outreach. Patient reports had GI office visit with RAMONITA with Dr. Ousmane Segura last week. States she now has EGD scheduled for 12/15/2021 & colonoscopy 12/20/2021. Patient states she was started on Linzess (received samples from office) and told to stop Omeprazole and start Protonix. States she just received new refill of Omeprazole so she is planning to finish that before starting Protonix. States still taking Bentyl PRN and Pepcid BID (afternoon and night). States she had x3 BMs after first dose Linzess. States ate [facility] tenders and mashed potatoes last night for supper and took Bentyl after dinner, Linzess before bed, Pepcid, & Gas-X at bed due to not feeling well after supper. Patient states ate eggs and toast this morning for breakfast and felt \"ok\" after. States had nausea at work on Thursday but did not have Zofran with her at that time. Patient states blood sugar 72 now and dropping but reports has been cleaning inside her home today. Reports BS 94, 295, 214, 250, 94, 121, 209, 202 today; 164, 146, 115, 107, 156, 135, 137, 128, 92, 62 yesterday. States BS in 200s in the morning but she does not eat breakfast so she does not usually take coverage. States BS goes down without intervention when she gets to work as she is more physically active at work. States she still periodically removes insulin pump if/when she has hypoglycemia symptoms. States most recent appt with endo was with CNP (phone visit) ~2 weeks ago but she needs to call office and schedule office follow up with Dr. Gen Mcleod. Patient questioning if Dr. Gen Mcleod is in network for new [place] plan (starting Jan 1, 2022). ACM provided patient with phone number to call to verify network coverage ([phone] available 8a - 8pm M-F).      Care Coordination Interventions    Program Enrollment: Complex Care  Referral from Primary Care Provider: No  Suggested Interventions and Community Resources  Specialty Services Referral: In Process (Comment: ACM routed chart to PCP to request GI referral (10/25/2021))         Goals Addressed                    This Visit's Progress       Patient Stated      COMPLETED: I will have office visit with GI provider. (pt-stated)         I will have office visit with GI provider. Barriers: stress  Plan for overcoming my barriers: I will work with my ACM to overcome barriers. Confidence: 9/10  Anticipated Goal Completion Date: 11/15/2021    10/25/2021: ACM routed message to PCP to request GI referral to Dr. New Miguel. 10/28/2021: Patient had appointment with PCP yesterday and referral was sent to Dr. Robbin Robertson. Patient waiting call to schedule initial office visit. 11/6/2021: Patient had GI office visit last week. Goal :MET            Prior to Admission medications    Medication Sig Start Date End Date Taking?  Authorizing Provider   linaclotide (LINZESS) 145 MCG capsule Take 145 mcg by mouth every morning (before breakfast)   Yes Historical Provider, MD   Simethicone (GAS RELIEF PO) Take by mouth as needed (gas pain)   Yes Historical Provider, MD   dicyclomine (BENTYL) 10 MG capsule Take 1 capsule by mouth 4 times daily 10/27/21  Yes Digna Terry, DO   famotidine (PEPCID) 20 MG tablet Take 1 tablet by mouth 2 times daily 10/23/21  Yes Felipe Bliss MD   omeprazole (PRILOSEC) 40 MG delayed release capsule Take 1 capsule by mouth daily  Patient taking differently: Take 40 mg by mouth daily GI changed to Protonix; patient wants to finish Omeprazole before starting Protonix (11/6/21) 8/6/21  Yes Digna Terry, DO   Calcium Carbonate Antacid (TUMS PO) Take by mouth as needed (acid reflux, epigastric discomfort)    Historical Provider, MD   polyethylene glycol (MIRALAX) 17 GM/SCOOP powder Take 17 g by mouth daily as needed (constipation)     Historical Provider, MD   ondansetron (ZOFRAN ODT) 4 MG disintegrating tablet Take 1 tablet by mouth every 8 hours as needed for Nausea or Vomiting 10/23/21   Kaelyn Bergman MD   buPROPion (WELLBUTRIN XL) 300 MG extended release tablet Take 1 tablet by mouth every morning 9/28/21 12/27/21  Lupe Terry DO   simvastatin (ZOCOR) 40 MG tablet Take 1 tablet by mouth daily 8/13/21   Lupe Terry DO   CPAP Machine MISC by Does not apply route    Historical Provider, MD   ramipril (ALTACE) 10 MG capsule TAKE 1 CAPSULE BY MOUTH ONE TIME A DAY 6/8/21   Digna Terry DO   metoprolol tartrate (LOPRESSOR) 25 MG tablet TAKE 1 TABLET BY MOUTH 2 TIMES A DAY 6/1/21   Digna Terry DO   Semaglutide,0.25 or 0.5MG/DOS, (OZEMPIC, 0.25 OR 0.5 MG/DOSE,) 2 MG/1.5ML SOPN Inject 1 mg into the skin once a week Patient takes on Thursdays    Historical Provider, MD   naproxen (NAPROSYN) 500 MG tablet Take 1 tablet by mouth 2 times daily (with meals) 5/3/21   Lucy Olson DPM   Probiotic Product (PROBIOTIC DAILY PO) Take 1 capsule by mouth daily    Historical Provider, MD   Ascorbic Acid (VITAMIN C PO) Take by mouth Patient states anesthesiologist instructed her to start Vitamin C. States plans to purchase and start taking. (4/24/2021)    Historical Provider, MD   ibuprofen (ADVIL;MOTRIN) 200 MG tablet Take 400 mg by mouth every 6 hours as needed for Pain    Historical Provider, MD   Elastic Bandages & Supports (JOBST KNEE HIGH COMPRESSION SM) MISC Compression stockings 20-30 mm hg knee high bilaterally  Dx :  Venous Insufficiency, Swelling 2/8/21   Basia Anthony MD   levothyroxine (SYNTHROID) 200 MCG tablet Take 200 mcg by mouth Daily    Historical Provider, MD   Insulin Pump - insulin lispro Inject into the skin continuous 7957-3152: 1.9 units/hr  4091-6868: 2.1 units/hr   9742-2919: 1.9 units/hr    Historical Provider, MD   docusate sodium (COLACE) 100 MG capsule Take 100 mg by mouth daily    Historical Provider, MD   vitamin B-12 (CYANOCOBALAMIN) 500 MCG tablet Take 1,000 mcg by mouth daily     Historical Provider, MD   aspirin 81 MG tablet Take 81 mg by mouth nightly. Historical Provider, MD       No future appointments. and   Diabetes Assessment    Medic Alert ID: Yes (Comment: patient states she does not wear medic alert ID)  Meal Planning: Carb counting   How often do you test your blood sugar?: Other, Meals, Bedtime (Comment: Patient states checks blood glucose 5-6 times per day \"at least\" as she has a [facility] 12/2/2020)   Do you have barriers with adherence to non-pharmacologic self-management interventions? (Nutrition/Exercise/Self-Monitoring): Yes   Have you ever had to go to the ED for symptoms of low blood sugar?: Yes   What is the date of your last ED visit for low blood sugar?: 10/31/20       Blood Sugars less than 70   Do you have hyperglycemia symptoms?: No   Do you have hypoglycemia symptoms?:  (Comment: Patient states she \"usually catches\" hypoglycemia symptoms)   Last Blood Sugar Value: 72   Blood Sugar Trends: Fluctuating        Follow-up With  Details  Why  Contact Info   EGD  On 12/15/2021  @ Dr. Dana Suggs office     Colonoscopy  On 12/20/2021  @ Dr. Dana Suggs office         Plan:  -Patient to call Dr. Ariela Hartman office to schedule follow up.    -Patient to start Protonix after finished with Omeprazole. -ACM to follow up with patient in ~2 weeks. Doe Lindsey RN BSN  Associate Care Manager  Phone: [phone]  Email: [email or web address]

## 2021-12-04 ENCOUNTER — CARE COORDINATION (OUTPATIENT)
Dept: OTHER | Facility: CLINIC | Age: 46
End: 2021-12-04

## 2021-12-04 NOTE — CARE COORDINATION
Ambulatory Care Coordination Note  12/4/2021  CM Risk Score: 5  Charlson 10 Year Mortality Risk Score: 23%     ACC: Megan Ayala RN    Summary Note: Associate Care Manager (ACM) called patient for CC outreach. Patient reports now has EGD scheduled for 12/10/2021, colonoscopy 12/20/2021.  has been getting appetite back but still bloated at times around lunch time. Reports still taking Omeprazole; will switch to Protonix once done with Omeprazole. Patient states taking Linzess PRN during the week and daily on weekends due to afraid will have issues with diarrhea at work if taking med daily on weekdays. ACM discussed Linzess and sent medication info sheet to patient via [address] Patient  has BM 2x/week if not taking Linzess. States she needs to  refill of med and plans to do so next week. [facility] med will cost $30 for 30 day supply (with coupon).  took Linzess last night and had BM this morning. Patient reports nausea at times. States she was going to take Zofran a couple days ago but was able to get nausea controlled with Ginger ale. Patient reports still having issues with hypoglycemia. States had episode of headache and dizziness after lunch yesterday at work. [facility] she had just eaten lunch and blood sugar 95; reports BP normal at that time. States blood sugar 78 before eating and denies hypoglycemia symptoms at that time. Denies hypoglycemia symptoms until blood sugar in low 60s. [facility] she still needs to call Dr. Anay Sanchez to schedule follow up and see about having insulin pump rates adjusted. States she plans to call office on Monday. Patient reports blood sugar will be 110-120 early in the morning (before getting up); 160s two hours later and up to low 200s before work. States she does not like to take insulin when in 200s prior to work due to will be physically active at work and blood sugar drops because of this.  States if she takes insulin prior to work, blood sugar drops quickly. Care Coordination Interventions    Program Enrollment: Complex Care  Referral from Primary Care Provider: No  Suggested Interventions and Community Resources  Specialty Services Referral: Completed (Comment: GI referral done)         Goals Addressed                    This Visit's Progress       Patient Stated      I will no longer have hypoglycemia symptoms consistently. (pt-stated)         I will no longer have hypoglycemia symptoms consistently. Barriers: none  Plan for overcoming my barriers: N/A  Confidence: 8/10  Anticipated Goal Completion Date: 12/31/2021 12/4/2021: Patient states having hypoglycemia symptoms regularly. Still needs to call Dr. Ngoc Espana office re: follow up appt and for possible insulin pump dose adjustment. States she will call office on Mon, Dec 6. Prior to Admission medications    Medication Sig Start Date End Date Taking?  Authorizing Provider   linaclotide (LINZESS) 145 MCG capsule Take 145 mcg by mouth every morning (before breakfast)   Yes Historical Provider, MD   Simethicone (GAS RELIEF PO) Take by mouth as needed (gas pain)   Yes Historical Provider, MD   dicyclomine (BENTYL) 10 MG capsule Take 1 capsule by mouth 4 times daily 10/27/21  Yes Digna Terry, DO   Calcium Carbonate Antacid (TUMS PO) Take by mouth as needed (acid reflux, epigastric discomfort)   Yes Historical Provider, MD   polyethylene glycol (MIRALAX) 17 GM/SCOOP powder Take 17 g by mouth daily as needed (constipation)    Yes Historical Provider, MD   famotidine (PEPCID) 20 MG tablet Take 1 tablet by mouth 2 times daily 10/23/21  Yes Karen Greene MD   ondansetron (ZOFRAN ODT) 4 MG disintegrating tablet Take 1 tablet by mouth every 8 hours as needed for Nausea or Vomiting 10/23/21  Yes Karen Greene MD   buPROPion (WELLBUTRIN XL) 300 MG extended release tablet Take 1 tablet by mouth every morning 9/28/21 12/27/21 Yes Digna Terry DO   simvastatin (ZOCOR) 40 MG tablet Take 1 tablet by mouth daily 8/13/21  Yes Lupe Terry, DO   CPAP Machine MISC by Does not apply route   Yes Historical Provider, MD   ramipril (ALTACE) 10 MG capsule TAKE 1 CAPSULE BY MOUTH ONE TIME A DAY 6/8/21  Yes Digna Terry, DO   metoprolol tartrate (LOPRESSOR) 25 MG tablet TAKE 1 TABLET BY MOUTH 2 TIMES A DAY 6/1/21  Yes Digna Terry, DO   Semaglutide,0.25 or 0.5MG/DOS, (OZEMPIC, 0.25 OR 0.5 MG/DOSE,) 2 MG/1.5ML SOPN Inject 1 mg into the skin once a week Patient takes on Thursdays   Yes Historical Provider, MD   naproxen (NAPROSYN) 500 MG tablet Take 1 tablet by mouth 2 times daily (with meals) 5/3/21  Yes Lucy Olson DPM   Probiotic Product (PROBIOTIC DAILY PO) Take 1 capsule by mouth daily   Yes Historical Provider, MD   Ascorbic Acid (VITAMIN C PO) Take by mouth Patient states anesthesiologist instructed her to start Vitamin C. States plans to purchase and start taking. (4/24/2021)   Yes Historical Provider, MD   ibuprofen (ADVIL;MOTRIN) 200 MG tablet Take 400 mg by mouth every 6 hours as needed for Pain   Yes Historical Provider, MD   Elastic Bandages & Supports (JOBST KNEE HIGH COMPRESSION SM) MISC Compression stockings 20-30 mm hg knee high bilaterally  Dx : Venous Insufficiency, Swelling 2/8/21  Yes Basia Anthony MD   levothyroxine (SYNTHROID) 200 MCG tablet Take 200 mcg by mouth Daily   Yes Historical Provider, MD   Insulin Pump - insulin lispro Inject into the skin continuous 2468-0204: 1.9 units/hr  3942-4452: 2.1 units/hr   8719-7334: 1.9 units/hr   Yes Historical Provider, MD   docusate sodium (COLACE) 100 MG capsule Take 100 mg by mouth daily   Yes Historical Provider, MD   vitamin B-12 (CYANOCOBALAMIN) 500 MCG tablet Take 1,000 mcg by mouth daily    Yes Historical Provider, MD   aspirin 81 MG tablet Take 81 mg by mouth nightly.    Yes Historical Provider, MD   omeprazole (PRILOSEC) 40 MG delayed release capsule Take 1 capsule by mouth daily  Patient not taking: Reported on 12/4/2021 8/6/21   Celsa Terry, DO        Diabetes Assessment    Medic Alert ID: Yes (Comment: patient states she does not wear medic alert ID)  Meal Planning: Carb counting   How often do you test your blood sugar?: Other, Meals, Bedtime (Comment: Patient states checks blood glucose 5-6 times per day \"at least\" as she has a [facility] 12/2/2020)   Do you have barriers with adherence to non-pharmacologic self-management interventions? (Nutrition/Exercise/Self-Monitoring): Yes   Have you ever had to go to the ED for symptoms of low blood sugar?: Yes   What is the date of your last ED visit for low blood sugar?: 10/31/20       Symptoms of Low Blood Sugar, Blood Sugars less than 70   Last Blood Sugar Value: 210   Blood Sugar Trends: Fluctuating        Follow-up With  Details  Why  Contact Info   EGD  On 12/10/2021       Colonoscopy  On 12/20/2021       Rima Huston, DO    Patient to call and schedule office visit  Anjum Aldridge [phone]     Plan:  -Patient to call Dr. Amari Price office on Monday re: office visit and insulin pump adjustments needed due to hypoglycemia episodes.    -Patient to take Linzess daily as ordered. -ACM to follow up with patient in ~2 weeks. Hali De La Cruz RN BSN  Associate Care Manager  Phone: [phone]  Email: [email or web address]

## 2021-12-07 ENCOUNTER — CLINICAL DOCUMENTATION (OUTPATIENT)
Dept: PHARMACY | Facility: CLINIC | Age: 46
End: 2021-12-07

## 2021-12-08 DIAGNOSIS — R00.2 HEART PALPITATIONS: ICD-10-CM

## 2021-12-08 DIAGNOSIS — F41.9 ANXIETY: ICD-10-CM

## 2021-12-08 RX ORDER — ALPRAZOLAM 0.5 MG/1
TABLET ORAL
Qty: 60 TABLET | Refills: 2 | Status: SHIPPED
Start: 2021-12-08 | End: 2022-03-18 | Stop reason: SDUPTHER

## 2021-12-30 ENCOUNTER — TELEPHONE (OUTPATIENT)
Dept: PHARMACY | Facility: CLINIC | Age: 46
End: 2021-12-30

## 2021-12-30 NOTE — TELEPHONE ENCOUNTER
2022 Annual Pharmacist Visit    Called patient to schedule 2022 yearly pharmacist appointment to discuss medications for Diabetes Management Program.    No answer. Left VM on [phone] TAD: Please call back at [phone] Option #3.      [facility]   Department, toll free: [phone] Option #3

## 2022-01-03 RX ORDER — CELECOXIB 200 MG/1
CAPSULE ORAL
Qty: 90 CAPSULE | Refills: 1 | Status: SHIPPED
Start: 2022-01-03 | End: 2022-08-25

## 2022-01-03 RX ORDER — OMEPRAZOLE 40 MG/1
CAPSULE, DELAYED RELEASE ORAL
Qty: 90 CAPSULE | Refills: 1 | Status: SHIPPED
Start: 2022-01-03 | End: 2022-08-31

## 2022-01-03 NOTE — TELEPHONE ENCOUNTER
Second attempt made to contact patient to schedule 2022 yearly pharmacist appointment to discuss medications for Diabetes Management Program.    No answer. Left VM on [phone] TAD: Please call back at [phone] Option #3. [employer] message sent to patient.       [facility]   Department, toll free: [phone] Option #3        For Pharmacy Admin Tracking Only     CPA in place:  No   Time Spent (min): 10

## 2022-01-05 ENCOUNTER — OFFICE VISIT (OUTPATIENT)
Dept: PRIMARY CARE CLINIC | Age: 47
End: 2022-01-05
Payer: COMMERCIAL

## 2022-01-05 VITALS
SYSTOLIC BLOOD PRESSURE: 121 MMHG | OXYGEN SATURATION: 96 % | TEMPERATURE: 97 F | BODY MASS INDEX: 44.22 KG/M2 | WEIGHT: 259 LBS | HEIGHT: 64 IN | RESPIRATION RATE: 18 BRPM | DIASTOLIC BLOOD PRESSURE: 63 MMHG | HEART RATE: 77 BPM

## 2022-01-05 DIAGNOSIS — J06.9 VIRAL URI WITH COUGH: Primary | ICD-10-CM

## 2022-01-05 DIAGNOSIS — J06.9 VIRAL URI WITH COUGH: ICD-10-CM

## 2022-01-05 LAB
Lab: NORMAL
PERFORMING INSTRUMENT: NORMAL
QC PASS/FAIL: NORMAL
SARS-COV-2, POC: NORMAL

## 2022-01-05 PROCEDURE — 87426 SARSCOV CORONAVIRUS AG IA: CPT | Performed by: NURSE PRACTITIONER

## 2022-01-05 PROCEDURE — 99213 OFFICE O/P EST LOW 20 MIN: CPT | Performed by: NURSE PRACTITIONER

## 2022-01-05 RX ORDER — BROMPHENIRAMINE MALEATE, PSEUDOEPHEDRINE HYDROCHLORIDE, AND DEXTROMETHORPHAN HYDROBROMIDE 2; 30; 10 MG/5ML; MG/5ML; MG/5ML
5 SYRUP ORAL 4 TIMES DAILY PRN
Qty: 118 ML | Refills: 0 | Status: SHIPPED
Start: 2022-01-05 | End: 2022-02-01 | Stop reason: ALTCHOICE

## 2022-01-05 NOTE — PROGRESS NOTES
Chief Complaint:   Concern For COVID-19      History of Present Illness   Source of history provided by:  patient. Princess Sutton is a 51 y.o. old female with a past medical history of:   Past Medical History:   Diagnosis Date    Capsulitis     right shoulder    Depression     Diabetes mellitus ([address]     AGE 2, TYPE 1  HAS INSULIN PUMP    Diabetic frozen shoulder associated with type 1 diabetes mellitus ([address]     RIGHT    GERD (gastroesophageal reflux disease)     HTN (hypertension)     Hyperlipidemia     Hypothyroid 2012    Insulin pump in place     Nausea & vomiting     resolved    Obesity 2012    Obesity, Class III, BMI 40-49.9 (morbid obesity) ([address] 2018    BMI 47.3    MILENA (obstructive sleep apnea) 2012    USES CPAP    PONV (postoperative nausea and vomiting)     Primary osteoarthritis of knee     B/L    Retinopathy due to secondary DM ([address]     LASER TX    Sleep apnea 3/2/2012    uses cpap, instructed to bring    Thyroid disease         Pt presents to the ready care with a cough/sinus pressure/eraches for the past few days. States the cough is non productive. No fever noted. Denies any N/V/D, abdominal pain, CP, progressive SOB, dizziness, or lethargy. Pt works [employer]    Unless otherwise stated in this report or unable to obtain because of the patient's clinical or mental status as evidenced by the medical record, this patients's positive and negative responses for Review of Systems, constitutional, psych, eyes, ENT, cardiovascular, respiratory, gastrointestinal, neurological, genitourinary, musculoskeletal, integument systems and systems related to the presenting problem are either stated in the preceding or were not pertinent or were negative for the symptoms and/or complaints related to the medical problem. Past Surgical History:  has a past surgical history that includes shoulder surgery (Left, );   section; Endometrial ablation (2013); Foot surgery (Bilateral, [id number]); hernia repair (2015); cardiovascular stress test (03/2017); Achilles tendon surgery (Left, 04/20/2021); and Achilles tendon surgery (Left, 4/20/2021). Social History:  reports that she quit smoking about 7 years ago. Her smoking use included cigarettes. She started smoking about 9 years ago. She has a 9.50 pack-year smoking history. She has never used smokeless tobacco. She reports current alcohol use of about 2.0 standard drinks of alcohol per week. She reports that she does not use drugs. Family History: family history includes Cancer in her father and paternal grandfather; Diabetes in her father, maternal grandfather, maternal grandmother, mother, and paternal grandfather; Heart Disease (age of onset: 47) in her mother; No Known Problems in her sister; Other in her father. Allergies: Patient has no known allergies. Physical Exam         VS:  /63   Pulse 77   Temp 97 °F (36.1 °C)   Resp 18   Ht 5' 4\" (1.626 m)   Wt 259 lb (117.5 kg)   SpO2 96%   BMI 44.46 kg/m²    Oxygen Saturation Interpretation: Normal.    Constitutional:  Alert, development consistent with age. Ears:  External Ears: Normal bilateral pinna. TM's & External Canals: TM's normal bilaterally without perforation. Canals without erythema or drainage. Nose:   There is no obvious septal defect. Mild redness  Mouth:  Moist bucca mucosa and normal tongue. Throat: Mild posterior pharyngeal erythema without exudates or lesions. Neck:  Supple. There is no obvious adenopathy or neck tenderness. Lungs:   Breath sounds: Normal chest expansion and breath sounds noted throughout. No wheezes, rales, or rhonchi noted. Heart:  Regular rate and rhythm, normal heart sounds, without pathological murmurs, ectopy, gallops, or rubs. Skin:  Normal turgor. Warm, dry, without visible rash. Neurological:  Oriented. Motor functions intact.        Marylen Blacksmith / Yeni Nettles Results   (All laboratory and radiology results have been personally reviewed by myself)  Labs:  Results for orders placed or performed in visit on 01/05/22   POCT COVID-19, Antigen   Result Value Ref Range    SARS-COV-2, POC Not-Detected Not Detected    Lot Number [id number]     QC Pass/Fail pass     Performing Instrument BD Veritor        Imaging: All Radiology results interpreted by Radiologist unless otherwise noted. No orders to display         Assessment / Plan     Impression(s):  1. Viral URI with cough      Disposition:  Disposition: Advised covid test is negative-covid ambulatory test sent to lab, start Bromfed      ER if changes or worse. Advised to take medication as directed.

## 2022-01-06 RX ORDER — DOXYCYCLINE HYCLATE 100 MG
100 TABLET ORAL 2 TIMES DAILY
Qty: 20 TABLET | Refills: 0 | Status: SHIPPED | OUTPATIENT
Start: 2022-01-06 | End: 2022-01-16

## 2022-01-07 LAB
SARS-COV-2: NOT DETECTED
SOURCE: NORMAL

## 2022-01-08 ENCOUNTER — CARE COORDINATION (OUTPATIENT)
Dept: OTHER | Facility: CLINIC | Age: 47
End: 2022-01-08

## 2022-01-15 ENCOUNTER — HOSPITAL ENCOUNTER (OUTPATIENT)
Age: 47
Discharge: HOME | End: 2022-01-15
Payer: COMMERCIAL

## 2022-01-15 LAB
ALBUMIN SERPL-MCNC: 4.2 G/DL (ref 3.5–5.2)
ALP BLD-CCNC: 137 U/L (ref 35–104)
ALT SERPL-CCNC: 24 U/L (ref 0–32)
ANION GAP SERPL CALCULATED.3IONS-SCNC: 11 MMOL/L (ref 7–16)
AST SERPL-CCNC: 24 U/L (ref 0–31)
BILIRUB SERPL-MCNC: 0.8 MG/DL (ref 0–1.2)
BUN BLDV-MCNC: 11 MG/DL (ref 6–20)
CALCIUM SERPL-MCNC: 9.6 MG/DL (ref 8.6–10.2)
CHLORIDE BLD-SCNC: 102 MMOL/L (ref 98–107)
CO2: 26 MMOL/L (ref 22–29)
CREAT SERPL-MCNC: 0.6 MG/DL (ref 0.5–1)
CREATININE URINE: 112 MG/DL (ref 29–226)
GFR AFRICAN AMERICAN: >60
GFR NON-AFRICAN AMERICAN: >60 ML/MIN/1.73
GLUCOSE BLD-MCNC: 165 MG/DL (ref 74–99)
HBA1C MFR BLD: 7.7 % (ref 4–5.6)
MICROALBUMIN UR-MCNC: <12 MG/L
MICROALBUMIN/CREAT UR-RTO: ABNORMAL (ref 0–30)
POTASSIUM SERPL-SCNC: 4.5 MMOL/L (ref 3.5–5)
SODIUM BLD-SCNC: 139 MMOL/L (ref 132–146)
TOTAL PROTEIN: 7.3 G/DL (ref 6.4–8.3)

## 2022-01-15 PROCEDURE — 36415 COLL VENOUS BLD VENIPUNCTURE: CPT

## 2022-01-15 PROCEDURE — 80053 COMPREHEN METABOLIC PANEL: CPT

## 2022-01-15 PROCEDURE — 84443 ASSAY THYROID STIM HORMONE: CPT

## 2022-01-15 PROCEDURE — 83036 HEMOGLOBIN GLYCOSYLATED A1C: CPT

## 2022-01-15 PROCEDURE — 80061 LIPID PANEL: CPT

## 2022-01-15 PROCEDURE — 82570 ASSAY OF URINE CREATININE: CPT

## 2022-01-15 PROCEDURE — 82044 UR ALBUMIN SEMIQUANTITATIVE: CPT

## 2022-01-15 PROCEDURE — 84439 ASSAY OF FREE THYROXINE: CPT

## 2022-01-15 PROCEDURE — 84481 FREE ASSAY (FT-3): CPT

## 2022-01-16 LAB
CHOLESTEROL, TOTAL: 161 MG/DL (ref 0–199)
HDLC SERPL-MCNC: 58 MG/DL
LDL CHOLESTEROL CALCULATED: 83 MG/DL (ref 0–99)
T3 FREE: 3.2 PG/ML (ref 2–4.4)
T4 FREE: 2 NG/DL (ref 0.93–1.7)
TRIGL SERPL-MCNC: 99 MG/DL (ref 0–149)
TSH SERPL DL<=0.05 MIU/L-ACNC: 0.82 UIU/ML (ref 0.27–4.2)
VLDLC SERPL CALC-MCNC: 20 MG/DL

## 2022-01-21 LAB
AVERAGE GLUCOSE: NORMAL
HBA1C MFR BLD: 7.7 %

## 2022-01-24 ENCOUNTER — CARE COORDINATION (OUTPATIENT)
Dept: OTHER | Facility: CLINIC | Age: 47
End: 2022-01-24

## 2022-01-24 NOTE — CARE COORDINATION
Associate Care Manager (ACM) called patient for CC outreach. No answer; unable to leave voicemail message due to mailbox full. ACM sent lost to follow up letter to patient via [address] ACM will continue to follow. Ulysses Rous, MSN RN  Associate Care Manager  Phone: [phone]  Email: [email or web address]

## 2022-02-01 ENCOUNTER — CARE COORDINATION (OUTPATIENT)
Dept: OTHER | Facility: CLINIC | Age: 47
End: 2022-02-01

## 2022-02-03 NOTE — CARE COORDINATION
Ambulatory Care Coordination Note  2/1/2022  CM Risk Score: 5  Charlson 10 Year Mortality Risk Score: 23%     ACC: Fausto Antoine, RN    Summary Note: Associate Care Manager (ACM) called patient for CC outreach. Patient reports had EGD & colonoscopy and both were WNL. States had recent virtual visit follow up with Dr. Debora Fabry but did not adjust insulin pump as he wanted to wait until next in person visit to do so. Patient states she had been doing pretty good following DM diet until recently when she had pizza & cake at a birthday party. Patient states forgot Ozempic dose for a couple days and noticed increased appetite when off med. States she noticed injection site \"bubbled\" after Ozempic dose. States had issues with BS in 400s back to back. States had low carb supper last night and AM . States BS now low 90s. Patient reports woke up in middle of night last night and checked BS- states was 50. Denies feeling low but states ate a cookie and BS came up. Patient has first version Jones so it does not alarm when BS out of range. ACM discussed importance of getting CGM that alarms due to low BS middle of night. Patient states would like to work on getting new insulin pump that has CGM built in. States plans to discuss this with Dr. Debora Fabry at next office visit. Patient states needs to save additional money prior to getting new pump. Patient reports taking Betty Shawna in the evening but still working up to taking daily. States plans to try to increase to daily this week. Reports multiple BMs/day. States URI resolved with antibiotic couple weeks ago. Denies needs. Patient graduated from [address] [place] [address] this date with goals met/partially met and no further needs.       Care Coordination Interventions    Program Enrollment: Complex Care  Referral from Primary Care Provider: No  Suggested Interventions and Community Resources  Specialty Services Referral: Completed (Comment: GI referral done)         Goals Addressed This Visit's Progress       Patient Stated      COMPLETED: I will no longer have hypoglycemia symptoms consistently. (pt-stated)         I will no longer have hypoglycemia symptoms consistently. Barriers: none  Plan for overcoming my barriers: N/A  Confidence: 8/10  Anticipated Goal Completion Date: 2/3/2022    12/4/2021: Patient states having hypoglycemia symptoms regularly. Still needs to call Dr. Raquel Parrish office re: follow up appt and for possible insulin pump dose adjustment. States she will call office on Mon, Dec 6.     1/8/2022: Patient reports has had x1 recent hypoglycemia episode which she states was when she woke up from sleeping. Reports blood sugar was 64 and came up after eating. States she is wondering if she should turn insulin pump off during the night due to that being when hypoglycemia episodes are hitting. ACM advised patient to discuss this with endocrinology at virtual visit on 1/19/2022. Patient states she will do so. 2/1/2022: Patient reports occasional night time hypoglycemia episodes. ACM advised her to reach out to endo for possible insulin pump adjustment. Goal: PARTIALLY MET            Prior to Admission medications    Medication Sig Start Date End Date Taking?  Authorizing Provider   celecoxib (CELEBREX) 200 MG capsule TAKE 1 CAPSULE BY MOUTH ONE TIME A DAY 1/3/22   Digna Terry, DO   omeprazole (PRILOSEC) 40 MG delayed release capsule TAKE 1 CAPSULE BY MOUTH ONE TIME A DAY 1/3/22   Digna Terry, DO   linaclotide (LINZESS) 145 MCG capsule Take 145 mcg by mouth every morning (before breakfast)    Historical Provider, MD   Simethicone (GAS RELIEF PO) Take by mouth as needed (gas pain)    Historical Provider, MD   dicyclomine (BENTYL) 10 MG capsule Take 1 capsule by mouth 4 times daily 10/27/21   Gregorio Terry, DO   Calcium Carbonate Antacid (TUMS PO) Take by mouth as needed (acid reflux, epigastric discomfort)    Historical Provider, MD   polyethylene glycol (MIRALAX) 17 GM/SCOOP powder Take 17 g by mouth daily as needed (constipation)     Historical Provider, MD   famotidine (PEPCID) 20 MG tablet Take 1 tablet by mouth 2 times daily 10/23/21   Margo Quintero MD   ondansetron (ZOFRAN ODT) 4 MG disintegrating tablet Take 1 tablet by mouth every 8 hours as needed for Nausea or Vomiting 10/23/21   Mrago Quintero MD   buPROPion (WELLBUTRIN XL) 300 MG extended release tablet Take 1 tablet by mouth every morning 9/28/21 12/27/21  Sherron Terry,    simvastatin (ZOCOR) 40 MG tablet Take 1 tablet by mouth daily 8/13/21   Sherron Terry, DO   CPAP Machine MISC by Does not apply route    Historical Provider, MD   ramipril (ALTACE) 10 MG capsule TAKE 1 CAPSULE BY MOUTH ONE TIME A DAY 6/8/21   Digna Terry,    metoprolol tartrate (LOPRESSOR) 25 MG tablet TAKE 1 TABLET BY MOUTH 2 TIMES A DAY 6/1/21   Digna Terry,    Semaglutide,0.25 or 0.5MG/DOS, (OZEMPIC, 0.25 OR 0.5 MG/DOSE,) 2 MG/1.5ML SOPN Inject 1 mg into the skin once a week Patient takes on Thursdays    Historical Provider, MD   naproxen (NAPROSYN) 500 MG tablet Take 1 tablet by mouth 2 times daily (with meals) 5/3/21   Janelle Abbasi DPM   Probiotic Product (PROBIOTIC DAILY PO) Take 1 capsule by mouth daily    Historical Provider, MD   Ascorbic Acid (VITAMIN C PO) Take by mouth Patient states anesthesiologist instructed her to start Vitamin C. [facility] plans to purchase and start taking. (4/24/2021)    Historical Provider, MD   ibuprofen (ADVIL;MOTRIN) 200 MG tablet Take 400 mg by mouth every 6 hours as needed for Pain    Historical Provider, MD   Elastic Bandages & Supports (JOBST KNEE HIGH COMPRESSION SM) MISC Compression stockings 20-30 mm hg knee high bilaterally  Dx :  Venous Insufficiency, Swelling 2/8/21   Eleanor Mari MD   levothyroxine (SYNTHROID) 200 MCG tablet Take 200 mcg by mouth Daily    Historical Provider, MD   Insulin Pump - insulin lispro Inject into the skin continuous 6276-8110: 1.9 units/hr  4877-5711: 2.1 units/hr   4812-9974: 1.9 units/hr    Historical Provider, MD   docusate sodium (COLACE) 100 MG capsule Take 100 mg by mouth daily    Historical Provider, MD   vitamin B-12 (CYANOCOBALAMIN) 500 MCG tablet Take 1,000 mcg by mouth daily     Historical Provider, MD   aspirin 81 MG tablet Take 81 mg by mouth nightly. Historical Provider, MD       No future appointments. and   Diabetes Assessment    Medic Alert ID: Yes (Comment: patient states she does not wear medic alert ID)  Meal Planning: Carb counting   How often do you test your blood sugar?: Other, Meals, Bedtime (Comment: Patient states checks blood glucose 5-6 times per day \"at least\" as she has a [facility] 12/2/2020)   Do you have barriers with adherence to non-pharmacologic self-management interventions? (Nutrition/Exercise/Self-Monitoring): Yes   Have you ever had to go to the ED for symptoms of low blood sugar?: Yes   What is the date of your last ED visit for low blood sugar?: 10/31/20            Plan:  -Patient graduated from [address] [place] [address] this date with goals met/partially met and no further needs. Raphael De Leon MSN RN  Associate Care Manager  Phone: [phone]  Email: [email or web address]

## 2022-02-08 DIAGNOSIS — E78.2 MIXED HYPERLIPIDEMIA: ICD-10-CM

## 2022-02-08 RX ORDER — SIMVASTATIN 40 MG
TABLET ORAL
Qty: 90 TABLET | Refills: 1 | Status: SHIPPED
Start: 2022-02-08 | End: 2022-08-16

## 2022-02-09 DIAGNOSIS — I10 ESSENTIAL HYPERTENSION: ICD-10-CM

## 2022-02-09 RX ORDER — RAMIPRIL 10 MG/1
CAPSULE ORAL
Qty: 90 CAPSULE | Refills: 1 | Status: SHIPPED | OUTPATIENT
Start: 2022-02-09 | End: 2022-08-31 | Stop reason: SDUPTHER

## 2022-02-18 ENCOUNTER — TELEPHONE (OUTPATIENT)
Dept: PHARMACY | Facility: CLINIC | Age: 47
End: 2022-02-18

## 2022-02-18 NOTE — TELEPHONE ENCOUNTER
2022 Annual Pharmacist Visit     Called patient to schedule 2022 yearly pharmacist appointment to discuss medications for Diabetes Management Program.     No answer.  Left VM on [phone] TAD: Please call back at [phone] Option #3.  [facility] [address]   Department, toll free: [phone] Option #3

## 2022-02-21 NOTE — TELEPHONE ENCOUNTER
Second attempt made to contact patient to schedule 2022 yearly pharmacist appointment to discuss medications for Diabetes Management Program.    No answer. Left VM on [phone] TAD: Please call back at [phone] Option #3.      Letter mailed to patient    [facility]   Department, toll free: [phone] Option #3        For Pharmacy [address] in place:  No   Recommendation Provided To: Patient/Caregiver: 1 via Telephone   Gap Closed?: No    Intervention Accepted By: Patient/Caregiver: 0   Time Spent (min): 10

## 2022-03-17 ENCOUNTER — PATIENT MESSAGE (OUTPATIENT)
Dept: FAMILY MEDICINE CLINIC | Age: 47
End: 2022-03-17

## 2022-03-17 DIAGNOSIS — R00.2 HEART PALPITATIONS: ICD-10-CM

## 2022-03-17 DIAGNOSIS — F41.9 ANXIETY: ICD-10-CM

## 2022-03-17 NOTE — TELEPHONE ENCOUNTER
From: Princess Sutton  To: Dr. Chinedu Terry  Sent: 3/17/2022 9:01 AM EDT  Subject: Xanax    I have no more refills on my xanax . 50 BID as needed .  Can you send it to the [facility] ph # [phone]  Thank you

## 2022-03-18 RX ORDER — ALPRAZOLAM 0.5 MG/1
TABLET ORAL
Qty: 60 TABLET | Refills: 2 | Status: SHIPPED
Start: 2022-03-18 | End: 2022-03-22 | Stop reason: SDUPTHER

## 2022-03-21 DIAGNOSIS — F41.9 ANXIETY: ICD-10-CM

## 2022-03-21 DIAGNOSIS — R00.2 HEART PALPITATIONS: ICD-10-CM

## 2022-03-22 DIAGNOSIS — R00.2 HEART PALPITATIONS: ICD-10-CM

## 2022-03-22 DIAGNOSIS — F41.9 ANXIETY: ICD-10-CM

## 2022-03-23 LAB — DIABETIC RETINOPATHY: POSITIVE

## 2022-03-23 RX ORDER — ALPRAZOLAM 0.5 MG/1
TABLET ORAL
Qty: 60 TABLET | Refills: 2 | OUTPATIENT
Start: 2022-03-23 | End: 2022-04-18

## 2022-03-23 RX ORDER — ALPRAZOLAM 0.5 MG/1
TABLET ORAL
Qty: 60 TABLET | Refills: 2 | Status: SHIPPED | OUTPATIENT
Start: 2022-03-23 | End: 2022-04-19

## 2022-03-24 ENCOUNTER — OFFICE VISIT (OUTPATIENT)
Dept: PRIMARY CARE CLINIC | Age: 47
End: 2022-03-24
Payer: COMMERCIAL

## 2022-03-24 VITALS
DIASTOLIC BLOOD PRESSURE: 51 MMHG | HEIGHT: 64 IN | BODY MASS INDEX: 43.02 KG/M2 | TEMPERATURE: 97 F | WEIGHT: 252 LBS | RESPIRATION RATE: 18 BRPM | HEART RATE: 76 BPM | OXYGEN SATURATION: 97 % | SYSTOLIC BLOOD PRESSURE: 114 MMHG

## 2022-03-24 DIAGNOSIS — N39.0 URINARY TRACT INFECTION IN FEMALE: ICD-10-CM

## 2022-03-24 DIAGNOSIS — N39.0 URINARY TRACT INFECTION IN FEMALE: Primary | ICD-10-CM

## 2022-03-24 LAB
BILIRUBIN, POC: NEGATIVE
BLOOD URINE, POC: NORMAL
CLARITY, POC: NORMAL
COLOR, POC: NORMAL
GLUCOSE URINE, POC: NEGATIVE
KETONES, POC: NORMAL
LEUKOCYTE EST, POC: NORMAL
NITRITE, POC: NEGATIVE
PH, POC: 6.5
PROTEIN, POC: NEGATIVE
SPECIFIC GRAVITY, POC: 1.02
UROBILINOGEN, POC: NORMAL

## 2022-03-24 PROCEDURE — 99213 OFFICE O/P EST LOW 20 MIN: CPT | Performed by: NURSE PRACTITIONER

## 2022-03-24 PROCEDURE — 81002 URINALYSIS NONAUTO W/O SCOPE: CPT | Performed by: NURSE PRACTITIONER

## 2022-03-24 RX ORDER — NITROFURANTOIN 25; 75 MG/1; MG/1
100 CAPSULE ORAL 2 TIMES DAILY
Qty: 20 CAPSULE | Refills: 0 | Status: SHIPPED | OUTPATIENT
Start: 2022-03-24 | End: 2022-04-03

## 2022-03-24 RX ORDER — PHENAZOPYRIDINE HYDROCHLORIDE 100 MG/1
100 TABLET, FILM COATED ORAL 3 TIMES DAILY PRN
Qty: 15 TABLET | Refills: 0 | Status: SHIPPED
Start: 2022-03-24 | End: 2022-04-27

## 2022-03-24 NOTE — PROGRESS NOTES
Chief Complaint:   Urinary Tract Infection (started today)      History of Present Illness   Source of history provided by:  patientJl Villar is a 51 y.o. old female who has a past medical history of:   Past Medical History:   Diagnosis Date    Capsulitis     right shoulder    Depression     Diabetes mellitus ([address]     AGE 2, TYPE 1  HAS INSULIN PUMP    Diabetic frozen shoulder associated with type 1 diabetes mellitus ([address] 2013    RIGHT    GERD (gastroesophageal reflux disease)     HTN (hypertension)     Hyperlipidemia     Hypothyroid 2/7/2012    Insulin pump in place 2018    Nausea & vomiting     resolved    Obesity 2/7/2012    Obesity, Class III, BMI 40-49.9 (morbid obesity) ([address] 09/2018    BMI 47.3    MILENA (obstructive sleep apnea) 03/02/2012    USES CPAP    PONV (postoperative nausea and vomiting)     Primary osteoarthritis of knee     B/L    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 Pt states the symptoms started today. .  No flank pain. Denies any vaginal discharge, vaginal bleeding, vomiting, diarrhea, or lethargy. No LMP recorded. Patient has had an ablation. ROS    Unless otherwise stated in this report or unable to obtain because of the patient's clinical or mental status as evidenced by the medical record, this patients's positive and negative responses for Review of Systems, constitutional, psych, eyes, ENT, cardiovascular, respiratory, gastrointestinal, neurological, genitourinary, musculoskeletal, integument systems and systems related to the presenting problem are either stated in the preceding or were not pertinent or were negative for the symptoms and/or complaints related to the medical problem.     Past Surgical history:   Past Surgical History:   Procedure Laterality Date    ACHILLES TENDON SURGERY Left 2021    ACHILLES TENDON SURGERY Left 2021    REPAIR ACHILLES TENDON LEFT LOWER EXTREMITY performed by Chente Fiore DPM at [address] [facility]  2017    NEG     SECTION      ENDOMETRIAL ABLATION  2013    FOOT SURGERY Bilateral ,    to treat fascitis bilateral, to remove a nerve left    HERNIA REPAIR  2015    VENTRAL    SHOULDER SURGERY Left 2013    to release impingement     Social History:  reports that she quit smoking about 8 years ago. Her smoking use included cigarettes. She started smoking about 9 years ago. She has a 9.50 pack-year smoking history. She has never used smokeless tobacco. She reports current alcohol use of about 2.0 standard drinks of alcohol per week. She reports that she does not use drugs. Family History: family history includes Cancer in her father and paternal grandfather; Diabetes in her father, maternal grandfather, maternal grandmother, mother, and paternal grandfather; Heart Disease (age of onset: 47) in her mother; No Known Problems in her sister; Other in her father. Allergies: Patient has no known allergies. Physical Exam         VS:  BP (!) 114/51 (Site: Left Upper Arm, Position: Sitting, Cuff Size: Large Adult)   Pulse 76   Temp 97 °F (36.1 °C) (Temporal)   Resp 18   Ht 5' 4\" (1.626 m)   Wt 252 lb (114.3 kg)   SpO2 97%   BMI 43.26 kg/m²    Oxygen Saturation Interpretation: Normal.    Constitutional:  Alert, development consistent with age. HEENT:  Atraumatic. Airway patent. PERRL. Neck:  Normal ROM. Supple. Lungs:  Clear to auscultation and breath sounds equal.  Heart:  Regular rate and rhythm, normal heart sounds, without pathological murmurs, ectopy, gallops, or rubs. Abdomen: Soft, mild suprapubic tenderness without rebound or guarding. .  No organomegaly. Back: No CVA tenderness. Skin:  Normal turgor.   Warm, dry, without visible rash, unless noted elsewhere. Neurological:  Alert and oriented. Motor functions intact. Responds to verbal commands. Lab / Imaging Results   (All laboratory and radiology results have been personally reviewed by myself)  Labs:  Results for orders placed or performed in visit on 03/24/22   POCT Urinalysis no Micro   Result Value Ref Range    Color, UA dk yellow     Clarity, UA cloudy     Glucose, UA POC Negative     Bilirubin, UA Negative     Ketones, UA Trace     Spec Grav, UA 1.025     Blood, UA POC Small     pH, UA 6.5     Protein, UA POC Negative     Urobilinogen, UA 1.0 E.U./dL     Leukocytes, UA Moderate     Nitrite, UA Negative        Imaging: All Radiology results interpreted by Radiologist unless otherwise noted. No orders to display       Medical Decision Making:    Patient is well appearing, non toxic and appropriate for outpatient management. Plan is for symptom management and PCP follow up. Assessment / Plan     Impression(s):  1. Urinary tract infection in female      Disposition:  Disposition: Start Macrobid and Pyridium today, increase water intake, daily Cranberry/Cherry Juice as a preventative. Urine culture to Lab      ER if changes or worse. Advised to take all medications as directed.

## 2022-03-28 LAB — URINE CULTURE, ROUTINE: NORMAL

## 2022-03-30 ENCOUNTER — TELEPHONE (OUTPATIENT)
Dept: PHARMACY | Facility: CLINIC | Age: 47
End: 2022-03-30

## 2022-03-30 NOTE — LETTER
[address]  [address], [address]  Phone: toll free [phone], option 3  Fax: Aebl Reid  [facility] [place] [address]           04/26/22     Dear Almita Caceres,   One of the requirements to participate in the [address] [place] [address],[address] Diabetes Management Program is to complete a Clinical Pharmacist Telephone appointment yearly. The [address] [place] [address] Team has attempted to contact you to schedule your 2022 Diabetes Management telephone appointment but was unable to reach you. We would like to work with you and your doctor to:  - Review your medications, including over-the-counter and herbal medications  - Answer questions about your medications and how to get the most benefit from them  - Identify potential drug interactions or side effects and help fix them  - Identify preferred medications that are equally effective, but available at a lower cost to you  - Help you reach the necessary requirements to remain enrolled in the Diabetes Management Program offered by [facility]     Please call [phone] and select option #3 to schedule this appointment to take advantage of this service. Telephone appointments are available Monday thru Friday from 7:30 AM till 5:30 PM.     This is a courtesy reminder. If you have this appointment already scheduled for your 2022 enrollment in the program, please disregard this message. If you have not scheduled this appointment yet, please contact us at the above number to schedule. Sincerely,   [address] [facility]  Phone: [phone], option 3  Email: [email or web address]  Fax: [phone]

## 2022-03-30 NOTE — TELEPHONE ENCOUNTER
2022 Annual Pharmacist Visit    Called patient to schedule 2022 yearly pharmacist appointment to discuss medications for Diabetes Management Program.    No answer. No VM.      Please call back at [phone], option #3          [address] [employer]   Phone: [phone], option #3

## 2022-04-04 DIAGNOSIS — F32.A DEPRESSION, UNSPECIFIED DEPRESSION TYPE: ICD-10-CM

## 2022-04-05 RX ORDER — BUPROPION HYDROCHLORIDE 300 MG/1
300 TABLET ORAL EVERY MORNING
Qty: 90 TABLET | Refills: 1 | Status: SHIPPED
Start: 2022-04-05 | End: 2022-08-31 | Stop reason: SDUPTHER

## 2022-04-08 NOTE — TELEPHONE ENCOUNTER
For [place] in place:  No   Recommendation Provided To: Patient/Caregiver: 1 via Telephone and [employer]   Gap Closed?: No    Intervention Accepted By: Patient/Caregiver: 0   Time Spent (min): 10

## 2022-04-08 NOTE — TELEPHONE ENCOUNTER
Second attempt made to contact patient to schedule 2022 yearly pharmacist appointment to discuss medications for Diabetes Management Program.    No answer. Left VM. Please call back at [phone], option #3    [employer] message sent to patient.         Braulio November, [address]   Phone: [phone], option #3

## 2022-04-26 ENCOUNTER — CLINICAL DOCUMENTATION (OUTPATIENT)
Dept: PHARMACY | Facility: CLINIC | Age: 47
End: 2022-04-26

## 2022-04-26 NOTE — PROGRESS NOTES
Pharmacy Pop Care Documentation:     AVS received for required office visit on: 1/19/22: Onel Campos Covert per Care Everywhere

## 2022-04-27 ENCOUNTER — TELEPHONE (OUTPATIENT)
Dept: PHARMACY | Facility: CLINIC | Age: 47
End: 2022-04-27

## 2022-04-27 NOTE — TELEPHONE ENCOUNTER
[facility] CLINICAL PHARMACY REVIEW - Be Well with Diabetes    Igor George is a 51 y.o. female enrolled in the [address] [place] [address] Diabetes Program. Patient provided [address] with verbal consent to remain in the program for this year. Patient enrolled 2019.     Insurance through the following employer: [employer]    Medications:  Current Outpatient Medications   Medication Sig Dispense Refill    buPROPion (WELLBUTRIN XL) 300 MG extended release tablet Take 1 tablet by mouth every morning 90 tablet 1    phenazopyridine (PYRIDIUM) 100 MG tablet Take 1 tablet by mouth 3 times daily as needed for Pain 15 tablet 0    ramipril (ALTACE) 10 MG capsule TAKE 1 CAPSULE BY MOUTH ONE TIME A DAY 90 capsule 1    simvastatin (ZOCOR) 40 MG tablet TAKE 1 TABLET BY MOUTH ONE TIME A DAY 90 tablet 1    celecoxib (CELEBREX) 200 MG capsule TAKE 1 CAPSULE BY MOUTH ONE TIME A DAY 90 capsule 1    omeprazole (PRILOSEC) 40 MG delayed release capsule TAKE 1 CAPSULE BY MOUTH ONE TIME A DAY 90 capsule 1    linaclotide (LINZESS) 145 MCG capsule Take 145 mcg by mouth every morning (before breakfast)      Simethicone (GAS RELIEF PO) Take by mouth as needed (gas pain)      dicyclomine (BENTYL) 10 MG capsule Take 1 capsule by mouth 4 times daily 120 capsule 2    Calcium Carbonate Antacid (TUMS PO) Take by mouth as needed (acid reflux, epigastric discomfort)      polyethylene glycol (MIRALAX) 17 GM/SCOOP powder Take 17 g by mouth daily as needed (constipation)       famotidine (PEPCID) 20 MG tablet Take 1 tablet by mouth 2 times daily 60 tablet 0    ondansetron (ZOFRAN ODT) 4 MG disintegrating tablet Take 1 tablet by mouth every 8 hours as needed for Nausea or Vomiting 10 tablet 0    CPAP Machine MISC by Does not apply route      metoprolol tartrate (LOPRESSOR) 25 MG tablet TAKE 1 TABLET BY MOUTH 2 TIMES A  tablet 3    Semaglutide,0.25 or 0.5MG/DOS, (OZEMPIC, 0.25 OR 0.5 MG/DOSE,) 2 MG/1.5ML SOPN Inject 1 mg into the skin once a week Patient takes on Thursdays      naproxen (NAPROSYN) 500 MG tablet Take 1 tablet by mouth 2 times daily (with meals) 60 tablet 3    Probiotic Product (PROBIOTIC DAILY PO) Take 1 capsule by mouth daily      Ascorbic Acid (VITAMIN C PO) Take by mouth Patient states anesthesiologist instructed her to start Vitamin C. States plans to purchase and start taking. (4/24/2021)      ibuprofen (ADVIL;MOTRIN) 200 MG tablet Take 400 mg by mouth every 6 hours as needed for Pain      Elastic Bandages & Supports (JOBST KNEE HIGH COMPRESSION SM) MISC Compression stockings 20-30 mm hg knee high bilaterally  Dx : Venous Insufficiency, Swelling 2 each 2    levothyroxine (SYNTHROID) 200 MCG tablet Take 200 mcg by mouth Daily      Insulin Pump - insulin lispro Inject into the skin continuous 7161-0601: 1.9 units/hr  7858-9182: 2.1 units/hr   1319-3832: 1.9 units/hr      docusate sodium (COLACE) 100 MG capsule Take 100 mg by mouth daily      vitamin B-12 (CYANOCOBALAMIN) 500 MCG tablet Take 1,000 mcg by mouth daily       aspirin 81 MG tablet Take 81 mg by mouth nightly. No current facility-administered medications for this visit.       Current Pharmacy: [facility]  Current testing supplies/frequency: [place]   Pen needles/syringes: n/a    Allergies:  No Known Allergies   Vitals/Labs:  BP Readings from Last 3 Encounters:   03/24/22 (!) 114/51   01/05/22 121/63   10/27/21 136/80     Lab Results   Component Value Date    LABMICR <12.0 01/15/2022     Lab Results   Component Value Date    LABA1C 7.7 01/19/2022    LABA1C 7.7 (H) 01/15/2022    LABA1C 7.9 (H) 10/11/2021     Lab Results   Component Value Date    CHOL 161 01/15/2022    TRIG 99 01/15/2022    HDL 58 01/15/2022    LDLCHOLESTEROL 90 12/03/2014    LDLCALC 83 01/15/2022     ALT   Date Value Ref Range Status   01/15/2022 24 0 - 32 U/L Final     AST   Date Value Ref Range Status   01/15/2022 24 0 - 31 U/L Final     The 10-year ASCVD risk score (Jacky Smith, et al., 2013) is: 1.3%    Values used to calculate the score:      Age: 51 years      Sex: Female      Is Non- : No      Diabetic: Yes      Tobacco smoker: No      Systolic Blood Pressure: 441 mmHg      Is BP treated: Yes      HDL Cholesterol: 58 mg/dL      Total Cholesterol: 161 mg/dL     Lab Results   Component Value Date    CREATININE 0.6 01/15/2022     Estimated Creatinine Clearance: 144 mL/min (based on SCr of 0.6 mg/dL). Lab Results   Component Value Date    LABGLOM >60 01/15/2022    LABGLOM >60 10/23/2021    LABGLOM >60 10/11/2021    LABGLOM >60 2021       Immunizations:  Immunization History   Administered Date(s) Administered    COVID-19, Eli Mcintosh, Primary or Immunocompromised, PF, 100mcg/0.5mL 2020, 2021    Influenza A ([id number]) Vaccine PF IM 2009    Influenza Virus Vaccine 10/04/2016, 10/03/2017, 10/08/2018, 10/14/2020    Pneumococcal Conjugate 13-valent ([id number]) 2010    Pneumococcal Polysaccharide ([id number]) 2019      Social History:  Social History     Tobacco Use    Smoking status: Former Smoker     Packs/day: 0.50     Years: 19.00     Pack years: 9.50     Types: Cigarettes     Start date: 2013     Quit date: 2014     Years since quittin.2    Smokeless tobacco: Never Used   Substance Use Topics    Alcohol use: Yes     Alcohol/week: 2.0 standard drinks     Types: 2 Glasses of wine per week     Comment: socially     ASSESSMENT:  Initial Program Requirements (Y indicates has completed for the year, N indicates needs to be completed by 2022): Yes - Provider Visit for DM (1st)  Yes - A1c (1st)     Ongoing Program Requirements (Y indicates has completed for the year, N indicates needs to be completed by 2022):   No - Provider Visit for DM (2nd)  Yes - ACC/diabetes educator visit (if A1c over 8%)  No - A1c (2nd)  Yes - Lipid panel  Yes - Urine microalbumin  Yes - Pneumococcal vaccination: Up-to-date with Pneumo series  Yes - Influenza vaccination for Fall 2022  Yes - Medication adherence over 70%  Yes - On statin or contraindication(s) simvastatin  Yes - On ACEi/ARB or contraindication(s) ramipril       Current medications eligible for copay waiver, up to $600, through [address]:  - Ramipril, Simvastatin, Ozempic, Levothyroxine, Aspirin, Humalog  - Prodigy, Freestyle Syed and generic pen needles and syringes     Diabetes Care:   - Glycemic Goal: <7.0%. Is not at blood glucose goal but is on insulin pump and Ozempic therapy. Type 1 DM under improving control as evidenced by a1c decrease from >9% a year ago. Patient was started on Ozempic and she feels that it made a bid difference. She lost some weight which may have helped decrease insulin resistance. - Home blood sugar records: Patient is using Freestyle syed 14- Not currently interested in Jones 2  - Any episodes of hypoglycemia? Not recently, but has experienced before. Understands what to look for and how to correct. - Eye exam current (within one year): yes  - Foot exam current (within one year): yes  - Appropriateness of Insulin Therapy: Patient using pump currently. She says she hopes to upgrade soon. Will be discussing with endo when she meets in person in the next couple months. Will possibly consider different CGM at that point depending on pump option. Made aware of Dexcom G6 pricing  - Medication compliance: compliant most of the time  - Diet compliance: compliant all of the time    Other Considerations:  - Blood Pressure Goal: BP less than 140/90 mmHg due to history of DM: Is at blood pressure goal.   - Lipids: Patient is prescribed moderate-intensity statin therapy.     PLAN:  - Consideration(s) for provider:   · None at this time  - DM program gaps identified:   · Initial requirements: Requirements met   · Ongoing requirements: Provider visit for DM (2nd), A1c (2nd) and Influenza vaccination for [id number]   - Education to patient: Discussed general issues about diabetes pathophysiology and management., Addressed medication adherence, Overview of Be Well With Diabetes program and Overview of HHP   - Follow up: PCP for identified gaps or as scheduled below and Endocrinologist for identified gaps or as scheduled below  - Upcoming appointments:   No future appointments.     Sofía Medina, PharmD, [facility], toll free: [phone]    For Pharmacy Admin Tracking Only     CPA in place:  No   Recommendation Provided To: Patient/Caregiver: 2 via Telephone   Intervention Detail: New Rx: 1, reason: Patient Preference and Vaccine Recommended/Administered   Gap Closed?: Yes    Intervention Accepted By: Patient/Caregiver: 1   Time Spent (min): 45

## 2022-05-09 DIAGNOSIS — I10 ESSENTIAL HYPERTENSION: ICD-10-CM

## 2022-06-14 DIAGNOSIS — F41.9 ANXIETY: Primary | ICD-10-CM

## 2022-06-14 RX ORDER — ALPRAZOLAM 0.5 MG/1
TABLET ORAL
Qty: 60 TABLET | Refills: 2 | Status: SHIPPED | OUTPATIENT
Start: 2022-06-14 | End: 2022-07-14

## 2022-07-01 ENCOUNTER — OFFICE VISIT (OUTPATIENT)
Dept: FAMILY MEDICINE CLINIC | Age: 47
End: 2022-07-01
Payer: COMMERCIAL

## 2022-07-01 VITALS
SYSTOLIC BLOOD PRESSURE: 120 MMHG | WEIGHT: 259 LBS | TEMPERATURE: 97 F | DIASTOLIC BLOOD PRESSURE: 74 MMHG | OXYGEN SATURATION: 99 % | BODY MASS INDEX: 44.46 KG/M2 | HEART RATE: 78 BPM

## 2022-07-01 DIAGNOSIS — N39.0 URINARY TRACT INFECTION WITH HEMATURIA, SITE UNSPECIFIED: Primary | ICD-10-CM

## 2022-07-01 DIAGNOSIS — R35.0 URINARY FREQUENCY: ICD-10-CM

## 2022-07-01 DIAGNOSIS — R31.9 URINARY TRACT INFECTION WITH HEMATURIA, SITE UNSPECIFIED: Primary | ICD-10-CM

## 2022-07-01 LAB
BILIRUBIN, POC: NORMAL
BLOOD URINE, POC: NORMAL
CLARITY, POC: CLEAR
COLOR, POC: YELLOW
GLUCOSE URINE, POC: NORMAL
KETONES, POC: NORMAL
LEUKOCYTE EST, POC: NORMAL
NITRITE, POC: NEGATIVE
PH, POC: 6
PROTEIN, POC: NORMAL
SPECIFIC GRAVITY, POC: 1.02
UROBILINOGEN, POC: 0.2

## 2022-07-01 PROCEDURE — 99214 OFFICE O/P EST MOD 30 MIN: CPT | Performed by: PHYSICIAN ASSISTANT

## 2022-07-01 PROCEDURE — 81002 URINALYSIS NONAUTO W/O SCOPE: CPT | Performed by: PHYSICIAN ASSISTANT

## 2022-07-01 RX ORDER — CEFDINIR 300 MG/1
300 CAPSULE ORAL 2 TIMES DAILY
Qty: 14 CAPSULE | Refills: 0 | Status: SHIPPED | OUTPATIENT
Start: 2022-07-01 | End: 2022-07-08

## 2022-07-01 RX ORDER — FLUCONAZOLE 150 MG/1
TABLET ORAL
Qty: 2 TABLET | Refills: 0 | Status: SHIPPED
Start: 2022-07-01 | End: 2022-08-31

## 2022-07-01 NOTE — PROGRESS NOTES
22  Alicia Curiel : 1975 Sex: female  Age 49 y.o. Subjective:  Chief Complaint   Patient presents with    Urinary Frequency         HPI:   Alicia Curiel , 49 y.o. female presents to express care for evaluation of urinary frequency, burning with urination    HPI  59-year-old female presents to express care for evaluation of possible urinary tract infection. The patient started with these symptoms a couple days ago. She did have some suprapubic pain and discomfort. The patient states that she is also had some increased irritation noted to the vaginal area. The patient is not really having any discharge. No significant back pain or flank pain. No fevers or chills. The patient is not currently on any antibiotics. The patient is a diabetic. ROS:   Unless otherwise stated in this report the patient's positive and negative responses for review of systems for constitutional, eyes, ENT, cardiovascular, respiratory, gastrointestinal, neurological, , musculoskeletal, and integument systems and related systems to the presenting problem are either stated in the history of present illness or were not pertinent or were negative for the symptoms and/or complaints related to the presenting medical problem. Positives and pertinent negatives as per HPI. All others reviewed and are negative.       PMH:     Past Medical History:   Diagnosis Date    Capsulitis     right shoulder    Depression     Diabetes mellitus ([address]     AGE 2, TYPE 1  HAS INSULIN PUMP    Diabetic frozen shoulder associated with type 1 diabetes mellitus ([address]     RIGHT    GERD (gastroesophageal reflux disease)     HTN (hypertension)     Hyperlipidemia     Hypothyroid 2012    Insulin pump in place     Nausea & vomiting     resolved    Obesity 2012    Obesity, Class III, BMI 40-49.9 (morbid obesity) ([address] 2018    BMI 47.3    MILENA (obstructive sleep apnea) 2012    USES CPAP    PONV (postoperative nausea and vomiting)     Primary osteoarthritis of knee     B/L    Retinopathy due to secondary DM ([facility]     LASER [place]    Sleep apnea 3/2/2012    uses cpap, instructed to bring    Thyroid disease        Past Surgical History:   Procedure Laterality Date    ACHILLES TENDON SURGERY Left 2021    ACHILLES TENDON SURGERY Left 2021    REPAIR ACHILLES TENDON LEFT LOWER EXTREMITY performed by Atul Marcano., DPM at [address] TEST  2017    NEG     SECTION      ENDOMETRIAL ABLATION      FOOT SURGERY Bilateral ,    to treat fascitis bilateral, to remove a nerve left    HERNIA REPAIR  2015    VENTRAL    SHOULDER SURGERY Left 2013    to release impingement       Family History   Problem Relation Age of Onset    Diabetes Mother         Age 59, 2018    Heart Disease Mother 47        ACB    Diabetes Father         Age 70, 2018    Cancer Father         Skin cancer    Other Father         AFIB    No Known Problems Sister     Diabetes Maternal Grandmother     Diabetes Maternal Grandfather     Diabetes Paternal Grandfather     Cancer Paternal Grandfather         lung       Medications:     Current Outpatient Medications:     cefdinir (OMNICEF) 300 MG capsule, Take 1 capsule by mouth 2 times daily for 7 days, Disp: 14 capsule, Rfl: 0    fluconazole (DIFLUCAN) 150 MG tablet, Take 1 now and then another at the end of the course of the antibiotic, Disp: 2 tablet, Rfl: 0    ALPRAZolam (XANAX) 0.5 MG tablet, TAKE 1 TABLET BY MOUTH 2 TIMES A DAY AS NEEDED FOR SLEEP OR DEPRESSION, Disp: 60 tablet, Rfl: 2    metoprolol tartrate (LOPRESSOR) 25 MG tablet, TAKE 1 TABLET BY MOUTH 2 TIMES A DAY, Disp: 180 tablet, Rfl: 1    buPROPion (WELLBUTRIN XL) 300 MG extended release tablet, Take 1 tablet by mouth every morning, Disp: 90 tablet, Rfl: 1    ramipril (ALTACE) 10 MG capsule, TAKE 1 CAPSULE BY MOUTH ONE TIME A DAY, Disp: 90 capsule, Rfl: 1    simvastatin (ZOCOR) 40 MG tablet, TAKE 1 TABLET BY MOUTH ONE TIME A DAY, Disp: 90 tablet, Rfl: 1    celecoxib (CELEBREX) 200 MG capsule, TAKE 1 CAPSULE BY MOUTH ONE TIME A DAY, Disp: 90 capsule, Rfl: 1    omeprazole (PRILOSEC) 40 MG delayed release capsule, TAKE 1 CAPSULE BY MOUTH ONE TIME A DAY, Disp: 90 capsule, Rfl: 1    linaclotide (LINZESS) 145 MCG capsule, Take 145 mcg by mouth every morning (before breakfast), Disp: , Rfl:     Simethicone (GAS RELIEF PO), Take by mouth as needed (gas pain), Disp: , Rfl:     dicyclomine (BENTYL) 10 MG capsule, Take 1 capsule by mouth 4 times daily, Disp: 120 capsule, Rfl: 2    Calcium Carbonate Antacid (TUMS PO), Take by mouth as needed (acid reflux, epigastric discomfort), Disp: , Rfl:     famotidine (PEPCID) 20 MG tablet, Take 1 tablet by mouth 2 times daily, Disp: 60 tablet, Rfl: 0    CPAP Machine MISC, by Does not apply route, Disp: , Rfl:     Semaglutide,0.25 or 0.5MG/DOS, (OZEMPIC, 0.25 OR 0.5 MG/DOSE,) 2 MG/1.5ML SOPN, Inject 1 mg into the skin once a week Patient takes on Thursdays, Disp: , Rfl:     Probiotic Product (PROBIOTIC DAILY PO), Take 1 capsule by mouth daily, Disp: , Rfl:     Ascorbic Acid (VITAMIN C PO), Take by mouth Patient states anesthesiologist instructed her to start Vitamin C. States plans to purchase and start taking. (4/24/2021), Disp: , Rfl:     Elastic Bandages & Supports (JOBST KNEE HIGH COMPRESSION SM) MISC, Compression stockings 20-30 mm hg knee high bilaterally Dx :  Venous Insufficiency, Swelling, Disp: 2 each, Rfl: 2    levothyroxine (SYNTHROID) 200 MCG tablet, Take 200 mcg by mouth Daily, Disp: , Rfl:     Insulin Pump - insulin lispro, Inject into the skin continuous 3868-7463: 1.9 units/hr 6314-5161: 2.1 units/hr  4046-9407: 1.9 units/hr, Disp: , Rfl:     docusate sodium (COLACE) 100 MG capsule, Take 100 mg by mouth daily, Disp: , Rfl:     vitamin B-12 (CYANOCOBALAMIN) 500 MCG tablet, Take 1,000 mcg by mouth daily , Disp: , Rfl:     aspirin 81 MG tablet, Take 81 mg by mouth nightly., Disp: , Rfl:     Allergies:   No Known Allergies    Social History:     Social History     Tobacco Use    Smoking status: Former Smoker     Packs/day: 0.50     Years: 19.00     Pack years: 9.50     Types: Cigarettes     Start date: 2013     Quit date: 2014     Years since quittin.4    Smokeless tobacco: Never Used   Vaping Use    Vaping Use: Never used   Substance Use Topics    Alcohol use: Yes     Alcohol/week: 2.0 standard drinks     Types: 2 Glasses of wine per week     Comment: socially    Drug use: No       Patient lives at home. Physical Exam:     Vitals:    22 0815   BP: 120/74   Site: Right Upper Arm   Position: Sitting   Pulse: 78   Temp: 97 °F (36.1 °C)   TempSrc: Temporal   SpO2: 99%   Weight: 259 lb (117.5 kg)       Exam:  Physical Exam  Nurses note and vital signs reviewed and patient is not hypoxic. General: The patient appears well and in no apparent distress. Patient is resting comfortably on cart. Skin: Warm, dry, no pallor noted. There is no rash noted. Head: Normocephalic, atraumatic. Eye: Normal conjunctiva  Ears, Nose, Mouth, and Throat: Oral mucosa is moist  Cardiovascular: Regular Rate and Rhythm  Respiratory: Patient is in no distress, no accessory muscle use, lungs are clear to auscultation, no wheezing, crackles or rhonchi  Back: Non-tender, no CVA tenderness bilaterally to percussion. GI: Normal bowel sounds, no tenderness to palpation, no masses appreciated. No rebound, guarding, or rigidity noted.   Neurological: A&O x4, normal speech      Testing:     Results for orders placed or performed in visit on 22   POCT Urinalysis no Micro   Result Value Ref Range    Color, UA yellow     Clarity, UA clear     Glucose, UA POC neg     Bilirubin, UA neg     Ketones, UA neg     Spec Grav, UA 1.025     Blood, UA POC trace-intact     pH, UA 6.0     Protein, UA POC neg     Urobilinogen, UA 0.2 Leukocytes, UA small     Nitrite, UA negative            Medical Decision Making:     Vital signs reviewed    Past medical history reviewed. Allergies reviewed. Medications reviewed. Patient on arrival does not appear to be in any apparent distress or discomfort. The patient has been seen and evaluated. The patient does not appear to be toxic or lethargic. The patient had urinalysis performed. Urinalysis did show evidence of a small leukocytes and trace blood noted. We will set up a urine culture. We will treat the patient with cefdinir. The patient did not want pelvic exam.  We will treat the patient with Diflucan. The patient is to return to express care or go directly to the emergency department should any of the signs or symptoms worsen. The patient is to followup with primary care physician in 2-3 days for repeat evaluation. The patient has no other questions or concerns at this time the patient will be discharged home. Clinical Impression:   Taye Nina was seen today for urinary frequency. Diagnoses and all orders for this visit:    Urinary tract infection with hematuria, site unspecified    Urinary frequency  -     POCT Urinalysis no Micro  -     Culture, Urine; Future    Other orders  -     cefdinir (OMNICEF) 300 MG capsule; Take 1 capsule by mouth 2 times daily for 7 days  -     fluconazole (DIFLUCAN) 150 MG tablet; Take 1 now and then another at the end of the course of the antibiotic        The patient is to call for any concerns or return if any of the signs or symptoms worsen. The patient is to follow-up with PCP in the next 2-3 days for repeat evaluation repeat assessment or go directly to the emergency department.      SIGNATURE: Marino Berman III, PA-C

## 2022-07-04 LAB — URINE CULTURE, ROUTINE: NORMAL

## 2022-08-16 DIAGNOSIS — E78.2 MIXED HYPERLIPIDEMIA: ICD-10-CM

## 2022-08-16 RX ORDER — SIMVASTATIN 40 MG
TABLET ORAL
Qty: 90 TABLET | Refills: 1 | Status: SHIPPED | OUTPATIENT
Start: 2022-08-16

## 2022-08-22 ENCOUNTER — HOSPITAL ENCOUNTER (OUTPATIENT)
Age: 47
Discharge: HOME | End: 2022-08-22
Payer: COMMERCIAL

## 2022-08-22 LAB
ALBUMIN SERPL-MCNC: 4.2 G/DL (ref 3.5–5.2)
ALP BLD-CCNC: 125 U/L (ref 35–104)
ALT SERPL-CCNC: 22 U/L (ref 0–32)
ANION GAP SERPL CALCULATED.3IONS-SCNC: 10 MMOL/L (ref 7–16)
AST SERPL-CCNC: 23 U/L (ref 0–31)
BILIRUB SERPL-MCNC: 0.6 MG/DL (ref 0–1.2)
BUN BLDV-MCNC: 16 MG/DL (ref 6–20)
CALCIUM SERPL-MCNC: 9.5 MG/DL (ref 8.6–10.2)
CHLORIDE BLD-SCNC: 100 MMOL/L (ref 98–107)
CHOLESTEROL, TOTAL: 203 MG/DL (ref 0–199)
CO2: 28 MMOL/L (ref 22–29)
CREAT SERPL-MCNC: 1.2 MG/DL (ref 0.5–1)
CREATININE URINE: 146 MG/DL (ref 29–226)
GFR AFRICAN AMERICAN: 58
GFR NON-AFRICAN AMERICAN: 48 ML/MIN/1.73
GLUCOSE BLD-MCNC: 92 MG/DL (ref 74–99)
HBA1C MFR BLD: 8.2 % (ref 4–5.6)
HDLC SERPL-MCNC: 59 MG/DL
LDL CHOLESTEROL CALCULATED: 112 MG/DL (ref 0–99)
MICROALBUMIN UR-MCNC: 26.3 MG/L
MICROALBUMIN/CREAT UR-RTO: 18 (ref 0–30)
POTASSIUM SERPL-SCNC: 4.6 MMOL/L (ref 3.5–5)
SODIUM BLD-SCNC: 138 MMOL/L (ref 132–146)
T3 FREE: 2.6 PG/ML (ref 2–4.4)
T4 FREE: 1.46 NG/DL (ref 0.93–1.7)
TOTAL PROTEIN: 7.1 G/DL (ref 6.4–8.3)
TRIGL SERPL-MCNC: 161 MG/DL (ref 0–149)
TSH SERPL DL<=0.05 MIU/L-ACNC: 2.07 UIU/ML (ref 0.27–4.2)
VLDLC SERPL CALC-MCNC: 32 MG/DL

## 2022-08-22 PROCEDURE — 83721 ASSAY OF BLOOD LIPOPROTEIN: CPT

## 2022-08-22 PROCEDURE — 84443 ASSAY THYROID STIM HORMONE: CPT

## 2022-08-22 PROCEDURE — 36415 COLL VENOUS BLD VENIPUNCTURE: CPT

## 2022-08-22 PROCEDURE — 83036 HEMOGLOBIN GLYCOSYLATED A1C: CPT

## 2022-08-22 PROCEDURE — 84481 FREE ASSAY (FT-3): CPT

## 2022-08-22 PROCEDURE — 82570 ASSAY OF URINE CREATININE: CPT

## 2022-08-22 PROCEDURE — 84439 ASSAY OF FREE THYROXINE: CPT

## 2022-08-22 PROCEDURE — 82044 UR ALBUMIN SEMIQUANTITATIVE: CPT

## 2022-08-22 PROCEDURE — 80053 COMPREHEN METABOLIC PANEL: CPT

## 2022-08-22 PROCEDURE — 80061 LIPID PANEL: CPT

## 2022-08-23 ENCOUNTER — TELEPHONE (OUTPATIENT)
Dept: FAMILY MEDICINE CLINIC | Age: 47
End: 2022-08-23

## 2022-08-23 NOTE — TELEPHONE ENCOUNTER
Patient called in stating that she is having episodes of anxiety. 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 States her heart rate varies between  throughout the day. States she does take Xanax and she took one this morning and it helped a little. She has an appointment with you on 8/31. Advised patient that if she gets worsening symptoms to go been seen through walk-in or ER.

## 2022-08-24 LAB
AVERAGE GLUCOSE: NORMAL
HBA1C MFR BLD: 8.2 %

## 2022-08-25 LAB
Lab: NORMAL
REPORT: NORMAL
THIS TEST SENT TO: NORMAL

## 2022-08-25 RX ORDER — CELECOXIB 200 MG/1
CAPSULE ORAL
Qty: 90 CAPSULE | Refills: 1 | Status: SHIPPED | OUTPATIENT
Start: 2022-08-25

## 2022-08-31 ENCOUNTER — OFFICE VISIT (OUTPATIENT)
Dept: FAMILY MEDICINE CLINIC | Age: 47
End: 2022-08-31
Payer: COMMERCIAL

## 2022-08-31 VITALS
BODY MASS INDEX: 43.49 KG/M2 | SYSTOLIC BLOOD PRESSURE: 129 MMHG | TEMPERATURE: 97.2 F | RESPIRATION RATE: 18 BRPM | HEART RATE: 74 BPM | DIASTOLIC BLOOD PRESSURE: 79 MMHG | WEIGHT: 261 LBS | HEIGHT: 65 IN | OXYGEN SATURATION: 99 %

## 2022-08-31 DIAGNOSIS — I10 ESSENTIAL HYPERTENSION: ICD-10-CM

## 2022-08-31 DIAGNOSIS — E10.29 TYPE 1 DIABETES MELLITUS WITH OTHER KIDNEY COMPLICATION (HCC): ICD-10-CM

## 2022-08-31 DIAGNOSIS — F32.A DEPRESSION, UNSPECIFIED DEPRESSION TYPE: ICD-10-CM

## 2022-08-31 DIAGNOSIS — R00.2 PALPITATIONS: Primary | ICD-10-CM

## 2022-08-31 PROBLEM — E10.49 NEUROLOGICAL DISORDER DUE TO TYPE 1 DIABETES MELLITUS (HCC): Status: ACTIVE | Noted: 2022-08-31

## 2022-08-31 PROBLEM — N95.1 MENOPAUSAL FLUSHING: Status: ACTIVE | Noted: 2022-08-31

## 2022-08-31 PROBLEM — M79.609 PAIN IN LIMB: Status: ACTIVE | Noted: 2018-08-20

## 2022-08-31 PROCEDURE — 93000 ELECTROCARDIOGRAM COMPLETE: CPT | Performed by: FAMILY MEDICINE

## 2022-08-31 PROCEDURE — 99214 OFFICE O/P EST MOD 30 MIN: CPT | Performed by: FAMILY MEDICINE

## 2022-08-31 PROCEDURE — 3052F HG A1C>EQUAL 8.0%<EQUAL 9.0%: CPT | Performed by: FAMILY MEDICINE

## 2022-08-31 RX ORDER — PANTOPRAZOLE SODIUM 40 MG/1
TABLET, DELAYED RELEASE ORAL
COMMUNITY
End: 2022-08-31 | Stop reason: SDUPTHER

## 2022-08-31 RX ORDER — ALPRAZOLAM 0.5 MG/1
TABLET ORAL
COMMUNITY
Start: 2022-08-19 | End: 2022-08-31 | Stop reason: SDUPTHER

## 2022-08-31 RX ORDER — RAMIPRIL 10 MG/1
CAPSULE ORAL
Qty: 90 CAPSULE | Refills: 3 | Status: SHIPPED | OUTPATIENT
Start: 2022-08-31

## 2022-08-31 RX ORDER — PANTOPRAZOLE SODIUM 40 MG/1
TABLET, DELAYED RELEASE ORAL
Qty: 90 TABLET | Refills: 3 | Status: SHIPPED | OUTPATIENT
Start: 2022-08-31

## 2022-08-31 RX ORDER — BUPROPION HYDROCHLORIDE 300 MG/1
300 TABLET ORAL EVERY MORNING
Qty: 90 TABLET | Refills: 3 | Status: SHIPPED
Start: 2022-08-31 | End: 2022-09-29

## 2022-08-31 RX ORDER — FLASH GLUCOSE SENSOR
KIT MISCELLANEOUS
COMMUNITY
Start: 2022-08-09

## 2022-08-31 RX ORDER — ALPRAZOLAM 0.5 MG/1
0.5 TABLET ORAL 2 TIMES DAILY PRN
Qty: 60 TABLET | Refills: 2 | Status: SHIPPED | OUTPATIENT
Start: 2022-08-31 | End: 2022-09-30

## 2022-08-31 RX ORDER — FLASH GLUCOSE SCANNING READER
EACH MISCELLANEOUS
COMMUNITY
Start: 2022-06-18

## 2022-08-31 SDOH — ECONOMIC STABILITY: FOOD INSECURITY: WITHIN THE PAST 12 MONTHS, YOU WORRIED THAT YOUR FOOD WOULD RUN OUT BEFORE YOU GOT MONEY TO BUY MORE.: NEVER TRUE

## 2022-08-31 SDOH — ECONOMIC STABILITY: FOOD INSECURITY: WITHIN THE PAST 12 MONTHS, THE FOOD YOU BOUGHT JUST DIDN'T LAST AND YOU DIDN'T HAVE MONEY TO GET MORE.: NEVER TRUE

## 2022-08-31 ASSESSMENT — PATIENT HEALTH QUESTIONNAIRE - PHQ9
8. MOVING OR SPEAKING SO SLOWLY THAT OTHER PEOPLE COULD HAVE NOTICED. OR THE OPPOSITE, BEING SO FIGETY OR RESTLESS THAT YOU HAVE BEEN MOVING AROUND A LOT MORE THAN USUAL: 0
SUM OF ALL RESPONSES TO PHQ QUESTIONS 1-9: 0
4. FEELING TIRED OR HAVING LITTLE ENERGY: 0
3. TROUBLE FALLING OR STAYING ASLEEP: 0
SUM OF ALL RESPONSES TO PHQ QUESTIONS 1-9: 0
SUM OF ALL RESPONSES TO PHQ QUESTIONS 1-9: 0
6. FEELING BAD ABOUT YOURSELF - OR THAT YOU ARE A FAILURE OR HAVE LET YOURSELF OR YOUR FAMILY DOWN: 0
5. POOR APPETITE OR OVEREATING: 0
SUM OF ALL RESPONSES TO PHQ QUESTIONS 1-9: 0
SUM OF ALL RESPONSES TO PHQ9 QUESTIONS 1 & 2: 0
2. FEELING DOWN, DEPRESSED OR HOPELESS: 0
9. THOUGHTS THAT YOU WOULD BE BETTER OFF DEAD, OR OF HURTING YOURSELF: 0
1. LITTLE INTEREST OR PLEASURE IN DOING THINGS: 0
7. TROUBLE CONCENTRATING ON THINGS, SUCH AS READING THE NEWSPAPER OR WATCHING TELEVISION: 0

## 2022-08-31 ASSESSMENT — SOCIAL DETERMINANTS OF HEALTH (SDOH): HOW HARD IS IT FOR YOU TO PAY FOR THE VERY BASICS LIKE FOOD, HOUSING, MEDICAL CARE, AND HEATING?: NOT HARD AT ALL

## 2022-08-31 NOTE — PROGRESS NOTES
Chief Complaint   Patient presents with    Medication Refill       Palpitations:  Patient is here for medication refills. States she has had increased anxiety that is causing heart flutters. States she would like an EKG done. HM reviewed with patient. Pt is not up to date. Labs completed 8/24. Hypertension: Patient is here for follow up chronic hypertension. Patient is  compliant with lifestyle modifications. Patient denies chest pain, diaphoresis, dyspnea, dyspnea on exertion, peripheral edema, palpitations, HA, visual issues. Taking meds as prescribed. No adverse effects. Anxiety and/or Depression:  Patient is here with complaints of anxiety and/or depression. Patient does not have suicidal or homicidal ideation. Has little interest in doing activities. Patient is  having issues falling or staying asleep. Patient is  having associated panic attacks. Patient does not use alcohol and/or drugs. Diabetes Mellitus Type II, Follow-up: Patient here for follow-up of Type 2 diabetes mellitus. This is a longstanding problem. Is taking all medications as prescribed and is tolerating well. Has been monitoring blood glucose at home. Lab Results   Component Value Date    LABA1C 8.2 08/24/2022    LABA1C 8.2 (H) 08/22/2022    LABA1C 7.7 01/19/2022     Lab Results   Component Value Date    GLUF 148 (H) 06/25/2018    LABMICR 26.3 (H) 08/22/2022    LDLCALC 112 (H) 08/22/2022    CREATININE 1.2 (H) 08/22/2022   Seeing NP from Dr. Radha Contreras. Microalbumin: Microalbumen/Creatinine ratio:  No components found for: RUCREAT    Patient's past medical, surgical, social and/or family history reviewed, updated in chart, and are non-contributory (unless otherwise stated). Medications and allergies also reviewed and updated in chart.     Review of Systems:  Constitutional:  No fever, no fatigue, no chills, no headaches, no weight change  Dermatology:  No rash, no mole, no dry or sensitive skin  ENT:  No cough, no sore throat, no sinus pain, no runny nose, no ear pain  Cardiology:  No chest pain, no palpitations, no leg edema, no shortness of breath, no PND  Gastroenterology:  No dysphagia, no abdominal pain, no nausea, no vomiting, no constipation, no diarrhea, no heartburn  Musculoskeletal:  No joint pain, no leg cramps, no back pain, no muscle aches  Respiratory:  No shortness of breath, no orthopnea, no wheezing, no STEEL, no hemoptysis  Urology:  No blood in the urine, no urinary frequency, no urinary incontinence, no urinary urgency, no nocturia, no dysuria    Vitals:    08/31/22 0910   BP: 129/79   Pulse: 74   Resp: 18   Temp: 97.2 °F (36.2 °C)   SpO2: 99%   Weight: 261 lb (118.4 kg)   Height: 5' 5\" (1.651 m)       General:  Patient alert and oriented x 3, NAD, pleasant  HEENT:  Atraumatic, normocephalic, PERRLA, EOMI, clear conjunctiva, TMs clear, nose-clear, throat - no erythema  Neck:  Supple, no goiter, no carotid bruits, no lymphadenopathy  Lungs:  CTA B  Heart:  RRR, no murmurs, gallops or rubs  Abdomen:  Soft/nt/nd, + bowel sounds  Extremities:  No clubbing, cyanosis or edema  Skin: unremarkable    Assessment/Plan:      Du Camarillo was seen today for medication refill. Diagnoses and all orders for this visit:    Palpitations  -     Holter Monitor 24 Hour; Future    Essential hypertension  -     ramipril (ALTACE) 10 MG capsule; TAKE 1 CAPSULE BY MOUTH ONE TIME A DAY  -     EKG 12 Lead - Clinic Performed    Depression, unspecified depression type  -     buPROPion (WELLBUTRIN XL) 300 MG extended release tablet; Take 1 tablet by mouth every morning  -     ALPRAZolam (XANAX) 0.5 MG tablet; Take 1 tablet by mouth 2 times daily as needed for Sleep for up to 30 days. Type 1 diabetes mellitus with other kidney complication (HCC)    Other orders  -     pantoprazole (PROTONIX) 40 MG tablet; pantoprazole 40 mg tablet,delayed release  -     linaclotide (LINZESS) 145 MCG capsule;  Take 1 capsule by mouth every morning (before breakfast)    As above. Call or go to ED immediately if symptoms worsen or persist.  No follow-ups on file. , or sooner if necessary. Educational materials and/or home exercises printed for patient's review and were included in patient instructions on his/her After Visit Summary and given to patient at the end of visit. Counseled regarding above diagnosis, including possible risks and complications,  especially if left uncontrolled. Counseled regarding the possible side effects, risks, benefits and alternatives to treatment; patient and/or guardian verbalizes understanding, agrees, feels comfortable with and wishes to proceed with above treatment plan. Advised patient to call with any new medication issues, and read all Rx info from pharmacy to assure aware of all possible risks and side effects of medication before taking. Reviewed age and gender appropriate health screening exams and vaccinations. Advised patient regarding importance of keeping up with recommended health maintenance and to schedule as soon as possible if overdue, as this is important in assessing for undiagnosed pathology, especially cancer, as well as protecting against potentially harmful/life threatening disease. Patient and/or guardian verbalizes understanding and agrees with above counseling, assessment and plan. All questions answered.     Digna Oneill DO  8/31/2022    I have personally reviewed and updated the chief complaint, HPI, Past Medical, Family and Social History, as well as the above Review of Systems

## 2022-09-02 ENCOUNTER — TELEPHONE (OUTPATIENT)
Dept: PHARMACY | Facility: CLINIC | Age: 47
End: 2022-09-02

## 2022-09-02 NOTE — TELEPHONE ENCOUNTER
As of 8/26/22 patient has used $525 of the maximum of $600 a year in waived co pays for specific medications and pharmacy-related supplies through the [address] for the DM Program.    Patient currently enrolled in the DM Program and is nearing the maximum of $600 a year in waived co pays for specific medications and pharmacy-related supplies through the [address]. Courtesy call placed to patient to advise them of the above information. Patient unavailable at the time of call. Message left on TAD: Maximum waived co pays for the DM Program has almost been met. Once maximum has been reached, they will begin to be charged their co-payment once again. I left our number: [phone], option #3 if patient has any questions/concerns.       Verita Nageotte, [address] [place] [address]   Phone: [phone], option #3       For Pharmacy [facility] in place:  No  Time Spent (min): 10

## 2022-09-08 ENCOUNTER — TELEPHONE (OUTPATIENT)
Dept: PHARMACY | Facility: CLINIC | Age: 47
End: 2022-09-08

## 2022-09-08 NOTE — TELEPHONE ENCOUNTER
[facility] CLINICAL PHARMACY REVIEW - BE WELL WITH DIABETES: HIGH A1C  =============================================  Syed Yost is a 49 y.o. female enrolled in the [address] [place] [address],[address] Be Well with Diabetes program.    Identified care gap(s): A1c > 8%    DM Program Prescriptions:  Current Outpatient Medications   Medication Sig Dispense Refill    Continuous Blood Gluc  (FREESTYLE ERIKA 14 DAY READER) GENARO USE AS DIRECTED      Continuous Blood Gluc Sensor (FREESTYLE ERIKA 14 DAY SENSOR) MISC       ramipril (ALTACE) 10 MG capsule TAKE 1 CAPSULE BY MOUTH ONE TIME A DAY 90 capsule 3    buPROPion (WELLBUTRIN XL) 300 MG extended release tablet Take 1 tablet by mouth every morning 90 tablet 3    ALPRAZolam (XANAX) 0.5 MG tablet Take 1 tablet by mouth 2 times daily as needed for Sleep for up to 30 days.  60 tablet 2    pantoprazole (PROTONIX) 40 MG tablet pantoprazole 40 mg tablet,delayed release 90 tablet 3    linaclotide (LINZESS) 145 MCG capsule Take 1 capsule by mouth every morning (before breakfast) 90 capsule 3    celecoxib (CELEBREX) 200 MG capsule TAKE 1 CAPSULE BY MOUTH ONE TIME A DAY 90 capsule 1    simvastatin (ZOCOR) 40 MG tablet TAKE 1 TABLET BY MOUTH ONE TIME A DAY 90 tablet 1    metoprolol tartrate (LOPRESSOR) 25 MG tablet TAKE 1 TABLET BY MOUTH 2 TIMES A  tablet 1    Simethicone (GAS RELIEF PO) Take by mouth as needed (gas pain)      dicyclomine (BENTYL) 10 MG capsule Take 1 capsule by mouth 4 times daily 120 capsule 2    famotidine (PEPCID) 20 MG tablet Take 1 tablet by mouth 2 times daily 60 tablet 0    CPAP Machine MISC by Does not apply route      Semaglutide,0.25 or 0.5MG/DOS, (OZEMPIC, 0.25 OR 0.5 MG/DOSE,) 2 MG/1.5ML SOPN Inject 1 mg into the skin once a week Patient takes on Thursdays      Probiotic Product (PROBIOTIC DAILY PO) Take 1 capsule by mouth daily      Elastic Bandages & Supports (JOBST KNEE HIGH COMPRESSION SM) MISC Compression stockings 20-30 mm hg knee high bilaterally  Dx : Venous Insufficiency, Swelling 2 each 2    levothyroxine (SYNTHROID) 200 MCG tablet Take 200 mcg by mouth Daily      Insulin Pump - insulin lispro Inject into the skin continuous 2365-2610: 1.9 units/hr  3004-4706: 2.1 units/hr   6233-3064: 1.9 units/hr      docusate sodium (COLACE) 100 MG capsule Take 100 mg by mouth daily      vitamin B-12 (CYANOCOBALAMIN) 500 MCG tablet Take 1,000 mcg by mouth daily       aspirin 81 MG tablet Take 81 mg by mouth nightly. No current facility-administered medications for this visit. Allergies:  No Known Allergies     Labs:  Lab Results   Component Value Date    LABA1C 8.2 08/24/2022    LABA1C 8.2 (H) 08/22/2022    LABA1C 7.7 01/19/2022     Estimated Creatinine Clearance: 74 mL/min (A) (based on SCr of 1.2 mg/dL (H)). Care Team:   Physician (endocrinologist): Covert (Last OV: 8/24/22)  ACC/RD: none  - Upcoming appointments:   No future appointments. Refill History:   Ozempic 1 mg 7/12, 4/6, 1/5    Diabetes Care:  - Glycemic Goal: <7.0% and directed by provider. Is not at blood glucose goal but is on insulin therapy. . Type 1 DM  - Appropriateness of Insulin Therapy: insulin pump managed by endo  - Therapy Optimization: increase ozempic to 2 mg, some supply issues but [facility] states they do have in stock right now  - Medication changes since last A1c: pt has not started ozempic 2 mg yet  - Medication Adherence Assessment: adherent  - ACE/ARB and/or Statin Gap: no gaps    Plan:    Only missing DM education for a1c > 8    Ozempic 2 mg dose- does have script on file at [facility]- will process through copay card now if patient wants    Previous pharmacist stated patient had lost some weight with ozepmic. How is diet going? What insulin pump is she currently using? Still filling trish 14 day. Previous pharmacist did discuss conversion to [place] 2. Patient wanted to wait and see if she was going to do new pump and get a new CGM that went with new pump. Educate patient abbott is no longer going to be making 14 day trish    Attempt made to reach patient by telephone for diabetes review. Left voice message for patient to return clinician's phone call to dept, ext 3. Will continue to attempt to contact patient by telephone as appropriate.  Would like to review: see note     Lucio Moreno, PharmD, [address] Pharmacist  Department: [phone]    For Pharmacy Admin Tracking Only    Gap Closed?: No   Time Spent (min): 20

## 2022-09-12 ENCOUNTER — CLINICAL DOCUMENTATION (OUTPATIENT)
Dept: PHARMACY | Facility: CLINIC | Age: 47
End: 2022-09-12

## 2022-09-21 ENCOUNTER — TELEPHONE (OUTPATIENT)
Dept: PHARMACY | Facility: CLINIC | Age: 47
End: 2022-09-21

## 2022-09-21 NOTE — TELEPHONE ENCOUNTER
As of 9/7/22 patient has used $655 of the maximum of $600 a year in waived co pays for specific medications and pharmacy-related supplies through the [address] [employer] [place] for the DM Program.    Patient currently enrolled in the DM Program and has used all of the maximum of $600 a year in waived co pays for specific medications and pharmacy-related supplies through the [address] [employer] [place]. Courtesy call placed to patient to advise them of the above information. Patient unavailable at the time of call. Message left on TAD: Maximum waived co pays for the DM Program has been met and they will begin to be charged their co-payments once again. I left our number: [phone], option #3 if patient has any questions/concerns.       Sneha Hernandez, [address]   Phone: [phone], option #3       For Pharmacy Admin Tracking Only    CPA in place:  No  Time Spent (min): 10

## 2022-09-29 DIAGNOSIS — F32.A DEPRESSION, UNSPECIFIED DEPRESSION TYPE: ICD-10-CM

## 2022-09-29 RX ORDER — BUPROPION HYDROCHLORIDE 300 MG/1
TABLET ORAL
Qty: 90 TABLET | Refills: 1 | Status: SHIPPED | OUTPATIENT
Start: 2022-09-29

## 2022-11-02 RX ORDER — FAMOTIDINE 20 MG/1
20 TABLET, FILM COATED ORAL 2 TIMES DAILY
Qty: 60 TABLET | Refills: 0 | Status: SHIPPED | OUTPATIENT
Start: 2022-11-02

## 2022-11-02 RX ORDER — FAMOTIDINE 20 MG/1
20 TABLET, FILM COATED ORAL 2 TIMES DAILY
Qty: 60 TABLET | Refills: 0 | Status: SHIPPED
Start: 2022-11-02 | End: 2022-11-02 | Stop reason: SDUPTHER

## 2022-11-15 ENCOUNTER — TELEPHONE (OUTPATIENT)
Dept: PHARMACY | Facility: CLINIC | Age: 47
End: 2022-11-15

## 2022-11-15 NOTE — TELEPHONE ENCOUNTER
[facility],[address] Employee Diabetes Program    Vira Bennett is a 49 y.o. female enrolled in the [place] with Diabetes Program. The goal of this voluntary program is to help employees and covered dependents reach their health maintenance goals in regards to their diabetes diagnosis. According to our records, patient is missing the following requirement(s) that must be completed by December 31, 2022 to avoid discharge from the program:    Further engagement to complete diabetic education (if A1c > 8%)     Plan:  Attempt made to reach patient by telephone to review above. Left voice message for patient to return clinician's phone call to [phone], option 3. If patient returns call, need to complete high A1c call.  See previous outreach on 9/8/22    [employer] message sent previously and read    Jo Jefferson, PharmD, [address] Pharmacist  Department: [address] Only    Time Spent (min): 10

## 2022-11-21 ENCOUNTER — OFFICE VISIT (OUTPATIENT)
Dept: FAMILY MEDICINE CLINIC | Age: 47
End: 2022-11-21
Payer: COMMERCIAL

## 2022-11-21 ENCOUNTER — TELEPHONE (OUTPATIENT)
Dept: FAMILY MEDICINE CLINIC | Age: 47
End: 2022-11-21

## 2022-11-21 VITALS
BODY MASS INDEX: 43.94 KG/M2 | DIASTOLIC BLOOD PRESSURE: 73 MMHG | HEART RATE: 75 BPM | RESPIRATION RATE: 18 BRPM | HEIGHT: 64 IN | WEIGHT: 257.4 LBS | TEMPERATURE: 98.2 F | SYSTOLIC BLOOD PRESSURE: 129 MMHG | OXYGEN SATURATION: 98 %

## 2022-11-21 DIAGNOSIS — G47.33 OSA (OBSTRUCTIVE SLEEP APNEA): Primary | ICD-10-CM

## 2022-11-21 PROCEDURE — 3078F DIAST BP <80 MM HG: CPT | Performed by: FAMILY MEDICINE

## 2022-11-21 PROCEDURE — 99213 OFFICE O/P EST LOW 20 MIN: CPT | Performed by: FAMILY MEDICINE

## 2022-11-21 PROCEDURE — 3074F SYST BP LT 130 MM HG: CPT | Performed by: FAMILY MEDICINE

## 2022-11-21 RX ORDER — ALPRAZOLAM 0.5 MG/1
TABLET ORAL
COMMUNITY
Start: 2022-10-26

## 2022-11-21 NOTE — TELEPHONE ENCOUNTER
If she can come in this afternoon, I will see her.   Otherwise, with the holiday, we will have to wait till Monday

## 2022-11-21 NOTE — TELEPHONE ENCOUNTER
----- Message from Irma Palafox sent at 11/21/2022 10:33 AM EST -----  Subject: Appointment Request    Reason for Call: Established Patient Appointment needed: Routine Existing   Condition Follow Up    QUESTIONS    Reason for appointment request? Other - patient appointment asap     Additional Information for Provider? c-pap machine needs replaced but   needs a face to face appointment before PCP can give script, soonest   appointment was 12/08/22 and patient feels c-pap machine will quit before   that date, please contact to advise.   ---------------------------------------------------------------------------  --------------  [address]  [phone]; OK to leave message on voicemail  ---------------------------------------------------------------------------  --------------  SCRIPT ANSWERS  BRANDYID Screen: Mega Kovacs

## 2022-11-21 NOTE — PROGRESS NOTES
Chief Complaint   Patient presents with    CPAP/BiPAP       HPI:  Patient is here for a new CPAP machine. States it has been making weird noises. Called company and was told blower is likely going bad. States she uses it every night. States she needed an in person office visit to provide documentation to the medical supplier for why she needed a new one. HM reviewed, pt had a colonoscopy completed in the beginning of the year. Using CPAP every time she lies down to sleep. Settings adjusted in 7/21. Not an issue. Sleep is much improved with machine and is concerned about effects of not using it as prescribed. Patient's past medical, surgical, social and/or family history reviewed, updated in chart, and are non-contributory (unless otherwise stated). Medications and allergies also reviewed and updated in chart. Review of Systems:  Constitutional:  No fever, no fatigue, no chills, no headaches, no weight change  Dermatology:  No rash, no mole, no dry or sensitive skin  ENT:  No cough, no sore throat, no sinus pain, no runny nose, no ear pain  Cardiology:  No chest pain, no palpitations, no leg edema, no shortness of breath, no PND  Gastroenterology:  No dysphagia, no abdominal pain, no nausea, no vomiting, no constipation, no diarrhea, no heartburn  Musculoskeletal:  No joint pain, no leg cramps, no back pain, no muscle aches  Respiratory:  No shortness of breath, no orthopnea, no wheezing, no STEEL, no hemoptysis  Urology:  No blood in the urine, no urinary frequency, no urinary incontinence, no urinary urgency, no nocturia, no dysuria      Patient's past medical, surgical, social and/or family history reviewed, updated in chart, and are non-contributory (unless otherwise stated). Medications and allergies also reviewed and updated in chart.      Physical Exam  /73   Pulse 75   Temp 98.2 °F (36.8 °C)   Resp 18   Ht 5' 4\" (1.626 m)   Wt 257 lb 6.4 oz (116.8 kg)   SpO2 98%   BMI 44.18 kg/m²   Wt Readings from Last 3 Encounters:   11/21/22 257 lb 6.4 oz (116.8 kg)   08/31/22 261 lb (118.4 kg)   07/01/22 259 lb (117.5 kg)     Temp Readings from Last 3 Encounters:   11/21/22 98.2 °F (36.8 °C)   08/31/22 97.2 °F (36.2 °C)   07/01/22 97 °F (36.1 °C) (Temporal)     BP Readings from Last 3 Encounters:   11/21/22 129/73   08/31/22 129/79   07/01/22 120/74     Pulse Readings from Last 3 Encounters:   11/21/22 75   08/31/22 74   07/01/22 78       General appearance: alert, well appearing, and in no distress, oriented to person, place, and time and normal appearing weight. CVS exam: normal rate, regular rhythm, normal S1, S2, no murmurs, rubs, clicks or gallops. Radial pulses 2+ bilateral.  PT/DP pulse 2+ bilat. No C/C/E    Chest: clear to auscultation, no wheezes, rales or rhonchi, symmetric air entry. Musculoskeletal: MS 5/5, DTR 2/4 x4 extremities, FROM F/E spine, no tenderness, obvious masses or deformities. Gait normal.     Abdomen: Soft, non-tender, non-distended, positive BS in all 4 quadrants    Extremities:Dorsalis pedis pulses palpated bilaterally, no clubbing, cyanosis, edema or erythema     SKIN: no lesions, jaundice, petechiae, pallor, cyanosis, ecchymosis    NEURO: gross motor exam normal by observation, gait normal      Assessment/Plan  Roscoe Archuleta was seen today for cpap/bipap. Diagnoses and all orders for this visit:    MILENA (obstructive sleep apnea)  -     DME Order for CPAP as OP        No follow-ups on file. Digna Oneill, DO    Call or go to ED immediately if symptoms worsen or persist.    Educational materials and/or home exercises printed for patient's review and were included in patient instructions on his/her After Visit Summary and given to patient at the end of visit. Counseled regarding above diagnosis, including possible risks and complications,  especially if left uncontrolled.     Counseled regarding the possible side effects, risks, benefits and alternatives to treatment; patient and/or guardian verbalizes understanding, agrees, feels comfortable with and wishes to proceed with above treatment plan. Advised patient to call with any new medication issues, and read all Rx info from pharmacy to assure aware of all possible risks and side effects of medication before taking. Reviewed age and gender appropriate health screening exams and vaccinations. Advised patient regarding importance of keeping up with recommended health maintenance and to schedule as soon as possible if overdue, as this is important in assessing for undiagnosed pathology, especially cancer, as well as protecting against potentially harmful/life threatening disease. Patient and/or guardian verbalizes understanding and agrees with above counseling, assessment and plan. All questions answered.

## 2022-11-28 DIAGNOSIS — I10 ESSENTIAL HYPERTENSION: ICD-10-CM

## 2022-12-12 ENCOUNTER — TELEPHONE (OUTPATIENT)
Dept: PHARMACY | Facility: CLINIC | Age: 47
End: 2022-12-12

## 2022-12-12 NOTE — TELEPHONE ENCOUNTER
[facility] CLINICAL PHARMACY REVIEW - BE WELL WITH DIABETES: HIGH A1C  =============================================  Sheela Morillo is a 49 y.o. female enrolled in the [address] [place], [place] [address] Be Well with Diabetes program.    Identified care gap(s): A1c > 8%      DM Program Prescriptions:  Current Outpatient Medications   Medication Sig Dispense Refill    metoprolol tartrate (LOPRESSOR) 25 MG tablet Take 1 tablet by mouth 2 times daily 180 tablet 1    ALPRAZolam (XANAX) 0.5 MG tablet       famotidine (PEPCID) 20 MG tablet Take 1 tablet by mouth 2 times daily 60 tablet 0    buPROPion (WELLBUTRIN XL) 300 MG extended release tablet TAKE ONE TABLET BY MOUTH EVERY MORNING 90 tablet 1    Continuous Blood Gluc  (FREESTYLE ERIKA 14 DAY READER) GENARO USE AS DIRECTED      Continuous Blood Gluc Sensor (Ideal ImplantSTYLE ERIKA 14 DAY SENSOR) MISC       ramipril (ALTACE) 10 MG capsule TAKE 1 CAPSULE BY MOUTH ONE TIME A DAY 90 capsule 3    pantoprazole (PROTONIX) 40 MG tablet pantoprazole 40 mg tablet,delayed release 90 tablet 3    linaclotide (LINZESS) 145 MCG capsule Take 1 capsule by mouth every morning (before breakfast) 90 capsule 3    celecoxib (CELEBREX) 200 MG capsule TAKE 1 CAPSULE BY MOUTH ONE TIME A DAY 90 capsule 1    simvastatin (ZOCOR) 40 MG tablet TAKE 1 TABLET BY MOUTH ONE TIME A DAY 90 tablet 1    Simethicone (GAS RELIEF PO) Take by mouth as needed (gas pain)      dicyclomine (BENTYL) 10 MG capsule Take 1 capsule by mouth 4 times daily 120 capsule 2    CPAP Machine MISC by Does not apply route      Semaglutide,0.25 or 0.5MG/DOS, (OZEMPIC, 0.25 OR 0.5 MG/DOSE,) 2 MG/1.5ML SOPN Inject 1 mg into the skin once a week Patient takes on Thursdays      Probiotic Product (PROBIOTIC DAILY PO) Take 1 capsule by mouth daily      Elastic Bandages & Supports (JOBST KNEE HIGH COMPRESSION SM) MISC Compression stockings 20-30 mm hg knee high bilaterally  Dx :  Venous Insufficiency, Swelling 2 each 2    levothyroxine (SYNTHROID) 200 MCG tablet Take 200 mcg by mouth Daily      Insulin Pump - insulin lispro Inject into the skin continuous 3688-4561: 1.9 units/hr  6756-6815: 2.1 units/hr   1226-3602: 1.9 units/hr      docusate sodium (COLACE) 100 MG capsule Take 100 mg by mouth daily      vitamin B-12 (CYANOCOBALAMIN) 500 MCG tablet Take 1,000 mcg by mouth daily       aspirin 81 MG tablet Take 81 mg by mouth nightly. No current facility-administered medications for this visit. Allergies:  No Known Allergies     Labs:  Lab Results   Component Value Date    LABA1C 8.2 08/24/2022    LABA1C 8.2 (H) 08/22/2022    LABA1C 7.7 01/19/2022     Estimated Creatinine Clearance: 72 mL/min (A) (based on SCr of 1.2 mg/dL (H)). Diabetes Care:  - Glycemic Goal: <7.0%. Is not at blood glucose goal  - Pt is type 1 and has used a pump for a long time  - She reports that she wears Syed 14 and does not think that her A1c has changed much recently. She has been having issues with her current pump and wanted to explore some other possible options. I have sent her pricing for omnipod5 via [employer]. She is also aware of other options she can consider through DME such as tandem or medtronic  - Discussed changing to [employer] 2. She does not have an up to date smart phone so she would need a reader. Syed 3 likely not a great option. Sending info via [employer]    Plan:  Discussed with patient- see above     ROSENDO Perkins, PharmD, [address] [facility]  Department, toll free: [phone]    For Pharmacy Admin Tracking Only    Gap Closed?: Yes   Time Spent (min): 45

## 2022-12-28 ENCOUNTER — TELEPHONE (OUTPATIENT)
Dept: PHARMACY | Facility: CLINIC | Age: 47
End: 2022-12-28

## 2022-12-28 NOTE — TELEPHONE ENCOUNTER
2023 Annual Pharmacist Visit  **Patient is [facility]**    Called patient to schedule 2023 yearly pharmacist appointment to discuss medications for Diabetes Management Program.    No answer. Left VM on Mobile TAD: Please call back at [phone] Option #3.      Murrel Cowden, [facility]   Department, toll free: [phone] Option #3

## 2023-01-04 NOTE — TELEPHONE ENCOUNTER
No answer after 2 attempts sending [employer] for outreach.      For Pharmacy Admin Tracking Only    Program: [address]:  No  Recommendation Provided To: Patient/Caregiver: 1 via [employer] Message  Intervention Detail: Scheduled Appointment  Intervention Accepted By: Patient/Caregiver: 1  Gap Closed?: No   Time Spent (min): 5

## 2023-01-16 RX ORDER — LEVOTHYROXINE SODIUM 0.2 MG/1
200 TABLET ORAL DAILY
Qty: 30 TABLET | Refills: 0 | Status: SHIPPED | OUTPATIENT
Start: 2023-01-16

## 2023-01-26 DIAGNOSIS — F41.9 ANXIETY: Primary | ICD-10-CM

## 2023-01-26 DIAGNOSIS — F32.A DEPRESSION, UNSPECIFIED DEPRESSION TYPE: ICD-10-CM

## 2023-01-26 RX ORDER — ALPRAZOLAM 0.5 MG/1
0.5 TABLET ORAL 2 TIMES DAILY PRN
Qty: 60 TABLET | Refills: 2 | Status: SHIPPED | OUTPATIENT
Start: 2023-01-26 | End: 2023-02-25

## 2023-01-26 RX ORDER — ALPRAZOLAM 0.5 MG/1
TABLET ORAL
Status: CANCELLED | OUTPATIENT
Start: 2023-01-26

## 2023-02-14 DIAGNOSIS — E78.2 MIXED HYPERLIPIDEMIA: ICD-10-CM

## 2023-02-14 RX ORDER — SIMVASTATIN 40 MG
TABLET ORAL
Qty: 90 TABLET | Refills: 1 | Status: SHIPPED | OUTPATIENT
Start: 2023-02-14

## 2023-02-20 RX ORDER — LEVOTHYROXINE SODIUM 0.2 MG/1
TABLET ORAL
Qty: 30 TABLET | Refills: 0 | Status: SHIPPED | OUTPATIENT
Start: 2023-02-20

## 2023-03-23 ENCOUNTER — TELEPHONE (OUTPATIENT)
Dept: PHARMACY | Facility: CLINIC | Age: 48
End: 2023-03-23

## 2023-03-23 NOTE — TELEPHONE ENCOUNTER
**Patient is Durga Quevedo **  Called patient to schedule 2023 yearly pharmacist appointment to discuss medications for Diabetes Management Program.    No answer. Left VM on mobile TAD: Please call back at [phone] Option #3.      Samuel Giron, [address] [email or web address]   Department, toll free: [phone] Option #3

## 2023-03-27 DIAGNOSIS — F32.A DEPRESSION, UNSPECIFIED DEPRESSION TYPE: ICD-10-CM

## 2023-03-27 RX ORDER — BUPROPION HYDROCHLORIDE 300 MG/1
TABLET ORAL
Qty: 90 TABLET | Refills: 1 | Status: SHIPPED | OUTPATIENT
Start: 2023-03-27

## 2023-03-28 LAB — DIABETIC RETINOPATHY: POSITIVE

## 2023-03-28 RX ORDER — CELECOXIB 200 MG/1
CAPSULE ORAL
Qty: 90 CAPSULE | Refills: 1 | Status: SHIPPED | OUTPATIENT
Start: 2023-03-28

## 2023-03-31 NOTE — TELEPHONE ENCOUNTER
Second attempt made to contact patient to schedule 2023 yearly pharmacist appointment to discuss medications for Diabetes Management Program.    No answer. Left VM on home TAD: Please call back at [phone] Option #3. [employer] message sent to patient.     [place], [address] [facility]   Department, toll free: [phone] Option #3      For Pharmacy Admin Tracking Only    Program: [address]:  No  Gap Closed?: Yes   Time Spent (min): 5

## 2023-04-03 RX ORDER — LEVOTHYROXINE SODIUM 0.2 MG/1
200 TABLET ORAL DAILY
Qty: 90 TABLET | Refills: 0 | Status: SHIPPED | OUTPATIENT
Start: 2023-04-03 | End: 2023-04-12

## 2023-05-01 ENCOUNTER — TELEPHONE (OUTPATIENT)
Dept: PHARMACY | Facility: CLINIC | Age: 48
End: 2023-05-01

## 2023-05-02 ENCOUNTER — PATIENT MESSAGE (OUTPATIENT)
Dept: FAMILY MEDICINE CLINIC | Age: 48
End: 2023-05-02

## 2023-05-02 DIAGNOSIS — F41.9 ANXIETY: Primary | ICD-10-CM

## 2023-05-02 NOTE — TELEPHONE ENCOUNTER
From: Cherelle Hernandez  To: Dr. oJao Terry  Sent: 5/2/2023 12:52 PM EDT  Subject: Xanax    Hello,  MyChart is not allowing me to request a refill on xanax, that I take twice a day . It's . 50 mg.   Thank you

## 2023-05-03 RX ORDER — ALPRAZOLAM 0.5 MG/1
0.5 TABLET ORAL 2 TIMES DAILY PRN
Qty: 60 TABLET | Refills: 2 | Status: SHIPPED | OUTPATIENT
Start: 2023-05-03 | End: 2023-06-02

## 2023-05-06 ENCOUNTER — HOSPITAL ENCOUNTER (OUTPATIENT)
Age: 48
Discharge: HOME | End: 2023-05-06
Payer: COMMERCIAL

## 2023-05-06 LAB
ALBUMIN SERPL-MCNC: 3.9 G/DL (ref 3.5–5.2)
ALP SERPL-CCNC: 110 U/L (ref 35–104)
ALT SERPL-CCNC: 14 U/L (ref 0–32)
ANION GAP SERPL CALCULATED.3IONS-SCNC: 13 MMOL/L (ref 7–16)
AST SERPL-CCNC: 28 U/L (ref 0–31)
BILIRUB SERPL-MCNC: 0.9 MG/DL (ref 0–1.2)
BUN SERPL-MCNC: 15 MG/DL (ref 6–20)
CALCIUM SERPL-MCNC: 9.6 MG/DL (ref 8.6–10.2)
CHLORIDE SERPL-SCNC: 100 MMOL/L (ref 98–107)
CHOLESTEROL, TOTAL: 169 MG/DL (ref 0–199)
CO2 SERPL-SCNC: 24 MMOL/L (ref 22–29)
CREAT SERPL-MCNC: 0.7 MG/DL (ref 0.5–1)
CREAT UR-MCNC: 72 MG/DL (ref 29–226)
GLUCOSE SERPL-MCNC: 143 MG/DL (ref 74–99)
HBA1C MFR BLD: 8 % (ref 4–5.6)
HDLC SERPL-MCNC: 59 MG/DL
LDLC SERPL CALC-MCNC: 85 MG/DL (ref 0–99)
MICROALBUMIN UR-MCNC: <12 MG/L
MICROALBUMIN/CREAT UR-RTO: ABNORMAL (ref 0–30)
POTASSIUM SERPL-SCNC: 5 MMOL/L (ref 3.5–5)
PROT SERPL-MCNC: 6.9 G/DL (ref 6.4–8.3)
SODIUM SERPL-SCNC: 137 MMOL/L (ref 132–146)
T4 FREE SERPL-MCNC: 1.43 NG/DL (ref 0.93–1.7)
TRIGL SERPL-MCNC: 123 MG/DL (ref 0–149)
TSH SERPL-MCNC: 2.97 UIU/ML (ref 0.27–4.2)
VLDLC SERPL CALC-MCNC: 25 MG/DL

## 2023-05-06 PROCEDURE — 82570 ASSAY OF URINE CREATININE: CPT

## 2023-05-06 PROCEDURE — 80053 COMPREHEN METABOLIC PANEL: CPT

## 2023-05-06 PROCEDURE — 80061 LIPID PANEL: CPT

## 2023-05-06 PROCEDURE — 36415 COLL VENOUS BLD VENIPUNCTURE: CPT

## 2023-05-06 PROCEDURE — 83036 HEMOGLOBIN GLYCOSYLATED A1C: CPT

## 2023-05-06 PROCEDURE — 82044 UR ALBUMIN SEMIQUANTITATIVE: CPT

## 2023-05-06 PROCEDURE — 84443 ASSAY THYROID STIM HORMONE: CPT

## 2023-05-06 PROCEDURE — 84439 ASSAY OF FREE THYROXINE: CPT

## 2023-05-09 NOTE — TELEPHONE ENCOUNTER
Patient returned call and scheduled for [birth year] at [address].    [address] [place] [address] free: [phone]     For Pharmacy Admin Tracking Only    Program: [address] in place:  No  Recommendation Provided To: Patient/Caregiver: 1 via Telephone  Intervention Detail: Scheduled Appointment  Intervention Accepted By: Patient/Caregiver: 1  Gap Closed?: Yes   Time Spent (min): 10

## 2023-05-12 ENCOUNTER — TELEPHONE (OUTPATIENT)
Dept: PHARMACY | Facility: CLINIC | Age: 48
End: 2023-05-12

## 2023-05-12 NOTE — TELEPHONE ENCOUNTER
As of 5/8/23 patient has used $518 of the maximum of $600 a year in waived co pays for specific medications and pharmacy-related supplies through the [address] [place] for the DM Program.    Patient currently enrolled in the DM Program and is nearing the maximum of $600 a year in waived co pays for specific medications and pharmacy-related supplies through the [address] [place]. Courtesy call placed to patient to advise them of the above information. Patient unavailable at the time of call. Message left on TAD: Maximum waived co pays for the DM Program has almost been met. Once maximum has been reached, they will begin to be charged their co-payment once again. I left our number: [phone], option #3 if patient has any questions/concerns.       Paris Miller, [address] [place] [address]   Phone: [phone], option #3     For Pharmacy Admin Tracking Only    Program: [address]:  No  Time Spent (min): 5

## 2023-05-15 ENCOUNTER — TELEPHONE (OUTPATIENT)
Dept: PHARMACY | Facility: CLINIC | Age: 48
End: 2023-05-15

## 2023-05-15 ENCOUNTER — CLINICAL DOCUMENTATION (OUTPATIENT)
Dept: PHARMACY | Facility: CLINIC | Age: 48
End: 2023-05-15

## 2023-05-15 RX ORDER — PANTOPRAZOLE SODIUM 40 MG/1
40 TABLET, DELAYED RELEASE ORAL DAILY
COMMUNITY

## 2023-05-15 NOTE — PROGRESS NOTES
Pharmacy Pop Care Documentation:     AVS received for required office visit on:  3/15/23 with Rosa Espinoza per careeveryjuan josé Geller, PharmD, [address] [facility], toll free: [phone]

## 2023-05-15 NOTE — TELEPHONE ENCOUNTER
[facility] CLINICAL PHARMACY REVIEW - Be Well with Diabetes    Cash Carroll is a 51 y.o. female enrolled in the [address] [place] [address] Diabetes Program. Patient provided [employer] Kari with verbal consent to remain in the program for this year.  Patient enrolled 2017    Insurance through the following employer: [address] [place] [address],[address]    Medications:  Current Outpatient Medications   Medication Instructions    ALPRAZolam (XANAX) 0.5 mg, Oral, 2 TIMES DAILY PRN    aspirin 81 mg, Oral, NIGHTLY    buPROPion (WELLBUTRIN XL) 300 MG extended release tablet TAKE ONE TABLET BY MOUTH EVERY MORNING    celecoxib (CELEBREX) 200 MG capsule TAKE 1 CAPSULE BY MOUTH ONE TIME A DAY    CPAP Machine MISC Does not apply    dicyclomine (BENTYL) 10 mg, Oral, 4 TIMES DAILY    docusate sodium (COLACE) 100 mg, Oral, DAILY    Elastic Bandages & Supports (JOBST KNEE HIGH COMPRESSION ) MISC Compression stockings 20-30 mm hg knee high bilaterally<BR>Dx : Venous Insufficiency, Swelling    famotidine (PEPCID) 20 mg, Oral, 2 TIMES DAILY    Insulin Pump - insulin lispro SubCUTAneous, CONTINUOUS, 1181-4447: 1.9 units/hr<BR>7185-9586: 2.1 units/hr <BR>0823-4037: 1.9 units/hr    levothyroxine (SYNTHROID) 200 MCG tablet TAKE ONE TABLET BY MOUTH EVERY DAY    linaclotide (LINZESS) 145 mcg, Oral, DAILY BEFORE BREAKFAST    metoprolol tartrate (LOPRESSOR) 25 mg, Oral, 2 TIMES DAILY    Ozempic (0.25 or 0.5 MG/DOSE) 1 mg, SubCUTAneous, WEEKLY, Patient takes on Thursdays    pantoprazole (PROTONIX) 40 mg, Oral, DAILY    Probiotic Product (PROBIOTIC DAILY PO) 1 capsule, Oral, DAILY    ramipril (ALTACE) 10 MG capsule TAKE 1 CAPSULE BY MOUTH ONE TIME A DAY    Simethicone (GAS RELIEF PO) Oral, PRN    simvastatin (ZOCOR) 40 MG tablet TAKE 1 TABLET BY MOUTH ONE TIME A DAY    vitamin B-12 (CYANOCOBALAMIN) 1,000 mcg, Oral, DAILY     Current Pharmacy: [facility]  Current testing supplies/frequency: Dexcom G6- recently

## 2023-07-17 ENCOUNTER — TELEPHONE (OUTPATIENT)
Dept: PODIATRY | Age: 48
End: 2023-07-17

## 2023-07-17 NOTE — TELEPHONE ENCOUNTER
Patient called and left a voicemail regarding a laceration to her great toe. Called patient back and she explained that she has a deep laceration with bleeding. She tried to bandage it up, however her mother in law said she needs immediate attention due to the deepness of the cut. I advised patient to go to Urgent Care. Patient agreed,  Call ended.

## 2023-07-19 ENCOUNTER — TELEPHONE (OUTPATIENT)
Dept: PHARMACY | Facility: CLINIC | Age: 48
End: 2023-07-19

## 2023-07-19 NOTE — TELEPHONE ENCOUNTER
As of 7/11/23 patient has used $829 of the maximum of $600 a year in waived co pays for specific medications and pharmacy-related supplies through the [address] for the DM Program.    Patient currently enrolled in the DM Program and has used all of the maximum of $600 a year in waived co pays for specific medications and pharmacy-related supplies through the [address]. Courtesy call placed to patient to advise them of the above information. Patient unavailable at the time of call. Message left on TAD: Maximum waived co pays for the DM Program has been met and they will begin to be charged their co-payments once again. I left our number: [phone], option #3 if patient has any questions/concerns.         Jolly Alvarado   Phone: [phone], option #3       For Pharmacy Admin Tracking Only    Program: [address] in place:  No  Time Spent (min): 5

## 2023-07-26 DIAGNOSIS — F41.9 ANXIETY: ICD-10-CM

## 2023-07-26 RX ORDER — FAMOTIDINE 20 MG/1
20 TABLET, FILM COATED ORAL 2 TIMES DAILY
Qty: 60 TABLET | Refills: 0 | Status: SHIPPED | OUTPATIENT
Start: 2023-07-26

## 2023-07-26 RX ORDER — ALPRAZOLAM 0.5 MG/1
0.5 TABLET ORAL 2 TIMES DAILY PRN
Qty: 60 TABLET | Refills: 2 | Status: SHIPPED | OUTPATIENT
Start: 2023-07-26 | End: 2023-10-24

## 2023-08-15 DIAGNOSIS — E78.2 MIXED HYPERLIPIDEMIA: ICD-10-CM

## 2023-08-15 RX ORDER — SIMVASTATIN 40 MG
40 TABLET ORAL DAILY
Qty: 90 TABLET | Refills: 0 | Status: SHIPPED | OUTPATIENT
Start: 2023-08-15

## 2023-08-30 NOTE — TELEPHONE ENCOUNTER
Called patient to schedule pharmacist appointment to discuss medications for Diabetes Management Program.     Scheduled for 1/20 at [address] [facility].   Pharmacy Odilon Palacio [facility]  Department, toll free: [phone], option 7 Detail Level: Generalized Detail Level: Detailed

## 2023-09-19 RX ORDER — PANTOPRAZOLE SODIUM 40 MG/1
40 TABLET, DELAYED RELEASE ORAL DAILY
Qty: 30 TABLET | Refills: 2 | Status: SHIPPED | OUTPATIENT
Start: 2023-09-19

## 2023-09-25 DIAGNOSIS — F32.A DEPRESSION, UNSPECIFIED DEPRESSION TYPE: ICD-10-CM

## 2023-09-25 RX ORDER — DICYCLOMINE HYDROCHLORIDE 10 MG/1
10 CAPSULE ORAL 4 TIMES DAILY
Qty: 120 CAPSULE | Refills: 2 | Status: SHIPPED | OUTPATIENT
Start: 2023-09-25

## 2023-09-25 RX ORDER — BUPROPION HYDROCHLORIDE 300 MG/1
300 TABLET ORAL EVERY MORNING
Qty: 90 TABLET | Refills: 1 | Status: SHIPPED | OUTPATIENT
Start: 2023-09-25

## 2023-09-26 NOTE — ANESTHESIA PRE PROCEDURE
Department of Anesthesiology  Preprocedure Note       Name:  Jose Miguel Yepez   Age:  54 y.o.  :  1975                                          MRN:  [id number]         Date:  2021      Surgeon: Sourav San):  Jo Prater, SIERRA    Procedure: Procedure(s):  REPAIR ACHILLES TENDON LEFT LOWER EXTREMITY    Medications prior to admission:   Prior to Admission medications    Medication Sig Start Date End Date Taking? Authorizing Provider   ibuprofen (ADVIL;MOTRIN) 200 MG tablet Take 400 mg by mouth every 6 hours as needed for Pain    Historical Provider, MD   omeprazole (PRILOSEC) 40 MG delayed release capsule Take 1 capsule by mouth daily 3/9/21   Beronica Terry, DO   buPROPion (WELLBUTRIN XL) 300 MG extended release tablet Take 1 tablet by mouth nightly 3/9/21   Beronica Terry, DO   metoprolol tartrate (LOPRESSOR) 25 MG tablet Take 1 tablet by mouth 2 times daily 21  Beronica Terry, DO   simvastatin (ZOCOR) 40 MG tablet Take 1 tablet by mouth daily 21   Beronica Terry, DO   Elastic Bandages & Supports (JOBST KNEE HIGH COMPRESSION SM) MISC Compression stockings 20-30 mm hg knee high bilaterally  Dx : Venous Insufficiency, Swelling 21   Carmen Mata MD   celecoxib (CELEBREX) 200 MG capsule Take 1 capsule by mouth daily 20   Beronica Terry, DO   ramipril (ALTACE) 10 MG capsule TAKE 1 CAPSULE BY MOUTH ONE TIME A DAY 20   Digna Terry,    potassium chloride (KLOR-CON M) 20 MEQ extended release tablet Take 1 tablet by mouth 2 times daily 20   Beronica Terry, DO   furosemide (LASIX) 40 MG tablet Take 1 tablet by mouth 2 times daily  Patient taking differently: Take 40 mg by mouth daily  20   Beronica Terry, DO   triamcinolone (KENALOG) 0.1 % cream Apply topically to affected areas 2 times daily.  20   Digna Terry,    levothyroxine (SYNTHROID) 200 MCG tablet Take 200 mcg by mouth Daily    Historical Provider, Reason for Call:  Other appointment    Detailed comments:  Patient wondering if can get appointment today or tomorrow needs paperwork fill out ASAP.      Phone Number Patient can be reached at: Home number on file [phone] (home)    Best Time: ASAP    Can we leave a detailed message on this number? YES    Call taken on 9/26/2023 at 9:52 AM by Kim Vences   MD   Insulin Pump - insulin lispro Inject into the skin continuous 9518-1133: 2.1 units/hr  6084-1491: 1.8 units/hr   2968-4677: 2.2 units/hr    Historical Provider, MD   docusate sodium (COLACE) 100 MG capsule Take 100 mg by mouth daily    Historical Provider, MD   vitamin B-12 (CYANOCOBALAMIN) 500 MCG tablet Take 1,000 mcg by mouth daily     Historical Provider, MD   aspirin 81 MG tablet Take 81 mg by mouth nightly. Historical Provider, MD       Current medications:    No current facility-administered medications for this encounter. Current Outpatient Medications   Medication Sig Dispense Refill    ibuprofen (ADVIL;MOTRIN) 200 MG tablet Take 400 mg by mouth every 6 hours as needed for Pain      omeprazole (PRILOSEC) 40 MG delayed release capsule Take 1 capsule by mouth daily 90 capsule 1    buPROPion (WELLBUTRIN XL) 300 MG extended release tablet Take 1 tablet by mouth nightly 90 tablet 0    metoprolol tartrate (LOPRESSOR) 25 MG tablet Take 1 tablet by mouth 2 times daily 180 tablet 2    simvastatin (ZOCOR) 40 MG tablet Take 1 tablet by mouth daily 90 tablet 1    Elastic Bandages & Supports (JOBST KNEE HIGH COMPRESSION SM) MISC Compression stockings 20-30 mm hg knee high bilaterally  Dx : Venous Insufficiency, Swelling 2 each 2    celecoxib (CELEBREX) 200 MG capsule Take 1 capsule by mouth daily 90 capsule 1    ramipril (ALTACE) 10 MG capsule TAKE 1 CAPSULE BY MOUTH ONE TIME A DAY 90 capsule 1    potassium chloride (KLOR-CON M) 20 MEQ extended release tablet Take 1 tablet by mouth 2 times daily 180 tablet 1    furosemide (LASIX) 40 MG tablet Take 1 tablet by mouth 2 times daily (Patient taking differently: Take 40 mg by mouth daily ) 180 tablet 1    triamcinolone (KENALOG) 0.1 % cream Apply topically to affected areas 2 times daily.  80 g 0    levothyroxine (SYNTHROID) 200 MCG tablet Take 200 mcg by mouth Daily      Insulin Pump - insulin lispro Inject into the skin continuous 3351-1072: 2.1 nausea and vomiting)     Primary osteoarthritis of knee     B/L    Retinopathy due to secondary DM ([address] 2011    LASER [place]    Sleep apnea 3/2/2012    uses cpap, instructed to bring    Thyroid disease        Past Surgical History:        Procedure Laterality Date    CARDIOVASCULAR STRESS TEST  2017    NEG     SECTION      ENDOMETRIAL ABLATION  2013    FOOT SURGERY Bilateral ,    to treat fascitis bilateral, to remove a nerve left    HERNIA REPAIR  2015    VENTRAL    SHOULDER SURGERY Left 2013    to release impingement       Social History:    Social History     Tobacco Use    Smoking status: Former Smoker     Packs/day: 0.50     Years: 19.00     Pack years: 9.50     Types: Cigarettes     Start date: 2013     Quit date: 2014     Years since quittin.2    Smokeless tobacco: Never Used   Substance Use Topics    Alcohol use: Yes     Alcohol/week: 2.0 standard drinks     Types: 2 Glasses of wine per week     Comment: socially                                Counseling given: Not Answered      Vital Signs (Current):   Vitals:    21 1513   Weight: 280 lb (127 kg)   Height: 5' 4\" (1.626 m)                                              BP Readings from Last 3 Encounters:   21 128/82   21 126/72   20 (!) 140/78       NPO Status:                                                                                 BMI:   Wt Readings from Last 3 Encounters:   21 288 lb (130.6 kg)   21 276 lb (125.2 kg)   21 276 lb (125.2 kg)     Body mass index is 48.06 kg/m².     CBC:   Lab Results   Component Value Date    WBC 7.7 2021    RBC 4.65 2021    HGB 13.3 2021    HCT 41.9 2021    MCV 90.1 2021    RDW 13.2 2021     2021       CMP:   Lab Results   Component Value Date     2021    K 4.9 2021    K 4.0 10/31/2020     2021    CO2 25 2021    BUN 19 2021    CREATININE 0.7 04/13/2021    GFRAA >60 04/13/2021    LABGLOM >60 04/13/2021    GLUCOSE 172 04/13/2021    GLUCOSE 177 02/02/2012    PROT 7.3 04/13/2021    CALCIUM 9.8 04/13/2021    BILITOT 0.9 04/13/2021    ALKPHOS 153 04/13/2021    AST 21 04/13/2021    ALT 25 04/13/2021       POC Tests: No results for input(s): POCGLU, POCNA, POCK, POCCL, POCBUN, POCHEMO, POCHCT in the last 72 hours. Coags:   Lab Results   Component Value Date    PROTIME 10.6 04/24/2016    INR 1.0 04/24/2016    APTT 31.5 04/24/2016       HCG (If Applicable):   Lab Results   Component Value Date    PREGTESTUR NEGATIVE 11/18/2013        ABGs: No results found for: PHART, PO2ART, PZH3BSX, GMR3UWN, BEART, C0IOJOVK     Type & Screen (If Applicable):  No results found for: LABABO, LABRH    Drug/Infectious Status (If Applicable):  No results found for: HIV, HEPCAB    COVID-19 Screening (If Applicable):   Lab Results   Component Value Date    COVID19 Not Detected 04/15/2021           Anesthesia Evaluation  Patient summary reviewed   history of anesthetic complications: PONV. Airway: Mallampati: III  TM distance: >3 FB   Neck ROM: full  Mouth opening: > = 3 FB Dental:      Comment: Dentition intact, missing 2 molars upper jaw bilaterally. Pulmonary: breath sounds clear to auscultation  (+) sleep apnea: on CPAP,      (-) not a current smoker                          ROS comment: Former 0.5 PPD x 15 years quit 2013   Cardiovascular:  Exercise tolerance: good (>4 METS),   (+) hypertension:, hyperlipidemia      ECG reviewed  Rhythm: regular  Rate: normal                 ROS comment: ECG 4/13/21   Sinus  Rhythm   -Left atrial enlargement.       Neuro/Psych:   (+) psychiatric history (Depression):depression/anxiety             GI/Hepatic/Renal:   (+) GERD:, morbid obesity          Endo/Other:    (+) Diabetes (Insulin pump HgbA1C 9.0)Type I DM, poorly controlled, using insulin, hypothyroidism: arthritis: OA., .                 Abdominal:   (+) obese ( Super morbidly obese), Vascular:           ROS comment: Venous insufficiency (chronic) (peripheral)  Varicose veins of bilateral lower extremities with pain. Anesthesia Plan      general     ASA 3       Induction: intravenous. MIPS: Postoperative opioids intended and Prophylactic antiemetics administered. Anesthetic plan and risks discussed with patient. Plan discussed with CRNA. Pt seen and examined, chart reviewed (including anesthesia, drug and allergy history). No interval changes to history and physical examination. (except as noted above). Anesthetic plan, risks, benefits, alternatives, and personnel involved discussed with patient. Patient verbalized an understanding and agrees to proceed. Plan discussed with care team members and agreed upon.     Jose Garcia MD  4-20-21  6:21 AM      PAT Chart Review:  Chart reviewed per routine on April 19, 2021 at 7:33 AM by Aleksey Porter MD.  (Final assessment and plan per day of surgery team.)    Aleksey Porter MD   4/19/2021

## 2023-10-02 ENCOUNTER — CARE COORDINATION (OUTPATIENT)
Dept: OTHER | Facility: CLINIC | Age: 48
End: 2023-10-02

## 2023-10-04 ENCOUNTER — PATIENT MESSAGE (OUTPATIENT)
Dept: PHARMACY | Facility: CLINIC | Age: 48
End: 2023-10-04

## 2023-10-23 ENCOUNTER — TELEPHONE (OUTPATIENT)
Dept: FAMILY MEDICINE CLINIC | Age: 48
End: 2023-10-23

## 2023-10-23 DIAGNOSIS — F41.9 ANXIETY: ICD-10-CM

## 2023-10-23 NOTE — TELEPHONE ENCOUNTER
Patient was trying to get a refill for her Xanax and she had to schedule to be seen to do so, she scheduled for the 7th of November but said that she only has two pills left, is there any way that you can send her enough to get through until that appt date?     [id number] [facility] pharmacy

## 2023-10-24 RX ORDER — ALPRAZOLAM 0.5 MG/1
0.5 TABLET ORAL 2 TIMES DAILY PRN
Qty: 60 TABLET | Refills: 0 | Status: SHIPPED | OUTPATIENT
Start: 2023-10-24 | End: 2024-01-22

## 2023-11-04 ENCOUNTER — HOSPITAL ENCOUNTER (OUTPATIENT)
Age: 48
Discharge: HOME | End: 2023-11-04
Payer: COMMERCIAL

## 2023-11-04 LAB
ALBUMIN SERPL-MCNC: 4.2 G/DL (ref 3.5–5.2)
ALP SERPL-CCNC: 126 U/L (ref 35–104)
ALT SERPL-CCNC: 24 U/L (ref 0–32)
ANION GAP SERPL CALCULATED.3IONS-SCNC: 12 MMOL/L (ref 7–16)
AST SERPL-CCNC: 27 U/L (ref 0–31)
BILIRUB SERPL-MCNC: 0.5 MG/DL (ref 0–1.2)
BUN SERPL-MCNC: 14 MG/DL (ref 6–20)
CALCIUM SERPL-MCNC: 9.2 MG/DL (ref 8.6–10.2)
CHLORIDE SERPL-SCNC: 104 MMOL/L (ref 98–107)
CHOLEST SERPL-MCNC: 161 MG/DL
CO2 SERPL-SCNC: 27 MMOL/L (ref 22–29)
CREAT SERPL-MCNC: 0.7 MG/DL (ref 0.5–1)
GFR SERPL CREATININE-BSD FRML MDRD: >60 ML/MIN/1.73M2
GLUCOSE SERPL-MCNC: 78 MG/DL (ref 74–99)
HBA1C MFR BLD: 7.2 % (ref 4–5.6)
HDLC SERPL-MCNC: 59 MG/DL
LDLC SERPL CALC-MCNC: 86 MG/DL
POTASSIUM SERPL-SCNC: 4.3 MMOL/L (ref 3.5–5)
PROT SERPL-MCNC: 6.9 G/DL (ref 6.4–8.3)
SODIUM SERPL-SCNC: 143 MMOL/L (ref 132–146)
T4 FREE SERPL-MCNC: 1.5 NG/DL (ref 0.9–1.7)
TRIGL SERPL-MCNC: 82 MG/DL
TSH SERPL DL<=0.05 MIU/L-ACNC: 1.7 UIU/ML (ref 0.27–4.2)
VLDLC SERPL CALC-MCNC: 16 MG/DL

## 2023-11-04 PROCEDURE — 83036 HEMOGLOBIN GLYCOSYLATED A1C: CPT

## 2023-11-04 PROCEDURE — 84443 ASSAY THYROID STIM HORMONE: CPT

## 2023-11-04 PROCEDURE — 80061 LIPID PANEL: CPT

## 2023-11-04 PROCEDURE — 80053 COMPREHEN METABOLIC PANEL: CPT

## 2023-11-04 PROCEDURE — 36415 COLL VENOUS BLD VENIPUNCTURE: CPT

## 2023-11-04 PROCEDURE — 84439 ASSAY OF FREE THYROXINE: CPT

## 2023-11-07 ENCOUNTER — OFFICE VISIT (OUTPATIENT)
Dept: FAMILY MEDICINE CLINIC | Age: 48
End: 2023-11-07
Payer: COMMERCIAL

## 2023-11-07 ENCOUNTER — TELEPHONE (OUTPATIENT)
Dept: ENDOCRINOLOGY | Age: 48
End: 2023-11-07

## 2023-11-07 VITALS
TEMPERATURE: 97.6 F | BODY MASS INDEX: 44.32 KG/M2 | RESPIRATION RATE: 16 BRPM | WEIGHT: 259.6 LBS | DIASTOLIC BLOOD PRESSURE: 79 MMHG | OXYGEN SATURATION: 95 % | HEART RATE: 74 BPM | HEIGHT: 64 IN | SYSTOLIC BLOOD PRESSURE: 129 MMHG

## 2023-11-07 DIAGNOSIS — F41.9 ANXIETY: ICD-10-CM

## 2023-11-07 DIAGNOSIS — E78.2 MIXED HYPERLIPIDEMIA: ICD-10-CM

## 2023-11-07 DIAGNOSIS — Z12.31 ENCOUNTER FOR SCREENING MAMMOGRAM FOR MALIGNANT NEOPLASM OF BREAST: ICD-10-CM

## 2023-11-07 DIAGNOSIS — E10.29 TYPE 1 DIABETES MELLITUS WITH OTHER KIDNEY COMPLICATION (HCC): Primary | ICD-10-CM

## 2023-11-07 DIAGNOSIS — E66.01 OBESITY, CLASS III, BMI 40-49.9 (MORBID OBESITY) (HCC): ICD-10-CM

## 2023-11-07 DIAGNOSIS — Z12.11 SPECIAL SCREENING FOR MALIGNANT NEOPLASM OF COLON: ICD-10-CM

## 2023-11-07 PROCEDURE — 3078F DIAST BP <80 MM HG: CPT | Performed by: FAMILY MEDICINE

## 2023-11-07 PROCEDURE — 3051F HG A1C>EQUAL 7.0%<8.0%: CPT | Performed by: FAMILY MEDICINE

## 2023-11-07 PROCEDURE — 3074F SYST BP LT 130 MM HG: CPT | Performed by: FAMILY MEDICINE

## 2023-11-07 PROCEDURE — 99214 OFFICE O/P EST MOD 30 MIN: CPT | Performed by: FAMILY MEDICINE

## 2023-11-07 RX ORDER — ALPRAZOLAM 0.5 MG/1
0.5 TABLET ORAL 2 TIMES DAILY PRN
Qty: 60 TABLET | Refills: 2 | Status: SHIPPED | OUTPATIENT
Start: 2023-11-07 | End: 2024-02-05

## 2023-11-07 SDOH — ECONOMIC STABILITY: FOOD INSECURITY: WITHIN THE PAST 12 MONTHS, YOU WORRIED THAT YOUR FOOD WOULD RUN OUT BEFORE YOU GOT MONEY TO BUY MORE.: NEVER TRUE

## 2023-11-07 SDOH — ECONOMIC STABILITY: HOUSING INSECURITY
IN THE LAST 12 MONTHS, WAS THERE A TIME WHEN YOU DID NOT HAVE A STEADY PLACE TO SLEEP OR SLEPT IN A SHELTER (INCLUDING NOW)?: NO

## 2023-11-07 SDOH — ECONOMIC STABILITY: FOOD INSECURITY: WITHIN THE PAST 12 MONTHS, THE FOOD YOU BOUGHT JUST DIDN'T LAST AND YOU DIDN'T HAVE MONEY TO GET MORE.: NEVER TRUE

## 2023-11-07 SDOH — ECONOMIC STABILITY: INCOME INSECURITY: HOW HARD IS IT FOR YOU TO PAY FOR THE VERY BASICS LIKE FOOD, HOUSING, MEDICAL CARE, AND HEATING?: NOT HARD AT ALL

## 2023-11-07 ASSESSMENT — COLUMBIA-SUICIDE SEVERITY RATING SCALE - C-SSRS
7. DID THIS OCCUR IN THE LAST THREE MONTHS: NO
3. HAVE YOU BEEN THINKING ABOUT HOW YOU MIGHT KILL YOURSELF?: NO
5. HAVE YOU STARTED TO WORK OUT OR WORKED OUT THE DETAILS OF HOW TO KILL YOURSELF? DO YOU INTEND TO CARRY OUT THIS PLAN?: NO
4. HAVE YOU HAD THESE THOUGHTS AND HAD SOME INTENTION OF ACTING ON THEM?: NO

## 2023-11-07 ASSESSMENT — PATIENT HEALTH QUESTIONNAIRE - PHQ9
10. IF YOU CHECKED OFF ANY PROBLEMS, HOW DIFFICULT HAVE THESE PROBLEMS MADE IT FOR YOU TO DO YOUR WORK, TAKE CARE OF THINGS AT HOME, OR GET ALONG WITH OTHER PEOPLE: 0
3. TROUBLE FALLING OR STAYING ASLEEP: 0
SUM OF ALL RESPONSES TO PHQ QUESTIONS 1-9: 0
4. FEELING TIRED OR HAVING LITTLE ENERGY: 0
7. TROUBLE CONCENTRATING ON THINGS, SUCH AS READING THE NEWSPAPER OR WATCHING TELEVISION: 0
1. LITTLE INTEREST OR PLEASURE IN DOING THINGS: 0
9. THOUGHTS THAT YOU WOULD BE BETTER OFF DEAD, OR OF HURTING YOURSELF: 0
8. MOVING OR SPEAKING SO SLOWLY THAT OTHER PEOPLE COULD HAVE NOTICED. OR THE OPPOSITE, BEING SO FIGETY OR RESTLESS THAT YOU HAVE BEEN MOVING AROUND A LOT MORE THAN USUAL: 0
SUM OF ALL RESPONSES TO PHQ QUESTIONS 1-9: 0
SUM OF ALL RESPONSES TO PHQ QUESTIONS 1-9: 0
SUM OF ALL RESPONSES TO PHQ9 QUESTIONS 1 & 2: 0
2. FEELING DOWN, DEPRESSED OR HOPELESS: 0
6. FEELING BAD ABOUT YOURSELF - OR THAT YOU ARE A FAILURE OR HAVE LET YOURSELF OR YOUR FAMILY DOWN: 0
5. POOR APPETITE OR OVEREATING: 0
SUM OF ALL RESPONSES TO PHQ QUESTIONS 1-9: 0

## 2023-11-07 NOTE — TELEPHONE ENCOUNTER
Pt has a referral in her chart for:  Type 1 diabetes mellitus with other kidney complication.  She called to schedule an appt, but I did see where the referring provider added \" Pt is on insulin pump,please expedite referral\"  I was unsure if pt should be seen sooner than the 1st available appt.  Please advise.

## 2023-11-09 NOTE — TELEPHONE ENCOUNTER
Darell Harvey called in concerning her missing requirements for the Be Well with Diabetes program.    Her missing requirements are:  Second diabetes visit with your provider in 2023- pt had her OV on 11.7.23  Second A1C result in 2023- pt had her A1C results on 11.4.23      Cesar Garcia, [address]   [phone] option 2     For Pharmacy Admin Tracking Only    Program: Susu in place:  No  Gap Closed?: Yes   Time Spent (min): 5

## 2023-11-19 DIAGNOSIS — E78.2 MIXED HYPERLIPIDEMIA: ICD-10-CM

## 2023-11-20 DIAGNOSIS — I10 ESSENTIAL HYPERTENSION: ICD-10-CM

## 2023-11-20 RX ORDER — RAMIPRIL 10 MG/1
CAPSULE ORAL
Qty: 90 CAPSULE | Refills: 3 | Status: SHIPPED | OUTPATIENT
Start: 2023-11-20

## 2023-11-20 RX ORDER — SIMVASTATIN 40 MG
40 TABLET ORAL DAILY
Qty: 90 TABLET | Refills: 0 | Status: SHIPPED | OUTPATIENT
Start: 2023-11-20

## 2023-12-25 DIAGNOSIS — I10 ESSENTIAL HYPERTENSION: ICD-10-CM

## 2023-12-25 DIAGNOSIS — F32.A DEPRESSION, UNSPECIFIED DEPRESSION TYPE: ICD-10-CM

## 2023-12-26 RX ORDER — BUPROPION HYDROCHLORIDE 300 MG/1
300 TABLET ORAL EVERY MORNING
Qty: 90 TABLET | Refills: 1 | Status: SHIPPED | OUTPATIENT
Start: 2023-12-26

## 2023-12-26 RX ORDER — PANTOPRAZOLE SODIUM 40 MG/1
40 TABLET, DELAYED RELEASE ORAL DAILY
Qty: 30 TABLET | Refills: 2 | Status: SHIPPED | OUTPATIENT
Start: 2023-12-26

## 2024-01-15 ENCOUNTER — TELEPHONE (OUTPATIENT)
Dept: PHARMACY | Facility: CLINIC | Age: 49
End: 2024-01-15

## 2024-01-15 NOTE — TELEPHONE ENCOUNTER
**Patient is [facility]**   2024 Annual Pharmacist Visit    Called patient to schedule 2024 yearly pharmacist appointment to discuss medications for Diabetes Management Program.    No answer. Left VM on TAD: Please call back at [phone] Option #3.       Ricardo Lassiter [facility]  Clinical Pharmacy   Department, toll free: [phone] Option #3

## 2024-01-22 NOTE — TELEPHONE ENCOUNTER
Second attempt made to contact patient to schedule 2024 yearly pharmacist appointment to discuss medications for Diabetes Management Program.    No answer. Left VM on TAD: Please call back at [phone] Option #3.     [employer] message sent to patient.    Ricardo Lassiter [facility]  Clinical Pharmacy   Department, toll free: [phone] Option #3      For Pharmacy Admin Tracking Only    Program: [employer]  CPA in place:  No  Gap Closed?: No   Time Spent (min): 5

## 2024-01-23 ENCOUNTER — OFFICE VISIT (OUTPATIENT)
Dept: ENDOCRINOLOGY | Age: 49
End: 2024-01-23
Payer: COMMERCIAL

## 2024-01-23 VITALS
BODY MASS INDEX: 44.49 KG/M2 | OXYGEN SATURATION: 98 % | RESPIRATION RATE: 18 BRPM | HEART RATE: 68 BPM | HEIGHT: 64 IN | DIASTOLIC BLOOD PRESSURE: 52 MMHG | SYSTOLIC BLOOD PRESSURE: 107 MMHG | WEIGHT: 260.6 LBS

## 2024-01-23 DIAGNOSIS — E10.65 TYPE 1 DIABETES MELLITUS WITH HYPERGLYCEMIA (HCC): Primary | ICD-10-CM

## 2024-01-23 DIAGNOSIS — E55.9 VITAMIN D DEFICIENCY: ICD-10-CM

## 2024-01-23 DIAGNOSIS — E03.9 HYPOTHYROIDISM, UNSPECIFIED TYPE: ICD-10-CM

## 2024-01-23 DIAGNOSIS — E10.319 TYPE 1 DIABETES MELLITUS WITH RETINOPATHY, MACULAR EDEMA PRESENCE UNSPECIFIED, UNSPECIFIED LATERALITY, UNSPECIFIED RETINOPATHY SEVERITY (HCC): ICD-10-CM

## 2024-01-23 DIAGNOSIS — E78.5 HYPERLIPIDEMIA, UNSPECIFIED HYPERLIPIDEMIA TYPE: ICD-10-CM

## 2024-01-23 DIAGNOSIS — E66.01 OBESITY, CLASS III, BMI 40-49.9 (MORBID OBESITY) (HCC): ICD-10-CM

## 2024-01-23 DIAGNOSIS — Z91.119 DIETARY NONCOMPLIANCE: ICD-10-CM

## 2024-01-23 PROCEDURE — 3074F SYST BP LT 130 MM HG: CPT | Performed by: FAMILY MEDICINE

## 2024-01-23 PROCEDURE — 99204 OFFICE O/P NEW MOD 45 MIN: CPT | Performed by: FAMILY MEDICINE

## 2024-01-23 PROCEDURE — 3078F DIAST BP <80 MM HG: CPT | Performed by: FAMILY MEDICINE

## 2024-01-23 PROCEDURE — 95251 CONT GLUC MNTR ANALYSIS I&R: CPT | Performed by: FAMILY MEDICINE

## 2024-01-23 NOTE — PROGRESS NOTES
will need routine diabetes maintenance and prevention  Diabetes labs before next visit     Continuous Glucose Monitoring (CGM) download and interpretation   I personally reviewed and interpreted continuous glucose monitor (CGM) download. CGM report was discussed with patient including blood glucose patterns, percentages of blood glucose at goal, above goal and below goal. Insulin dosages/antidiabetic regimen was adjusted according to CGM download. Full CGM was scanned under media.      Diabetic retinopathy  Compliant with regular ophthalmology follow-ups  Advised optimal glucose control    Hypothyroidism  On levothyroxine 200mcg daily  Patient takes levothyroxine in the morning at empty stomach, wait one hour before eating , avoid multivitamins containing calcium  or iron with it.   Check TFTs    Hyperlipidemia  Continue on statin  Advised low saturated fat diet and exercise  Advised optimal glucose control   Check lipid panel    Dietary noncompliance  Discussed with patient the importance of eating consistent carbohydrate meals, avoiding high glycemic index food. Also, discussed with patient the risk and negative consequences of dietary noncompliance on blood glucose control, blood pressure and weight      Obesity  Discussed lifestyle changes including diet and exercise with patient in depth. Also discussed with patient cardiovascular risk associated with obesity    Vitamin D deficiency  At risk  Check level    I personally spent  30 minutes reviewing  external notes from PCP and other patient's care team providers, and personally interpreted labs associated with the above diagnosis. I also ordered labs to further assess and manage the above addressed medical conditions.        Return in about 3 months (around 4/23/2024) for Type 1 DM.    The above issues were reviewed with the patient who understood and agreed with the plan.    Thank you for allowing us to participate in the care of this patient. Please do not

## 2024-01-24 NOTE — TELEPHONE ENCOUNTER
Patient returned call and scheduled 2/21/24 at 4:30pm    Jessica Sanabria CPhT.   Population Health Clinical   [facility] Clinical Pharmacy  Toll free: [phone] Option 2     For Pharmacy Admin Tracking Only    Program: [employer]  CPA in place:  No  Recommendation Provided To: Patient/Caregiver: 1 via Telephone  Intervention Detail: Scheduled Appointment  Intervention Accepted By: Patient/Caregiver: 1  Gap Closed?: Yes   Time Spent (min): 10

## 2024-01-29 DIAGNOSIS — E10.65 TYPE 1 DIABETES MELLITUS WITH HYPERGLYCEMIA (HCC): Primary | ICD-10-CM

## 2024-01-29 RX ORDER — ACYCLOVIR 400 MG/1
TABLET ORAL
Qty: 9 EACH | Refills: 3 | Status: SHIPPED | OUTPATIENT
Start: 2024-01-29

## 2024-02-06 ENCOUNTER — OFFICE VISIT (OUTPATIENT)
Dept: FAMILY MEDICINE CLINIC | Age: 49
End: 2024-02-06
Payer: COMMERCIAL

## 2024-02-06 VITALS
DIASTOLIC BLOOD PRESSURE: 72 MMHG | WEIGHT: 268 LBS | OXYGEN SATURATION: 95 % | HEIGHT: 64 IN | HEART RATE: 71 BPM | SYSTOLIC BLOOD PRESSURE: 142 MMHG | BODY MASS INDEX: 45.75 KG/M2 | TEMPERATURE: 97.3 F

## 2024-02-06 DIAGNOSIS — J02.0 STREP PHARYNGITIS: Primary | ICD-10-CM

## 2024-02-06 DIAGNOSIS — M79.10 MYALGIA: ICD-10-CM

## 2024-02-06 DIAGNOSIS — R09.81 NASAL CONGESTION: ICD-10-CM

## 2024-02-06 DIAGNOSIS — R05.9 COUGH, UNSPECIFIED TYPE: ICD-10-CM

## 2024-02-06 DIAGNOSIS — J01.90 ACUTE NON-RECURRENT SINUSITIS, UNSPECIFIED LOCATION: ICD-10-CM

## 2024-02-06 LAB
INFLUENZA A ANTIBODY: NEGATIVE
INFLUENZA B ANTIBODY: NEGATIVE
Lab: NORMAL
PERFORMING INSTRUMENT: NORMAL
QC PASS/FAIL: NORMAL
RSV ANTIGEN: NEGATIVE
S PYO AG THROAT QL: POSITIVE
SARS-COV-2, POC: NORMAL

## 2024-02-06 PROCEDURE — 87804 INFLUENZA ASSAY W/OPTIC: CPT | Performed by: PHYSICIAN ASSISTANT

## 2024-02-06 PROCEDURE — 3077F SYST BP >= 140 MM HG: CPT | Performed by: PHYSICIAN ASSISTANT

## 2024-02-06 PROCEDURE — 3078F DIAST BP <80 MM HG: CPT | Performed by: PHYSICIAN ASSISTANT

## 2024-02-06 PROCEDURE — 99214 OFFICE O/P EST MOD 30 MIN: CPT | Performed by: PHYSICIAN ASSISTANT

## 2024-02-06 PROCEDURE — 87880 STREP A ASSAY W/OPTIC: CPT | Performed by: PHYSICIAN ASSISTANT

## 2024-02-06 PROCEDURE — 86756 RESPIRATORY VIRUS ANTIBODY: CPT | Performed by: PHYSICIAN ASSISTANT

## 2024-02-06 PROCEDURE — 87426 SARSCOV CORONAVIRUS AG IA: CPT | Performed by: PHYSICIAN ASSISTANT

## 2024-02-06 RX ORDER — AMOXICILLIN 875 MG/1
875 TABLET, COATED ORAL 2 TIMES DAILY
Qty: 20 TABLET | Refills: 0 | Status: SHIPPED | OUTPATIENT
Start: 2024-02-06 | End: 2024-02-16

## 2024-02-06 NOTE — PROGRESS NOTES
24  Sammy Crystal : 1975 Sex: female  Age 48 y.o.      Subjective:  Chief Complaint   Patient presents with    Cough     Started 3 days ago with yellow-green sputum    Headache    Sinus Problem    Chills    Generalized Body Aches    Sore Throat         HPI:   HPI  Sammy Crystal , 48 y.o. female presents to [facility] care for evaluation of cough, headache, sinus, chills, myalgias and sore throat.  The patient started with the symptoms about 3 days ago.  The patient has had some increased congestion, drainage and a sputum production with yellow-green sputum.  The patient is not really noted to have fever but has had chills, myalgias and fatigue.  The patient states that she got off work yesterday and ended up going to bed essentially after work at 5 PM.  The patient states that she did have a flu shot.  She has had COVID-vaccine and boosters.    The patient does work at the hospital.      ROS:   Unless otherwise stated in this report the patient's positive and negative responses for review of systems for constitutional, eyes, ENT, cardiovascular, respiratory, gastrointestinal, neurological, , musculoskeletal, and integument systems and related systems to the presenting problem are either stated in the history of present illness or were not pertinent or were negative for the symptoms and/or complaints related to the presenting medical problem.  Positives and pertinent negatives as per HPI.  All others reviewed and are negative.      PMH:     Past Medical History:   Diagnosis Date    Capsulitis     right shoulder    Depression     Diabetes mellitus (HCC)     AGE 2, TYPE 1  HAS INSULIN PUMP    Diabetic frozen shoulder associated with type 1 diabetes mellitus (HCC) 2013    RIGHT    GERD (gastroesophageal reflux disease)     HTN (hypertension)     Hyperlipidemia     Hypothyroid 2012    Insulin pump in place 2018    Nausea & vomiting     resolved    Obesity 2012    Obesity, Class III, BMI

## 2024-02-11 DIAGNOSIS — E78.2 MIXED HYPERLIPIDEMIA: ICD-10-CM

## 2024-02-12 RX ORDER — SIMVASTATIN 40 MG
40 TABLET ORAL DAILY
Qty: 90 TABLET | Refills: 0 | Status: SHIPPED | OUTPATIENT
Start: 2024-02-12

## 2024-02-12 RX ORDER — CELECOXIB 200 MG/1
200 CAPSULE ORAL DAILY
Qty: 90 CAPSULE | Refills: 1 | Status: SHIPPED | OUTPATIENT
Start: 2024-02-12

## 2024-02-12 RX ORDER — FAMOTIDINE 20 MG/1
20 TABLET, FILM COATED ORAL 2 TIMES DAILY
Qty: 60 TABLET | Refills: 0 | Status: SHIPPED | OUTPATIENT
Start: 2024-02-12

## 2024-02-12 RX ORDER — DICYCLOMINE HYDROCHLORIDE 10 MG/1
10 CAPSULE ORAL 4 TIMES DAILY
Qty: 120 CAPSULE | Refills: 2 | Status: SHIPPED | OUTPATIENT
Start: 2024-02-12

## 2024-02-15 ENCOUNTER — OFFICE VISIT (OUTPATIENT)
Dept: FAMILY MEDICINE CLINIC | Age: 49
End: 2024-02-15
Payer: COMMERCIAL

## 2024-02-15 VITALS
DIASTOLIC BLOOD PRESSURE: 68 MMHG | WEIGHT: 268.8 LBS | BODY MASS INDEX: 45.89 KG/M2 | HEART RATE: 73 BPM | OXYGEN SATURATION: 96 % | RESPIRATION RATE: 12 BRPM | SYSTOLIC BLOOD PRESSURE: 100 MMHG | TEMPERATURE: 98.3 F | HEIGHT: 64 IN

## 2024-02-15 DIAGNOSIS — R09.81 SINUS CONGESTION: ICD-10-CM

## 2024-02-15 DIAGNOSIS — J32.9 SINOBRONCHITIS: Primary | ICD-10-CM

## 2024-02-15 DIAGNOSIS — J02.9 SORE THROAT: ICD-10-CM

## 2024-02-15 DIAGNOSIS — J40 SINOBRONCHITIS: Primary | ICD-10-CM

## 2024-02-15 DIAGNOSIS — R05.1 ACUTE COUGH: ICD-10-CM

## 2024-02-15 LAB
HETEROPHILE ANTIBODIES: NEGATIVE
S PYO AG THROAT QL: NORMAL

## 2024-02-15 PROCEDURE — 86308 HETEROPHILE ANTIBODY SCREEN: CPT | Performed by: NURSE PRACTITIONER

## 2024-02-15 PROCEDURE — 87880 STREP A ASSAY W/OPTIC: CPT | Performed by: NURSE PRACTITIONER

## 2024-02-15 PROCEDURE — 99214 OFFICE O/P EST MOD 30 MIN: CPT | Performed by: NURSE PRACTITIONER

## 2024-02-15 PROCEDURE — 3078F DIAST BP <80 MM HG: CPT | Performed by: NURSE PRACTITIONER

## 2024-02-15 PROCEDURE — 3074F SYST BP LT 130 MM HG: CPT | Performed by: NURSE PRACTITIONER

## 2024-02-15 RX ORDER — PROCHLORPERAZINE 25 MG/1
SUPPOSITORY RECTAL
COMMUNITY
Start: 2024-01-31

## 2024-02-15 RX ORDER — DOXYCYCLINE HYCLATE 100 MG/1
100 CAPSULE ORAL 2 TIMES DAILY
Qty: 20 CAPSULE | Refills: 0 | Status: SHIPPED | OUTPATIENT
Start: 2024-02-15 | End: 2024-02-25

## 2024-02-15 RX ORDER — ALBUTEROL SULFATE 90 UG/1
2 AEROSOL, METERED RESPIRATORY (INHALATION) EVERY 4 HOURS PRN
Qty: 1 EACH | Refills: 0 | Status: SHIPPED | OUTPATIENT
Start: 2024-02-15

## 2024-02-15 RX ORDER — METHYLPREDNISOLONE 4 MG/1
TABLET ORAL
Qty: 1 KIT | Refills: 0 | Status: SHIPPED | OUTPATIENT
Start: 2024-02-15

## 2024-02-15 NOTE — PROGRESS NOTES
into the skin once a week Patient takes on Thursdays (Patient not taking: Reported on 2/6/2024), Disp: , Rfl:     Allergies:   No Known Allergies    Social History:     Social History     Tobacco Use    Smoking status: Former     Current packs/day: 0.00     Average packs/day: 0.5 packs/day for 19.0 years (9.5 ttl pk-yrs)     Types: Cigarettes     Start date: 1/1/2013     Quit date: 1/12/2014     Years since quitting: 10.0    Smokeless tobacco: Never   Vaping Use    Vaping Use: Never used   Substance Use Topics    Alcohol use: Yes     Alcohol/week: 2.0 standard drinks of alcohol     Types: 2 Glasses of wine per week     Comment: socially    Drug use: No       Physical Exam:     Vitals:    02/15/24 1255   BP: 100/68   Site: Right Upper Arm   Position: Sitting   Cuff Size: Medium Adult   Pulse: 73   Resp: 12   Temp: 98.3 °F (36.8 °C)   TempSrc: Temporal   SpO2: 96%   Weight: 121.9 kg (268 lb 12.8 oz)   Height: 1.626 m (5' 4.02\")       Physical Exam (PE)    Physical Exam  Constitutional:       Appearance: Normal appearance.   HENT:      Head: Normocephalic.      Right Ear: Tympanic membrane, ear canal and external ear normal.      Left Ear: Tympanic membrane, ear canal and external ear normal.      Nose: Congestion and rhinorrhea present.      Right Sinus: Maxillary sinus tenderness and frontal sinus tenderness present.      Left Sinus: Maxillary sinus tenderness and frontal sinus tenderness present.      Mouth/Throat:      Mouth: Mucous membranes are moist.      Pharynx: Oropharynx is clear. No oropharyngeal exudate or posterior oropharyngeal erythema.   Eyes:      Pupils: Pupils are equal, round, and reactive to light.   Cardiovascular:      Rate and Rhythm: Normal rate and regular rhythm.      Pulses: Normal pulses.      Heart sounds: Normal heart sounds.   Pulmonary:      Effort: Pulmonary effort is normal.      Breath sounds: Normal breath sounds. No wheezing, rhonchi or rales.   Abdominal:      General: Bowel

## 2024-02-16 DIAGNOSIS — F41.9 ANXIETY: ICD-10-CM

## 2024-02-19 RX ORDER — ALPRAZOLAM 0.5 MG/1
0.5 TABLET ORAL 2 TIMES DAILY PRN
Qty: 60 TABLET | Refills: 2 | Status: SHIPPED | OUTPATIENT
Start: 2024-02-19 | End: 2024-05-19

## 2024-02-21 ENCOUNTER — TELEPHONE (OUTPATIENT)
Dept: PHARMACY | Facility: CLINIC | Age: 49
End: 2024-02-21

## 2024-02-21 NOTE — TELEPHONE ENCOUNTER
Arline Mera CNP,    Your patient is currently enrolled in the Be Well with Diabetes program. After your patient's recent visit with a [facility] Clinical Pharmacist, the below were identified as opportunities to assist with their diabetes management (if patient is not eligible for below recommendations, please reply with reason/contraindication):   Patient would like prescription for Gvoke hypopen in case of hypoglycemic emergency. Prescription pended for your convenience if you agree.    See pharmacist note dated 2/21/24 for complete details.     Thank you,  Kizzy Diana, PharmD, [facility], [facility] Clinical Pharmacy  Department, toll free: [phone], option 3

## 2024-02-21 NOTE — TELEPHONE ENCOUNTER
[facility] HEALTH CLINICAL PHARMACY REVIEW - Be Well with Diabetes ([facility])    Sammy Crystal is a 48 y.o. female enrolled in the [facility] Employee Diabetes Program. Patient provided writer with verbal consent to remain in the program for this year. Patient enrolled 2017.    Medications:  Current Outpatient Medications   Medication Instructions    ALPRAZolam (XANAX) 0.5 mg, Oral, 2 TIMES DAILY PRN    aspirin 81 mg, Oral, NIGHTLY    buPROPion (WELLBUTRIN XL) 300 mg, Oral, EVERY MORNING    celecoxib (CELEBREX) 200 mg, Oral, DAILY    Continuous Blood Gluc Sensor (DEXCOM G7 SENSOR) MISC To change every 10 days    Continuous Blood Gluc Transmit (DEXCOM G6 TRANSMITTER) MISC     CPAP Machine MISC Does not apply    Cyanocobalamin (VITAMIN B-12 PO) 2,000 mcg, Oral, DAILY    dicyclomine (BENTYL) 10 mg, Oral, 4 TIMES DAILY    docusate sodium (COLACE) 100 mg, Oral, DAILY    Elastic Bandages & Supports (JOBST KNEE HIGH COMPRESSION SM) MISC Compression stockings 20-30 mm hg knee high bilaterally<BR>Dx : Venous Insufficiency, Swelling    famotidine (PEPCID) 20 mg, Oral, 2 TIMES DAILY    Insulin Pump - insulin lispro SubCUTAneous, CONTINUOUS, 6082-9641: 1.9 units/hr<BR>2231-2916: 2.1 units/hr <BR>1136-0405: 1.9 units/hr    levothyroxine (SYNTHROID) 200 MCG tablet TAKE ONE TABLET BY MOUTH EVERY DAY    linaclotide (LINZESS) 145 mcg, Oral, DAILY BEFORE BREAKFAST    metoprolol tartrate (LOPRESSOR) 25 mg, Oral, 2 TIMES DAILY    pantoprazole (PROTONIX) 40 mg, Oral, DAILY    Probiotic Product (PROBIOTIC DAILY PO) 1 capsule, Oral, DAILY    ramipril (ALTACE) 10 MG capsule TAKE 1 CAPSULE BY MOUTH ONE TIME A DAY    Simethicone (GAS RELIEF PO) Oral, PRN    simvastatin (ZOCOR) 40 mg, Oral, DAILY     Pharmacy and Supplies Information  Current Pharmacy: [facility]  Current Testing supplies:Dexcom G6, states changing to the Dexcom G7 soon since now compatible with T:slim  Insulin Pump system (if applicable):

## 2024-02-22 RX ORDER — GLUCAGON INJECTION, SOLUTION 1 MG/.2ML
INJECTION, SOLUTION SUBCUTANEOUS
Qty: 2 EACH | Refills: 0 | Status: SHIPPED | OUTPATIENT
Start: 2024-02-22

## 2024-02-27 DIAGNOSIS — I10 PRIMARY HYPERTENSION: Primary | ICD-10-CM

## 2024-03-04 RX ORDER — GLUCAGON INJECTION, SOLUTION 1 MG/.2ML
INJECTION, SOLUTION SUBCUTANEOUS
Qty: 2 EACH | Refills: 0 | Status: SHIPPED | OUTPATIENT
Start: 2024-03-04

## 2024-03-04 RX ORDER — PANTOPRAZOLE SODIUM 40 MG/1
40 TABLET, DELAYED RELEASE ORAL DAILY
Qty: 30 TABLET | Refills: 2 | Status: SHIPPED | OUTPATIENT
Start: 2024-03-04

## 2024-03-10 ENCOUNTER — APPOINTMENT (OUTPATIENT)
Dept: CT IMAGING | Age: 49
End: 2024-03-10
Payer: COMMERCIAL

## 2024-03-10 ENCOUNTER — HOSPITAL ENCOUNTER (EMERGENCY)
Age: 49
Discharge: HOME | End: 2024-03-10
Attending: EMERGENCY MEDICINE
Payer: COMMERCIAL

## 2024-03-10 VITALS
RESPIRATION RATE: 28 BRPM | BODY MASS INDEX: 47.18 KG/M2 | OXYGEN SATURATION: 96 % | WEIGHT: 275 LBS | HEART RATE: 71 BPM | TEMPERATURE: 97.4 F | SYSTOLIC BLOOD PRESSURE: 140 MMHG | DIASTOLIC BLOOD PRESSURE: 74 MMHG

## 2024-03-10 DIAGNOSIS — K29.00 ACUTE GASTRITIS, PRESENCE OF BLEEDING UNSPECIFIED, UNSPECIFIED GASTRITIS TYPE: ICD-10-CM

## 2024-03-10 DIAGNOSIS — R10.11 RIGHT UPPER QUADRANT ABDOMINAL PAIN: Primary | ICD-10-CM

## 2024-03-10 LAB
ALBUMIN SERPL-MCNC: 4.4 G/DL (ref 3.5–5.2)
ALP SERPL-CCNC: 174 U/L (ref 35–104)
ALT SERPL-CCNC: 34 U/L (ref 0–32)
ANION GAP SERPL CALCULATED.3IONS-SCNC: 10 MMOL/L (ref 7–16)
AST SERPL-CCNC: 36 U/L (ref 0–31)
BACTERIA URNS QL MICRO: ABNORMAL
BASOPHILS # BLD: 0.05 K/UL (ref 0–0.2)
BASOPHILS NFR BLD: 1 % (ref 0–2)
BILIRUB SERPL-MCNC: 1 MG/DL (ref 0–1.2)
BILIRUB UR QL STRIP: NEGATIVE
BUN SERPL-MCNC: 17 MG/DL (ref 6–20)
CALCIUM SERPL-MCNC: 9.7 MG/DL (ref 8.6–10.2)
CHLORIDE SERPL-SCNC: 100 MMOL/L (ref 98–107)
CLARITY UR: CLEAR
CO2 SERPL-SCNC: 28 MMOL/L (ref 22–29)
COLOR UR: YELLOW
CREAT SERPL-MCNC: 0.8 MG/DL (ref 0.5–1)
EOSINOPHIL # BLD: 0.06 K/UL (ref 0.05–0.5)
EOSINOPHILS RELATIVE PERCENT: 1 % (ref 0–6)
EPI CELLS #/AREA URNS HPF: ABNORMAL /HPF
ERYTHROCYTE [DISTWIDTH] IN BLOOD BY AUTOMATED COUNT: 13.3 % (ref 11.5–15)
GFR SERPL CREATININE-BSD FRML MDRD: >60 ML/MIN/1.73M2
GLUCOSE SERPL-MCNC: 186 MG/DL (ref 74–99)
GLUCOSE UR STRIP-MCNC: NEGATIVE MG/DL
HCT VFR BLD AUTO: 40.8 % (ref 34–48)
HGB BLD-MCNC: 12.8 G/DL (ref 11.5–15.5)
HGB UR QL STRIP.AUTO: NEGATIVE
IMM GRANULOCYTES # BLD AUTO: 0.03 K/UL (ref 0–0.58)
IMM GRANULOCYTES NFR BLD: 0 % (ref 0–5)
KETONES UR STRIP-MCNC: NEGATIVE MG/DL
LACTATE BLDV-SCNC: 1.5 MMOL/L (ref 0.5–2.2)
LEUKOCYTE ESTERASE UR QL STRIP: NEGATIVE
LIPASE SERPL-CCNC: 17 U/L (ref 13–60)
LYMPHOCYTES NFR BLD: 0.66 K/UL (ref 1.5–4)
LYMPHOCYTES RELATIVE PERCENT: 7 % (ref 20–42)
MCH RBC QN AUTO: 28.2 PG (ref 26–35)
MCHC RBC AUTO-ENTMCNC: 31.4 G/DL (ref 32–34.5)
MCV RBC AUTO: 89.9 FL (ref 80–99.9)
MONOCYTES NFR BLD: 0.54 K/UL (ref 0.1–0.95)
MONOCYTES NFR BLD: 6 % (ref 2–12)
NEUTROPHILS NFR BLD: 86 % (ref 43–80)
NEUTS SEG NFR BLD: 8 K/UL (ref 1.8–7.3)
NITRITE UR QL STRIP: NEGATIVE
PH UR STRIP: 6 [PH] (ref 5–9)
PLATELET # BLD AUTO: 292 K/UL (ref 130–450)
PMV BLD AUTO: 11.3 FL (ref 7–12)
POTASSIUM SERPL-SCNC: 4.5 MMOL/L (ref 3.5–5)
PROT SERPL-MCNC: 7.3 G/DL (ref 6.4–8.3)
PROT UR STRIP-MCNC: NEGATIVE MG/DL
RBC # BLD AUTO: 4.54 M/UL (ref 3.5–5.5)
RBC #/AREA URNS HPF: ABNORMAL /HPF
SODIUM SERPL-SCNC: 138 MMOL/L (ref 132–146)
SP GR UR STRIP: 1.02 (ref 1–1.03)
TROPONIN I SERPL HS-MCNC: 7 NG/L (ref 0–9)
UROBILINOGEN UR STRIP-ACNC: 0.2 EU/DL (ref 0–1)
WBC #/AREA URNS HPF: ABNORMAL /HPF
WBC OTHER # BLD: 9.3 K/UL (ref 4.5–11.5)

## 2024-03-10 PROCEDURE — 96374 THER/PROPH/DIAG INJ IV PUSH: CPT

## 2024-03-10 PROCEDURE — 6370000000 HC RX 637 (ALT 250 FOR IP): Performed by: EMERGENCY MEDICINE

## 2024-03-10 PROCEDURE — 84484 ASSAY OF TROPONIN QUANT: CPT

## 2024-03-10 PROCEDURE — 83690 ASSAY OF LIPASE: CPT

## 2024-03-10 PROCEDURE — 85025 COMPLETE CBC W/AUTO DIFF WBC: CPT

## 2024-03-10 PROCEDURE — 81001 URINALYSIS AUTO W/SCOPE: CPT

## 2024-03-10 PROCEDURE — 80053 COMPREHEN METABOLIC PANEL: CPT

## 2024-03-10 PROCEDURE — 99285 EMERGENCY DEPT VISIT HI MDM: CPT

## 2024-03-10 PROCEDURE — 93005 ELECTROCARDIOGRAM TRACING: CPT | Performed by: EMERGENCY MEDICINE

## 2024-03-10 PROCEDURE — 6360000004 HC RX CONTRAST MEDICATION: Performed by: RADIOLOGY

## 2024-03-10 PROCEDURE — 6360000002 HC RX W HCPCS: Performed by: EMERGENCY MEDICINE

## 2024-03-10 PROCEDURE — 83605 ASSAY OF LACTIC ACID: CPT

## 2024-03-10 PROCEDURE — 2580000003 HC RX 258: Performed by: EMERGENCY MEDICINE

## 2024-03-10 PROCEDURE — 74177 CT ABD & PELVIS W/CONTRAST: CPT

## 2024-03-10 RX ORDER — ONDANSETRON 4 MG/1
4 TABLET, ORALLY DISINTEGRATING ORAL ONCE
Status: COMPLETED | OUTPATIENT
Start: 2024-03-10 | End: 2024-03-10

## 2024-03-10 RX ORDER — 0.9 % SODIUM CHLORIDE 0.9 %
1000 INTRAVENOUS SOLUTION INTRAVENOUS ONCE
Status: COMPLETED | OUTPATIENT
Start: 2024-03-10 | End: 2024-03-10

## 2024-03-10 RX ORDER — FAMOTIDINE 20 MG/1
20 TABLET, FILM COATED ORAL 2 TIMES DAILY
Qty: 14 TABLET | Refills: 0 | Status: SHIPPED | OUTPATIENT
Start: 2024-03-10 | End: 2024-03-15

## 2024-03-10 RX ORDER — ONDANSETRON 4 MG/1
4 TABLET, ORALLY DISINTEGRATING ORAL 3 TIMES DAILY PRN
Qty: 21 TABLET | Refills: 0 | Status: SHIPPED | OUTPATIENT
Start: 2024-03-10

## 2024-03-10 RX ORDER — SUCRALFATE 1 G/1
1 TABLET ORAL 3 TIMES DAILY
Qty: 90 TABLET | Refills: 0 | Status: SHIPPED | OUTPATIENT
Start: 2024-03-10 | End: 2024-04-09

## 2024-03-10 RX ORDER — KETOROLAC TROMETHAMINE 30 MG/ML
30 INJECTION, SOLUTION INTRAMUSCULAR; INTRAVENOUS ONCE
Status: COMPLETED | OUTPATIENT
Start: 2024-03-10 | End: 2024-03-10

## 2024-03-10 RX ADMIN — IOPAMIDOL 75 ML: 755 INJECTION, SOLUTION INTRAVENOUS at 17:17

## 2024-03-10 RX ADMIN — ONDANSETRON 4 MG: 4 TABLET, ORALLY DISINTEGRATING ORAL at 15:59

## 2024-03-10 RX ADMIN — SODIUM CHLORIDE 1000 ML: 9 INJECTION, SOLUTION INTRAVENOUS at 16:24

## 2024-03-10 RX ADMIN — KETOROLAC TROMETHAMINE 30 MG: 30 INJECTION, SOLUTION INTRAMUSCULAR at 16:25

## 2024-03-10 ASSESSMENT — PAIN DESCRIPTION - LOCATION
LOCATION: ABDOMEN;BACK
LOCATION: ABDOMEN

## 2024-03-10 ASSESSMENT — PAIN SCALES - GENERAL
PAINLEVEL_OUTOF10: 8
PAINLEVEL_OUTOF10: 6

## 2024-03-10 ASSESSMENT — PAIN DESCRIPTION - FREQUENCY: FREQUENCY: CONTINUOUS

## 2024-03-10 ASSESSMENT — PAIN DESCRIPTION - ONSET: ONSET: ON-GOING

## 2024-03-10 ASSESSMENT — PAIN DESCRIPTION - DESCRIPTORS
DESCRIPTORS: ACHING;DULL;TENDER;SORE;DISCOMFORT
DESCRIPTORS: DISCOMFORT;SHARP

## 2024-03-10 ASSESSMENT — PAIN - FUNCTIONAL ASSESSMENT
PAIN_FUNCTIONAL_ASSESSMENT: PREVENTS OR INTERFERES SOME ACTIVE ACTIVITIES AND ADLS
PAIN_FUNCTIONAL_ASSESSMENT: 0-10
PAIN_FUNCTIONAL_ASSESSMENT: PREVENTS OR INTERFERES SOME ACTIVE ACTIVITIES AND ADLS

## 2024-03-10 ASSESSMENT — PAIN DESCRIPTION - ORIENTATION
ORIENTATION: RIGHT
ORIENTATION: MID

## 2024-03-10 ASSESSMENT — PAIN DESCRIPTION - PAIN TYPE: TYPE: ACUTE PAIN

## 2024-03-10 NOTE — ED PROVIDER NOTES
kg (275 lb)        ED Course as of 03/11/24 1646   Sun Mar 10, 2024   1758 Patient is in the bed no acute distress.  Results cussed.  Patient be discharged [MT]      ED Course User Index  [MT] Betty Sheldon DO          I am the Primary Clinician of Record.    FINAL IMPRESSION      1. Right upper quadrant abdominal pain    2. Acute gastritis, presence of bleeding unspecified, unspecified gastritis type          DISPOSITION/PLAN      Decision To Discharge 03/10/2024 06:42:12 PM    Patient's disposition: Discharge to home  Patient's condition is stable.           (Please note that portions of this note were completed with a voice recognition program.  Efforts were made to edit the dictations but occasionally words are mis-transcribed.)    Betty Sheldon DO (electronically signed)       Betty Sheldon DO  03/11/24 3585

## 2024-03-11 ENCOUNTER — HOSPITAL ENCOUNTER (OUTPATIENT)
Dept: ULTRASOUND IMAGING | Age: 49
Discharge: HOME | End: 2024-03-13
Payer: COMMERCIAL

## 2024-03-11 ENCOUNTER — CARE COORDINATION (OUTPATIENT)
Dept: OTHER | Facility: CLINIC | Age: 49
End: 2024-03-11

## 2024-03-11 DIAGNOSIS — R10.11 RIGHT UPPER QUADRANT ABDOMINAL PAIN: ICD-10-CM

## 2024-03-11 PROCEDURE — 76705 ECHO EXAM OF ABDOMEN: CPT

## 2024-03-11 NOTE — CARE COORDINATION
Ambulatory Care Coordination Note    ACM attempted to reach patient for introduction to Associate Care Management related to post ED Assessment. HIPAA compliant message left requesting a return phone call at patient convenience.     Plan for follow-up call in 3-5 days      Future Appointments   Date Time Provider Department Center   3/11/2024 11:30 AM SEB US RM 1 [facility] US SEB Radiolog   5/1/2024  7:30 AM Arline Mera APRN - CNP [facility] ENDO [facility]   5/7/2024  8:00 AM Digna Herman DO [facility]   6/11/2024  8:00 AM Nicole Newman MD [place] [facility]     Arline Daly MSN, RN   Ambulatory Care Manager  Associate Care Management  Cell [phone]  [email or web address]

## 2024-03-13 ENCOUNTER — TELEPHONE (OUTPATIENT)
Dept: FAMILY MEDICINE CLINIC | Age: 49
End: 2024-03-13

## 2024-03-13 LAB
EKG ATRIAL RATE: 61 BPM
EKG P AXIS: 35 DEGREES
EKG P-R INTERVAL: 154 MS
EKG Q-T INTERVAL: 438 MS
EKG QRS DURATION: 76 MS
EKG QTC CALCULATION (BAZETT): 440 MS
EKG R AXIS: 14 DEGREES
EKG T AXIS: 21 DEGREES
EKG VENTRICULAR RATE: 61 BPM

## 2024-03-13 PROCEDURE — 93010 ELECTROCARDIOGRAM REPORT: CPT | Performed by: INTERNAL MEDICINE

## 2024-03-14 ENCOUNTER — CARE COORDINATION (OUTPATIENT)
Dept: OTHER | Facility: CLINIC | Age: 49
End: 2024-03-14

## 2024-03-14 DIAGNOSIS — K82.8 GALLBLADDER SLUDGE: ICD-10-CM

## 2024-03-14 DIAGNOSIS — R10.11 RIGHT UPPER QUADRANT ABDOMINAL PAIN: Primary | ICD-10-CM

## 2024-03-14 NOTE — CARE COORDINATION
Ambulatory Care Coordination Note    ACM attempted 2nd outreach to patient for introduction to Associate Care Management related to post Ed assessment. HIPAA compliant message left requesting a return phone call at patient convenience.     Unable to Reach Letter sent to patient via my chart.    Will continue to outreach.      Future Appointments   Date Time Provider Department Center   3/15/2024  8:15 AM Pernell Gómez MD COL SURG [facility]   5/1/2024  7:30 AM Arline Mera APRN - CNP [facility] ENDO [facility]   5/7/2024  8:00 AM Digna Herman DO [facility]   6/11/2024  8:00 AM Nicole Newman MD [place] [facility]     Arline Daly MSN, RN   Ambulatory Care Manager  Associate Care Management  Cell [phone]  [email or web address]

## 2024-03-15 ENCOUNTER — OFFICE VISIT (OUTPATIENT)
Dept: SURGERY | Age: 49
End: 2024-03-15
Payer: COMMERCIAL

## 2024-03-15 VITALS
SYSTOLIC BLOOD PRESSURE: 126 MMHG | HEART RATE: 91 BPM | DIASTOLIC BLOOD PRESSURE: 95 MMHG | WEIGHT: 267 LBS | TEMPERATURE: 97.3 F | BODY MASS INDEX: 45.58 KG/M2 | HEIGHT: 64 IN | OXYGEN SATURATION: 98 %

## 2024-03-15 DIAGNOSIS — K76.0 FATTY LIVER: ICD-10-CM

## 2024-03-15 DIAGNOSIS — K80.20 SYMPTOMATIC CHOLELITHIASIS: Primary | ICD-10-CM

## 2024-03-15 PROCEDURE — 3080F DIAST BP >= 90 MM HG: CPT | Performed by: SURGERY

## 2024-03-15 PROCEDURE — 3074F SYST BP LT 130 MM HG: CPT | Performed by: SURGERY

## 2024-03-15 PROCEDURE — 99204 OFFICE O/P NEW MOD 45 MIN: CPT | Performed by: SURGERY

## 2024-03-15 NOTE — PROGRESS NOTES
[facility] General Surgery Clinic Note    Assessment/Plan:      Diagnosis Orders   1. Symptomatic cholelithiasis      Will plan for cholecystectomy. Although not appreciated on CT scan, if she has findings of significant liver disease she is aware the procedure may be aborted      2. Fatty liver      Will plan for liver biopsy at time of cholecystectomy.            Return for Surgery.      Chief Complaint   Patient presents with    New Patient    Abdominal Pain     RUQ abd pain, gallbladder sludge       PCP: Digna Herman DO    HPI: Sammy Crystal is a 48 y.o. female who presents in consultation for gallbladder issues.  On Sunday she went to the emergency room.  She says she did not feel well was having dry heaves.  She then developed right upper quadrant pain that radiated to the back.  She was unable to get comfortable.  This lasted for several hours and then abated.  She had an ultrasound which imaging was reviewed.  It does show cholelithiasis.  The report also mentions a recanalized umbilical vein.  She also had a CT which imaging was reviewed from the emergency department.  It does show some hepatomegaly but I see any significant evidence of portal hypertension or cirrhosis.  She did have some mild LFT elevation.  She had a colonoscopy in 2022.       Past Medical History:   Diagnosis Date    Capsulitis     right shoulder    Depression     Diabetes mellitus (HCC)     AGE 2, TYPE 1  HAS INSULIN PUMP    Diabetic frozen shoulder associated with type 1 diabetes mellitus (HCC) 2013    RIGHT    GERD (gastroesophageal reflux disease)     HTN (hypertension)     Hyperlipidemia     Hypothyroid 2/7/2012    Insulin pump in place 2018    Nausea & vomiting     resolved    Obesity 2/7/2012    Obesity, Class III, BMI 40-49.9 (morbid obesity) (HCC) 09/2018    BMI 47.3    MILENA (obstructive sleep apnea) 03/02/2012    USES CPAP    PONV (postoperative nausea and vomiting)     Primary osteoarthritis of knee     B/L    Retinopathy

## 2024-03-19 ENCOUNTER — PREP FOR PROCEDURE (OUTPATIENT)
Dept: SURGERY | Age: 49
End: 2024-03-19

## 2024-03-19 ENCOUNTER — TELEPHONE (OUTPATIENT)
Dept: SURGERY | Age: 49
End: 2024-03-19

## 2024-03-19 DIAGNOSIS — K80.20 SYMPTOMATIC CHOLELITHIASIS: ICD-10-CM

## 2024-03-19 DIAGNOSIS — K76.0 FATTY LIVER: ICD-10-CM

## 2024-03-19 NOTE — TELEPHONE ENCOUNTER
Prior Authorization Form:      DEMOGRAPHICS:                     Patient Name:  Sammy Crystal  Patient :  1975            Insurance:  Payor: [facility] / Plan: [employer] EM[employer]YEES / Product Type: *No Product type* /   Insurance ID Number:    Payer/Plan Subscr  Sex Relation Sub. Ins. ID Effective Group Num   1. [facility]* SAMMY CRYSTAL 1975 Female Self [id number]                                   [address] [address]         DIAGNOSIS & PROCEDURE:                       Procedure/Operation: Laparoscopic robotic XI assisted cholecystectomy, liver biopsy           CPT Code: 15753/60582     Diagnosis:  Symptomatic cholelithiasis/fatty liver    ICD10 Code: K80.20/K76.0    Location:  [facility]    Surgeon:  Dr Gómez    SCHEDULING INFORMATION:                          Date: 24    Time: 12:30 pm              Anesthesia:  General                                                       Status:  Outpatient        Special Comments:         Electronically signed by Hilda Jung MA on 3/19/2024 at 6:46 AM

## 2024-03-19 NOTE — TELEPHONE ENCOUNTER
Sammy Crystal is scheduled for laparoscopic robotic XI assisted cholecystectomy, liver biopsy with Dr Gómez on 04-01-24 at [facility]. Patient needs to be NPO after midnight the night before procedure. All surgery instructions were explained to the patient and a surgery letter was also mailed out. MA informed patient that PAT will also be calling to review pre-op instructions and medications. Patient verbalized understanding.  Electronically signed by Hilda Jung MA on 3/19/2024 at 6:45 AM

## 2024-03-27 NOTE — PROGRESS NOTES
[facility] PRE-ADMISSION TESTING INSTRUCTIONS    The Preadmission Testing patient is instructed accordingly using the following criteria (check applicable):    ARRIVAL INSTRUCTIONS:  [x] Parking the day of Surgery is located in the Main Entrance lot.  Upon entering the door, make an immediate right to the surgery reception desk    [x] Bring photo ID and insurance card    [] Bring in a copy of Living will or Durable Power of  papers.    [x] Please be sure to arrange for a responsible adult to provide transportation to and from the hospital    [x] Please arrange for a responsible adult to be with you for the 24 hour period post procedure due to having anesthesia    [x] If you awake am of surgery not feeling well or have temperature >100 please call [phone]    GENERAL INSTRUCTIONS:    [x] No solid foods after midnight, may have up to 8oz of water until 4 hours prior to surgery. No gum, no candy, no mints. NPO time: 0900       [x] You may brush your teeth, do not swallow any toothpaste    [x] Take medications as instructed     [x] Stop herbal supplements and vitamins 5 days prior to procedure    [x] Follow preop dosing of blood thinners per physician instructions    [] Take 1/2 dose of evening insulin, but no insulin after midnight    [] No oral diabetic medications after midnight    [x] If diabetic and have low blood sugar or feel symptomatic, take 1-2oz apple juice only    [] Bring inhalers day of surgery    [] Bring urine specimen day of surgery    [x] Shower or bath with soap, lather and rinse well, AM of Surgery, no lotion, powders or creams to surgical site    [] Follow bowel prep as instructed per surgeon    [x] No tobacco products within 24 hours of surgery     [x] No alcohol or illegal drug use, marijuana included, within 24 hours of surgery.    [x] Jewelry, body piercing's, eyeglasses, contact lenses and dentures are not permitted into surgery (bring cases)      [x]

## 2024-03-28 ENCOUNTER — CARE COORDINATION (OUTPATIENT)
Dept: OTHER | Facility: CLINIC | Age: 49
End: 2024-03-28

## 2024-03-28 NOTE — CARE COORDINATION
Ambulatory Care Coordination Note    ACM attempted third and final call to patient for introduction to Associate Care Management. HIPAA compliant message left requesting a return phone call at patient convenience.    Final Unable to Reach Letter sent to patient via my chart, along with handouts: Nurse Access Line and Right Care/Right Place/Right Time.    Of note, patient had f/u on 3/15/24 with Dr. Gómez and is scheduled for Cholecystectomy on 4/1/24.    No further outreach scheduled with this ACM, patient has been provided with this ACM's contact information.  ACM will sign off care team at this time.     Future Appointments   Date Time Provider Department Center   4/17/2024 10:45 AM Pernell Gómez MD COL SURG [facility]   5/1/2024  7:30 AM Arline Mera APRN - CNP BDM ENDO [facility]   5/7/2024  8:00 AM Digna Herman DO [facility]   6/11/2024  8:00 AM Nicole Newman MD [place] [facility]     Arline Daly MSN, RN   Ambulatory Care Manager  Associate Care Management  Cell [phone]  [email or web address]

## 2024-03-30 ENCOUNTER — ANESTHESIA EVENT (OUTPATIENT)
Dept: OPERATING ROOM | Age: 49
End: 2024-03-30
Payer: COMMERCIAL

## 2024-04-01 ENCOUNTER — HOSPITAL ENCOUNTER (OUTPATIENT)
Age: 49
Setting detail: OBSERVATION
Discharge: HOME | End: 2024-04-02
Attending: SURGERY | Admitting: SURGERY
Payer: COMMERCIAL

## 2024-04-01 ENCOUNTER — ANESTHESIA (OUTPATIENT)
Dept: OPERATING ROOM | Age: 49
End: 2024-04-01
Payer: COMMERCIAL

## 2024-04-01 DIAGNOSIS — K80.20 SYMPTOMATIC CHOLELITHIASIS: ICD-10-CM

## 2024-04-01 DIAGNOSIS — K76.0 FATTY LIVER: ICD-10-CM

## 2024-04-01 PROBLEM — Z98.890 POSTOPERATIVE HYPOXIA: Status: ACTIVE | Noted: 2024-04-01

## 2024-04-01 PROBLEM — R09.02 POSTOPERATIVE HYPOXIA: Status: ACTIVE | Noted: 2024-04-01

## 2024-04-01 LAB
HCG, URINE, POC: NEGATIVE
Lab: NORMAL
NEGATIVE QC PASS/FAIL: NORMAL
POSITIVE QC PASS/FAIL: NORMAL

## 2024-04-01 PROCEDURE — 94660 CPAP INITIATION&MGMT: CPT

## 2024-04-01 PROCEDURE — S2900 ROBOTIC SURGICAL SYSTEM: HCPCS | Performed by: SURGERY

## 2024-04-01 PROCEDURE — 6360000002 HC RX W HCPCS: Performed by: ANESTHESIOLOGY

## 2024-04-01 PROCEDURE — C1889 IMPLANT/INSERT DEVICE, NOC: HCPCS | Performed by: SURGERY

## 2024-04-01 PROCEDURE — G0378 HOSPITAL OBSERVATION PER HR: HCPCS

## 2024-04-01 PROCEDURE — 47001 NDL BIOPSY LVR TM OTH MAJ PX: CPT | Performed by: SURGERY

## 2024-04-01 PROCEDURE — 6370000000 HC RX 637 (ALT 250 FOR IP)

## 2024-04-01 PROCEDURE — 88307 TISSUE EXAM BY PATHOLOGIST: CPT

## 2024-04-01 PROCEDURE — 7100000001 HC PACU RECOVERY - ADDTL 15 MIN: Performed by: SURGERY

## 2024-04-01 PROCEDURE — 6360000002 HC RX W HCPCS

## 2024-04-01 PROCEDURE — 2720000010 HC SURG SUPPLY STERILE: Performed by: SURGERY

## 2024-04-01 PROCEDURE — 3700000001 HC ADD 15 MINUTES (ANESTHESIA): Performed by: SURGERY

## 2024-04-01 PROCEDURE — 2580000003 HC RX 258

## 2024-04-01 PROCEDURE — 3700000000 HC ANESTHESIA ATTENDED CARE: Performed by: SURGERY

## 2024-04-01 PROCEDURE — 2500000003 HC RX 250 WO HCPCS: Performed by: SURGERY

## 2024-04-01 PROCEDURE — 6360000002 HC RX W HCPCS: Performed by: SURGERY

## 2024-04-01 PROCEDURE — 2580000003 HC RX 258: Performed by: SURGERY

## 2024-04-01 PROCEDURE — 2700000000 HC OXYGEN THERAPY PER DAY

## 2024-04-01 PROCEDURE — 2500000003 HC RX 250 WO HCPCS

## 2024-04-01 PROCEDURE — 7100000000 HC PACU RECOVERY - FIRST 15 MIN: Performed by: SURGERY

## 2024-04-01 PROCEDURE — 2709999900 HC NON-CHARGEABLE SUPPLY: Performed by: SURGERY

## 2024-04-01 PROCEDURE — 94669 MECHANICAL CHEST WALL OSCILL: CPT

## 2024-04-01 PROCEDURE — 94640 AIRWAY INHALATION TREATMENT: CPT

## 2024-04-01 PROCEDURE — 3600000019 HC SURGERY ROBOT ADDTL 15MIN: Performed by: SURGERY

## 2024-04-01 PROCEDURE — 3600000009 HC SURGERY ROBOT BASE: Performed by: SURGERY

## 2024-04-01 PROCEDURE — 47563 LAPARO CHOLECYSTECTOMY/GRAPH: CPT | Performed by: SURGERY

## 2024-04-01 PROCEDURE — 88304 TISSUE EXAM BY PATHOLOGIST: CPT

## 2024-04-01 DEVICE — CLIP INT M L POLYMER LOK LIG HEM O LOK: Type: IMPLANTABLE DEVICE | Site: ABDOMEN | Status: FUNCTIONAL

## 2024-04-01 RX ORDER — ATORVASTATIN CALCIUM 20 MG/1
20 TABLET, FILM COATED ORAL DAILY
Status: DISCONTINUED | OUTPATIENT
Start: 2024-04-01 | End: 2024-04-02 | Stop reason: HOSPADM

## 2024-04-01 RX ORDER — POTASSIUM CHLORIDE 20 MEQ/1
40 TABLET, EXTENDED RELEASE ORAL PRN
Status: DISCONTINUED | OUTPATIENT
Start: 2024-04-01 | End: 2024-04-02 | Stop reason: HOSPADM

## 2024-04-01 RX ORDER — OXYCODONE HYDROCHLORIDE 5 MG/1
10 TABLET ORAL PRN
Status: DISCONTINUED | OUTPATIENT
Start: 2024-04-01 | End: 2024-04-01 | Stop reason: HOSPADM

## 2024-04-01 RX ORDER — ONDANSETRON 2 MG/ML
INJECTION INTRAMUSCULAR; INTRAVENOUS PRN
Status: DISCONTINUED | OUTPATIENT
Start: 2024-04-01 | End: 2024-04-01 | Stop reason: SDUPTHER

## 2024-04-01 RX ORDER — DEXTROSE MONOHYDRATE 100 MG/ML
INJECTION, SOLUTION INTRAVENOUS CONTINUOUS PRN
Status: DISCONTINUED | OUTPATIENT
Start: 2024-04-01 | End: 2024-04-02 | Stop reason: HOSPADM

## 2024-04-01 RX ORDER — FENTANYL CITRATE 50 UG/ML
INJECTION, SOLUTION INTRAMUSCULAR; INTRAVENOUS PRN
Status: DISCONTINUED | OUTPATIENT
Start: 2024-04-01 | End: 2024-04-01 | Stop reason: SDUPTHER

## 2024-04-01 RX ORDER — ALPRAZOLAM 0.25 MG/1
0.5 TABLET ORAL 2 TIMES DAILY PRN
Status: DISCONTINUED | OUTPATIENT
Start: 2024-04-01 | End: 2024-04-02 | Stop reason: HOSPADM

## 2024-04-01 RX ORDER — NALOXONE HYDROCHLORIDE 0.4 MG/ML
INJECTION, SOLUTION INTRAMUSCULAR; INTRAVENOUS; SUBCUTANEOUS PRN
Status: DISCONTINUED | OUTPATIENT
Start: 2024-04-01 | End: 2024-04-01 | Stop reason: HOSPADM

## 2024-04-01 RX ORDER — INSULIN LISPRO 100 [IU]/ML
0-8 INJECTION, SOLUTION INTRAVENOUS; SUBCUTANEOUS
Status: DISCONTINUED | OUTPATIENT
Start: 2024-04-01 | End: 2024-04-02 | Stop reason: HOSPADM

## 2024-04-01 RX ORDER — GLUCAGON 1 MG/ML
1 KIT INJECTION PRN
Status: DISCONTINUED | OUTPATIENT
Start: 2024-04-01 | End: 2024-04-02 | Stop reason: HOSPADM

## 2024-04-01 RX ORDER — ACETAMINOPHEN 325 MG/1
650 TABLET ORAL EVERY 6 HOURS
Status: DISCONTINUED | OUTPATIENT
Start: 2024-04-01 | End: 2024-04-02 | Stop reason: HOSPADM

## 2024-04-01 RX ORDER — POTASSIUM CHLORIDE 7.45 MG/ML
10 INJECTION INTRAVENOUS PRN
Status: DISCONTINUED | OUTPATIENT
Start: 2024-04-01 | End: 2024-04-02 | Stop reason: HOSPADM

## 2024-04-01 RX ORDER — OXYCODONE HYDROCHLORIDE AND ACETAMINOPHEN 5; 325 MG/1; MG/1
1 TABLET ORAL EVERY 6 HOURS PRN
Qty: 28 TABLET | Refills: 0 | Status: SHIPPED | OUTPATIENT
Start: 2024-04-01 | End: 2024-04-08

## 2024-04-01 RX ORDER — SODIUM CHLORIDE 0.9 % (FLUSH) 0.9 %
10 SYRINGE (ML) INJECTION PRN
Status: DISCONTINUED | OUTPATIENT
Start: 2024-04-01 | End: 2024-04-02 | Stop reason: HOSPADM

## 2024-04-01 RX ORDER — DEXAMETHASONE SODIUM PHOSPHATE 4 MG/ML
INJECTION, SOLUTION INTRA-ARTICULAR; INTRALESIONAL; INTRAMUSCULAR; INTRAVENOUS; SOFT TISSUE PRN
Status: DISCONTINUED | OUTPATIENT
Start: 2024-04-01 | End: 2024-04-01 | Stop reason: SDUPTHER

## 2024-04-01 RX ORDER — PANTOPRAZOLE SODIUM 40 MG/1
40 TABLET, DELAYED RELEASE ORAL DAILY
Status: DISCONTINUED | OUTPATIENT
Start: 2024-04-02 | End: 2024-04-02 | Stop reason: HOSPADM

## 2024-04-01 RX ORDER — BUPROPION HYDROCHLORIDE 300 MG/1
300 TABLET ORAL EVERY MORNING
Status: DISCONTINUED | OUTPATIENT
Start: 2024-04-02 | End: 2024-04-02 | Stop reason: HOSPADM

## 2024-04-01 RX ORDER — PROPOFOL 10 MG/ML
INJECTION, EMULSION INTRAVENOUS PRN
Status: DISCONTINUED | OUTPATIENT
Start: 2024-04-01 | End: 2024-04-01 | Stop reason: SDUPTHER

## 2024-04-01 RX ORDER — OXYCODONE HYDROCHLORIDE 5 MG/1
5 TABLET ORAL PRN
Status: DISCONTINUED | OUTPATIENT
Start: 2024-04-01 | End: 2024-04-01 | Stop reason: HOSPADM

## 2024-04-01 RX ORDER — OXYCODONE HYDROCHLORIDE 5 MG/1
5 TABLET ORAL EVERY 4 HOURS PRN
Status: DISCONTINUED | OUTPATIENT
Start: 2024-04-01 | End: 2024-04-02 | Stop reason: HOSPADM

## 2024-04-01 RX ORDER — OXYCODONE HYDROCHLORIDE 5 MG/1
10 TABLET ORAL EVERY 4 HOURS PRN
Status: DISCONTINUED | OUTPATIENT
Start: 2024-04-01 | End: 2024-04-02 | Stop reason: HOSPADM

## 2024-04-01 RX ORDER — INDOCYANINE GREEN AND WATER 25 MG
5 KIT INJECTION
Status: COMPLETED | OUTPATIENT
Start: 2024-04-01 | End: 2024-04-01

## 2024-04-01 RX ORDER — ONDANSETRON 4 MG/1
4 TABLET, ORALLY DISINTEGRATING ORAL EVERY 8 HOURS PRN
Status: DISCONTINUED | OUTPATIENT
Start: 2024-04-01 | End: 2024-04-02 | Stop reason: HOSPADM

## 2024-04-01 RX ORDER — SODIUM CHLORIDE 0.9 % (FLUSH) 0.9 %
5-40 SYRINGE (ML) INJECTION EVERY 12 HOURS SCHEDULED
Status: DISCONTINUED | OUTPATIENT
Start: 2024-04-01 | End: 2024-04-01 | Stop reason: HOSPADM

## 2024-04-01 RX ORDER — MEPERIDINE HYDROCHLORIDE 25 MG/ML
12.5 INJECTION INTRAMUSCULAR; INTRAVENOUS; SUBCUTANEOUS EVERY 5 MIN PRN
Status: DISCONTINUED | OUTPATIENT
Start: 2024-04-01 | End: 2024-04-01 | Stop reason: HOSPADM

## 2024-04-01 RX ORDER — ASPIRIN 81 MG/1
81 TABLET ORAL NIGHTLY
Status: DISCONTINUED | OUTPATIENT
Start: 2024-04-01 | End: 2024-04-02 | Stop reason: HOSPADM

## 2024-04-01 RX ORDER — ROCURONIUM BROMIDE 10 MG/ML
INJECTION, SOLUTION INTRAVENOUS PRN
Status: DISCONTINUED | OUTPATIENT
Start: 2024-04-01 | End: 2024-04-01 | Stop reason: SDUPTHER

## 2024-04-01 RX ORDER — SODIUM CHLORIDE 9 MG/ML
INJECTION, SOLUTION INTRAVENOUS PRN
Status: DISCONTINUED | OUTPATIENT
Start: 2024-04-01 | End: 2024-04-02 | Stop reason: HOSPADM

## 2024-04-01 RX ORDER — FAMOTIDINE 20 MG/1
20 TABLET, FILM COATED ORAL 2 TIMES DAILY PRN
Status: DISCONTINUED | OUTPATIENT
Start: 2024-04-01 | End: 2024-04-02 | Stop reason: HOSPADM

## 2024-04-01 RX ORDER — SODIUM CHLORIDE, SODIUM LACTATE, POTASSIUM CHLORIDE, CALCIUM CHLORIDE 600; 310; 30; 20 MG/100ML; MG/100ML; MG/100ML; MG/100ML
INJECTION, SOLUTION INTRAVENOUS CONTINUOUS PRN
Status: DISCONTINUED | OUTPATIENT
Start: 2024-04-01 | End: 2024-04-01 | Stop reason: SDUPTHER

## 2024-04-01 RX ORDER — SODIUM CHLORIDE 9 MG/ML
INJECTION, SOLUTION INTRAVENOUS PRN
Status: DISCONTINUED | OUTPATIENT
Start: 2024-04-01 | End: 2024-04-01 | Stop reason: HOSPADM

## 2024-04-01 RX ORDER — DOCUSATE SODIUM 100 MG/1
100 CAPSULE, LIQUID FILLED ORAL DAILY
Status: DISCONTINUED | OUTPATIENT
Start: 2024-04-01 | End: 2024-04-02 | Stop reason: HOSPADM

## 2024-04-01 RX ORDER — LEVOTHYROXINE SODIUM 0.1 MG/1
200 TABLET ORAL DAILY
Status: DISCONTINUED | OUTPATIENT
Start: 2024-04-02 | End: 2024-04-02 | Stop reason: HOSPADM

## 2024-04-01 RX ORDER — LIDOCAINE HYDROCHLORIDE 20 MG/ML
INJECTION, SOLUTION EPIDURAL; INFILTRATION; INTRACAUDAL; PERINEURAL PRN
Status: DISCONTINUED | OUTPATIENT
Start: 2024-04-01 | End: 2024-04-01 | Stop reason: SDUPTHER

## 2024-04-01 RX ORDER — MIDAZOLAM HYDROCHLORIDE 1 MG/ML
INJECTION INTRAMUSCULAR; INTRAVENOUS PRN
Status: DISCONTINUED | OUTPATIENT
Start: 2024-04-01 | End: 2024-04-01 | Stop reason: SDUPTHER

## 2024-04-01 RX ORDER — LISINOPRIL 20 MG/1
40 TABLET ORAL DAILY
Status: DISCONTINUED | OUTPATIENT
Start: 2024-04-01 | End: 2024-04-02 | Stop reason: HOSPADM

## 2024-04-01 RX ORDER — MAGNESIUM SULFATE IN WATER 40 MG/ML
2000 INJECTION, SOLUTION INTRAVENOUS PRN
Status: DISCONTINUED | OUTPATIENT
Start: 2024-04-01 | End: 2024-04-02 | Stop reason: HOSPADM

## 2024-04-01 RX ORDER — PROCHLORPERAZINE EDISYLATE 5 MG/ML
5 INJECTION INTRAMUSCULAR; INTRAVENOUS
Status: COMPLETED | OUTPATIENT
Start: 2024-04-01 | End: 2024-04-01

## 2024-04-01 RX ORDER — ENOXAPARIN SODIUM 100 MG/ML
30 INJECTION SUBCUTANEOUS 2 TIMES DAILY
Status: DISCONTINUED | OUTPATIENT
Start: 2024-04-02 | End: 2024-04-02 | Stop reason: HOSPADM

## 2024-04-01 RX ORDER — SODIUM CHLORIDE 0.9 % (FLUSH) 0.9 %
10 SYRINGE (ML) INJECTION EVERY 12 HOURS SCHEDULED
Status: DISCONTINUED | OUTPATIENT
Start: 2024-04-01 | End: 2024-04-02 | Stop reason: HOSPADM

## 2024-04-01 RX ORDER — ONDANSETRON 2 MG/ML
4 INJECTION INTRAMUSCULAR; INTRAVENOUS EVERY 6 HOURS PRN
Status: DISCONTINUED | OUTPATIENT
Start: 2024-04-01 | End: 2024-04-02 | Stop reason: HOSPADM

## 2024-04-01 RX ORDER — LABETALOL HYDROCHLORIDE 5 MG/ML
INJECTION, SOLUTION INTRAVENOUS PRN
Status: DISCONTINUED | OUTPATIENT
Start: 2024-04-01 | End: 2024-04-01 | Stop reason: SDUPTHER

## 2024-04-01 RX ORDER — BUPIVACAINE HYDROCHLORIDE AND EPINEPHRINE 2.5; 5 MG/ML; UG/ML
INJECTION, SOLUTION EPIDURAL; INFILTRATION; INTRACAUDAL; PERINEURAL PRN
Status: DISCONTINUED | OUTPATIENT
Start: 2024-04-01 | End: 2024-04-01 | Stop reason: ALTCHOICE

## 2024-04-01 RX ORDER — SODIUM CHLORIDE 0.9 % (FLUSH) 0.9 %
5-40 SYRINGE (ML) INJECTION PRN
Status: DISCONTINUED | OUTPATIENT
Start: 2024-04-01 | End: 2024-04-01 | Stop reason: HOSPADM

## 2024-04-01 RX ORDER — IPRATROPIUM BROMIDE AND ALBUTEROL SULFATE 2.5; .5 MG/3ML; MG/3ML
1 SOLUTION RESPIRATORY (INHALATION)
Status: DISCONTINUED | OUTPATIENT
Start: 2024-04-01 | End: 2024-04-02 | Stop reason: HOSPADM

## 2024-04-01 RX ORDER — CELECOXIB 100 MG/1
200 CAPSULE ORAL DAILY
Status: DISCONTINUED | OUTPATIENT
Start: 2024-04-01 | End: 2024-04-02 | Stop reason: HOSPADM

## 2024-04-01 RX ADMIN — ACETAMINOPHEN 650 MG: 325 TABLET ORAL at 19:21

## 2024-04-01 RX ADMIN — IPRATROPIUM BROMIDE AND ALBUTEROL SULFATE 1 DOSE: 2.5; .5 SOLUTION RESPIRATORY (INHALATION) at 20:09

## 2024-04-01 RX ADMIN — PROCHLORPERAZINE EDISYLATE 5 MG: 5 INJECTION INTRAMUSCULAR; INTRAVENOUS at 13:34

## 2024-04-01 RX ADMIN — INDOCYANINE GREEN AND WATER 5 MG: KIT at 11:50

## 2024-04-01 RX ADMIN — ROCURONIUM BROMIDE 50 MG: 50 INJECTION INTRAVENOUS at 12:15

## 2024-04-01 RX ADMIN — OXYCODONE 10 MG: 5 TABLET ORAL at 19:00

## 2024-04-01 RX ADMIN — MIDAZOLAM 1 MG: 1 INJECTION INTRAMUSCULAR; INTRAVENOUS at 12:04

## 2024-04-01 RX ADMIN — FENTANYL CITRATE 50 MCG: 50 INJECTION, SOLUTION INTRAMUSCULAR; INTRAVENOUS at 12:32

## 2024-04-01 RX ADMIN — SODIUM CHLORIDE, PRESERVATIVE FREE 10 ML: 5 INJECTION INTRAVENOUS at 20:55

## 2024-04-01 RX ADMIN — LISINOPRIL 40 MG: 20 TABLET ORAL at 20:53

## 2024-04-01 RX ADMIN — FENTANYL CITRATE 100 MCG: 50 INJECTION, SOLUTION INTRAMUSCULAR; INTRAVENOUS at 12:15

## 2024-04-01 RX ADMIN — PROPOFOL 200 MG: 10 INJECTION, EMULSION INTRAVENOUS at 12:15

## 2024-04-01 RX ADMIN — SUGAMMADEX 242 MG: 100 INJECTION, SOLUTION INTRAVENOUS at 13:06

## 2024-04-01 RX ADMIN — WATER 300 MG: 1 INJECTION INTRAMUSCULAR; INTRAVENOUS; SUBCUTANEOUS at 12:08

## 2024-04-01 RX ADMIN — SODIUM CHLORIDE, POTASSIUM CHLORIDE, SODIUM LACTATE AND CALCIUM CHLORIDE: 600; 310; 30; 20 INJECTION, SOLUTION INTRAVENOUS at 13:14

## 2024-04-01 RX ADMIN — FENTANYL CITRATE 50 MCG: 50 INJECTION, SOLUTION INTRAMUSCULAR; INTRAVENOUS at 13:07

## 2024-04-01 RX ADMIN — HYDROMORPHONE HYDROCHLORIDE 0.5 MG: 1 INJECTION, SOLUTION INTRAMUSCULAR; INTRAVENOUS; SUBCUTANEOUS at 13:28

## 2024-04-01 RX ADMIN — MIDAZOLAM 1 MG: 1 INJECTION INTRAMUSCULAR; INTRAVENOUS at 12:08

## 2024-04-01 RX ADMIN — DEXAMETHASONE SODIUM PHOSPHATE 10 MG: 4 INJECTION, SOLUTION INTRAMUSCULAR; INTRAVENOUS at 12:29

## 2024-04-01 RX ADMIN — LABETALOL HYDROCHLORIDE 2.5 MG: 5 INJECTION INTRAVENOUS at 12:55

## 2024-04-01 RX ADMIN — SODIUM CHLORIDE, POTASSIUM CHLORIDE, SODIUM LACTATE AND CALCIUM CHLORIDE: 600; 310; 30; 20 INJECTION, SOLUTION INTRAVENOUS at 12:07

## 2024-04-01 RX ADMIN — METOPROLOL TARTRATE 25 MG: 25 TABLET, FILM COATED ORAL at 20:54

## 2024-04-01 RX ADMIN — LIDOCAINE HYDROCHLORIDE 100 MG: 20 INJECTION, SOLUTION EPIDURAL; INFILTRATION; INTRACAUDAL; PERINEURAL at 12:15

## 2024-04-01 RX ADMIN — FENTANYL CITRATE 50 MCG: 50 INJECTION, SOLUTION INTRAMUSCULAR; INTRAVENOUS at 13:09

## 2024-04-01 RX ADMIN — ASPIRIN 81 MG: 81 TABLET, COATED ORAL at 20:53

## 2024-04-01 RX ADMIN — ONDANSETRON 4 MG: 2 INJECTION INTRAMUSCULAR; INTRAVENOUS at 12:29

## 2024-04-01 RX ADMIN — FENTANYL CITRATE 50 MCG: 50 INJECTION, SOLUTION INTRAMUSCULAR; INTRAVENOUS at 12:35

## 2024-04-01 RX ADMIN — LABETALOL HYDROCHLORIDE 2.5 MG: 5 INJECTION INTRAVENOUS at 12:38

## 2024-04-01 RX ADMIN — ATORVASTATIN CALCIUM 20 MG: 20 TABLET, FILM COATED ORAL at 20:53

## 2024-04-01 RX ADMIN — HYDROMORPHONE HYDROCHLORIDE 0.5 MG: 1 INJECTION, SOLUTION INTRAMUSCULAR; INTRAVENOUS; SUBCUTANEOUS at 14:26

## 2024-04-01 ASSESSMENT — PAIN DESCRIPTION - ORIENTATION: ORIENTATION: POSTERIOR

## 2024-04-01 ASSESSMENT — PAIN DESCRIPTION - DESCRIPTORS
DESCRIPTORS: ACHING;DISCOMFORT;CRAMPING;SORE;TENDER
DESCRIPTORS: ACHING;DISCOMFORT;CRAMPING;SORE;TENDER
DESCRIPTORS: ACHING;DISCOMFORT;SORE

## 2024-04-01 ASSESSMENT — PAIN SCALES - GENERAL
PAINLEVEL_OUTOF10: 10
PAINLEVEL_OUTOF10: 8
PAINLEVEL_OUTOF10: 8
PAINLEVEL_OUTOF10: 7

## 2024-04-01 ASSESSMENT — PAIN DESCRIPTION - LOCATION
LOCATION: ABDOMEN
LOCATION: HEAD

## 2024-04-01 ASSESSMENT — PAIN - FUNCTIONAL ASSESSMENT: PAIN_FUNCTIONAL_ASSESSMENT: PREVENTS OR INTERFERES SOME ACTIVE ACTIVITIES AND ADLS

## 2024-04-01 ASSESSMENT — PAIN DESCRIPTION - ONSET: ONSET: ON-GOING

## 2024-04-01 ASSESSMENT — LIFESTYLE VARIABLES: SMOKING_STATUS: 0

## 2024-04-01 ASSESSMENT — PAIN DESCRIPTION - FREQUENCY: FREQUENCY: CONTINUOUS

## 2024-04-01 ASSESSMENT — PAIN DESCRIPTION - PAIN TYPE: TYPE: ACUTE PAIN

## 2024-04-01 NOTE — PROGRESS NOTES
Sammy Crystal was ordered linaclotide (Linzess) which is a nonformulary medication. Nurse is going to check with patient to see if home supply of this medication will be brought in to the hospital for inpatient use.  A pharmacist will follow-up with the nurse of the patient to assess the capability of the patient to bring in the medication.  If it is determined that the patient cannot supply the medication and it is not available to be dispensed from the pharmacy, a call will be placed to the ordering provider to discuss alternative options.     Alo John, PharmD  04/01/24 6:56 PM

## 2024-04-01 NOTE — OP NOTE
Operative Note     Sammy Crystal  1975  [id number]       Pre-op Diagnosis:   Symptomatic cholelithiasis [K80.20]  Fatty liver [K76.0]     Post-op Diagnosis List:  Symptomatic cholelithiasis [K80.20]  Fatty liver [K76.0]       Procedure:  Procedure(s):  LAPAROSCOPIC ROBOTIC XI CHOLECYSTECTOMY WITH LIVER BIOPSY WITH ICG CHOLANGIOGRAPHY     Surgeon:  Pernell Gómez MD    Staff:  Nereida Scrub: Naya Childers  Scrub Person First: Latasha Paredes     Anesthesia:  General     Findings: chronic cholecystitis, fatty liver with probable early fibrosis     EBL: less than 50      Drains: None     Specimens:  ID Type Source Tests Collected by Time Destination   A : GALLBLADDER Tissue Gallbladder SURGICAL PATHOLOGY Pernell Gómez MD 4/1/2024 1254    B : RIGHT LOBE LIVER BIOPSY Tissue Tissue SURGICAL PATHOLOGY Pernell Gómez MD 4/1/2024 1233          Complications:  * No complications entered in OR log *     Disposition of Patient:  Tolerated procedure well     CDC Wound Closure Status:  primary     Surgical Course:  Patient is a 48 y.o. female who was diagnosed with the above. Risks, benefits, and alternatives of the procedure, including bleeding, infection, bile leak, CBD injury, possibility for open procedure/subtotal cholecystectomy, possibility for future procedures were discussed with the patient. The planned procedure was agreed to and informed consent was obtained.          After informed consent was obtained patient brought to operating table placed in supine position.  General anesthesia was induced.  Patient is prepped and draped in usual sterile fashion.  Time-out was performed identifying correct patient procedure.  SCDs were on and functional.  Patient received appropriate perioperative antibiotics.  Left upper quadrant 8mm incision was made and Veress needle was inserted.  Pneumoperitoneum was then induced which the patient tolerated. 8mm robotic optical entry port was used to visualize the abdominal

## 2024-04-01 NOTE — ANESTHESIA PRE PROCEDURE
Department of Anesthesiology  Preprocedure Note       Name:  Sammy Crystal   Age:  48 y.o.  :  1975                                          MRN:  [id number]         Date:  2024      Surgeon: Surgeon(s):  Pernell Gómez MD    Procedure: Procedure(s):  LAPAROSCOPIC ROBOTIC XI CHOLECYSTECTOMY POSSIBLE OPEN POSSIBLE GRAM WITH LIVER BIOPSY    Medications prior to admission:   Prior to Admission medications    Medication Sig Start Date End Date Taking? Authorizing Provider   sucralfate (CARAFATE) 1 GM tablet Take 1 tablet by mouth in the morning, at noon, and at bedtime  Patient not taking: Reported on 3/15/2024 3/10/24 4/9/24  Betty Sheldon DO   ondansetron (ZOFRAN-ODT) 4 MG disintegrating tablet Take 1 tablet by mouth 3 times daily as needed for Nausea or Vomiting 3/10/24   Rancho Garcia DO   pantoprazole (PROTONIX) 40 MG tablet Take 1 tablet by mouth daily 3/4/24   Digna Herman DO   Glucagon (GVOKE HYPOPEN 2-PACK) 1 MG/0.2ML SOAJ Inject 1 mg subcutaneously in case of hypoglycemic emergency.  May repeat in 15 minutes as needed using a new device.  Patient not taking: Reported on 3/15/2024 3/4/24   Arline Mera APRN - CNP   ALPRAZolam (XANAX) 0.5 MG tablet Take 1 tablet by mouth 2 times daily as needed for Sleep for up to 90 days. Max Daily Amount: 1 mg 24  Digna Herman DO   Continuous Blood Gluc Transmit (DEXCOM G6 TRANSMITTER) MISC  24   Provider, MD Js   celecoxib (CELEBREX) 200 MG capsule Take 1 capsule by mouth daily 24   Digna Herman DO   dicyclomine (BENTYL) 10 MG capsule Take 1 capsule by mouth 4 times daily  Patient taking differently: Take 1 capsule by mouth 4 times daily as needed 24   Digna Herman DO   famotidine (PEPCID) 20 MG tablet Take 1 tablet by mouth 2 times daily  Patient taking differently: Take 1 tablet by mouth 2 times daily as needed (heart burn) 24   Digna Herman DO   simvastatin (ZOCOR) 40 MG

## 2024-04-01 NOTE — H&P
[facility] General Surgery Clinic Note     Assessment/Plan:        Diagnosis Orders   1. Symptomatic cholelithiasis        Will plan for cholecystectomy. Although not appreciated on CT scan, if she has findings of significant liver disease she is aware the procedure may be aborted       2. Fatty liver        Will plan for liver biopsy at time of cholecystectomy.                Return for Surgery.             Chief Complaint   Patient presents with    New Patient    Abdominal Pain       RUQ abd pain, gallbladder sludge         PCP: Digna Herman DO     HPI: Sammy Crystal is a 48 y.o. female who presents in consultation for gallbladder issues.  On Sunday she went to the emergency room.  She says she did not feel well was having dry heaves.  She then developed right upper quadrant pain that radiated to the back.  She was unable to get comfortable.  This lasted for several hours and then abated.  She had an ultrasound which imaging was reviewed.  It does show cholelithiasis.  The report also mentions a recanalized umbilical vein.  She also had a CT which imaging was reviewed from the emergency department.  It does show some hepatomegaly but I see any significant evidence of portal hypertension or cirrhosis.  She did have some mild LFT elevation.  She had a colonoscopy in 2022.         Past Medical History        Past Medical History:   Diagnosis Date    Capsulitis       right shoulder    Depression      Diabetes mellitus (HCC)       AGE 2, TYPE 1  HAS INSULIN PUMP    Diabetic frozen shoulder associated with type 1 diabetes mellitus (HCC) 2013     RIGHT    GERD (gastroesophageal reflux disease)      HTN (hypertension)      Hyperlipidemia      Hypothyroid 2/7/2012    Insulin pump in place 2018    Nausea & vomiting       resolved    Obesity 2/7/2012    Obesity, Class III, BMI 40-49.9 (morbid obesity) (HCC) 09/2018     BMI 47.3    MILENA (obstructive sleep apnea) 03/02/2012     USES CPAP    PONV (postoperative nausea and

## 2024-04-01 NOTE — ANESTHESIA POSTPROCEDURE EVALUATION
Department of Anesthesiology  Postprocedure Note    Patient: Sammy Crystal  MRN: [id number]  YOB: 1975  Date of evaluation: 4/1/2024    Procedure Summary       Date: 04/01/24 Room / Location: [address]    Anesthesia Start: 1203 Anesthesia Stop: 1324    Procedure: LAPAROSCOPIC ROBOTIC XI CHOLECYSTECTOMY WITH LIVER BIOPSY (Abdomen) Diagnosis:       Symptomatic cholelithiasis      Fatty liver      (Symptomatic cholelithiasis [K80.20])      (Fatty liver [K76.0])    Surgeons: Pernell Gómez MD Responsible Provider: Pretty Soliz DO    Anesthesia Type: General ASA Status: 3            Anesthesia Type: General    Ekaterina Phase I: Ekaterina Score: 9    Ekaterina Phase II:      Anesthesia Post Evaluation    Patient location during evaluation: PACU  Patient participation: complete - patient participated  Level of consciousness: awake and alert  Airway patency: patent  Nausea & Vomiting: no nausea and no vomiting  Cardiovascular status: hemodynamically stable  Respiratory status: acceptable  Hydration status: euvolemic  Pain management: adequate    No notable events documented.

## 2024-04-02 ENCOUNTER — CLINICAL DOCUMENTATION (OUTPATIENT)
Dept: SURGERY | Age: 49
End: 2024-04-02

## 2024-04-02 VITALS
SYSTOLIC BLOOD PRESSURE: 113 MMHG | HEART RATE: 70 BPM | DIASTOLIC BLOOD PRESSURE: 57 MMHG | TEMPERATURE: 98.8 F | WEIGHT: 267 LBS | HEIGHT: 65 IN | OXYGEN SATURATION: 95 % | RESPIRATION RATE: 16 BRPM | BODY MASS INDEX: 44.48 KG/M2

## 2024-04-02 PROCEDURE — 6360000002 HC RX W HCPCS

## 2024-04-02 PROCEDURE — G0378 HOSPITAL OBSERVATION PER HR: HCPCS

## 2024-04-02 PROCEDURE — 2580000003 HC RX 258

## 2024-04-02 PROCEDURE — 2700000000 HC OXYGEN THERAPY PER DAY

## 2024-04-02 PROCEDURE — 96372 THER/PROPH/DIAG INJ SC/IM: CPT

## 2024-04-02 PROCEDURE — 94660 CPAP INITIATION&MGMT: CPT

## 2024-04-02 PROCEDURE — 6370000000 HC RX 637 (ALT 250 FOR IP)

## 2024-04-02 RX ORDER — ONDANSETRON 4 MG/1
4 TABLET, FILM COATED ORAL DAILY PRN
Qty: 12 TABLET | Refills: 0 | Status: SHIPPED | OUTPATIENT
Start: 2024-04-02 | End: 2024-04-14

## 2024-04-02 RX ADMIN — METOPROLOL TARTRATE 25 MG: 25 TABLET, FILM COATED ORAL at 08:30

## 2024-04-02 RX ADMIN — PANTOPRAZOLE SODIUM 40 MG: 40 TABLET, DELAYED RELEASE ORAL at 08:45

## 2024-04-02 RX ADMIN — SODIUM CHLORIDE, PRESERVATIVE FREE 10 ML: 5 INJECTION INTRAVENOUS at 08:45

## 2024-04-02 RX ADMIN — ATORVASTATIN CALCIUM 20 MG: 20 TABLET, FILM COATED ORAL at 08:45

## 2024-04-02 RX ADMIN — LEVOTHYROXINE SODIUM 200 MCG: 100 TABLET ORAL at 06:16

## 2024-04-02 RX ADMIN — ENOXAPARIN SODIUM 30 MG: 100 INJECTION SUBCUTANEOUS at 08:45

## 2024-04-02 RX ADMIN — DOCUSATE SODIUM 100 MG: 100 CAPSULE, LIQUID FILLED ORAL at 10:19

## 2024-04-02 RX ADMIN — CELECOXIB 200 MG: 100 CAPSULE ORAL at 08:45

## 2024-04-02 RX ADMIN — BUPROPION HYDROCHLORIDE 300 MG: 300 TABLET, EXTENDED RELEASE ORAL at 10:19

## 2024-04-02 RX ADMIN — LISINOPRIL 40 MG: 20 TABLET ORAL at 08:45

## 2024-04-02 RX ADMIN — OXYCODONE 5 MG: 5 TABLET ORAL at 02:35

## 2024-04-02 RX ADMIN — ACETAMINOPHEN 650 MG: 325 TABLET ORAL at 11:06

## 2024-04-02 ASSESSMENT — PAIN DESCRIPTION - DESCRIPTORS: DESCRIPTORS: ACHING;DISCOMFORT;SORE

## 2024-04-02 ASSESSMENT — PAIN DESCRIPTION - ORIENTATION: ORIENTATION: MID;LOWER

## 2024-04-02 ASSESSMENT — PAIN DESCRIPTION - PAIN TYPE: TYPE: SURGICAL PAIN

## 2024-04-02 ASSESSMENT — PAIN DESCRIPTION - FREQUENCY: FREQUENCY: CONTINUOUS

## 2024-04-02 ASSESSMENT — PAIN SCALES - GENERAL: PAINLEVEL_OUTOF10: 5

## 2024-04-02 ASSESSMENT — PAIN DESCRIPTION - ONSET: ONSET: ON-GOING

## 2024-04-02 ASSESSMENT — PAIN DESCRIPTION - LOCATION: LOCATION: ABDOMEN

## 2024-04-02 ASSESSMENT — PAIN - FUNCTIONAL ASSESSMENT: PAIN_FUNCTIONAL_ASSESSMENT: PREVENTS OR INTERFERES SOME ACTIVE ACTIVITIES AND ADLS

## 2024-04-02 NOTE — PLAN OF CARE
Problem: Chronic Conditions and Co-morbidities  Goal: Patient's chronic conditions and co-morbidity symptoms are monitored and maintained or improved  4/2/2024 1055 by Britney Cho RN  Outcome: Completed  4/1/2024 2309 by Verona Sheldon RN  Outcome: Progressing     Problem: Discharge Planning  Goal: Discharge to home or other facility with appropriate resources  4/2/2024 1055 by Britney Cho RN  Outcome: Completed  4/1/2024 2309 by Verona Sheldon RN  Outcome: Progressing     Problem: Pain  Goal: Verbalizes/displays adequate comfort level or baseline comfort level  4/2/2024 1055 by Britney Cho RN  Outcome: Completed  4/1/2024 2309 by Verona Sheldon RN  Outcome: Progressing     Problem: Safety - Adult  Goal: Free from fall injury  4/2/2024 1055 by Britney Cho RN  Outcome: Completed  4/1/2024 2309 by Verona Sheldon RN  Outcome: Progressing

## 2024-04-02 NOTE — CARE COORDINATION
Social Work discharge planning  SW met with pt for discharge planning. She said she lives with family with bed/bath on 1st floor and 3 steps to enter. She already has a cpap and glucometer at home. She reports being independent with adls and ambulation pta. She said she is more weak post op, but denied need for PT OT cane, or ww. She advised she saw her PCP in January this year, and will again in May.  She plans to use [facility] at discharge. She said she will have a ride home.  Pt denied discharge needs.  Electronically signed by RADHA Franklin on 4/2/2024 at 12:12 PM

## 2024-04-02 NOTE — PROGRESS NOTES
Date: 4/1/2024    Time: 11:33 PM    Patient Placed On BIPAP/CPAP/ Non-Invasive Ventilation?  Yes    If no must comment.  Facial area red/color change? No           If YES are Blister/Lesion present?No   If yes must notify nursing staff  BIPAP/CPAP skin barrier?  Yes    Skin barrier type:mepilexlite       Comments: Machine plugged into red outlet and outside alarm plugged in.        Parvin Ruth RCP    04/01/24 4546   NIV Type   NIV Started/Stopped On   Mode CPAP   Mask Type Full face mask   Mask Size Medium   Assessment   Respirations 21   Comfort Level Good   Using Accessory Muscles No   Mask Compliance Good   Skin Assessment Clean, dry, & intact   Skin Protection for O2 Device Yes   Settings/Measurements   PIP Observed 12 cm H20   CPAP/EPAP 11 cmH2O   Vt (Measured) 605 mL   FiO2  25 %   Minute Volume (L/min) 12.6 Liters   Mask Leak (lpm) 29 lpm

## 2024-04-02 NOTE — PROGRESS NOTES
CLINICAL PHARMACY NOTE: MEDS TO BEDS    Total # of Prescriptions Filled: 2   The following medications were delivered to the patient:  Percocet 5/325 mg   Ondansetron 4 mg     Additional Documentation:

## 2024-04-02 NOTE — PROGRESS NOTES
GENERAL SURGERY  DAILY PROGRESS NOTE    Patient's Name/Date of Birth: Sammy Crystal / 1975    Date: 2024     No chief complaint on file.       Subjective:  Patient was comfortably this a.m.  Patient states that her abdominal pain is much improved this a.m.  Patient denies any subjective shortness of breath.      Objective:  Last 24Hrs  Temp  Av.2 °F (36.8 °C)  Min: 97.7 °F (36.5 °C)  Max: 98.9 °F (37.2 °C)  Resp  Av.1  Min: 14  Max: 27  Pulse  Av  Min: 49  Max: 101  Systolic (24hrs), Av , Min:106 , Max:165     Diastolic (24hrs), Av, Min:51, Max:79    SpO2  Av.8 %  Min: 85 %  Max: 98 %    I/O last 3 completed shifts:  In: 1000 [I.V.:1000]  Out: 25 [Blood:25]      General: In no acute distress, alert and oriented x4  Cardiovascular: Warm throughout, no edema  Respiratory: no respiratory distress, equal chest rise  Abdomen: Soft, nondistended, appropriately tender with incisions clean/dry/intact with skin glue in place.  Skin: no obvious rashes or lesions appreciated, no jaundice  Extremities: atraumatic, no focal motor deficits, no open wounds      CBC  No results for input(s): \"WBC\", \"RBC\", \"HGB\", \"HCT\", \"MCV\", \"MCH\", \"MCHC\", \"RDW\", \"PLT\", \"MPV\" in the last 72 hours.    CMP  No results for input(s): \"NA\", \"K\", \"CL\", \"CO2\", \"BUN\", \"CREATININE\", \"GLUCOSE\", \"CALCIUM\", \"PROT\", \"LABALBU\", \"BILITOT\", \"ALKPHOS\", \"AST\", \"ALT\" in the last 72 hours.      Assessment/Plan:    Patient Active Problem List   Diagnosis    Hypothyroid    HTN (hypertension)    Diabetic retinopathy (HCC)    DM type 1 (diabetes mellitus, type 1) (formerly [facility])    Heart palpitations    Primary osteoarthritis of left knee    GERD (gastroesophageal reflux disease)    Depression    MILENA (obstructive sleep apnea)    Obesity, Class III, BMI 40-49.9 (morbid obesity) (formerly [facility])    Insulin pump in place    Hyperlipidemia    Venous insufficiency (chronic) (peripheral)    Pain in both lower legs    Difficulty walking    Varicose

## 2024-04-02 NOTE — PROGRESS NOTES
LATE ENTRY - MA faxed CNS Response to [phone] on 04-01-24  Electronically signed by Hilda Jung MA on 4/2/2024 at 6:01 PM

## 2024-04-02 NOTE — PROGRESS NOTES
Pt's blood sugar via continuous glucose monitor is 334. Patient did dose self with insulin pump of 3.3 units

## 2024-04-02 NOTE — DISCHARGE SUMMARY
1 diabetes mellitus with hyperglycemia (HCC)      metoprolol tartrate (LOPRESSOR) 25 MG tablet Take 1 tablet by mouth 2 times daily  Qty: 180 tablet, Refills: 1    Associated Diagnoses: Essential hypertension      buPROPion (WELLBUTRIN XL) 300 MG extended release tablet Take 1 tablet by mouth every morning  Qty: 90 tablet, Refills: 1    Associated Diagnoses: Depression, unspecified depression type      ramipril (ALTACE) 10 MG capsule TAKE 1 CAPSULE BY MOUTH ONE TIME A DAY  Qty: 90 capsule, Refills: 3    Associated Diagnoses: Essential hypertension      levothyroxine (SYNTHROID) 200 MCG tablet TAKE ONE TABLET BY MOUTH EVERY DAY  Qty: 30 tablet, Refills: 0      linaclotide (LINZESS) 145 MCG capsule Take 1 capsule by mouth every morning (before breakfast)  Qty: 90 capsule, Refills: 3      Simethicone (GAS RELIEF PO) Take by mouth as needed (gas pain)      CPAP Machine MISC by Does not apply route      Probiotic Product (PROBIOTIC DAILY PO) Take 1 capsule by mouth daily      Elastic Bandages & Supports (JOBST KNEE HIGH COMPRESSION SM) MISC Compression stockings 20-30 mm hg knee high bilaterally  Dx : Venous Insufficiency, Swelling  Qty: 2 each, Refills: 2      Insulin Pump - insulin lispro Inject into the skin continuous 6285-4016: 1.9 units/hr  1661-7485: 2.1 units/hr   5385-2548: 1.9 units/hr      docusate sodium (COLACE) 100 MG capsule Take 1 capsule by mouth daily      Cyanocobalamin (VITAMIN B-12 PO) Take 2,000 mcg by mouth daily      aspirin 81 MG tablet Take 1 tablet by mouth nightly              Gallbladder Removal Surgery: What to Expect at Home  Your Recovery  After your surgery, you will likely feel weak and tired for several days after you return home. Your belly may be swollen. If you had laparoscopic surgery, it's normal to also have some shoulder pain. This is caused by the air that your doctor put in your belly to help see your organs better.  You may have gas or need to burp a lot at first. A few people

## 2024-04-03 ENCOUNTER — CARE COORDINATION (OUTPATIENT)
Dept: OTHER | Facility: CLINIC | Age: 49
End: 2024-04-03

## 2024-04-03 NOTE — CARE COORDINATION
Care Transitions Outreach Attempt    Call within 2 business days of discharge: Yes   Attempted to reach patient for transitions of care follow up. Unable to reach patient.  HIPAA compliant message left requesting a return phone call at patient convenience.       Patient: Sammy Crystal Patient : 1975 MRN: [id number]    Last Discharge Facility       Date Complaint Diagnosis Description Type Department Provider    24  Symptomatic cholelithiasis ... Admission (Discharged) GREGORIO GabrielW Pernell Gómez MD              Was this an external facility discharge? No Discharge Facility Name: [facility]    Noted following upcoming appointments from discharge chart review:   [facility] follow up appointment(s):   Future Appointments   Date Time Provider Department Center   2024 10:45 AM Pernell Gómez MD COL SURG [facility]   2024  7:30 AM Arline Mera APRN - CNP BDM ENDO [facility]   2024  8:00 AM Digna Herman DO [facility]   2024  8:00 AM Nicole Newman MD [facility]     Non-[facility]  follow up appointment(s): unknown    Arline Daly MSN, RN   Ambulatory Care Manager  Associate Care Management  Cell [phone]  [email or web address]

## 2024-04-04 ENCOUNTER — CARE COORDINATION (OUTPATIENT)
Dept: OTHER | Facility: CLINIC | Age: 49
End: 2024-04-04

## 2024-04-04 LAB — SURGICAL PATHOLOGY REPORT: NORMAL

## 2024-04-04 NOTE — CARE COORDINATION
Care Transitions Outreach Attempt    Call within 2 business days of discharge: Yes   Attempted to reach patient for transitions of care follow up. Unable to reach patient.  HIPAA compliant message left requesting a return phone call at patient convenience.   Unable to Reach Letter sent to patient via my chart.      Future Appointments   Date Time Provider Department Center   2024 10:45 AM Pernell Gómez MD COL SURG [facility]   2024  7:30 AM Arline Mera APRN - CNP [facility] ENDO [facility]   2024  8:00 AM Digna Herman DO [facility]   2024  8:00 AM Nicole Newman MD [facility]         Patient: Sammy Crystal Patient : 1975 MRN: [id number]    Last Discharge Facility       Date Complaint Diagnosis Description Type Department Provider    24  Symptomatic cholelithiasis ... Admission (Discharged) Pernell Echeverria MD              Was this an external facility discharge? No Discharge Facility Name: [facility]    Noted following upcoming appointments from discharge chart review:   BS follow up appointment(s):   Future Appointments   Date Time Provider Department Center   2024 10:45 AM Pernell Gómez MD COL SURG [facility]   2024  7:30 AM Arline Mera APRN - CNP [facility] ENDO [facility]   2024  8:00 AM Digna Herman DO [facility]   2024  8:00 AM Nicole Newman MD [facility]     Non-BS  follow up appointment(s): unknown    Arline Daly MSN, RN   Ambulatory Care Manager  Associate Care Management  Cell [phone]  [email or web address]

## 2024-04-08 ENCOUNTER — TELEPHONE (OUTPATIENT)
Dept: SURGERY | Age: 49
End: 2024-04-08

## 2024-04-08 NOTE — TELEPHONE ENCOUNTER
Pt had lap cristofer with liver biopsy 4/1/24. Pt reports she is having right side muscular abdominal pain. She is very gassy, gas pain is radiating to her groin. It is keeping her up at night. She has nausea. Denies fever. States her incisions look good, no incisional pain. She is taking gas-ex, tylenol, ibuprofen. Per Dr. Gómez pain is normal, take percocet as directed, tylenol and motrin if she wants to alternate. Pt advised and verbalized understanding. Advised pt to call if new or worsening pain or fever. Electronically signed by Maria Eugenia Richardson MA on 4/8/2024 at 1:58 PM

## 2024-04-09 ENCOUNTER — CARE COORDINATION (OUTPATIENT)
Dept: OTHER | Facility: CLINIC | Age: 49
End: 2024-04-09

## 2024-04-09 NOTE — CARE COORDINATION
Final Transition of Care Outreach Attempt     ACM attempted to reach patient for final Care Transitions call. HIPAA compliant message left requesting a return phone call at patient convenience.     Final Unable to Reach Letter sent via my chart, along with handouts: Nurse Access Line and Right Care/Right Place/Right Time.    No further outreach scheduled with this ACM, patient has been provided with this ACM's contact information. ACM will sign off care team at this time. Episode of care resolved.      Future Appointments   Date Time Provider Department Center   4/17/2024 10:45 AM Pernell Gómez MD COL SURG [facility]   5/1/2024  7:30 AM Arline Mera APRN - CNP BDM ENDO [facility]   5/7/2024  8:00 AM Digna Herman DO [facility]   6/11/2024  8:00 AM Nicole Newman MD [place] [facility]      Arline Daly MSN, RN   Ambulatory Care Manager  Associate Care Management  Cell [phone]  [email or web address]

## 2024-04-10 ENCOUNTER — CLINICAL DOCUMENTATION (OUTPATIENT)
Dept: PHARMACY | Facility: CLINIC | Age: 49
End: 2024-04-10

## 2024-04-10 NOTE — PROGRESS NOTES
1st Quarterly Reminder sent to patient for the DM Program - See Mychart message or Letter for more information.    Neda Simon CphT  [facility]  Clinical Pharmacy   Department, toll free: [phone] Option #3      For Pharmacy Admin Tracking Only    Program: [employer]  CPA in place:  No  Gap Closed?: Yes   Time Spent (min): 5

## 2024-04-17 ENCOUNTER — HOSPITAL ENCOUNTER (OUTPATIENT)
Age: 49
Discharge: HOME | End: 2024-04-17
Payer: COMMERCIAL

## 2024-04-17 ENCOUNTER — OFFICE VISIT (OUTPATIENT)
Dept: SURGERY | Age: 49
End: 2024-04-17

## 2024-04-17 VITALS
OXYGEN SATURATION: 95 % | SYSTOLIC BLOOD PRESSURE: 128 MMHG | WEIGHT: 259 LBS | TEMPERATURE: 97.3 F | HEIGHT: 64 IN | DIASTOLIC BLOOD PRESSURE: 79 MMHG | BODY MASS INDEX: 44.22 KG/M2 | HEART RATE: 70 BPM

## 2024-04-17 DIAGNOSIS — E55.9 VITAMIN D DEFICIENCY: ICD-10-CM

## 2024-04-17 DIAGNOSIS — E10.65 TYPE 1 DIABETES MELLITUS WITH HYPERGLYCEMIA (HCC): ICD-10-CM

## 2024-04-17 DIAGNOSIS — K76.9 HEPATOPATHY: Primary | ICD-10-CM

## 2024-04-17 DIAGNOSIS — E03.9 HYPOTHYROIDISM, UNSPECIFIED TYPE: ICD-10-CM

## 2024-04-17 LAB
25(OH)D3 SERPL-MCNC: <6 NG/ML (ref 30–100)
ALBUMIN SERPL-MCNC: 3.5 G/DL (ref 3.5–5.2)
ALP SERPL-CCNC: 564 U/L (ref 35–104)
ALT SERPL-CCNC: 54 U/L (ref 0–32)
ANION GAP SERPL CALCULATED.3IONS-SCNC: 9 MMOL/L (ref 7–16)
AST SERPL-CCNC: 47 U/L (ref 0–31)
BASOPHILS # BLD: 0.05 K/UL (ref 0–0.2)
BASOPHILS NFR BLD: 0 % (ref 0–2)
BILIRUB SERPL-MCNC: 0.9 MG/DL (ref 0–1.2)
BUN SERPL-MCNC: 11 MG/DL (ref 6–20)
CALCIUM SERPL-MCNC: 9.3 MG/DL (ref 8.6–10.2)
CHLORIDE SERPL-SCNC: 101 MMOL/L (ref 98–107)
CHOLEST SERPL-MCNC: 126 MG/DL
CO2 SERPL-SCNC: 30 MMOL/L (ref 22–29)
CREAT SERPL-MCNC: 0.8 MG/DL (ref 0.5–1)
CREAT UR-MCNC: 96.8 MG/DL (ref 29–226)
EOSINOPHIL # BLD: 0.09 K/UL (ref 0.05–0.5)
EOSINOPHILS RELATIVE PERCENT: 1 % (ref 0–6)
ERYTHROCYTE [DISTWIDTH] IN BLOOD BY AUTOMATED COUNT: 13.4 % (ref 11.5–15)
GFR SERPL CREATININE-BSD FRML MDRD: >90 ML/MIN/1.73M2
GLUCOSE SERPL-MCNC: 112 MG/DL (ref 74–99)
HCT VFR BLD AUTO: 32.9 % (ref 34–48)
HDLC SERPL-MCNC: 34 MG/DL
HGB BLD-MCNC: 10.4 G/DL (ref 11.5–15.5)
IMM GRANULOCYTES # BLD AUTO: 0.11 K/UL (ref 0–0.58)
IMM GRANULOCYTES NFR BLD: 1 % (ref 0–5)
LDLC SERPL CALC-MCNC: 75 MG/DL
LYMPHOCYTES NFR BLD: 0.81 K/UL (ref 1.5–4)
LYMPHOCYTES RELATIVE PERCENT: 7 % (ref 20–42)
MCH RBC QN AUTO: 27.5 PG (ref 26–35)
MCHC RBC AUTO-ENTMCNC: 31.6 G/DL (ref 32–34.5)
MCV RBC AUTO: 87 FL (ref 80–99.9)
MICROALBUMIN UR-MCNC: 23 MG/L (ref 0–19)
MICROALBUMIN/CREAT UR-RTO: 24 MCG/MG CREAT (ref 0–30)
MONOCYTES NFR BLD: 1.56 K/UL (ref 0.1–0.95)
MONOCYTES NFR BLD: 13 % (ref 2–12)
NEUTROPHILS NFR BLD: 79 % (ref 43–80)
NEUTS SEG NFR BLD: 9.85 K/UL (ref 1.8–7.3)
PLATELET # BLD AUTO: 459 K/UL (ref 130–450)
PMV BLD AUTO: 10.8 FL (ref 7–12)
POTASSIUM SERPL-SCNC: 4.5 MMOL/L (ref 3.5–5)
PROT SERPL-MCNC: 6.7 G/DL (ref 6.4–8.3)
RBC # BLD AUTO: 3.78 M/UL (ref 3.5–5.5)
SODIUM SERPL-SCNC: 140 MMOL/L (ref 132–146)
T4 FREE SERPL-MCNC: 2 NG/DL (ref 0.9–1.7)
TRIGL SERPL-MCNC: 86 MG/DL
TSH SERPL DL<=0.05 MIU/L-ACNC: 1.54 UIU/ML (ref 0.27–4.2)
VLDLC SERPL CALC-MCNC: 17 MG/DL
WBC OTHER # BLD: 12.5 K/UL (ref 4.5–11.5)

## 2024-04-17 PROCEDURE — 80053 COMPREHEN METABOLIC PANEL: CPT

## 2024-04-17 PROCEDURE — 82306 VITAMIN D 25 HYDROXY: CPT

## 2024-04-17 PROCEDURE — 82570 ASSAY OF URINE CREATININE: CPT

## 2024-04-17 PROCEDURE — 99024 POSTOP FOLLOW-UP VISIT: CPT | Performed by: SURGERY

## 2024-04-17 PROCEDURE — 84443 ASSAY THYROID STIM HORMONE: CPT

## 2024-04-17 PROCEDURE — 82043 UR ALBUMIN QUANTITATIVE: CPT

## 2024-04-17 PROCEDURE — 84439 ASSAY OF FREE THYROXINE: CPT

## 2024-04-17 PROCEDURE — 85025 COMPLETE CBC W/AUTO DIFF WBC: CPT

## 2024-04-17 PROCEDURE — 36415 COLL VENOUS BLD VENIPUNCTURE: CPT

## 2024-04-17 PROCEDURE — 80061 LIPID PANEL: CPT

## 2024-04-19 ENCOUNTER — CLINICAL DOCUMENTATION (OUTPATIENT)
Dept: SURGERY | Age: 49
End: 2024-04-19

## 2024-04-19 NOTE — PROGRESS NOTES
Faxed short term disability forms to [facility]. Electronically signed by Maria Eugenia Richardson MA on 4/19/2024 at 2:10 PM

## 2024-04-19 NOTE — PROGRESS NOTES
Faxed work release to [employer] 4/17/24. Electronically signed by Maria Eugenia Richardson MA on 4/19/2024 at 2:13 PM

## 2024-04-25 ENCOUNTER — TELEPHONE (OUTPATIENT)
Dept: ENDOCRINOLOGY | Age: 49
End: 2024-04-25

## 2024-04-25 DIAGNOSIS — E55.9 VITAMIN D DEFICIENCY: Primary | ICD-10-CM

## 2024-04-25 NOTE — TELEPHONE ENCOUNTER
Please let patient know I reviewed her labs.  Thyroid labs and cholesterol labs are at goal.    Vitamin D is extremely low.  I recommend prescription strength vitamin D 50,000 units weekly for 12 weeks.  Once completed, I recommend over-the-counter vitamin D3 2000 IU daily.    She is showing some new, slight anemia as well as elevated liver enzymes, which appear to be new.  I see she had a recent liver biopsy.  Please advise her to continue following with PCP for these issues

## 2024-04-30 NOTE — PROGRESS NOTES
[facility]  [facility] Department of Endocrinology Diabetes and Metabolism   [address]  [place] [address]  Phone: [phone]  Fax: [phone]    Date of Service: 5/1/2024    Primary Care Physician: Digna Herman DO  Referring physician: No ref. provider found  Provider: ABELINO Willingham CNP     Reason for the visit:  Type 1 diabetes    History of Present Illness:  The history is provided by the patient. No  was used. Accuracy of the patient data is excellent.  Sammy Crystal is a very pleasant 48 y.o. female seen today for diabetes management     Sammy Crystal was diagnosed with diabetes at age 3 and currently on t:slim insulin pump and Dexcom G6 CGM. Started pump in May 2023.     basal 12 AM 1.4,  CR 12 AM 8, target 110, AIT 5 hours      Was previously on Ozempic but insurance will not cover due to type1 DM diagnosis.    TIR 77%  Hyperglycemia 23%  Hypoglycemia 0%  Avg glucose 163    Patient performs several corrections throughout the day, but rarely puts in carbs.  If she does cover for her meal its only at dinnertime.    Had lap cristofer 4/1/24  Undergoing work-up for elevated LFT's. S/P liver bx -congestive hepatopathy, negative for steatosis or fibrosis    Most recent A1c results summarized below  Lab Results   Component Value Date/Time    LABA1C 7.2 11/04/2023 09:42 AM    LABA1C 8.0 05/06/2023 10:24 AM    LABA1C 8.2 08/24/2022 12:00 AM     Patient has had some hypoglycemic episodes, mostly at work and sometimes overnight  The patient hasn't been mindful of what has been eating and wasn't following diabetes diet    Eats 3 meals per day and also snacks at night  I reviewed current medications and the patient has no issues with diabetes medications  Sammy Crystal is up to date with eye exam. Last visit 4/17/24   The patient seeing podiatrist as need and also performs  own feet care  Microvascular complications:  + Retinopathy,

## 2024-04-30 NOTE — PROGRESS NOTES
[facility] General Surgery Clinic Note    Assessment/Plan:     Diagnosis Orders   1. Congestive Hepatopathy  Bo Morales MD, GastroenterologyMino (ALEX)    US LIVER    Evaluate with ultrasound.  Will have her see GI for her liver issues.          No follow-ups on file.      Chief Complaint   Patient presents with    Post-Op Check     Post op lap cristofer, liver bx 4/1/24       PCP: Digna Herman DO    HPI: Sammy Crystal is a 48 y.o. female here for follow-up of cholecystectomy for symptomatic cholelithiasis.  Pathology showed chronic cholecystitis.  Liver biopsy showed no evidence of steatosis or fibrosis but did show congestive hepatopathy.  Will have her see GI we also informed she should discuss with her PCP for any potential further workup as well.  She is still having some right-sided abdominal pain but it is different than prior to surgery.    Review of Systems      Past Medical History:   Diagnosis Date    Capsulitis     right shoulder    Depression     Diabetes mellitus (HCC)     AGE 2, TYPE 1  HAS INSULIN PUMP    Diabetic frozen shoulder associated with type 1 diabetes mellitus (HCC) 2013    RIGHT    GERD (gastroesophageal reflux disease)     HTN (hypertension)     Hyperlipidemia     Hypothyroid 02/07/2012    Insulin pump in place 2018    Nausea & vomiting     resolved    Obesity 02/07/2012    Obesity, Class III, BMI 40-49.9 (morbid obesity) (HCC) 09/2018    BMI 47.3    MILENA (obstructive sleep apnea) 03/02/2012    USES CPAP    PONV (postoperative nausea and vomiting)     Primary osteoarthritis of knee     B/L    Retinopathy due to secondary DM (HCC) 2011    LASER TX    Sleep apnea 03/02/2012    uses cpap, instructed to bring    Thyroid disease        Past Surgical History:   Procedure Laterality Date    ACHILLES TENDON SURGERY Left 04/20/2021    ACHILLES TENDON SURGERY Left 4/20/2021    REPAIR ACHILLES TENDON LEFT LOWER EXTREMITY performed by Carmine Funes Jr., DPM at [facility]

## 2024-05-01 ENCOUNTER — OFFICE VISIT (OUTPATIENT)
Dept: ENDOCRINOLOGY | Age: 49
End: 2024-05-01

## 2024-05-01 VITALS
BODY MASS INDEX: 43.46 KG/M2 | OXYGEN SATURATION: 98 % | WEIGHT: 254.6 LBS | DIASTOLIC BLOOD PRESSURE: 75 MMHG | HEART RATE: 60 BPM | HEIGHT: 64 IN | SYSTOLIC BLOOD PRESSURE: 110 MMHG | RESPIRATION RATE: 18 BRPM

## 2024-05-01 DIAGNOSIS — E55.9 VITAMIN D DEFICIENCY: ICD-10-CM

## 2024-05-01 DIAGNOSIS — E10.319 TYPE 1 DIABETES MELLITUS WITH RETINOPATHY, MACULAR EDEMA PRESENCE UNSPECIFIED, UNSPECIFIED LATERALITY, UNSPECIFIED RETINOPATHY SEVERITY (HCC): ICD-10-CM

## 2024-05-01 DIAGNOSIS — Z91.119 DIETARY NONCOMPLIANCE: ICD-10-CM

## 2024-05-01 DIAGNOSIS — E66.01 OBESITY, CLASS III, BMI 40-49.9 (MORBID OBESITY) (HCC): ICD-10-CM

## 2024-05-01 DIAGNOSIS — E10.65 TYPE 1 DIABETES MELLITUS WITH HYPERGLYCEMIA (HCC): Primary | ICD-10-CM

## 2024-05-01 DIAGNOSIS — E03.9 HYPOTHYROIDISM, UNSPECIFIED TYPE: ICD-10-CM

## 2024-05-01 LAB — HBA1C MFR BLD: 7 %

## 2024-05-01 RX ORDER — LEVOTHYROXINE SODIUM 0.2 MG/1
200 TABLET ORAL DAILY
Qty: 90 TABLET | Refills: 3 | Status: SHIPPED | OUTPATIENT
Start: 2024-05-01

## 2024-05-01 RX ORDER — INSULIN LISPRO 100 [IU]/ML
INJECTION, SOLUTION INTRAVENOUS; SUBCUTANEOUS
Qty: 90 ML | Refills: 3 | Status: SHIPPED | OUTPATIENT
Start: 2024-05-01

## 2024-05-02 ENCOUNTER — HOSPITAL ENCOUNTER (OUTPATIENT)
Dept: ULTRASOUND IMAGING | Age: 49
Discharge: HOME | End: 2024-05-04
Payer: COMMERCIAL

## 2024-05-02 DIAGNOSIS — K76.9 HEPATOPATHY: ICD-10-CM

## 2024-05-02 PROCEDURE — 93975 VASCULAR STUDY: CPT

## 2024-05-02 PROCEDURE — 76705 ECHO EXAM OF ABDOMEN: CPT

## 2024-05-06 ENCOUNTER — CLINICAL DOCUMENTATION (OUTPATIENT)
Dept: SURGERY | Age: 49
End: 2024-05-06

## 2024-05-06 NOTE — PROGRESS NOTES
Faxed referral to Dr. Morales 4/29/24. Electronically signed by Maria Eugenia Richardson MA on 5/6/2024 at 10:09 AM

## 2024-05-07 ENCOUNTER — OFFICE VISIT (OUTPATIENT)
Dept: FAMILY MEDICINE CLINIC | Age: 49
End: 2024-05-07
Payer: COMMERCIAL

## 2024-05-07 ENCOUNTER — TELEPHONE (OUTPATIENT)
Dept: SURGERY | Age: 49
End: 2024-05-07

## 2024-05-07 VITALS
BODY MASS INDEX: 43.81 KG/M2 | HEIGHT: 64 IN | RESPIRATION RATE: 18 BRPM | DIASTOLIC BLOOD PRESSURE: 68 MMHG | WEIGHT: 256.6 LBS | SYSTOLIC BLOOD PRESSURE: 118 MMHG | OXYGEN SATURATION: 98 % | HEART RATE: 61 BPM | TEMPERATURE: 97.3 F

## 2024-05-07 DIAGNOSIS — E78.2 MIXED HYPERLIPIDEMIA: ICD-10-CM

## 2024-05-07 DIAGNOSIS — I10 PRIMARY HYPERTENSION: Primary | ICD-10-CM

## 2024-05-07 DIAGNOSIS — E10.29 TYPE 1 DIABETES MELLITUS WITH OTHER KIDNEY COMPLICATION (HCC): ICD-10-CM

## 2024-05-07 DIAGNOSIS — E03.9 ACQUIRED HYPOTHYROIDISM: ICD-10-CM

## 2024-05-07 PROCEDURE — 99214 OFFICE O/P EST MOD 30 MIN: CPT | Performed by: FAMILY MEDICINE

## 2024-05-07 PROCEDURE — 3074F SYST BP LT 130 MM HG: CPT | Performed by: FAMILY MEDICINE

## 2024-05-07 PROCEDURE — 3078F DIAST BP <80 MM HG: CPT | Performed by: FAMILY MEDICINE

## 2024-05-07 PROCEDURE — 3051F HG A1C>EQUAL 7.0%<8.0%: CPT | Performed by: FAMILY MEDICINE

## 2024-05-07 RX ORDER — PLECANATIDE 3 MG/1
TABLET ORAL
COMMUNITY

## 2024-05-07 RX ORDER — FAMOTIDINE 20 MG/1
20 TABLET, FILM COATED ORAL 2 TIMES DAILY
Qty: 180 TABLET | Refills: 1 | Status: SHIPPED | OUTPATIENT
Start: 2024-05-07

## 2024-05-07 NOTE — TELEPHONE ENCOUNTER
----- Message from Pernell Gómez MD sent at 5/7/2024  2:37 PM EDT -----  Regarding: RE: US Liver   Contact: [phone]  It may just be post op changes, maybe a small hematoma. How is she doing - if she is still having pain issues, she can come back in or we can order CT.    ----- Message -----  From: Maria Eugenia Richardson MA  Sent: 5/7/2024  10:49 AM EDT  To: Pernell Gómez MD  Subject: FW: US Liver                                       ----- Message -----  From: Sammy Crystal  Sent: 5/7/2024  10:22 AM EDT  To: [facility] Clinical Staff  Subject: US Liver                                         Went to see my PCP today , we looked at the US of Liver.  She asked me to send you a message re what the report said about the fluid collection in right gallbladder region and possibly a CT.

## 2024-05-07 NOTE — PROGRESS NOTES
5/7/2024    Chief Complaint   Patient presents with    Diabetes       Diabetes Mellitus Type II, Follow-up: Patient here for follow-up of Type 2 diabetes mellitus. This is a longstanding problem. Is taking all medications as prescribed and is tolerating well. Has been monitoring blood glucose at home.     Lab Results   Component Value Date    LABA1C 7.0 05/01/2024    LABA1C 7.2 (H) 11/04/2023    LABA1C 8.0 (H) 05/06/2023     Lab Results   Component Value Date    GLUF 148 (H) 06/25/2018    CREATININE 0.8 04/17/2024        Microalbumin: Microalbumen/Creatinine ratio:  No components found for: \"RUCREAT\"     Hypertension: Patient is here for follow up chronic hypertension. Patient is  compliant with lifestyle modifications like exercising and adherence to a low salt diet.   Patient denies chest pain, diaphoresis, dyspnea, dyspnea on exertion, peripheral edema, palpitations, HA, visual issues. See List for current meds. Taking as prescribed. No adverse effects.    Hyperlipidemia:  Patient presents with hyperlipidemia. Is asymptomatic. This is a chronic problem. Patient is  well controlled, as reviewed and seen on most recent labs. Compliance with treatment thus far has been adequate.  No adverse effects.     Hypothyroidism:  Patient is here today to follow up chronic hypothyroidism. This problem is longstanding. This is   generally controlled on current medication regimen. Takes medication as directed and tolerates well. Denies fatigue, changes in skin, hair or nails, unintentional weight loss or gain.  remarkable. TSH:    Lab Results   Component Value Date/Time    TSH 1.54 04/17/2024 08:12 AM       Patient's past medical, surgical, social and/or family history reviewed, updated in chart, and are non-contributory (unless otherwise stated).  Medications and allergies also reviewed and updated in chart.    ROS negative unless otherwise specified      Physical Exam  Wt Readings from Last 3 Encounters:   05/07/24 116.4

## 2024-05-07 NOTE — TELEPHONE ENCOUNTER
Left voicemail for pt to call back. Electronically signed by Maria Eugenia Richardson MA on 5/7/2024 at 4:08 PM    What Type Of Note Output Would You Prefer (Optional)?: Bullet Format What Is The Reason For Today's Visit?: Full Body Skin Examination What Is The Reason For Today's Visit? (Being Monitored For X): surveillance against the recurrence of atypical nevi How Severe Are Your Spot(S)?: mild Additional History: Upper body check

## 2024-05-07 NOTE — PATIENT INSTRUCTIONS
Schedule with Gynecologist:      [facility]  Dr. Katherine Luna  [address] [address]  Ph: [phone]  Fax: [phone]    Dr. Sania Prince  [address] [place] [address] [phone]    Dr. Jessica Sequeira/Dali Arora  [address] [place] [address] [phone]    Dr. Frank Padilla  [address] [place] [address] [phone]    [facility]   [address] [place] [address]   [phone]     Dr. Ar Minor  [address] [place] [address]

## 2024-05-09 ENCOUNTER — TELEPHONE (OUTPATIENT)
Dept: SURGERY | Age: 49
End: 2024-05-09

## 2024-05-09 DIAGNOSIS — F41.9 ANXIETY: ICD-10-CM

## 2024-05-09 NOTE — TELEPHONE ENCOUNTER
Patient advised ok to monitor symptoms until seen for follow up 5/28/24. Pt verblaized understanding, will call sooner if worsening. Electronically signed by Maria Eugenia Richardson MA on 5/9/2024 at 11:13 AM

## 2024-05-09 NOTE — TELEPHONE ENCOUNTER
----- Message from Pernell Gómez MD sent at 5/9/2024  9:40 AM EDT -----  Regarding: RE: US Liver   Contact: [phone]  I think we can monitor until she comes in    ----- Message -----  From: Maria Eugenia Richardson MA  Sent: 5/8/2024  10:48 AM EDT  To: Pernell Gómez MD  Subject: FW: US Liver                                           ----- Message -----  From: Pernell Gómez MD  Sent: 5/7/2024   2:38 PM EDT  To: Maria Eugenia Richardson MA  Subject: RE: US Liver                                     It may just be post op changes, maybe a small hematoma. How is she doing - if she is still having pain issues, she can come back in or we can order CT.    ----- Message -----  From: Maria Eugenia Richardson MA  Sent: 5/7/2024  10:49 AM EDT  To: Pernell Gómez MD  Subject: FW: US Liver                                       ----- Message -----  From: Sammy Crystal  Sent: 5/7/2024  10:22 AM EDT  To: [facility] Clinical Staff  Subject: US Liver                                         Went to see my PCP today , we looked at the US of Liver.  She asked me to send you a message re what the report said about the fluid collection in right gallbladder region and possibly a CT.

## 2024-05-10 RX ORDER — ALPRAZOLAM 0.5 MG/1
TABLET ORAL
Qty: 60 TABLET | Refills: 2 | Status: SHIPPED | OUTPATIENT
Start: 2024-05-10 | End: 2024-08-08

## 2024-05-14 DIAGNOSIS — E78.2 MIXED HYPERLIPIDEMIA: ICD-10-CM

## 2024-05-14 RX ORDER — SIMVASTATIN 40 MG
40 TABLET ORAL DAILY
Qty: 90 TABLET | Refills: 0 | Status: SHIPPED | OUTPATIENT
Start: 2024-05-14

## 2024-05-14 RX ORDER — CELECOXIB 200 MG/1
200 CAPSULE ORAL DAILY
Qty: 90 CAPSULE | Refills: 1 | Status: SHIPPED | OUTPATIENT
Start: 2024-05-14

## 2024-05-16 ENCOUNTER — HOSPITAL ENCOUNTER (OUTPATIENT)
Dept: GENERAL RADIOLOGY | Age: 49
Discharge: HOME | End: 2024-05-18
Attending: FAMILY MEDICINE
Payer: COMMERCIAL

## 2024-05-16 VITALS — BODY MASS INDEX: 41.2 KG/M2 | WEIGHT: 240 LBS

## 2024-05-16 DIAGNOSIS — Z12.31 ENCOUNTER FOR SCREENING MAMMOGRAM FOR MALIGNANT NEOPLASM OF BREAST: ICD-10-CM

## 2024-05-16 PROCEDURE — 77063 BREAST TOMOSYNTHESIS BI: CPT

## 2024-05-30 ENCOUNTER — TELEPHONE (OUTPATIENT)
Dept: ENDOCRINOLOGY | Age: 49
End: 2024-05-30

## 2024-05-30 RX ORDER — INSULIN GLARGINE 100 [IU]/ML
30 INJECTION, SOLUTION SUBCUTANEOUS NIGHTLY
Qty: 3 ADJUSTABLE DOSE PRE-FILLED PEN SYRINGE | Refills: 1 | Status: SHIPPED | OUTPATIENT
Start: 2024-05-30

## 2024-05-30 NOTE — TELEPHONE ENCOUNTER
Pt's pump is not working, is calling tandem but needs a lantus pen in the mean time. I don't know what or how to much to pend.

## 2024-06-11 ENCOUNTER — TELEPHONE (OUTPATIENT)
Dept: CARDIOLOGY CLINIC | Age: 49
End: 2024-06-11

## 2024-06-11 ENCOUNTER — OFFICE VISIT (OUTPATIENT)
Dept: CARDIOLOGY CLINIC | Age: 49
End: 2024-06-11
Payer: COMMERCIAL

## 2024-06-11 VITALS
RESPIRATION RATE: 16 BRPM | DIASTOLIC BLOOD PRESSURE: 82 MMHG | BODY MASS INDEX: 43.52 KG/M2 | HEIGHT: 64 IN | SYSTOLIC BLOOD PRESSURE: 120 MMHG | WEIGHT: 254.9 LBS | HEART RATE: 64 BPM

## 2024-06-11 DIAGNOSIS — R06.09 DOE (DYSPNEA ON EXERTION): ICD-10-CM

## 2024-06-11 DIAGNOSIS — I10 HYPERTENSION, UNSPECIFIED TYPE: Primary | ICD-10-CM

## 2024-06-11 DIAGNOSIS — Z01.818 PREOP TESTING: ICD-10-CM

## 2024-06-11 DIAGNOSIS — I20.89 ANGINA OF EFFORT (HCC): ICD-10-CM

## 2024-06-11 DIAGNOSIS — I20.89 ANGINA OF EFFORT (HCC): Primary | ICD-10-CM

## 2024-06-11 PROCEDURE — 3079F DIAST BP 80-89 MM HG: CPT | Performed by: INTERNAL MEDICINE

## 2024-06-11 PROCEDURE — 93000 ELECTROCARDIOGRAM COMPLETE: CPT | Performed by: INTERNAL MEDICINE

## 2024-06-11 PROCEDURE — 3074F SYST BP LT 130 MM HG: CPT | Performed by: INTERNAL MEDICINE

## 2024-06-11 PROCEDURE — 99204 OFFICE O/P NEW MOD 45 MIN: CPT | Performed by: INTERNAL MEDICINE

## 2024-06-11 RX ORDER — VIT C/B6/B5/MAGNESIUM/HERB 173 50-5-6-5MG
CAPSULE ORAL DAILY
COMMUNITY

## 2024-06-11 NOTE — TELEPHONE ENCOUNTER
Per Dr. Newman, patient needs Coronary CTA at [facility] re: angina of effort.  Prior auth pending.    Coronary CTA ([id number])  Angina (I20.89)

## 2024-06-11 NOTE — PROGRESS NOTES
OUTPATIENT CARDIOLOGY CONSULT    Name: Sammy Crystal    Age: 49 y.o.    Date of Service: 6/11/24    Reason for Consultation:  Chief Complaint   Patient presents with    Hyperlipidemia    Dizziness    winded       Referring Physician: Digna Herman DO    History of Present Illness:  Sammy Crystal is a 49 y.o. female who presents today for further evaluation of dizziness and dyspnea.  She noticed exertional dyspnea for the past couple of months and also gets winded on bending without orthopnea, PND.  She does have some abdominal bloating and also early satiety without loss of appetite.  She has chronic lower extremity edema and uses compression stockings.  Has intermittent palpitations without any symptoms of heart racing.  She also noticed intermittent episodes of chest discomfort both at rest as well as with exertion.    PMH includes palpitations, sleep apnea, obesity, hypothyroidism on hormone replacement, hypertension, diabetes neuropathy with a history of type I diabetes, morbid obesity with a BMI of 43, former smoker quit in 2013, strong family history of premature CAD, her mother had a bypass surgery in her early 60s..  Father had atrial fibrillation.  She had a stress test in September 2018 which showed no perfusion defect with normal wall motion EF 70%.  Recently underwent laparoscopic cholecystectomy on 4/1/2024 without any perioperative cardiac events.    She is routinely active with no complaints of you have chest discomfort but if you discomfort syncope. No history of PND or orthopnea. He/She is currently with no active cardiac complaints at rest. SR on EKG.    Review of Systems:  Cardiac: As per HPI  General: No fever, chills  Pulmonary: As per HPI  HEENT: No visual disturbances, difficult swallowing  GI: No nausea, vomiting  Endocrine: No thyroid disease or DM  Musculoskeletal: HESS x 4, no focal motor deficits  Skin: Intact, no rashes  Neuro/Psych: No headache or seizures    Past

## 2024-06-11 NOTE — PATIENT INSTRUCTIONS
Will schedule for coronary CTA to rule out obstructive CAD due to underlying anginal symptoms  Schedule for an echocardiogram to assess LV function and also to rule out diastolic dysfunction contributing to her dyspnea with exertion  Recent lab work was reviewed, as above LDL and blood sugars are well-controlled  Continue simvastatin 40 mg p.o. daily  Continue metoprolol tartrate 25 mg p.o. twice daily, ramipril 10 mg p.o. daily for hypertension  Management of diabetes and sleep apnea as per primary service  Follow-up with me in 3 months

## 2024-06-17 NOTE — TELEPHONE ENCOUNTER
Prior authorization is APPROVED.  Documentation will be scanned in the patient chart.     Case ID: [id number]      [facility]

## 2024-06-29 ENCOUNTER — HOSPITAL ENCOUNTER (OUTPATIENT)
Age: 49
Discharge: HOME | End: 2024-06-29
Payer: COMMERCIAL

## 2024-06-29 LAB
ALBUMIN SERPL-MCNC: 4.1 G/DL (ref 3.5–5.2)
ALP SERPL-CCNC: 330 U/L (ref 35–104)
ALT SERPL-CCNC: 95 U/L (ref 0–32)
AST SERPL-CCNC: 156 U/L (ref 0–31)
BASOPHILS # BLD: 0.03 K/UL (ref 0–0.2)
BASOPHILS NFR BLD: 1 % (ref 0–2)
BILIRUB DIRECT SERPL-MCNC: 0.3 MG/DL (ref 0–0.3)
BILIRUB INDIRECT SERPL-MCNC: 0.6 MG/DL (ref 0–1)
BILIRUB SERPL-MCNC: 0.9 MG/DL (ref 0–1.2)
CRP SERPL HS-MCNC: 7 MG/L (ref 0–5)
EOSINOPHIL # BLD: 0.12 K/UL (ref 0.05–0.5)
EOSINOPHILS RELATIVE PERCENT: 3 % (ref 0–6)
ERYTHROCYTE [DISTWIDTH] IN BLOOD BY AUTOMATED COUNT: 16.4 % (ref 11.5–15)
FERRITIN SERPL-MCNC: 127 NG/ML
GGT SERPL-CCNC: 335 U/L (ref 6–42)
HCT VFR BLD AUTO: 36.3 % (ref 34–48)
HGB BLD-MCNC: 10.7 G/DL (ref 11.5–15.5)
IMM GRANULOCYTES # BLD AUTO: <0.03 K/UL (ref 0–0.58)
IMM GRANULOCYTES NFR BLD: 0 % (ref 0–5)
LYMPHOCYTES NFR BLD: 0.82 K/UL (ref 1.5–4)
LYMPHOCYTES RELATIVE PERCENT: 17 % (ref 20–42)
MCH RBC QN AUTO: 27 PG (ref 26–35)
MCHC RBC AUTO-ENTMCNC: 29.5 G/DL (ref 32–34.5)
MCV RBC AUTO: 91.7 FL (ref 80–99.9)
MONOCYTES NFR BLD: 0.55 K/UL (ref 0.1–0.95)
MONOCYTES NFR BLD: 12 % (ref 2–12)
NEUTROPHILS NFR BLD: 68 % (ref 43–80)
NEUTS SEG NFR BLD: 3.19 K/UL (ref 1.8–7.3)
PLATELET # BLD AUTO: 272 K/UL (ref 130–450)
PMV BLD AUTO: 12.1 FL (ref 7–12)
PROT SERPL-MCNC: 6.8 G/DL (ref 6.4–8.3)
RBC # BLD AUTO: 3.96 M/UL (ref 3.5–5.5)
WBC OTHER # BLD: 4.7 K/UL (ref 4.5–11.5)

## 2024-06-29 PROCEDURE — 80076 HEPATIC FUNCTION PANEL: CPT

## 2024-06-29 PROCEDURE — 83516 IMMUNOASSAY NONANTIBODY: CPT

## 2024-06-29 PROCEDURE — 85025 COMPLETE CBC W/AUTO DIFF WBC: CPT

## 2024-06-29 PROCEDURE — 86038 ANTINUCLEAR ANTIBODIES: CPT

## 2024-06-29 PROCEDURE — 86140 C-REACTIVE PROTEIN: CPT

## 2024-06-29 PROCEDURE — 84080 ASSAY ALKALINE PHOSPHATASES: CPT

## 2024-06-29 PROCEDURE — 86376 MICROSOMAL ANTIBODY EACH: CPT

## 2024-06-29 PROCEDURE — 36415 COLL VENOUS BLD VENIPUNCTURE: CPT

## 2024-06-29 PROCEDURE — 84075 ASSAY ALKALINE PHOSPHATASE: CPT

## 2024-06-29 PROCEDURE — 82728 ASSAY OF FERRITIN: CPT

## 2024-06-29 PROCEDURE — 86256 FLUORESCENT ANTIBODY TITER: CPT

## 2024-06-29 PROCEDURE — 82977 ASSAY OF GGT: CPT

## 2024-06-30 DIAGNOSIS — I10 ESSENTIAL HYPERTENSION: ICD-10-CM

## 2024-06-30 LAB
SEND OUT REPORT: NORMAL
TEST NAME: NORMAL

## 2024-07-01 ENCOUNTER — TELEPHONE (OUTPATIENT)
Dept: ENDOCRINOLOGY | Age: 49
End: 2024-07-01

## 2024-07-01 DIAGNOSIS — E55.9 VITAMIN D DEFICIENCY: ICD-10-CM

## 2024-07-01 DIAGNOSIS — E10.65 TYPE 1 DIABETES MELLITUS WITH HYPERGLYCEMIA (HCC): Primary | ICD-10-CM

## 2024-07-01 DIAGNOSIS — E78.5 HYPERLIPIDEMIA, UNSPECIFIED HYPERLIPIDEMIA TYPE: ICD-10-CM

## 2024-07-01 LAB
ANA SER QL IA: NEGATIVE
MITOCHONDRIA M2 IGG SER-ACNC: 5.3 UNITS (ref 0–24.9)
SMA IGG SER-ACNC: NEGATIVE

## 2024-07-01 RX ORDER — PANTOPRAZOLE SODIUM 40 MG/1
40 TABLET, DELAYED RELEASE ORAL DAILY
Qty: 30 TABLET | Refills: 2 | Status: SHIPPED | OUTPATIENT
Start: 2024-07-01

## 2024-07-01 NOTE — TELEPHONE ENCOUNTER
----- Message from Sammy Crystal sent at 6/29/2024  9:48 AM EDT -----  Regarding: Labs  Contact: [phone]  Hi,  I have an appointment in Aug, I was never given anything to have labs drawn. Didn't know if you wanted some drawn.   Thank you,  Sammy

## 2024-07-02 LAB — LKM-1 IGG SER IA-ACNC: 0.5 U (ref 0–24.9)

## 2024-07-03 LAB
ALK PHOS BONE SPECIFIC: 61 U/L (ref 0–55)
ALK PHOS OTHER CALC: 0 U/L
ALK PHOSPHATASE: 323 U/L (ref 40–120)
ALKALINE PHOSPHATASE LIVER FRACTION: 262 U/L (ref 0–94)
SEND OUT REPORT: NORMAL
TEST NAME: NORMAL

## 2024-07-08 DIAGNOSIS — Z01.818 PREOP TESTING: Primary | ICD-10-CM

## 2024-07-15 ENCOUNTER — HOSPITAL ENCOUNTER (OUTPATIENT)
Age: 49
Discharge: HOME | End: 2024-07-15
Payer: COMMERCIAL

## 2024-07-15 ENCOUNTER — TELEPHONE (OUTPATIENT)
Dept: ENDOCRINOLOGY | Age: 49
End: 2024-07-15

## 2024-07-15 DIAGNOSIS — E78.5 HYPERLIPIDEMIA, UNSPECIFIED HYPERLIPIDEMIA TYPE: ICD-10-CM

## 2024-07-15 DIAGNOSIS — I20.89 ANGINA OF EFFORT (HCC): ICD-10-CM

## 2024-07-15 DIAGNOSIS — E10.65 TYPE 1 DIABETES MELLITUS WITH HYPERGLYCEMIA (HCC): ICD-10-CM

## 2024-07-15 DIAGNOSIS — E55.9 VITAMIN D DEFICIENCY: ICD-10-CM

## 2024-07-15 DIAGNOSIS — Z01.818 PREOP TESTING: ICD-10-CM

## 2024-07-15 LAB
25(OH)D3 SERPL-MCNC: 55.9 NG/ML (ref 30–100)
ALBUMIN SERPL-MCNC: 4.2 G/DL (ref 3.5–5.2)
ALP SERPL-CCNC: 211 U/L (ref 35–104)
ALT SERPL-CCNC: 28 U/L (ref 0–32)
ANION GAP SERPL CALCULATED.3IONS-SCNC: 9 MMOL/L (ref 7–16)
AST SERPL-CCNC: 36 U/L (ref 0–31)
BASOPHILS # BLD: 0.06 K/UL (ref 0–0.2)
BASOPHILS NFR BLD: 1 % (ref 0–2)
BILIRUB SERPL-MCNC: 0.9 MG/DL (ref 0–1.2)
BUN SERPL-MCNC: 18 MG/DL (ref 6–20)
CALCIUM SERPL-MCNC: 9.5 MG/DL (ref 8.6–10.2)
CHLORIDE SERPL-SCNC: 101 MMOL/L (ref 98–107)
CHOLEST SERPL-MCNC: 145 MG/DL
CO2 SERPL-SCNC: 30 MMOL/L (ref 22–29)
CREAT SERPL-MCNC: 1 MG/DL (ref 0.5–1)
CREAT UR-MCNC: 86.1 MG/DL (ref 29–226)
EOSINOPHIL # BLD: 0.12 K/UL (ref 0.05–0.5)
EOSINOPHILS RELATIVE PERCENT: 2 % (ref 0–6)
ERYTHROCYTE [DISTWIDTH] IN BLOOD BY AUTOMATED COUNT: 15.6 % (ref 11.5–15)
GFR, ESTIMATED: 68 ML/MIN/1.73M2
GLUCOSE SERPL-MCNC: 124 MG/DL (ref 74–99)
HCG UR QL: NEGATIVE
HCT VFR BLD AUTO: 39.1 % (ref 34–48)
HDLC SERPL-MCNC: 57 MG/DL
HGB BLD-MCNC: 11.8 G/DL (ref 11.5–15.5)
IMM GRANULOCYTES # BLD AUTO: 0.03 K/UL (ref 0–0.58)
IMM GRANULOCYTES NFR BLD: 1 % (ref 0–5)
LDLC SERPL CALC-MCNC: 72 MG/DL
LYMPHOCYTES NFR BLD: 1.2 K/UL (ref 1.5–4)
LYMPHOCYTES RELATIVE PERCENT: 19 % (ref 20–42)
MCH RBC QN AUTO: 27.4 PG (ref 26–35)
MCHC RBC AUTO-ENTMCNC: 30.2 G/DL (ref 32–34.5)
MCV RBC AUTO: 90.7 FL (ref 80–99.9)
MICROALBUMIN UR-MCNC: 96 MG/L (ref 0–19)
MICROALBUMIN/CREAT UR-RTO: 111 MCG/MG CREAT (ref 0–30)
MONOCYTES NFR BLD: 0.7 K/UL (ref 0.1–0.95)
MONOCYTES NFR BLD: 11 % (ref 2–12)
NEUTROPHILS NFR BLD: 66 % (ref 43–80)
NEUTS SEG NFR BLD: 4.1 K/UL (ref 1.8–7.3)
PLATELET # BLD AUTO: 305 K/UL (ref 130–450)
PMV BLD AUTO: 11.5 FL (ref 7–12)
POTASSIUM SERPL-SCNC: 5.1 MMOL/L (ref 3.5–5)
PROT SERPL-MCNC: 7.2 G/DL (ref 6.4–8.3)
RBC # BLD AUTO: 4.31 M/UL (ref 3.5–5.5)
SODIUM SERPL-SCNC: 140 MMOL/L (ref 132–146)
T4 FREE SERPL-MCNC: 2 NG/DL (ref 0.9–1.7)
TRIGL SERPL-MCNC: 78 MG/DL
TSH SERPL DL<=0.05 MIU/L-ACNC: 3.18 UIU/ML (ref 0.27–4.2)
VLDLC SERPL CALC-MCNC: 16 MG/DL
WBC OTHER # BLD: 6.2 K/UL (ref 4.5–11.5)

## 2024-07-15 PROCEDURE — 85025 COMPLETE CBC W/AUTO DIFF WBC: CPT

## 2024-07-15 PROCEDURE — 84443 ASSAY THYROID STIM HORMONE: CPT

## 2024-07-15 PROCEDURE — 36415 COLL VENOUS BLD VENIPUNCTURE: CPT

## 2024-07-15 PROCEDURE — 82043 UR ALBUMIN QUANTITATIVE: CPT

## 2024-07-15 PROCEDURE — 80061 LIPID PANEL: CPT

## 2024-07-15 PROCEDURE — 84703 CHORIONIC GONADOTROPIN ASSAY: CPT

## 2024-07-15 PROCEDURE — 84439 ASSAY OF FREE THYROXINE: CPT

## 2024-07-15 PROCEDURE — 82306 VITAMIN D 25 HYDROXY: CPT

## 2024-07-15 PROCEDURE — 82570 ASSAY OF URINE CREATININE: CPT

## 2024-07-15 PROCEDURE — 80053 COMPREHEN METABOLIC PANEL: CPT

## 2024-07-15 NOTE — TELEPHONE ENCOUNTER
Labs reviewed.    No medication changes.  Mostly stable.    She does have an increased amount of protein in her urine, but her ramipril will help with that.    No additional concerns today

## 2024-07-16 ENCOUNTER — HOSPITAL ENCOUNTER (OUTPATIENT)
Dept: CT IMAGING | Age: 49
Discharge: HOME | End: 2024-07-18
Payer: COMMERCIAL

## 2024-07-16 VITALS
DIASTOLIC BLOOD PRESSURE: 61 MMHG | RESPIRATION RATE: 24 BRPM | SYSTOLIC BLOOD PRESSURE: 132 MMHG | HEART RATE: 66 BPM | OXYGEN SATURATION: 93 %

## 2024-07-16 DIAGNOSIS — I20.89 ANGINA OF EFFORT (HCC): ICD-10-CM

## 2024-07-16 PROCEDURE — 75574 CT ANGIO HRT W/3D IMAGE: CPT

## 2024-07-16 PROCEDURE — 6370000000 HC RX 637 (ALT 250 FOR IP): Performed by: INTERNAL MEDICINE

## 2024-07-16 PROCEDURE — 2580000003 HC RX 258: Performed by: INTERNAL MEDICINE

## 2024-07-16 PROCEDURE — 6360000004 HC RX CONTRAST MEDICATION: Performed by: RADIOLOGY

## 2024-07-16 RX ORDER — NITROGLYCERIN 0.4 MG/1
0.8 TABLET SUBLINGUAL ONCE
Status: COMPLETED | OUTPATIENT
Start: 2024-07-16 | End: 2024-07-16

## 2024-07-16 RX ORDER — 0.9 % SODIUM CHLORIDE 0.9 %
1000 INTRAVENOUS SOLUTION INTRAVENOUS ONCE
Status: COMPLETED | OUTPATIENT
Start: 2024-07-16 | End: 2024-07-16

## 2024-07-16 RX ADMIN — IOPAMIDOL 100 ML: 755 INJECTION, SOLUTION INTRAVENOUS at 10:00

## 2024-07-16 RX ADMIN — SODIUM CHLORIDE 1000 ML: 9 INJECTION, SOLUTION INTRAVENOUS at 09:33

## 2024-07-16 RX ADMIN — NITROGLYCERIN 0.8 MG: 0.4 TABLET, ORALLY DISINTEGRATING SUBLINGUAL at 10:05

## 2024-07-16 RX ADMIN — METOPROLOL TARTRATE 25 MG: 25 TABLET, FILM COATED ORAL at 09:39

## 2024-07-16 ASSESSMENT — PAIN SCALES - GENERAL: PAINLEVEL_OUTOF10: 4

## 2024-07-16 NOTE — OR NURSING
Patient arrived via for CTA coronary arteries. Allergies reviewed, instructions given to  include protocol for heart rate, b/p, necessary medications that will be given, IV site and proper breath hold during scan. Questions answered and expressed understanding confirmed. Placed on monitoring devices for heart rate and rhythm, B/P taken,  IV flushed / started, flushed and prn adapter attached. Orders placed and given as per call to cardiologist. Radiologist informed of patient's arrival.  Monitored after procedure. IV removed and patient taken to restroom to dress and replace insulin pump/and insulin monitor. Escorted out with all belongings and no complaints or issues.

## 2024-07-16 NOTE — PROGRESS NOTES
Lat entry- Consulted to place IV with ultrasound. An 18g 1.25 inch BD Diffusics placed to right AC x1 attempt without difficulty.  Excellent blood return obtained, flushed with 10ml NS, and DSD applied. Patient tolerated procedure well.   Electronically signed by Nancy Baker RN on 7/16/2024 at 9:35 AM

## 2024-07-18 ENCOUNTER — HOSPITAL ENCOUNTER (OUTPATIENT)
Dept: CARDIOLOGY | Age: 49
Discharge: HOME | End: 2024-07-20
Payer: COMMERCIAL

## 2024-07-18 VITALS — HEIGHT: 64 IN | WEIGHT: 254 LBS | BODY MASS INDEX: 43.36 KG/M2

## 2024-07-18 DIAGNOSIS — I20.89 ANGINA OF EFFORT (HCC): ICD-10-CM

## 2024-07-18 DIAGNOSIS — R06.09 DOE (DYSPNEA ON EXERTION): ICD-10-CM

## 2024-07-18 LAB
ECHO AO ASC DIAM: 2.6 CM
ECHO AO ASCENDING AORTA INDEX: 1.2 CM/M2
ECHO AO SINUS VALSALVA DIAM: 2.5 CM
ECHO AO SINUS VALSALVA INDEX: 1.15 CM/M2
ECHO AV AREA PEAK VELOCITY: 2.1 CM2
ECHO AV AREA VTI: 2.2 CM2
ECHO AV AREA/BSA PEAK VELOCITY: 1 CM2/M2
ECHO AV AREA/BSA VTI: 1 CM2/M2
ECHO AV CUSP MM: 1.7 CM
ECHO AV MEAN GRADIENT: 5 MMHG
ECHO AV MEAN VELOCITY: 1.1 M/S
ECHO AV PEAK GRADIENT: 8 MMHG
ECHO AV PEAK VELOCITY: 1.4 M/S
ECHO AV VELOCITY RATIO: 0.71
ECHO AV VTI: 37.3 CM
ECHO BSA: 2.28 M2
ECHO EST RA PRESSURE: 8 MMHG
ECHO LA DIAMETER INDEX: 1.98 CM/M2
ECHO LA DIAMETER: 4.3 CM
ECHO LA VOL A-L A2C: 67 ML (ref 22–52)
ECHO LA VOL A-L A4C: 80 ML (ref 22–52)
ECHO LA VOL MOD A2C: 65 ML (ref 22–52)
ECHO LA VOL MOD A4C: 74 ML (ref 22–52)
ECHO LA VOLUME AREA LENGTH: 80 ML
ECHO LA VOLUME INDEX A-L A2C: 31 ML/M2 (ref 16–34)
ECHO LA VOLUME INDEX A-L A4C: 37 ML/M2 (ref 16–34)
ECHO LA VOLUME INDEX AREA LENGTH: 37 ML/M2 (ref 16–34)
ECHO LA VOLUME INDEX MOD A2C: 30 ML/M2 (ref 16–34)
ECHO LA VOLUME INDEX MOD A4C: 34 ML/M2 (ref 16–34)
ECHO LV FRACTIONAL SHORTENING: 33 % (ref 28–44)
ECHO LV INTERNAL DIMENSION DIASTOLE INDEX: 2.12 CM/M2
ECHO LV INTERNAL DIMENSION DIASTOLIC: 4.6 CM (ref 3.9–5.3)
ECHO LV INTERNAL DIMENSION SYSTOLIC INDEX: 1.43 CM/M2
ECHO LV INTERNAL DIMENSION SYSTOLIC: 3.1 CM
ECHO LV ISOVOLUMETRIC RELAXATION TIME (IVRT): 48.4 MS
ECHO LV IVSD: 1.1 CM (ref 0.6–0.9)
ECHO LV IVSS: 1.7 CM
ECHO LV MASS 2D: 192.9 G (ref 67–162)
ECHO LV MASS INDEX 2D: 88.9 G/M2 (ref 43–95)
ECHO LV POSTERIOR WALL DIASTOLIC: 1.2 CM (ref 0.6–0.9)
ECHO LV POSTERIOR WALL SYSTOLIC: 1.6 CM
ECHO LV RELATIVE WALL THICKNESS RATIO: 0.52
ECHO LVOT AREA: 3.1 CM2
ECHO LVOT AV VTI INDEX: 0.73
ECHO LVOT DIAM: 2 CM
ECHO LVOT MEAN GRADIENT: 2 MMHG
ECHO LVOT PEAK GRADIENT: 4 MMHG
ECHO LVOT PEAK VELOCITY: 1 M/S
ECHO LVOT STROKE VOLUME INDEX: 39.2 ML/M2
ECHO LVOT SV: 85.1 ML
ECHO LVOT VTI: 27.1 CM
ECHO MV "A" WAVE DURATION: 259.5 MSEC
ECHO MV A VELOCITY: 0.46 M/S
ECHO MV AREA PHT: 3.5 CM2
ECHO MV AREA VTI: 2.4 CM2
ECHO MV E DECELERATION TIME (DT): 170 MS
ECHO MV E VELOCITY: 1.51 M/S
ECHO MV E/A RATIO: 3.28
ECHO MV LVOT VTI INDEX: 1.33
ECHO MV MAX VELOCITY: 1.5 M/S
ECHO MV MEAN GRADIENT: 3 MMHG
ECHO MV MEAN VELOCITY: 0.7 M/S
ECHO MV PEAK GRADIENT: 9 MMHG
ECHO MV PRESSURE HALF TIME (PHT): 62.9 MS
ECHO MV VTI: 36 CM
ECHO PV MAX VELOCITY: 1 M/S
ECHO PV MEAN GRADIENT: 2 MMHG
ECHO PV MEAN VELOCITY: 0.7 M/S
ECHO PV PEAK GRADIENT: 4 MMHG
ECHO PV VTI: 23.8 CM
ECHO PVEIN A DURATION: 121.1 MS
ECHO PVEIN A VELOCITY: 0.2 M/S
ECHO PVEIN PEAK D VELOCITY: 0.8 M/S
ECHO PVEIN PEAK S VELOCITY: 0.5 M/S
ECHO PVEIN S/D RATIO: 0.6
ECHO RIGHT VENTRICULAR SYSTOLIC PRESSURE (RVSP): 42 MMHG
ECHO RV INTERNAL DIMENSION: 2.6 CM
ECHO RV TAPSE: 2.4 CM (ref 1.7–?)
ECHO TV REGURGITANT MAX VELOCITY: 2.91 M/S
ECHO TV REGURGITANT PEAK GRADIENT: 34 MMHG

## 2024-07-18 PROCEDURE — 93306 TTE W/DOPPLER COMPLETE: CPT

## 2024-07-18 PROCEDURE — 93306 TTE W/DOPPLER COMPLETE: CPT | Performed by: INTERNAL MEDICINE

## 2024-07-19 ENCOUNTER — TELEPHONE (OUTPATIENT)
Dept: CARDIOLOGY CLINIC | Age: 49
End: 2024-07-19

## 2024-07-19 NOTE — TELEPHONE ENCOUNTER
Patient notified of echo results and Dr. Newman's recommendations. Her F/U was not until October so it was moved up to 9/16/24 at 7:40 a.m.  Jardiance e-scribed.

## 2024-07-19 NOTE — TELEPHONE ENCOUNTER
----- Message from Nicole Newman MD sent at 7/19/2024  1:52 PM EDT -----  Echo showed normal function and stiff heart and mild leaky valves. Please start her on Jardiance 10 mg po daily for stiff heart and follow up with me as scheduled in 8/24.

## 2024-07-22 ENCOUNTER — TELEPHONE (OUTPATIENT)
Dept: CARDIOLOGY CLINIC | Age: 49
End: 2024-07-22

## 2024-07-22 NOTE — TELEPHONE ENCOUNTER
----- Message from Sammy Crystal sent at 7/22/2024  9:04 AM EDT -----  Regarding: Juanita  Contact: [phone]  Good morning,   I'm not sure if you knew that I am taking aldactone and lasix per GI not on a daily but I've been taking it about every other day. I s Jardiance still a safe medication to take? Thank you for your time.

## 2024-07-22 NOTE — TELEPHONE ENCOUNTER
Jardiance is a safe medication and one of the best medication for the stiff heart,  Advise her to call us is she develops episodes of UTI.

## 2024-07-26 ENCOUNTER — CLINICAL DOCUMENTATION (OUTPATIENT)
Dept: PHARMACY | Facility: CLINIC | Age: 49
End: 2024-07-26

## 2024-07-26 NOTE — PROGRESS NOTES
2nd Quarterly Reminder sent to patient for the DM Program - See Mychart message or Letter for more information.    Neda Simon CphT  [facility]  Clinical Pharmacy   Department, toll free: [phone] Option #3    For Pharmacy Admin Tracking Only    Program: [employer]  CPA in place:  No  Gap Closed?: Yes   Time Spent (min): 5

## 2024-07-30 ENCOUNTER — TELEMEDICINE (OUTPATIENT)
Dept: FAMILY MEDICINE CLINIC | Age: 49
End: 2024-07-30
Payer: COMMERCIAL

## 2024-07-30 DIAGNOSIS — E10.29 TYPE 1 DIABETES MELLITUS WITH OTHER KIDNEY COMPLICATION (HCC): Primary | ICD-10-CM

## 2024-07-30 DIAGNOSIS — Z01.419 WELL WOMAN EXAM: ICD-10-CM

## 2024-07-30 PROCEDURE — 3051F HG A1C>EQUAL 7.0%<8.0%: CPT | Performed by: FAMILY MEDICINE

## 2024-07-30 PROCEDURE — 99213 OFFICE O/P EST LOW 20 MIN: CPT | Performed by: FAMILY MEDICINE

## 2024-07-30 NOTE — PROGRESS NOTES
Onset    Diabetes Mother         Age 64, 2018    Heart Disease Mother 54        ACB    Diabetes Father         Age 71, 2018    Cancer Father         Skin cancer    Other Father         AFIB    No Known Problems Sister     Diabetes Maternal Grandmother     Diabetes Maternal Grandfather     Diabetes Paternal Grandfather     Cancer Paternal Grandfather         lung   ,   Immunization History   Administered Date(s) Administered    COVID-19, MODERNA BLUE border, Primary or Immunocompromised, (age 12y+), IM, 100 mcg/0.5mL 12/29/2020, 01/26/2021, 11/12/2021    COVID-19, MODERNA Bivalent, (age 12y+), IM, 50 mcg/0.5 mL 09/27/2022    COVID-19, PFIZER, (2023-24 formula), (age 12y+), IM, 30mcg/0.3mL 12/11/2023    Influenza A (O3N0-47) Vaccine PF IM 12/11/2009    Influenza Virus Vaccine 10/04/2016, 10/03/2017, 10/08/2018, 10/14/2020, 09/30/2021, 10/11/2022    Pneumococcal, PCV-13, PREVNAR 13, (age 6w+), IM, 0.5mL 04/16/2010    Pneumococcal, PPSV23, PNEUMOVAX 23, (age 2y+), SC/IM, 0.5mL 09/18/2019   ,   Health Maintenance   Topic Date Due    Hepatitis B vaccine (1 of 3 - 3-dose series) Never done    Diabetic foot exam  Never done    HIV screen  Never done    Hepatitis C screen  Never done    Hepatitis A vaccine (1 of 2 - Risk 2-dose series) Never done    DTaP/Tdap/Td vaccine (1 - Tdap) Never done    Colorectal Cancer Screen  Never done    Cervical cancer screen  04/12/2020    Diabetic retinal exam  03/28/2024    Shingles vaccine (2 of 2) 07/11/2024    Flu vaccine (1) 08/01/2024    Depression Monitoring  02/15/2025    A1C test (Diabetic or Prediabetic)  05/01/2025    Diabetic Alb to Cr ratio (uACR) test  07/15/2025    Lipids  07/15/2025    GFR test (Diabetes, CKD 3-4, OR last GFR 15-59)  07/15/2025    Breast cancer screen  05/16/2026    Pneumococcal 0-64 years Vaccine (3 of 3 - PPSV23 or PCV20) 05/15/2039    COVID-19 Vaccine  Completed    Hib vaccine  Aged Out    Polio vaccine  Aged Out    Meningococcal (ACWY) vaccine  Aged Out

## 2024-08-10 ENCOUNTER — HOSPITAL ENCOUNTER (OUTPATIENT)
Age: 49
Discharge: HOME | End: 2024-08-10
Payer: COMMERCIAL

## 2024-08-10 LAB
ALBUMIN SERPL-MCNC: 4.5 G/DL (ref 3.5–5.2)
ALP SERPL-CCNC: 199 U/L (ref 35–104)
ALT SERPL-CCNC: 35 U/L (ref 0–32)
ANION GAP SERPL CALCULATED.3IONS-SCNC: 10 MMOL/L (ref 7–16)
AST SERPL-CCNC: 44 U/L (ref 0–31)
BILIRUB SERPL-MCNC: 1 MG/DL (ref 0–1.2)
BUN SERPL-MCNC: 28 MG/DL (ref 6–20)
CALCIUM SERPL-MCNC: 9.7 MG/DL (ref 8.6–10.2)
CHLORIDE SERPL-SCNC: 99 MMOL/L (ref 98–107)
CO2 SERPL-SCNC: 29 MMOL/L (ref 22–29)
CREAT SERPL-MCNC: 1.1 MG/DL (ref 0.5–1)
GFR, ESTIMATED: 62 ML/MIN/1.73M2
GLUCOSE SERPL-MCNC: 142 MG/DL (ref 74–99)
POTASSIUM SERPL-SCNC: 5.7 MMOL/L (ref 3.5–5)
PROT SERPL-MCNC: 7.4 G/DL (ref 6.4–8.3)
SODIUM SERPL-SCNC: 138 MMOL/L (ref 132–146)

## 2024-08-10 PROCEDURE — 80053 COMPREHEN METABOLIC PANEL: CPT

## 2024-08-10 PROCEDURE — 36415 COLL VENOUS BLD VENIPUNCTURE: CPT

## 2024-08-15 ENCOUNTER — HOSPITAL ENCOUNTER (OUTPATIENT)
Dept: ULTRASOUND IMAGING | Age: 49
Discharge: HOME | End: 2024-08-17
Payer: COMMERCIAL

## 2024-08-15 DIAGNOSIS — R74.01 ELEVATED LEVELS OF TRANSAMINASE & LACTIC ACID DEHYDROGENASE: ICD-10-CM

## 2024-08-15 DIAGNOSIS — R74.02 ELEVATED LEVELS OF TRANSAMINASE & LACTIC ACID DEHYDROGENASE: ICD-10-CM

## 2024-08-15 PROCEDURE — 76981 USE PARENCHYMA: CPT

## 2024-08-18 DIAGNOSIS — E78.2 MIXED HYPERLIPIDEMIA: ICD-10-CM

## 2024-08-19 RX ORDER — SIMVASTATIN 40 MG
40 TABLET ORAL DAILY
Qty: 90 TABLET | Refills: 0 | Status: SHIPPED | OUTPATIENT
Start: 2024-08-19

## 2024-08-19 NOTE — PROGRESS NOTES
percentages of blood glucose at goal, above goal and below goal. Insulin dosages/antidiabetic regimen was adjusted according to CGM download. Full CGM was scanned under media.      Diabetic retinopathy  Compliant with regular ophthalmology follow-ups  Advised optimal glucose control    Hypothyroidism  On levothyroxine 200mcg daily  Patient takes levothyroxine in the morning at empty stomach, wait one hour before eating , avoid multivitamins containing calcium  or iron with it.     Hyperlipidemia  Continue on simvastatin 200 mcg daily  Advised low saturated fat diet and exercise  Advised optimal glucose control     Dietary noncompliance  Discussed with patient the importance of eating consistent carbohydrate meals, avoiding high glycemic index food. Also, discussed with patient the risk and negative consequences of dietary noncompliance on blood glucose control, blood pressure and weight      Obesity  Discussed lifestyle changes including diet and exercise with patient in depth. Also discussed with patient cardiovascular risk associated with obesity    Vitamin D deficiency  Continue supplement    Hyperkalemia  Repeat CMP.  If hyperkalemia remains, will forward labs to GI.  May need spironolactone dose adjusted or stopped.        I personally spent  30 minutes reviewing  external notes from PCP and other patient's care team providers, and personally interpreted labs associated with the above diagnosis. I also ordered labs to further assess and manage the above addressed medical conditions.        Return in about 3 months (around 11/20/2024) for Type 2 DM.    The above issues were reviewed with the patient who understood and agreed with the plan.    Thank you for allowing us to participate in the care of this patient. Please do not hesitate to contact us with any additional questions.     Arline Mera, APRN - CNP    [facility]   [address] [place] [address].  [address]  [place], [place] [address]  Phone:  Patient/Caregiver provided printed discharge information.

## 2024-08-20 ENCOUNTER — HOSPITAL ENCOUNTER (OUTPATIENT)
Age: 49
Discharge: HOME | End: 2024-08-20
Payer: COMMERCIAL

## 2024-08-20 ENCOUNTER — OFFICE VISIT (OUTPATIENT)
Dept: ENDOCRINOLOGY | Age: 49
End: 2024-08-20
Payer: COMMERCIAL

## 2024-08-20 ENCOUNTER — TELEPHONE (OUTPATIENT)
Dept: ENDOCRINOLOGY | Age: 49
End: 2024-08-20

## 2024-08-20 VITALS
HEIGHT: 64 IN | SYSTOLIC BLOOD PRESSURE: 124 MMHG | WEIGHT: 253.4 LBS | RESPIRATION RATE: 18 BRPM | OXYGEN SATURATION: 72 % | BODY MASS INDEX: 43.26 KG/M2 | DIASTOLIC BLOOD PRESSURE: 74 MMHG | HEART RATE: 58 BPM

## 2024-08-20 DIAGNOSIS — E87.5 HYPERKALEMIA: ICD-10-CM

## 2024-08-20 DIAGNOSIS — E55.9 VITAMIN D DEFICIENCY: ICD-10-CM

## 2024-08-20 DIAGNOSIS — Z91.119 DIETARY NONCOMPLIANCE: ICD-10-CM

## 2024-08-20 DIAGNOSIS — E66.01 OBESITY, CLASS III, BMI 40-49.9 (MORBID OBESITY) (HCC): ICD-10-CM

## 2024-08-20 DIAGNOSIS — E10.319 TYPE 1 DIABETES MELLITUS WITH RETINOPATHY, MACULAR EDEMA PRESENCE UNSPECIFIED, UNSPECIFIED LATERALITY, UNSPECIFIED RETINOPATHY SEVERITY (HCC): ICD-10-CM

## 2024-08-20 DIAGNOSIS — E10.65 TYPE 1 DIABETES MELLITUS WITH HYPERGLYCEMIA (HCC): Primary | ICD-10-CM

## 2024-08-20 DIAGNOSIS — E10.65 TYPE 1 DIABETES MELLITUS WITH HYPERGLYCEMIA (HCC): ICD-10-CM

## 2024-08-20 DIAGNOSIS — E03.9 HYPOTHYROIDISM, UNSPECIFIED TYPE: ICD-10-CM

## 2024-08-20 LAB
ALBUMIN SERPL-MCNC: 4.4 G/DL (ref 3.5–5.2)
ALP SERPL-CCNC: 216 U/L (ref 35–104)
ALT SERPL-CCNC: 41 U/L (ref 0–32)
ANION GAP SERPL CALCULATED.3IONS-SCNC: 8 MMOL/L (ref 7–16)
AST SERPL-CCNC: 44 U/L (ref 0–31)
BILIRUB SERPL-MCNC: 0.5 MG/DL (ref 0–1.2)
BUN SERPL-MCNC: 31 MG/DL (ref 6–20)
CALCIUM SERPL-MCNC: 9.6 MG/DL (ref 8.6–10.2)
CHLORIDE SERPL-SCNC: 100 MMOL/L (ref 98–107)
CO2 SERPL-SCNC: 30 MMOL/L (ref 22–29)
CREAT SERPL-MCNC: 1.1 MG/DL (ref 0.5–1)
GFR, ESTIMATED: 63 ML/MIN/1.73M2
GLUCOSE SERPL-MCNC: 89 MG/DL (ref 74–99)
HBA1C MFR BLD: 7 %
POTASSIUM SERPL-SCNC: 5 MMOL/L (ref 3.5–5)
PROT SERPL-MCNC: 7.6 G/DL (ref 6.4–8.3)
SODIUM SERPL-SCNC: 138 MMOL/L (ref 132–146)

## 2024-08-20 PROCEDURE — 80053 COMPREHEN METABOLIC PANEL: CPT

## 2024-08-20 PROCEDURE — 83036 HEMOGLOBIN GLYCOSYLATED A1C: CPT | Performed by: FAMILY MEDICINE

## 2024-08-20 PROCEDURE — 99214 OFFICE O/P EST MOD 30 MIN: CPT | Performed by: FAMILY MEDICINE

## 2024-08-20 PROCEDURE — 3074F SYST BP LT 130 MM HG: CPT | Performed by: FAMILY MEDICINE

## 2024-08-20 PROCEDURE — 3051F HG A1C>EQUAL 7.0%<8.0%: CPT | Performed by: FAMILY MEDICINE

## 2024-08-20 PROCEDURE — 3078F DIAST BP <80 MM HG: CPT | Performed by: FAMILY MEDICINE

## 2024-08-20 PROCEDURE — 36415 COLL VENOUS BLD VENIPUNCTURE: CPT

## 2024-08-20 NOTE — TELEPHONE ENCOUNTER
Please let patient know that her potassium came back better.  It is in the higher end of normal, but is back in normal range.    BUN and creatinine (kidney function test) are still slightly elevated, but may be related to her diuretic use.    I would recommend discussing with PCP if she specific concerns related to that.      I also was not able to access her pump download today.  Our  did speak with tandem rep who will be making an appointment to come in and assist staff with pump downloads.

## 2024-08-21 ENCOUNTER — PATIENT MESSAGE (OUTPATIENT)
Dept: FAMILY MEDICINE CLINIC | Age: 49
End: 2024-08-21

## 2024-08-21 DIAGNOSIS — R79.89 ELEVATED SERUM CREATININE: Primary | ICD-10-CM

## 2024-08-31 ENCOUNTER — HOSPITAL ENCOUNTER (OUTPATIENT)
Age: 49
Discharge: HOME | End: 2024-08-31
Payer: COMMERCIAL

## 2024-08-31 LAB
ALBUMIN SERPL-MCNC: 4 G/DL (ref 3.5–5.2)
ALP SERPL-CCNC: 164 U/L (ref 35–104)
ALT SERPL-CCNC: 23 U/L (ref 0–32)
AMMONIA PLAS-SCNC: 26 UMOL/L (ref 11–51)
ANION GAP SERPL CALCULATED.3IONS-SCNC: 11 MMOL/L (ref 7–16)
AST SERPL-CCNC: 27 U/L (ref 0–31)
BILIRUB SERPL-MCNC: 0.7 MG/DL (ref 0–1.2)
BUN SERPL-MCNC: 17 MG/DL (ref 6–20)
CALCIUM SERPL-MCNC: 9.7 MG/DL (ref 8.6–10.2)
CHLORIDE SERPL-SCNC: 101 MMOL/L (ref 98–107)
CO2 SERPL-SCNC: 27 MMOL/L (ref 22–29)
CREAT SERPL-MCNC: 0.7 MG/DL (ref 0.5–1)
GFR, ESTIMATED: >90 ML/MIN/1.73M2
GLUCOSE SERPL-MCNC: 129 MG/DL (ref 74–99)
PARTIAL THROMBOPLASTIN TIME: 31.8 SEC (ref 24.5–35.1)
POTASSIUM SERPL-SCNC: 4.6 MMOL/L (ref 3.5–5)
PROT SERPL-MCNC: 6.7 G/DL (ref 6.4–8.3)
SODIUM SERPL-SCNC: 139 MMOL/L (ref 132–146)

## 2024-08-31 PROCEDURE — 82105 ALPHA-FETOPROTEIN SERUM: CPT

## 2024-08-31 PROCEDURE — 82390 ASSAY OF CERULOPLASMIN: CPT

## 2024-08-31 PROCEDURE — 36415 COLL VENOUS BLD VENIPUNCTURE: CPT

## 2024-08-31 PROCEDURE — 85730 THROMBOPLASTIN TIME PARTIAL: CPT

## 2024-08-31 PROCEDURE — 82103 ALPHA-1-ANTITRYPSIN TOTAL: CPT

## 2024-08-31 PROCEDURE — 82140 ASSAY OF AMMONIA: CPT

## 2024-08-31 PROCEDURE — 80053 COMPREHEN METABOLIC PANEL: CPT

## 2024-09-03 DIAGNOSIS — I10 ESSENTIAL HYPERTENSION: ICD-10-CM

## 2024-09-03 DIAGNOSIS — F41.9 ANXIETY: Primary | ICD-10-CM

## 2024-09-03 RX ORDER — ALPRAZOLAM 0.5 MG
0.5 TABLET ORAL DAILY PRN
Qty: 60 TABLET | Refills: 2 | Status: SHIPPED | OUTPATIENT
Start: 2024-09-03 | End: 2024-10-03

## 2024-09-03 RX ORDER — RAMIPRIL 10 MG/1
CAPSULE ORAL
Qty: 90 CAPSULE | Refills: 3 | Status: SHIPPED | OUTPATIENT
Start: 2024-09-03

## 2024-09-06 LAB — AFP SERPL-MCNC: 2.1 UG/L

## 2024-09-10 LAB
A1AT SERPL-MCNC: 142 MG/DL (ref 90–200)
CERULOPLASMIN SERPL-MCNC: 32 MG/DL (ref 16–45)

## 2024-09-18 ENCOUNTER — OFFICE VISIT (OUTPATIENT)
Dept: CARDIOLOGY CLINIC | Age: 49
End: 2024-09-18
Payer: COMMERCIAL

## 2024-09-18 VITALS
WEIGHT: 260.4 LBS | HEIGHT: 64 IN | RESPIRATION RATE: 16 BRPM | BODY MASS INDEX: 44.46 KG/M2 | SYSTOLIC BLOOD PRESSURE: 122 MMHG | HEART RATE: 61 BPM | DIASTOLIC BLOOD PRESSURE: 60 MMHG

## 2024-09-18 DIAGNOSIS — N95.1 MENOPAUSAL FLUSHING: Primary | ICD-10-CM

## 2024-09-18 PROCEDURE — 3078F DIAST BP <80 MM HG: CPT | Performed by: INTERNAL MEDICINE

## 2024-09-18 PROCEDURE — 93000 ELECTROCARDIOGRAM COMPLETE: CPT | Performed by: INTERNAL MEDICINE

## 2024-09-18 PROCEDURE — 99214 OFFICE O/P EST MOD 30 MIN: CPT | Performed by: INTERNAL MEDICINE

## 2024-09-18 PROCEDURE — 3074F SYST BP LT 130 MM HG: CPT | Performed by: INTERNAL MEDICINE

## 2024-09-18 RX ORDER — ATORVASTATIN CALCIUM 20 MG/1
20 TABLET, FILM COATED ORAL DAILY
Qty: 90 TABLET | Refills: 3 | Status: SHIPPED | OUTPATIENT
Start: 2024-09-18

## 2024-09-19 RX ORDER — FLUCONAZOLE 150 MG/1
150 TABLET ORAL ONCE
Qty: 1 TABLET | Refills: 0 | Status: SHIPPED | OUTPATIENT
Start: 2024-09-19 | End: 2024-09-19

## 2024-09-19 RX ORDER — FLUCONAZOLE 150 MG/1
150 TABLET ORAL ONCE
Qty: 1 TABLET | Refills: 0 | Status: CANCELLED | OUTPATIENT
Start: 2024-09-19 | End: 2024-09-19

## 2024-10-06 DIAGNOSIS — F32.A DEPRESSION, UNSPECIFIED DEPRESSION TYPE: ICD-10-CM

## 2024-10-07 DIAGNOSIS — F32.A DEPRESSION, UNSPECIFIED DEPRESSION TYPE: ICD-10-CM

## 2024-10-07 DIAGNOSIS — F41.9 ANXIETY: ICD-10-CM

## 2024-10-07 RX ORDER — BUPROPION HYDROCHLORIDE 300 MG/1
300 TABLET ORAL EVERY MORNING
Qty: 90 TABLET | Refills: 1 | Status: SHIPPED | OUTPATIENT
Start: 2024-10-07

## 2024-10-07 RX ORDER — BUPROPION HYDROCHLORIDE 300 MG/1
300 TABLET ORAL EVERY MORNING
Qty: 90 TABLET | Refills: 1 | Status: SHIPPED
Start: 2024-10-07 | End: 2024-10-07 | Stop reason: SDUPTHER

## 2024-10-07 NOTE — TELEPHONE ENCOUNTER
Pt called in and stated that the script for her Xanax that was sent in on 9/3/2024 was not sent in correctly, it was sent in as QD and she takes it BID.   Needs sent in as correct dose.

## 2024-10-08 RX ORDER — ALPRAZOLAM 0.5 MG
0.5 TABLET ORAL 2 TIMES DAILY PRN
Qty: 60 TABLET | Refills: 2 | Status: SHIPPED | OUTPATIENT
Start: 2024-10-08 | End: 2024-11-07

## 2024-11-23 ENCOUNTER — HOSPITAL ENCOUNTER (OUTPATIENT)
Age: 49
Discharge: HOME | End: 2024-11-23
Payer: COMMERCIAL

## 2024-11-23 LAB
ALBUMIN SERPL-MCNC: 4.1 G/DL (ref 3.5–5.2)
ALP SERPL-CCNC: 190 U/L (ref 35–104)
ALT SERPL-CCNC: 38 U/L (ref 0–32)
ANION GAP SERPL CALCULATED.3IONS-SCNC: 9 MMOL/L (ref 7–16)
AST SERPL-CCNC: 46 U/L (ref 0–31)
BILIRUB SERPL-MCNC: 1.3 MG/DL (ref 0–1.2)
BUN SERPL-MCNC: 18 MG/DL (ref 6–20)
CALCIUM SERPL-MCNC: 9.7 MG/DL (ref 8.6–10.2)
CHLORIDE SERPL-SCNC: 102 MMOL/L (ref 98–107)
CHOLEST SERPL-MCNC: 154 MG/DL
CO2 SERPL-SCNC: 30 MMOL/L (ref 22–29)
CREAT SERPL-MCNC: 0.9 MG/DL (ref 0.5–1)
ERYTHROCYTE [DISTWIDTH] IN BLOOD BY AUTOMATED COUNT: 13.7 % (ref 11.5–15)
GFR, ESTIMATED: 82 ML/MIN/1.73M2
GLUCOSE SERPL-MCNC: 157 MG/DL (ref 74–99)
HBA1C MFR BLD: 7.3 % (ref 4–5.6)
HCT VFR BLD AUTO: 40.1 % (ref 34–48)
HDLC SERPL-MCNC: 74 MG/DL
HGB BLD-MCNC: 12.4 G/DL (ref 11.5–15.5)
LDLC SERPL CALC-MCNC: 67 MG/DL
MCH RBC QN AUTO: 27.6 PG (ref 26–35)
MCHC RBC AUTO-ENTMCNC: 30.9 G/DL (ref 32–34.5)
MCV RBC AUTO: 89.3 FL (ref 80–99.9)
PLATELET # BLD AUTO: 252 K/UL (ref 130–450)
PMV BLD AUTO: 11.3 FL (ref 7–12)
POTASSIUM SERPL-SCNC: 5.2 MMOL/L (ref 3.5–5)
PROT SERPL-MCNC: 6.8 G/DL (ref 6.4–8.3)
RBC # BLD AUTO: 4.49 M/UL (ref 3.5–5.5)
SODIUM SERPL-SCNC: 141 MMOL/L (ref 132–146)
TRIGL SERPL-MCNC: 65 MG/DL
TSH SERPL DL<=0.05 MIU/L-ACNC: 1.17 UIU/ML (ref 0.27–4.2)
VLDLC SERPL CALC-MCNC: 13 MG/DL
WBC OTHER # BLD: 5.3 K/UL (ref 4.5–11.5)

## 2024-11-23 PROCEDURE — 80061 LIPID PANEL: CPT

## 2024-11-23 PROCEDURE — 84443 ASSAY THYROID STIM HORMONE: CPT

## 2024-11-23 PROCEDURE — 85027 COMPLETE CBC AUTOMATED: CPT

## 2024-11-23 PROCEDURE — 80053 COMPREHEN METABOLIC PANEL: CPT

## 2024-11-23 PROCEDURE — 36415 COLL VENOUS BLD VENIPUNCTURE: CPT

## 2024-11-23 PROCEDURE — 83036 HEMOGLOBIN GLYCOSYLATED A1C: CPT

## 2024-11-25 ENCOUNTER — OFFICE VISIT (OUTPATIENT)
Dept: ENDOCRINOLOGY | Age: 49
End: 2024-11-25
Payer: COMMERCIAL

## 2024-11-25 VITALS
OXYGEN SATURATION: 100 % | WEIGHT: 256.4 LBS | DIASTOLIC BLOOD PRESSURE: 78 MMHG | RESPIRATION RATE: 18 BRPM | HEIGHT: 64 IN | SYSTOLIC BLOOD PRESSURE: 135 MMHG | BODY MASS INDEX: 43.77 KG/M2 | TEMPERATURE: 98.6 F | HEART RATE: 65 BPM

## 2024-11-25 DIAGNOSIS — E55.9 VITAMIN D DEFICIENCY: ICD-10-CM

## 2024-11-25 DIAGNOSIS — E10.65 TYPE 1 DIABETES MELLITUS WITH HYPERGLYCEMIA (HCC): Primary | ICD-10-CM

## 2024-11-25 DIAGNOSIS — Z91.119 DIETARY NONCOMPLIANCE: ICD-10-CM

## 2024-11-25 DIAGNOSIS — E66.01 OBESITY, CLASS III, BMI 40-49.9 (MORBID OBESITY): ICD-10-CM

## 2024-11-25 DIAGNOSIS — E03.9 HYPOTHYROIDISM, UNSPECIFIED TYPE: ICD-10-CM

## 2024-11-25 DIAGNOSIS — E78.5 HYPERLIPIDEMIA, UNSPECIFIED HYPERLIPIDEMIA TYPE: ICD-10-CM

## 2024-11-25 DIAGNOSIS — E10.319 TYPE 1 DIABETES MELLITUS WITH RETINOPATHY, MACULAR EDEMA PRESENCE UNSPECIFIED, UNSPECIFIED LATERALITY, UNSPECIFIED RETINOPATHY SEVERITY (HCC): ICD-10-CM

## 2024-11-25 DIAGNOSIS — R74.8 ELEVATED ALKALINE PHOSPHATASE LEVEL: ICD-10-CM

## 2024-11-25 PROCEDURE — 99214 OFFICE O/P EST MOD 30 MIN: CPT | Performed by: FAMILY MEDICINE

## 2024-11-25 PROCEDURE — 95251 CONT GLUC MNTR ANALYSIS I&R: CPT | Performed by: FAMILY MEDICINE

## 2024-11-25 PROCEDURE — 3078F DIAST BP <80 MM HG: CPT | Performed by: FAMILY MEDICINE

## 2024-11-25 PROCEDURE — 3051F HG A1C>EQUAL 7.0%<8.0%: CPT | Performed by: FAMILY MEDICINE

## 2024-11-25 PROCEDURE — 3075F SYST BP GE 130 - 139MM HG: CPT | Performed by: FAMILY MEDICINE

## 2024-11-25 NOTE — PROGRESS NOTES
[facility]  [facility] Department of Endocrinology Diabetes and Metabolism   [address]  [place] [address]  Phone: [phone]  Fax: [phone]    Date of Service: 11/25/2024    Primary Care Physician: Digna Herman DO  Referring physician: No ref. provider found  Provider: ABELINO Willingham - CNP     Reason for the visit:  Type 1 diabetes    History of Present Illness:  The history is provided by the patient. No  was used. Accuracy of the patient data is excellent.  Sammy Crystal is a very pleasant 49 y.o. female seen today for diabetes management     Sammy Crystal was diagnosed with diabetes at age 3 and currently on t:slim insulin pump and Dexcom G7 CGM. Started pump in May 2023.    Basal 1.3, CR 8, ISF 35, target 110, AIT 5 hours    Enters less carbs than she eats.  She is not sure why she does this, but states that she is not likely to use the pump appropriately    Most recent A1c results summarized below  Lab Results   Component Value Date/Time    LABA1C 7.3 11/23/2024 01:17 PM    LABA1C 7.0 08/20/2024 07:50 AM    LABA1C 7.0 05/01/2024 07:45 AM     Patient has had some hypoglycemic episodes, mostly at work and sometimes overnight  The patient hasn't been mindful of what has been eating and wasn't following diabetes diet    Eats 3 meals per day and also snacks at night  I reviewed current medications and the patient has no issues with diabetes medications  Sammy Crystal is up to date with eye exam. Last visit March 2024  The patient seeing podiatrist as need and also performs  own feet care  Microvascular complications:  + Retinopathy, Nephropathy or Neuropathy   Macrovascular complications: no CAD, PVD, or Stroke      PAST MEDICAL HISTORY   Past Medical History:   Diagnosis Date    Capsulitis     right shoulder    Depression     Diabetes mellitus (HCC)     AGE 2, TYPE 1  HAS INSULIN PUMP    Diabetic frozen shoulder associated with

## 2024-12-10 ENCOUNTER — TELEMEDICINE (OUTPATIENT)
Dept: FAMILY MEDICINE CLINIC | Age: 49
End: 2024-12-10
Payer: COMMERCIAL

## 2024-12-10 DIAGNOSIS — E10.29 TYPE 1 DIABETES MELLITUS WITH OTHER KIDNEY COMPLICATION (HCC): ICD-10-CM

## 2024-12-10 DIAGNOSIS — F41.9 ANXIETY: ICD-10-CM

## 2024-12-10 DIAGNOSIS — N76.0 ACUTE VAGINITIS: Primary | ICD-10-CM

## 2024-12-10 PROCEDURE — 3051F HG A1C>EQUAL 7.0%<8.0%: CPT | Performed by: FAMILY MEDICINE

## 2024-12-10 PROCEDURE — 99214 OFFICE O/P EST MOD 30 MIN: CPT | Performed by: FAMILY MEDICINE

## 2024-12-10 RX ORDER — FLUCONAZOLE 150 MG/1
150 TABLET ORAL DAILY
Qty: 3 TABLET | Refills: 1 | Status: SHIPPED | OUTPATIENT
Start: 2024-12-10 | End: 2024-12-13

## 2024-12-10 RX ORDER — ALPRAZOLAM 0.5 MG
0.5 TABLET ORAL 2 TIMES DAILY PRN
Qty: 60 TABLET | Refills: 2 | Status: SHIPPED | OUTPATIENT
Start: 2024-12-10 | End: 2025-03-10

## 2024-12-10 SDOH — ECONOMIC STABILITY: FOOD INSECURITY: WITHIN THE PAST 12 MONTHS, YOU WORRIED THAT YOUR FOOD WOULD RUN OUT BEFORE YOU GOT MONEY TO BUY MORE.: NEVER TRUE

## 2024-12-10 SDOH — ECONOMIC STABILITY: FOOD INSECURITY: WITHIN THE PAST 12 MONTHS, THE FOOD YOU BOUGHT JUST DIDN'T LAST AND YOU DIDN'T HAVE MONEY TO GET MORE.: NEVER TRUE

## 2024-12-10 SDOH — ECONOMIC STABILITY: INCOME INSECURITY: HOW HARD IS IT FOR YOU TO PAY FOR THE VERY BASICS LIKE FOOD, HOUSING, MEDICAL CARE, AND HEATING?: PATIENT DECLINED

## 2024-12-10 SDOH — ECONOMIC STABILITY: TRANSPORTATION INSECURITY
IN THE PAST 12 MONTHS, HAS LACK OF TRANSPORTATION KEPT YOU FROM MEETINGS, WORK, OR FROM GETTING THINGS NEEDED FOR DAILY LIVING?: NO

## 2024-12-10 NOTE — PROGRESS NOTES
ALPRAZolam (XANAX) 0.5 MG tablet; Take 1 tablet by mouth 2 times daily as needed for Sleep for up to 90 days. Max Daily Amount: 1 mg    Type 1 diabetes mellitus with other kidney complication (HCC)  -     Hemoglobin A1C; Future       Assessment & Plan      No follow-ups on file.    An  electronic signature was used to authenticate this note.    --Digna Oneill, DO on 12/11/2024 at 2:17 }    Pursuant to the emergency declaration under the Pedersen Act and the National Emergencies Act, 1135 waiver authority and the Coronavirus Preparedness and Response Supplemental Appropriations Act, this Virtual  Visit was conducted, with patient's consent, to reduce the patient's risk of exposure to COVID-19 and provide continuity of care for an established patient.    Services were provided through a video synchronous discussion virtually to substitute for in-person clinic visit.

## 2024-12-31 RX ORDER — PANTOPRAZOLE SODIUM 40 MG/1
40 TABLET, DELAYED RELEASE ORAL DAILY
Qty: 30 TABLET | Refills: 2 | Status: SHIPPED | OUTPATIENT
Start: 2024-12-31

## 2025-01-02 RX ORDER — METOPROLOL TARTRATE 25 MG/1
25 TABLET, FILM COATED ORAL 2 TIMES DAILY
Qty: 180 TABLET | Refills: 0 | Status: SHIPPED | OUTPATIENT
Start: 2025-01-02

## 2025-01-14 ENCOUNTER — OFFICE VISIT (OUTPATIENT)
Dept: CARDIOLOGY CLINIC | Age: 50
End: 2025-01-14
Payer: COMMERCIAL

## 2025-01-14 VITALS
WEIGHT: 256.7 LBS | HEIGHT: 64 IN | HEART RATE: 62 BPM | RESPIRATION RATE: 18 BRPM | SYSTOLIC BLOOD PRESSURE: 128 MMHG | OXYGEN SATURATION: 99 % | TEMPERATURE: 97.1 F | DIASTOLIC BLOOD PRESSURE: 72 MMHG | BODY MASS INDEX: 43.83 KG/M2

## 2025-01-14 DIAGNOSIS — R00.2 HEART PALPITATIONS: Primary | ICD-10-CM

## 2025-01-14 PROCEDURE — 3074F SYST BP LT 130 MM HG: CPT | Performed by: INTERNAL MEDICINE

## 2025-01-14 PROCEDURE — 93000 ELECTROCARDIOGRAM COMPLETE: CPT | Performed by: INTERNAL MEDICINE

## 2025-01-14 PROCEDURE — 3078F DIAST BP <80 MM HG: CPT | Performed by: INTERNAL MEDICINE

## 2025-01-14 PROCEDURE — 99214 OFFICE O/P EST MOD 30 MIN: CPT | Performed by: INTERNAL MEDICINE

## 2025-01-14 NOTE — PATIENT INSTRUCTIONS
Coronary CTA results were reviewed and discussed with the patient.  Has moderate obstructive CAD RCA has 50 to 60% calcified proximal plaque in the LAD has 30 to 40% calcified plaque.  LCx has mild luminal irregularities.  CAC score of 121 with 98 percentile score.  Echo results were reviewed and discussed with the patient.  She does have a stage III diastolic dysfunction.  Will repeat another echocardiogram in 6 months on follow-up visit to assess diastolic function.    Continue Jardiance 10 mg p.o. daily.  She was recommended to take Lasix as needed for leg edema.  Continue atorvastatin 20 mg p.o. daily due to recent increased liver functions.  Now her liver functions are back to baseline.  Recent lab work was reviewed, as above LDL and blood sugars are well-controlled  Continue metoprolol tartrate 25 mg p.o. twice daily for palpitations, ramipril 10 mg p.o. daily for hypertension  Management of diabetes and sleep apnea as per primary service  Weight loss management as per primary service.  Follow-up with me in 6 months.

## 2025-01-14 NOTE — PROGRESS NOTES
patient's care. Please feel free to contact me if you have any questions or concerns.    Nicole Newman MD  [facility]

## 2025-01-21 ENCOUNTER — TELEPHONE (OUTPATIENT)
Dept: PHARMACY | Facility: CLINIC | Age: 50
End: 2025-01-21

## 2025-01-21 NOTE — TELEPHONE ENCOUNTER
**Patient is [facility] **     Called patient to schedule 2025 yearly pharmacist appointment to discuss medications for Diabetes Management Program.       No answer. Left VM on Mobile/Home TAD: Please call back at [phone] Option #3.     Johanne Lynn MA  [facility] Clinical   [facility]  [phone] Option 1

## 2025-01-30 NOTE — TELEPHONE ENCOUNTER
Second attempt made to contact patient to schedule 2025 yearly pharmacist appointment to discuss medications for Diabetes Management Program.     No answer. Mailbox is full: VuMed[employer] message sent to patient.     No further patient outreach planned at this time.    Johanne Lynn MA  [facility] Clinical   [facility]  [phone] Option 1     For Pharmacy Admin Tracking Only    Program: [employer]  CPA in place:  No  Gap Closed?: No   Time Spent (min): 5

## 2025-01-30 NOTE — TELEPHONE ENCOUNTER
Noted.    Neda [facility]   Population Health Clinical   [facility]  Department, toll free: [phone], option 3

## 2025-02-11 DIAGNOSIS — I51.89 GRADE III DIASTOLIC DYSFUNCTION: Primary | ICD-10-CM

## 2025-02-17 DIAGNOSIS — E10.65 TYPE 1 DIABETES MELLITUS WITH HYPERGLYCEMIA (HCC): ICD-10-CM

## 2025-02-17 RX ORDER — ACYCLOVIR 400 MG/1
TABLET ORAL
Qty: 9 EACH | Refills: 3 | Status: SHIPPED | OUTPATIENT
Start: 2025-02-17

## 2025-02-17 RX ORDER — LEVOTHYROXINE SODIUM 200 UG/1
200 TABLET ORAL DAILY
Qty: 90 TABLET | Refills: 3 | Status: CANCELLED | OUTPATIENT
Start: 2025-02-17

## 2025-02-17 RX ORDER — LEVOTHYROXINE SODIUM 200 UG/1
200 TABLET ORAL DAILY
Qty: 90 TABLET | Refills: 1 | Status: SHIPPED | OUTPATIENT
Start: 2025-02-17

## 2025-02-18 ENCOUNTER — TELEPHONE (OUTPATIENT)
Dept: PHARMACY | Facility: CLINIC | Age: 50
End: 2025-02-18

## 2025-02-18 NOTE — TELEPHONE ENCOUNTER
POPULATION HEALTH CLINICAL PHARMACY REVIEW - Be Well with Diabetes ([facility])    Sammy Crystal is a 49 y.o. female enrolled in the [facility] Diabetes Program. Patient provided writer with verbal consent to remain in the program for this year. Patient enrolled 2017.    Communication preferences (for >8% followup, urgent messages, etc)  Preferred methods: Mychart  Preferred days/time: any time    Medications:  Current Outpatient Medications   Medication Instructions    ALPRAZolam (XANAX) 0.5 mg, Oral, 2 TIMES DAILY PRN    aspirin 81 mg, Oral, NIGHTLY    atorvastatin (LIPITOR) 20 mg, Oral, DAILY    buPROPion (WELLBUTRIN XL) 300 mg, Oral, EVERY MORNING    buPROPion (WELLBUTRIN XL) 300 mg, Oral, EVERY MORNING    celecoxib (CELEBREX) 200 mg, Oral, DAILY    Continuous Glucose Sensor (DEXCOM G7 SENSOR) MISC To change every 10 days    CPAP Machine MISC Does not apply    Cyanocobalamin (VITAMIN B-12 PO) 2,000 mcg, Oral, DAILY    dicyclomine (BENTYL) 10 mg, Oral, 4 TIMES DAILY    Elastic Bandages & Supports (JOBST KNEE HIGH COMPRESSION ) MISC Compression stockings 20-30 mm hg knee high bilaterally<BR>Dx : Venous Insufficiency, Swelling    empagliflozin (JARDIANCE) 10 mg, Oral, DAILY    famotidine (PEPCID) 20 mg, Oral, 2 TIMES DAILY    Glucagon (GVOKE HYPOPEN 2-PACK) 1 MG/0.2ML SOAJ Inject 1 mg subcutaneously in case of hypoglycemic emergency.  May repeat in 15 minutes as needed using a new device.    insulin lispro (HUMALOG) 100 UNIT/ML SOLN injection vial For use in insulin pump.  Max daily dose 100 unit    Insulin Pump - insulin lispro SubCUTAneous, CONTINUOUS, 8087-9073: 1.9 units/hr<BR>1117-2797: 2.1 units/hr <BR>2024-9271: 1.9 units/hr    levothyroxine (SYNTHROID) 200 mcg, Oral, DAILY    metoprolol tartrate (LOPRESSOR) 25 mg, Oral, 2 TIMES DAILY    pantoprazole (PROTONIX) 40 mg, Oral, DAILY    Plecanatide (TRULANCE) 3 MG TABS Oral    ramipril (ALTACE) 10 MG capsule TAKE 1 CAPSULE BY MOUTH ONE TIME

## 2025-02-26 ENCOUNTER — PATIENT MESSAGE (OUTPATIENT)
Dept: ENDOCRINOLOGY | Age: 50
End: 2025-02-26

## 2025-03-04 RX ORDER — FAMOTIDINE 20 MG/1
20 TABLET, FILM COATED ORAL 2 TIMES DAILY
Qty: 180 TABLET | Refills: 1 | Status: SHIPPED | OUTPATIENT
Start: 2025-03-04

## 2025-03-04 RX ORDER — GLUCAGON INJECTION, SOLUTION 1 MG/.2ML
INJECTION, SOLUTION SUBCUTANEOUS
Qty: 2 EACH | Refills: 0 | Status: SHIPPED | OUTPATIENT
Start: 2025-03-04

## 2025-03-04 RX ORDER — CELECOXIB 200 MG/1
200 CAPSULE ORAL DAILY
Qty: 90 CAPSULE | Refills: 1 | Status: SHIPPED | OUTPATIENT
Start: 2025-03-04

## 2025-03-14 ENCOUNTER — PATIENT MESSAGE (OUTPATIENT)
Dept: ENDOCRINOLOGY | Age: 50
End: 2025-03-14

## 2025-03-14 DIAGNOSIS — E10.65 TYPE 1 DIABETES MELLITUS WITH HYPERGLYCEMIA (HCC): Primary | ICD-10-CM

## 2025-03-22 ENCOUNTER — HOSPITAL ENCOUNTER (OUTPATIENT)
Age: 50
Discharge: HOME | End: 2025-03-22
Payer: COMMERCIAL

## 2025-03-22 DIAGNOSIS — E55.9 VITAMIN D DEFICIENCY: ICD-10-CM

## 2025-03-22 DIAGNOSIS — E10.65 TYPE 1 DIABETES MELLITUS WITH HYPERGLYCEMIA (HCC): ICD-10-CM

## 2025-03-22 DIAGNOSIS — R74.8 ELEVATED ALKALINE PHOSPHATASE LEVEL: ICD-10-CM

## 2025-03-22 DIAGNOSIS — E10.29 TYPE 1 DIABETES MELLITUS WITH OTHER KIDNEY COMPLICATION: ICD-10-CM

## 2025-03-22 LAB
ALBUMIN SERPL-MCNC: 4.3 G/DL (ref 3.5–5.2)
ALP SERPL-CCNC: 283 U/L (ref 35–104)
ALT SERPL-CCNC: 45 U/L (ref 0–32)
ANION GAP SERPL CALCULATED.3IONS-SCNC: 12 MMOL/L (ref 7–16)
AST SERPL-CCNC: 49 U/L (ref 0–31)
BILIRUB SERPL-MCNC: 1.4 MG/DL (ref 0–1.2)
BUN SERPL-MCNC: 18 MG/DL (ref 6–20)
CALCIUM SERPL-MCNC: 9.6 MG/DL (ref 8.6–10.2)
CHLORIDE SERPL-SCNC: 104 MMOL/L (ref 98–107)
CO2 SERPL-SCNC: 27 MMOL/L (ref 22–29)
CREAT SERPL-MCNC: 0.9 MG/DL (ref 0.5–1)
CREAT UR-MCNC: 58.1 MG/DL (ref 29–226)
GFR, ESTIMATED: 81 ML/MIN/1.73M2
GLUCOSE SERPL-MCNC: 96 MG/DL (ref 74–99)
HBA1C MFR BLD: 6.8 % (ref 4–5.6)
MICROALBUMIN UR-MCNC: <12 MG/L (ref 0–19)
MICROALBUMIN/CREAT UR-RTO: NORMAL MCG/MG CREAT (ref 0–30)
POTASSIUM SERPL-SCNC: 4.8 MMOL/L (ref 3.5–5)
PROT SERPL-MCNC: 7.3 G/DL (ref 6.4–8.3)
SODIUM SERPL-SCNC: 143 MMOL/L (ref 132–146)

## 2025-03-22 PROCEDURE — 36415 COLL VENOUS BLD VENIPUNCTURE: CPT

## 2025-03-22 PROCEDURE — 84080 ASSAY ALKALINE PHOSPHATASES: CPT

## 2025-03-22 PROCEDURE — 84075 ASSAY ALKALINE PHOSPHATASE: CPT

## 2025-03-22 PROCEDURE — 82570 ASSAY OF URINE CREATININE: CPT

## 2025-03-22 PROCEDURE — 83036 HEMOGLOBIN GLYCOSYLATED A1C: CPT

## 2025-03-22 PROCEDURE — 80053 COMPREHEN METABOLIC PANEL: CPT

## 2025-03-22 PROCEDURE — 82306 VITAMIN D 25 HYDROXY: CPT

## 2025-03-22 PROCEDURE — 82043 UR ALBUMIN QUANTITATIVE: CPT

## 2025-03-22 PROCEDURE — 83970 ASSAY OF PARATHORMONE: CPT

## 2025-03-24 LAB
25(OH)D3 SERPL-MCNC: 25.8 NG/ML (ref 30–100)
PTH-INTACT SERPL-MCNC: 55.3 PG/ML (ref 15–65)

## 2025-03-25 DIAGNOSIS — I10 ESSENTIAL HYPERTENSION: ICD-10-CM

## 2025-03-25 DIAGNOSIS — F41.9 ANXIETY: ICD-10-CM

## 2025-03-25 LAB — ALP BONE SERPL-MCNC: 22.9 UG/L

## 2025-03-25 RX ORDER — ALPRAZOLAM 0.5 MG
0.5 TABLET ORAL 2 TIMES DAILY
Qty: 60 TABLET | Refills: 2 | Status: SHIPPED | OUTPATIENT
Start: 2025-03-25 | End: 2025-06-23

## 2025-03-25 RX ORDER — METOPROLOL TARTRATE 25 MG/1
25 TABLET, FILM COATED ORAL 2 TIMES DAILY
Qty: 180 TABLET | Refills: 1 | Status: SHIPPED | OUTPATIENT
Start: 2025-03-25

## 2025-03-25 RX ORDER — PANTOPRAZOLE SODIUM 40 MG/1
40 TABLET, DELAYED RELEASE ORAL DAILY
Qty: 30 TABLET | Refills: 2 | Status: SHIPPED | OUTPATIENT
Start: 2025-03-25

## 2025-03-25 NOTE — PROGRESS NOTES
deficiency  Recommend resuming vitamin D supplementation    Elevated Alk Phos  Workup initiated  Check gamma GT        I personally spent  30 minutes reviewing  external notes from PCP and other patient's care team providers, and personally interpreted labs associated with the above diagnosis. I also ordered labs to further assess and manage the above addressed medical conditions.        Return in about 3 months (around 6/26/2025) for Type 1 DM.    The above issues were reviewed with the patient who understood and agreed with the plan.    Thank you for allowing us to participate in the care of this patient. Please do not hesitate to contact us with any additional questions.     ABELINO Willingham CNP    [facility]   [address] [place] [address].  [address]  [place] [address]  Phone: [phone]  Fax: [phone]  --------------------------------------------  An electronic signature was used to authenticate this note. ABELINO Willingham CNP on 3/26/2025 at 8:36 AM

## 2025-03-26 ENCOUNTER — OFFICE VISIT (OUTPATIENT)
Dept: ENDOCRINOLOGY | Age: 50
End: 2025-03-26
Payer: COMMERCIAL

## 2025-03-26 VITALS
RESPIRATION RATE: 16 BRPM | BODY MASS INDEX: 46.1 KG/M2 | OXYGEN SATURATION: 99 % | HEART RATE: 62 BPM | DIASTOLIC BLOOD PRESSURE: 83 MMHG | WEIGHT: 270 LBS | HEIGHT: 64 IN | SYSTOLIC BLOOD PRESSURE: 135 MMHG

## 2025-03-26 DIAGNOSIS — E03.9 HYPOTHYROIDISM, UNSPECIFIED TYPE: ICD-10-CM

## 2025-03-26 DIAGNOSIS — E78.5 HYPERLIPIDEMIA, UNSPECIFIED HYPERLIPIDEMIA TYPE: ICD-10-CM

## 2025-03-26 DIAGNOSIS — Z91.119 DIETARY NONCOMPLIANCE: ICD-10-CM

## 2025-03-26 DIAGNOSIS — E55.9 VITAMIN D DEFICIENCY: ICD-10-CM

## 2025-03-26 DIAGNOSIS — E10.319 TYPE 1 DIABETES MELLITUS WITH RETINOPATHY, MACULAR EDEMA PRESENCE UNSPECIFIED, UNSPECIFIED LATERALITY, UNSPECIFIED RETINOPATHY SEVERITY (HCC): ICD-10-CM

## 2025-03-26 DIAGNOSIS — R74.8 ELEVATED ALKALINE PHOSPHATASE LEVEL: ICD-10-CM

## 2025-03-26 DIAGNOSIS — E66.01 OBESITY, CLASS III, BMI 40-49.9 (MORBID OBESITY): ICD-10-CM

## 2025-03-26 DIAGNOSIS — E10.65 TYPE 1 DIABETES MELLITUS WITH HYPERGLYCEMIA (HCC): Primary | ICD-10-CM

## 2025-03-26 LAB
ALK PHOS BONE SPECIFIC: 62 U/L (ref 0–55)
ALK PHOS OTHER CALC: 0 U/L
ALK PHOSPHATASE: 295 U/L (ref 40–120)
ALKALINE PHOSPHATASE LIVER FRACTION: 233 U/L (ref 0–94)

## 2025-03-26 PROCEDURE — 99214 OFFICE O/P EST MOD 30 MIN: CPT | Performed by: FAMILY MEDICINE

## 2025-03-26 PROCEDURE — 3044F HG A1C LEVEL LT 7.0%: CPT | Performed by: FAMILY MEDICINE

## 2025-03-26 PROCEDURE — 3075F SYST BP GE 130 - 139MM HG: CPT | Performed by: FAMILY MEDICINE

## 2025-03-26 PROCEDURE — 95251 CONT GLUC MNTR ANALYSIS I&R: CPT | Performed by: FAMILY MEDICINE

## 2025-03-26 PROCEDURE — 3079F DIAST BP 80-89 MM HG: CPT | Performed by: FAMILY MEDICINE

## 2025-04-16 ENCOUNTER — HOSPITAL ENCOUNTER (OUTPATIENT)
Age: 50
Discharge: HOME | End: 2025-04-16
Payer: COMMERCIAL

## 2025-04-16 DIAGNOSIS — E10.65 TYPE 1 DIABETES MELLITUS WITH HYPERGLYCEMIA (HCC): ICD-10-CM

## 2025-04-16 DIAGNOSIS — R74.8 ELEVATED ALKALINE PHOSPHATASE LEVEL: ICD-10-CM

## 2025-04-16 LAB
CHOLEST SERPL-MCNC: 148 MG/DL
GGT SERPL-CCNC: 204 U/L (ref 5–36)
HDLC SERPL-MCNC: 70 MG/DL
LDLC SERPL CALC-MCNC: 61 MG/DL
T4 FREE SERPL-MCNC: 1.8 NG/DL (ref 0.9–1.7)
TRIGL SERPL-MCNC: 85 MG/DL
TSH SERPL DL<=0.05 MIU/L-ACNC: 3.82 UIU/ML (ref 0.27–4.2)
VLDLC SERPL CALC-MCNC: 17 MG/DL

## 2025-04-16 PROCEDURE — 84443 ASSAY THYROID STIM HORMONE: CPT

## 2025-04-16 PROCEDURE — 82977 ASSAY OF GGT: CPT

## 2025-04-16 PROCEDURE — 84439 ASSAY OF FREE THYROXINE: CPT

## 2025-04-16 PROCEDURE — 36415 COLL VENOUS BLD VENIPUNCTURE: CPT

## 2025-04-16 PROCEDURE — 80061 LIPID PANEL: CPT

## 2025-04-23 ENCOUNTER — TELEPHONE (OUTPATIENT)
Dept: ENDOCRINOLOGY | Age: 50
End: 2025-04-23

## 2025-04-23 NOTE — TELEPHONE ENCOUNTER
A blood work showed that your thyroid hormones are at goal.  Cholesterol level also very good.  Alkaline phosphatase isoenzymes showed elevated liver part of the enzyme with slightly changes in the bone part.  This result consistent more with the liver cause for high alkaline phosphatase.  This usually managed by the primary care physician and sometimes you need to see a GI doctor for further evaluation.    Again, your blood work was consistent more with liver close of high alkaline phosphatase than the bone.

## 2025-04-27 ENCOUNTER — RESULTS FOLLOW-UP (OUTPATIENT)
Dept: ENDOCRINOLOGY | Age: 50
End: 2025-04-27

## 2025-04-28 NOTE — TELEPHONE ENCOUNTER
----- Message from ABELINO Willingham - CNP sent at 4/27/2025  9:26 PM EDT -----  Hi, Dr. Carrillo. Please see this gamma GT result.  Please also see bone specific alkaline phosphatase labs from 3/22/2025 and advise.

## 2025-05-15 ENCOUNTER — TELEMEDICINE (OUTPATIENT)
Dept: FAMILY MEDICINE CLINIC | Age: 50
End: 2025-05-15
Payer: COMMERCIAL

## 2025-05-15 DIAGNOSIS — R74.8 ELEVATED SERUM GGT LEVEL: Primary | ICD-10-CM

## 2025-05-15 DIAGNOSIS — Z12.31 ENCOUNTER FOR SCREENING MAMMOGRAM FOR MALIGNANT NEOPLASM OF BREAST: ICD-10-CM

## 2025-05-15 PROCEDURE — 99213 OFFICE O/P EST LOW 20 MIN: CPT | Performed by: FAMILY MEDICINE

## 2025-05-15 SDOH — ECONOMIC STABILITY: FOOD INSECURITY: WITHIN THE PAST 12 MONTHS, THE FOOD YOU BOUGHT JUST DIDN'T LAST AND YOU DIDN'T HAVE MONEY TO GET MORE.: NEVER TRUE

## 2025-05-15 SDOH — ECONOMIC STABILITY: FOOD INSECURITY: WITHIN THE PAST 12 MONTHS, YOU WORRIED THAT YOUR FOOD WOULD RUN OUT BEFORE YOU GOT MONEY TO BUY MORE.: NEVER TRUE

## 2025-05-15 SDOH — ECONOMIC STABILITY: INCOME INSECURITY: IN THE LAST 12 MONTHS, WAS THERE A TIME WHEN YOU WERE NOT ABLE TO PAY THE MORTGAGE OR RENT ON TIME?: NO

## 2025-05-15 SDOH — ECONOMIC STABILITY: TRANSPORTATION INSECURITY
IN THE PAST 12 MONTHS, HAS THE LACK OF TRANSPORTATION KEPT YOU FROM MEDICAL APPOINTMENTS OR FROM GETTING MEDICATIONS?: NO

## 2025-05-15 NOTE — PROGRESS NOTES
TeleMedicine Patient Consent    This visit was performed as a virtual video visit using a synchronous, two-way, audio-video telehealth technology platform. Patient identification was verified at the start of the visit, including the patient's telephone number and physical location. I discussed with the patient the nature of our telehealth visits, that:     Due to the nature of an audio- video modality, the only components of a physical exam that could be done are the elements supported by direct observation.  I would evaluate the patient and recommend diagnostics and treatments based on my assessment.  If it was felt that the patient should be evaluated in clinic or an emergency room setting, then they would be directed there.  Our sessions are not being recorded and that personal health information is protected.  Our team would provide follow up care in person if/when the patient needs it.       Patient does agree to proceed with telemedicine consultation.    Sammy Crystal was evaluated through a synchronous (real-time) audio-video encounter. The patient (or guardian if applicable) is aware that this is a billable service, which includes applicable co-pays. This Virtual Visit was conducted with patient's (and/or legal guardian's) consent. Patient identification was verified, and a caregiver was present when appropriate.   The patient was located at Home: [address] [place] [address]  Provider was located at Facility (Appt Dept): [address] [place] [phone]  Confirm you are appropriately licensed, registered, or certified to deliver care in the state where the patient is located as indicated above. If you are not or unsure, please re-schedule the visit: Yes, I confirm.       --Digna Oneill, DO on 5/19/2025 at 9:55 AM    An electronic signature was used to authenticate this note.     Time spent: Greater than Not billed by time    5/15/2025    TELEHEALTH EVALUATION -- 
no dizziness/no diaphoresis
05-Dec-2024 18:25

## 2025-06-04 DIAGNOSIS — F41.9 ANXIETY: ICD-10-CM

## 2025-06-04 RX ORDER — PANTOPRAZOLE SODIUM 40 MG/1
40 TABLET, DELAYED RELEASE ORAL DAILY
Qty: 30 TABLET | Refills: 2 | Status: SHIPPED | OUTPATIENT
Start: 2025-06-04

## 2025-06-05 RX ORDER — PLECANATIDE 3 MG/1
5 TABLET ORAL DAILY
Qty: 30 TABLET | Refills: 2 | Status: SHIPPED | OUTPATIENT
Start: 2025-06-05

## 2025-06-05 RX ORDER — ALPRAZOLAM 0.5 MG
0.5 TABLET ORAL 2 TIMES DAILY
Qty: 60 TABLET | Refills: 2 | Status: SHIPPED | OUTPATIENT
Start: 2025-06-05 | End: 2025-09-03

## 2025-06-11 DIAGNOSIS — E10.65 TYPE 1 DIABETES MELLITUS WITH HYPERGLYCEMIA (HCC): Primary | ICD-10-CM

## 2025-07-01 RX ORDER — GLUCAGON INJECTION, SOLUTION 1 MG/.2ML
INJECTION, SOLUTION SUBCUTANEOUS
Qty: 2 EACH | Refills: 0 | Status: SHIPPED | OUTPATIENT
Start: 2025-07-01

## 2025-07-21 ENCOUNTER — HOSPITAL ENCOUNTER (OUTPATIENT)
Dept: CARDIOLOGY | Age: 50
Discharge: HOME | End: 2025-07-23
Attending: INTERNAL MEDICINE
Payer: COMMERCIAL

## 2025-07-21 VITALS
SYSTOLIC BLOOD PRESSURE: 135 MMHG | BODY MASS INDEX: 43.71 KG/M2 | HEIGHT: 64 IN | DIASTOLIC BLOOD PRESSURE: 83 MMHG | WEIGHT: 256 LBS

## 2025-07-21 DIAGNOSIS — I51.89 GRADE III DIASTOLIC DYSFUNCTION: ICD-10-CM

## 2025-07-21 PROCEDURE — 93306 TTE W/DOPPLER COMPLETE: CPT

## 2025-07-24 LAB
ECHO AO ASC DIAM: 2.4 CM
ECHO AO ASCENDING AORTA INDEX: 1.11 CM/M2
ECHO AO SINUS VALSALVA DIAM: 2.5 CM
ECHO AO SINUS VALSALVA INDEX: 1.15 CM/M2
ECHO AR MAX VEL PISA: 3.3 M/S
ECHO AV AREA PEAK VELOCITY: 1.5 CM2
ECHO AV AREA VTI: 1.6 CM2
ECHO AV AREA/BSA PEAK VELOCITY: 0.7 CM2/M2
ECHO AV AREA/BSA VTI: 0.7 CM2/M2
ECHO AV CUSP MM: 1.7 CM
ECHO AV MEAN GRADIENT: 5 MMHG
ECHO AV MEAN VELOCITY: 1.1 M/S
ECHO AV PEAK GRADIENT: 10 MMHG
ECHO AV PEAK VELOCITY: 1.6 M/S
ECHO AV REGURGITANT PHT: 665.3 MS
ECHO AV VELOCITY RATIO: 0.56
ECHO AV VTI: 38.9 CM
ECHO BSA: 2.29 M2
ECHO EST RA PRESSURE: 3 MMHG
ECHO LA DIAMETER INDEX: 1.98 CM/M2
ECHO LA DIAMETER: 4.3 CM
ECHO LA VOL A-L A2C: 42 ML (ref 22–52)
ECHO LA VOL A-L A4C: 58 ML (ref 22–52)
ECHO LA VOL MOD A2C: 39 ML (ref 22–52)
ECHO LA VOL MOD A4C: 53 ML (ref 22–52)
ECHO LA VOLUME AREA LENGTH: 52 ML
ECHO LA VOLUME INDEX A-L A2C: 19 ML/M2 (ref 16–34)
ECHO LA VOLUME INDEX A-L A4C: 27 ML/M2 (ref 16–34)
ECHO LA VOLUME INDEX AREA LENGTH: 24 ML/M2 (ref 16–34)
ECHO LA VOLUME INDEX MOD A2C: 18 ML/M2 (ref 16–34)
ECHO LA VOLUME INDEX MOD A4C: 24 ML/M2 (ref 16–34)
ECHO LV EF PHYSICIAN: 58 %
ECHO LV FRACTIONAL SHORTENING: 33 % (ref 28–44)
ECHO LV GLOBAL LONGITUDINAL STRAIN (GLS): -18.8 %
ECHO LV INTERNAL DIMENSION DIASTOLE INDEX: 2.26 CM/M2
ECHO LV INTERNAL DIMENSION DIASTOLIC: 4.9 CM (ref 3.9–5.3)
ECHO LV INTERNAL DIMENSION SYSTOLIC INDEX: 1.52 CM/M2
ECHO LV INTERNAL DIMENSION SYSTOLIC: 3.3 CM
ECHO LV ISOVOLUMETRIC RELAXATION TIME (IVRT): 27.7 MS
ECHO LV IVSD: 1.2 CM (ref 0.6–0.9)
ECHO LV IVSS: 1.5 CM
ECHO LV MASS 2D: 213.3 G (ref 67–162)
ECHO LV MASS INDEX 2D: 98.3 G/M2 (ref 43–95)
ECHO LV POSTERIOR WALL DIASTOLIC: 1.1 CM (ref 0.6–0.9)
ECHO LV POSTERIOR WALL SYSTOLIC: 1.5 CM
ECHO LV RELATIVE WALL THICKNESS RATIO: 0.45
ECHO LVOT AREA: 2.8 CM2
ECHO LVOT AV VTI INDEX: 0.57
ECHO LVOT DIAM: 1.9 CM
ECHO LVOT MEAN GRADIENT: 2 MMHG
ECHO LVOT PEAK GRADIENT: 3 MMHG
ECHO LVOT PEAK VELOCITY: 0.9 M/S
ECHO LVOT STROKE VOLUME INDEX: 29.1 ML/M2
ECHO LVOT SV: 63.2 ML
ECHO LVOT VTI: 22.3 CM
ECHO MV "A" WAVE DURATION: 129.2 MSEC
ECHO MV A VELOCITY: 0.54 M/S
ECHO MV AREA PHT: 3.2 CM2
ECHO MV AREA VTI: 1.7 CM2
ECHO MV E DECELERATION TIME (DT): 177.2 MS
ECHO MV E VELOCITY: 1.29 M/S
ECHO MV E/A RATIO: 2.39
ECHO MV LVOT VTI INDEX: 1.64
ECHO MV MAX VELOCITY: 1.4 M/S
ECHO MV MEAN GRADIENT: 2 MMHG
ECHO MV MEAN VELOCITY: 0.6 M/S
ECHO MV PEAK GRADIENT: 8 MMHG
ECHO MV PRESSURE HALF TIME (PHT): 69.4 MS
ECHO MV REGURGITANT VELOCITY PISA: 4.4 M/S
ECHO MV REGURGITANT VTIA: 170 CM
ECHO MV VTI: 36.6 CM
ECHO PULMONARY ARTERY END DIASTOLIC PRESSURE: 2 MMHG
ECHO PV MAX VELOCITY: 1 M/S
ECHO PV MEAN GRADIENT: 2 MMHG
ECHO PV MEAN VELOCITY: 0.7 M/S
ECHO PV PEAK GRADIENT: 4 MMHG
ECHO PV REGURGITANT MAX VELOCITY: 0.8 M/S
ECHO PV VTI: 23.5 CM
ECHO PVEIN A DURATION: 115.3 MS
ECHO PVEIN A VELOCITY: 0.2 M/S
ECHO PVEIN PEAK D VELOCITY: 0.6 M/S
ECHO PVEIN PEAK S VELOCITY: 0.5 M/S
ECHO PVEIN S/D RATIO: 0.8
ECHO RIGHT VENTRICULAR SYSTOLIC PRESSURE (RVSP): 39 MMHG
ECHO RV INTERNAL DIMENSION: 3 CM
ECHO RV TAPSE: 2.6 CM (ref 1.7–?)
ECHO TV REGURGITANT MAX VELOCITY: 2.98 M/S
ECHO TV REGURGITANT PEAK GRADIENT: 36 MMHG

## 2025-07-24 PROCEDURE — 93356 MYOCRD STRAIN IMG SPCKL TRCK: CPT | Performed by: INTERNAL MEDICINE

## 2025-07-24 PROCEDURE — 93306 TTE W/DOPPLER COMPLETE: CPT | Performed by: INTERNAL MEDICINE

## 2025-07-25 ENCOUNTER — OFFICE VISIT (OUTPATIENT)
Dept: CARDIOLOGY CLINIC | Age: 50
End: 2025-07-25
Payer: COMMERCIAL

## 2025-07-25 VITALS
WEIGHT: 256.9 LBS | SYSTOLIC BLOOD PRESSURE: 124 MMHG | BODY MASS INDEX: 42.8 KG/M2 | HEIGHT: 65 IN | OXYGEN SATURATION: 93 % | DIASTOLIC BLOOD PRESSURE: 66 MMHG | RESPIRATION RATE: 16 BRPM | TEMPERATURE: 97.3 F | HEART RATE: 67 BPM

## 2025-07-25 DIAGNOSIS — I25.84 CORONARY ARTERY DISEASE DUE TO CALCIFIED CORONARY LESION: ICD-10-CM

## 2025-07-25 DIAGNOSIS — R00.2 HEART PALPITATIONS: ICD-10-CM

## 2025-07-25 DIAGNOSIS — I10 PRIMARY HYPERTENSION: ICD-10-CM

## 2025-07-25 DIAGNOSIS — R00.2 PALPITATIONS: ICD-10-CM

## 2025-07-25 DIAGNOSIS — I50.32 CHRONIC HEART FAILURE WITH PRESERVED EJECTION FRACTION (HFPEF) (HCC): Primary | ICD-10-CM

## 2025-07-25 DIAGNOSIS — I25.10 CORONARY ARTERY DISEASE DUE TO CALCIFIED CORONARY LESION: ICD-10-CM

## 2025-07-25 PROCEDURE — 99214 OFFICE O/P EST MOD 30 MIN: CPT | Performed by: INTERNAL MEDICINE

## 2025-07-25 PROCEDURE — 3078F DIAST BP <80 MM HG: CPT | Performed by: INTERNAL MEDICINE

## 2025-07-25 PROCEDURE — G2211 COMPLEX E/M VISIT ADD ON: HCPCS | Performed by: INTERNAL MEDICINE

## 2025-07-25 PROCEDURE — 3074F SYST BP LT 130 MM HG: CPT | Performed by: INTERNAL MEDICINE

## 2025-07-25 PROCEDURE — 93000 ELECTROCARDIOGRAM COMPLETE: CPT | Performed by: INTERNAL MEDICINE

## 2025-07-25 RX ORDER — SPIRONOLACTONE 25 MG/1
25 TABLET ORAL DAILY
Qty: 90 TABLET | Refills: 3 | Status: SHIPPED | OUTPATIENT
Start: 2025-07-25

## 2025-07-25 NOTE — PROGRESS NOTES
OUTPATIENT CARDIOLOGY CONSULT    Name: Sammy Crystal    Age: 50 y.o.    Date of Service: 6/11/24    Reason for Consultation:  Chief Complaint   Patient presents with    6 Month Follow-Up     Pt states no current cardiac issues.        Referring Physician: Digna Oneill DO    History of Present Illness:  Sammy Crystal is a 50 y.o. female who presents today for follow  of dizziness and dyspnea.  She noticed exertional dyspnea for few couple of months and also gets winded on bending without orthopnea, PND.  She had some abdominal bloating and also early satiety without loss of appetite.  She has chronic lower extremity edema and uses compression stockings and takes intermittent lasix.  Has intermittent palpitations without any symptoms of heart racing.  She also noticed intermittent episodes of chest discomfort both at rest as well as with exertion.    PMH includes palpitations, sleep apnea, obesity, hypothyroidism on hormone replacement, hypertension, diabetes neuropathy with a history of type I diabetes, morbid obesity with a BMI of 45, former smoker quit in 2013, strong family history of premature CAD, her mother had a bypass surgery in her early 60s..  Father had atrial fibrillation.  She had a stress test in September 2018 which showed no perfusion defect with normal wall motion EF 70%.  Recently underwent laparoscopic cholecystectomy on 4/1/2024 without any perioperative cardiac events.    Last visit on 1/14/25, since last visit, she has not had any further hospitalizations or ER visits.  Denies any more chest pain, dyspnea.  She has obstructive sleep apnea and uses CPAP on a regular basis and compliant with CPAP use. Had 2 episodes of yeast infection.     Gets intermittent fluttering once a week, feels flip flops and no racing. Told me that she 2 yeast infections since she was started on Jardiance and also told me that she has type 1 DM and not type 2. But Jardiance made her breathing much

## 2025-07-25 NOTE — PROGRESS NOTES
Pt was seen today and a 14 day monitor was placed. Monitor was ordered by Dr. Newman. The monitor was applied. Instructions were given to the patient. Patient stated understanding and verbalized readback.    Monitor Company: [employer]   Serial #: [id number]  Monitor placed from: 7/25/2025 to 8/8/2025 @ 2:50 pm.    Betty Hodge [facility]

## 2025-07-25 NOTE — PATIENT INSTRUCTIONS
Coronary CTA results were reviewed and discussed with the patient.  Has moderate obstructive CAD RCA has 50 to 60% calcified proximal plaque in the LAD has 30 to 40% calcified plaque.  LCx has mild luminal irregularities.  CAC score of 121 with 98 percentile score.  Echo results reviewed, as above has normal LV systolic function diastolic function was indeterminate but LV global longitudinal strain was -18.8% indicating probably has LV normal diastolic function.  Will stop Jardiance due to 2 episodes of yeast infection and also has Type 1 DM, will add spironolactone 25 mg po daily for diastolic dysfunction, check BMP in one week.   Wi apply ZIO XT to evaluate palpitations.  Continue atorvastatin 20 mg p.o. daily due to recent increased liver functions.  Now her liver functions are back to baseline.LDL is well controlled, LDL 61  Recent lab work was reviewed, as above LDL and blood sugars are well-controlled  Continue metoprolol tartrate 25 mg p.o. twice daily for palpitations, ramipril 10 mg p.o. daily for hypertension  Management of diabetes and sleep apnea as per primary service  Weight loss management as per primary service.  Follow-up with me in 6 months.

## 2025-08-04 ENCOUNTER — PATIENT MESSAGE (OUTPATIENT)
Dept: ENDOCRINOLOGY | Age: 50
End: 2025-08-04

## 2025-08-04 DIAGNOSIS — E03.9 HYPOTHYROIDISM, UNSPECIFIED TYPE: ICD-10-CM

## 2025-08-04 DIAGNOSIS — E78.5 HYPERLIPIDEMIA, UNSPECIFIED HYPERLIPIDEMIA TYPE: ICD-10-CM

## 2025-08-04 DIAGNOSIS — E55.9 VITAMIN D DEFICIENCY: ICD-10-CM

## 2025-08-04 DIAGNOSIS — E10.65 TYPE 1 DIABETES MELLITUS WITH HYPERGLYCEMIA (HCC): Primary | ICD-10-CM

## 2025-08-18 ENCOUNTER — TELEPHONE (OUTPATIENT)
Dept: CARDIOLOGY CLINIC | Age: 50
End: 2025-08-18

## 2025-08-18 DIAGNOSIS — R00.2 PALPITATIONS: ICD-10-CM

## 2025-08-19 ENCOUNTER — TELEPHONE (OUTPATIENT)
Dept: ENDOCRINOLOGY | Age: 50
End: 2025-08-19

## 2025-08-29 ENCOUNTER — OFFICE VISIT (OUTPATIENT)
Dept: PODIATRY | Age: 50
End: 2025-08-29

## 2025-08-29 VITALS — HEART RATE: 62 BPM | OXYGEN SATURATION: 96 % | TEMPERATURE: 97.2 F

## 2025-08-29 DIAGNOSIS — B35.1 ONYCHOMYCOSIS: Primary | ICD-10-CM

## 2025-08-29 DIAGNOSIS — L60.0 OC (ONYCHOCRYPTOSIS): ICD-10-CM

## 2025-08-29 DIAGNOSIS — I87.2 VENOUS INSUFFICIENCY (CHRONIC) (PERIPHERAL): ICD-10-CM

## 2025-09-02 ENCOUNTER — CLINICAL DOCUMENTATION (OUTPATIENT)
Dept: PHARMACY | Facility: CLINIC | Age: 50
End: 2025-09-02

## 2025-09-02 RX ORDER — CELECOXIB 200 MG/1
200 CAPSULE ORAL DAILY
Qty: 90 CAPSULE | Refills: 0 | Status: SHIPPED | OUTPATIENT
Start: 2025-09-02

## 2025-09-02 RX ORDER — ATORVASTATIN CALCIUM 20 MG/1
20 TABLET, FILM COATED ORAL DAILY
Qty: 90 TABLET | Refills: 3 | Status: SHIPPED | OUTPATIENT
Start: 2025-09-02

## (undated) DEVICE — GOWN,SIRUS,FABRNF,XL,20/CS: Brand: MEDLINE

## (undated) DEVICE — CHLORAPREP 26ML ORANGE

## (undated) DEVICE — 1810 FOAM BLOCK NEEDLE COUNTER: Brand: DEVON

## (undated) DEVICE — COLUMN DRAPE

## (undated) DEVICE — TOWEL,OR,DSP,ST,BLUE,STD,6/PK,12PK/CS: Brand: MEDLINE

## (undated) DEVICE — SUTURE VCRL SZ 4-0 L27IN ABSRB VLT L17MM RB-1 1/2 CIR J304H

## (undated) DEVICE — MEDIUM-LARGE CLIP APPLIER: Brand: ENDOWRIST

## (undated) DEVICE — DRAPE,LAP,CHOLE,W/TROUGHS,STERILE: Brand: MEDLINE

## (undated) DEVICE — GLOVE SURG SZ 85 L12IN FNGR THK94MIL STD WHT LTX FREE

## (undated) DEVICE — NEEDLE HYPO 25GA L1.5IN BLU POLYPR HUB S STL REG BVL STR

## (undated) DEVICE — CAMERA STRYKER 1488

## (undated) DEVICE — ZIMMER® STERILE DISPOSABLE TOURNIQUET CUFF WITH PROTECTIVE SLEEVE AND PLC, DUAL PORT, SINGLE BLADDER, 18 IN. (46 CM)

## (undated) DEVICE — BLADE SAW SAG OSCIL LNG MED 9X31MM

## (undated) DEVICE — BASIC PACK: Brand: CONVERTORS

## (undated) DEVICE — SUTURE PDS II SZ 2-0 L27IN ABSRB VLT SH L26MM 1/2 CIR Z317H

## (undated) DEVICE — PROGRASP FORCEPS: Brand: ENDOWRIST

## (undated) DEVICE — BANDAGE,GAUZE,4.5"X4.1YD,STERILE,LF: Brand: MEDLINE

## (undated) DEVICE — STANDARD SURGICAL GOWN, L: Brand: CONVERTORS

## (undated) DEVICE — ARM DRAPE

## (undated) DEVICE — PADDING CAST W6INXL4YD NONSTERILE WHT SYN FBR NONABSORBENT

## (undated) DEVICE — KIT,ANTI FOG,W/SPONGE & FLUID,SOFT PACK: Brand: MEDLINE

## (undated) DEVICE — FORCE BIPOLAR: Brand: ENDOWRIST

## (undated) DEVICE — MONOPOLAR CURVED SCISSORS: Brand: ENDOWRIST

## (undated) DEVICE — WARMER SCP 2 ANTIFOG LAP DISP

## (undated) DEVICE — GAUZE,SPONGE,4"X4",16PLY,XRAY,STRL,LF: Brand: MEDLINE

## (undated) DEVICE — SYRINGE IRRIG 60ML SFT PLIABLE BLB EZ TO GRP 1 HND USE W/

## (undated) DEVICE — SUTURE NONABSORBABLE MONOFILAMENT 4-0 PS-2 18 IN BLK ETHILON 1667G

## (undated) DEVICE — DRESSING PETRO W3XL8IN OIL EMUL N ADH GZ KNIT IMPREG CELOS

## (undated) DEVICE — SMALL TEAR CROSS CUT RASP (11.0 X 5.0MM)

## (undated) DEVICE — GLOVE,SURG,SENSICARE,ALOE,LF,PF,7: Brand: MEDLINE

## (undated) DEVICE — PACK PROCEDURE SURG GEN CUST

## (undated) DEVICE — TRAY 0 DEG STRYKER 5MM/10MM LENS REUSABLE

## (undated) DEVICE — SOLUTION IRRIG 1000ML 09% SOD CHL USP PIC PLAS CONTAINER

## (undated) DEVICE — TIBURON EXTREMITY SHEET: Brand: CONVERTORS

## (undated) DEVICE — SUCTION IRRIGATOR: Brand: ENDOWRIST

## (undated) DEVICE — SYRINGE BLB 50CC IRRIG PLIABLE FNGR FLNG GRAD FLSK DISP

## (undated) DEVICE — NEEDLE HYPO 18GA L1.5IN PNK POLYPR HUB S STL THN WALL FILL

## (undated) DEVICE — BLADE,STAINLESS-STEEL,11,STRL,DISPOSABLE: Brand: MEDLINE

## (undated) DEVICE — BANDAGE COMPR W6INXL12FT SMOOTH FOR LIMB EXSANG ESMARCH

## (undated) DEVICE — HANDLE CVR PATENTED RETENTION DISC STRL LIGHT SHLD

## (undated) DEVICE — MEDI-VAC NON-CONDUCTIVE SUCTION TUBING: Brand: CARDINAL HEALTH

## (undated) DEVICE — PEN: MARKING STD 100/CS: Brand: MEDICAL ACTION INDUSTRIES

## (undated) DEVICE — PADDING,UNDERCAST,COTTON, 4"X4YD STERILE: Brand: MEDLINE

## (undated) DEVICE — INSTRUMENT CLAMP TOWEL LARGE REUSABLE

## (undated) DEVICE — SYRINGE 20ML LL S/C 50

## (undated) DEVICE — SEAL

## (undated) DEVICE — PATIENT RETURN ELECTRODE, SINGLE-USE, CONTACT QUALITY MONITORING, ADULT, WITH 9FT CORD, FOR PATIENTS WEIGING OVER 33LBS. (15KG): Brand: MEGADYNE

## (undated) DEVICE — SUTURE VCRL SZ 3-0 L27IN ABSRB VLT RB-1 L17MM 1/2 CIR J305H

## (undated) DEVICE — INSUFFLATION TUBING SET WITH FILTER, FUNNEL CONNECTOR AND LUER LOCK: Brand: JOSNOE MEDICAL INC

## (undated) DEVICE — INSUFFLATION NEEDLE TO ESTABLISH PNEUMOPERITONEUM.: Brand: INSUFFLATION NEEDLE

## (undated) DEVICE — BANDAGE COMPR W4INXL10YD WHITE/BEIGE E MTRX HK LOOP CLSR

## (undated) DEVICE — BANDAGE COMPR W4INXL5YD WHT BGE POLY COT M E WRP WV HK AND

## (undated) DEVICE — SPONGE LAP W18XL18IN WHT COT 4 PLY FLD STRUNG RADPQ DISP ST

## (undated) DEVICE — BLADE SAW LONG WIDE 34.5X16.5X.38MM

## (undated) DEVICE — APPLICATOR MEDICATED 26 CC SOLUTION HI LT ORNG CHLORAPREP

## (undated) DEVICE — Z CONVERTED USE 2275207 CLOTH PREP W7.5XL7.5IN 2% CHG SKIN ALC AND RNS FREE

## (undated) DEVICE — BANDAGE,GAUZE,BULKEE II,4.5"X4.1YD,STRL: Brand: MEDLINE

## (undated) DEVICE — GAUZE,SPONGE,4"X4",8PLY,STRL,LF,10/TRAY: Brand: MEDLINE

## (undated) DEVICE — INTENDED FOR TISSUE SEPARATION, AND OTHER PROCEDURES THAT REQUIRE A SHARP SURGICAL BLADE TO PUNCTURE OR CUT.: Brand: BARD-PARKER ® STAINLESS STEEL BLADES

## (undated) DEVICE — TIP COVER ACCESSORY

## (undated) DEVICE — BANDAGE COMPR W3INXL15FT BGE E SGL LAYERED CLP CLSR

## (undated) DEVICE — LIQUIBAND RAPID ADHESIVE 36/CS 0.8ML: Brand: MEDLINE

## (undated) DEVICE — DOUBLE BASIN SET: Brand: MEDLINE INDUSTRIES, INC.

## (undated) DEVICE — GAUZE,SPONGE,4"X4",16PLY,STRL,LF,10/TRAY: Brand: MEDLINE

## (undated) DEVICE — ELECTRODE PT RET AD L9FT HI MOIST COND ADH HYDRGEL CORDED

## (undated) DEVICE — BLADELESS OBTURATOR: Brand: WECK VISTA

## (undated) DEVICE — GLOVE SURG SZ 65 L12IN FNGR THK94MIL STD WHT LTX FREE

## (undated) DEVICE — TAPE CAST W4INXL4YD WHT RESIN FBRGLS H2O ACT TACK FREE

## (undated) DEVICE — SOLUTION IV IRRIG POUR BRL 0.9% SODIUM CHL 2F7124

## (undated) DEVICE — ANCHOR TISSUE RETRIEVAL SYSTEM, BAG SIZE 125 ML, PORT SIZE 8 MM: Brand: ANCHOR TISSUE RETRIEVAL SYSTEM

## (undated) DEVICE — 3M™ SYNTHETIC CAST STOCKINET, MS04, 4 INCH X 25 YARD (10.1CM X 22.8M), 1 ROLL/CASE: Brand: 3M™

## (undated) DEVICE — GLOVE SURG SZ 7 CRM LTX FREE POLYISOPRENE POLYMER BEAD ANTI